# Patient Record
Sex: MALE | Race: WHITE | Employment: OTHER | URBAN - METROPOLITAN AREA
[De-identification: names, ages, dates, MRNs, and addresses within clinical notes are randomized per-mention and may not be internally consistent; named-entity substitution may affect disease eponyms.]

---

## 2017-04-17 ENCOUNTER — HOSPITAL ENCOUNTER (OUTPATIENT)
Dept: OTHER | Age: 67
Discharge: HOME | End: 2017-04-17
Attending: INTERNAL MEDICINE | Admitting: INTERNAL MEDICINE

## 2017-04-17 LAB
A/G RATIO: 1.6 (ref 1.1–2.2)
ALBUMIN SERPL-MCNC: 3.9 G/DL (ref 3.4–5)
ALP BLD-CCNC: 97 U/L (ref 40–129)
ALT SERPL-CCNC: 11 U/L (ref 10–40)
ANION GAP SERPL CALCULATED.3IONS-SCNC: 18 MMOL/L (ref 3–16)
AST SERPL-CCNC: 25 U/L (ref 15–37)
BASOPHILS ABSOLUTE: 0.1 K/UL (ref 0–0.2)
BASOPHILS RELATIVE PERCENT: 1 %
BILIRUB SERPL-MCNC: 0.3 MG/DL (ref 0–1)
BUN BLDV-MCNC: 15 MG/DL (ref 7–20)
C-REACTIVE PROTEIN: 14.1 MG/L (ref 0–5.1)
CALCIUM SERPL-MCNC: 8.8 MG/DL (ref 8.3–10.6)
CHLORIDE BLD-SCNC: 101 MMOL/L (ref 99–110)
CO2: 22 MMOL/L (ref 21–32)
CREAT SERPL-MCNC: 0.7 MG/DL (ref 0.8–1.3)
EOSINOPHILS ABSOLUTE: 0.3 K/UL (ref 0–0.6)
EOSINOPHILS RELATIVE PERCENT: 5.4 %
GFR AFRICAN AMERICAN: >60
GFR NON-AFRICAN AMERICAN: >60
GLOBULIN: 2.5 G/DL
GLUCOSE BLD-MCNC: 90 MG/DL (ref 70–99)
HCT VFR BLD CALC: 29.5 % (ref 40.5–52.5)
HEMOGLOBIN: 9.3 G/DL (ref 13.5–17.5)
LYMPHOCYTES ABSOLUTE: 0.8 K/UL (ref 1–5.1)
LYMPHOCYTES RELATIVE PERCENT: 14.1 %
MCH RBC QN AUTO: 23 PG (ref 26–34)
MCHC RBC AUTO-ENTMCNC: 31.4 G/DL (ref 31–36)
MCV RBC AUTO: 73.3 FL (ref 80–100)
MONOCYTES ABSOLUTE: 0.3 K/UL (ref 0–1.3)
MONOCYTES RELATIVE PERCENT: 6 %
NEUTROPHILS ABSOLUTE: 4 K/UL (ref 1.7–7.7)
NEUTROPHILS RELATIVE PERCENT: 73.5 %
PDW BLD-RTO: 17.8 % (ref 12.4–15.4)
PLATELET # BLD: 260 K/UL (ref 135–450)
PMV BLD AUTO: 8.1 FL (ref 5–10.5)
POTASSIUM SERPL-SCNC: 3.8 MMOL/L (ref 3.5–5.1)
RBC # BLD: 4.02 M/UL (ref 4.2–5.9)
SEDIMENTATION RATE, ERYTHROCYTE: 38 MM/HR (ref 0–20)
SODIUM BLD-SCNC: 141 MMOL/L (ref 136–145)
TOTAL PROTEIN: 6.4 G/DL (ref 6.4–8.2)
WBC # BLD: 5.4 K/UL (ref 4–11)

## 2017-04-25 ENCOUNTER — HOSPITAL ENCOUNTER (OUTPATIENT)
Dept: OTHER | Age: 67
Discharge: HOME | End: 2017-04-25
Attending: INTERNAL MEDICINE | Admitting: INTERNAL MEDICINE

## 2017-04-25 LAB
A/G RATIO: 1.8 (ref 1.1–2.2)
ALBUMIN SERPL-MCNC: 3.9 G/DL (ref 3.4–5)
ALP BLD-CCNC: 108 U/L (ref 40–129)
ALT SERPL-CCNC: <5 U/L (ref 10–40)
ANION GAP SERPL CALCULATED.3IONS-SCNC: 16 MMOL/L (ref 3–16)
AST SERPL-CCNC: 19 U/L (ref 15–37)
BASOPHILS ABSOLUTE: 0.1 K/UL (ref 0–0.2)
BASOPHILS RELATIVE PERCENT: 1 %
BILIRUB SERPL-MCNC: <0.2 MG/DL (ref 0–1)
BUN BLDV-MCNC: 11 MG/DL (ref 7–20)
C-REACTIVE PROTEIN: 11.1 MG/L (ref 0–5.1)
CALCIUM SERPL-MCNC: 8.4 MG/DL (ref 8.3–10.6)
CHLORIDE BLD-SCNC: 104 MMOL/L (ref 99–110)
CO2: 22 MMOL/L (ref 21–32)
CREAT SERPL-MCNC: 0.7 MG/DL (ref 0.8–1.3)
EOSINOPHILS ABSOLUTE: 0.3 K/UL (ref 0–0.6)
EOSINOPHILS RELATIVE PERCENT: 5.4 %
GFR AFRICAN AMERICAN: >60
GFR NON-AFRICAN AMERICAN: >60
GLOBULIN: 2.2 G/DL
GLUCOSE BLD-MCNC: 129 MG/DL (ref 70–99)
HCT VFR BLD CALC: 27.9 % (ref 40.5–52.5)
HEMOGLOBIN: 8.7 G/DL (ref 13.5–17.5)
LYMPHOCYTES ABSOLUTE: 0.5 K/UL (ref 1–5.1)
LYMPHOCYTES RELATIVE PERCENT: 10.7 %
MCH RBC QN AUTO: 22.4 PG (ref 26–34)
MCHC RBC AUTO-ENTMCNC: 31.3 G/DL (ref 31–36)
MCV RBC AUTO: 71.7 FL (ref 80–100)
MONOCYTES ABSOLUTE: 0.4 K/UL (ref 0–1.3)
MONOCYTES RELATIVE PERCENT: 8 %
NEUTROPHILS ABSOLUTE: 3.8 K/UL (ref 1.7–7.7)
NEUTROPHILS RELATIVE PERCENT: 74.9 %
PDW BLD-RTO: 17.3 % (ref 12.4–15.4)
PLATELET # BLD: 266 K/UL (ref 135–450)
PMV BLD AUTO: 7.8 FL (ref 5–10.5)
POTASSIUM SERPL-SCNC: 3.7 MMOL/L (ref 3.5–5.1)
RBC # BLD: 3.89 M/UL (ref 4.2–5.9)
SEDIMENTATION RATE, ERYTHROCYTE: 34 MM/HR (ref 0–20)
SODIUM BLD-SCNC: 142 MMOL/L (ref 136–145)
TOTAL PROTEIN: 6.1 G/DL (ref 6.4–8.2)
WBC # BLD: 5.1 K/UL (ref 4–11)

## 2017-12-22 ENCOUNTER — ORTHOTIC/BRACE ENCOUNTER (OUTPATIENT)
Dept: ORTHOPEDIC SURGERY | Age: 67
End: 2017-12-22

## 2018-02-16 ENCOUNTER — ORTHOTIC/BRACE ENCOUNTER (OUTPATIENT)
Dept: ORTHOPEDIC SURGERY | Age: 68
End: 2018-02-16

## 2019-04-30 ENCOUNTER — OFFICE VISIT (OUTPATIENT)
Dept: SURGERY | Age: 69
End: 2019-04-30
Payer: MEDICARE

## 2019-04-30 VITALS
BODY MASS INDEX: 30.71 KG/M2 | WEIGHT: 260.1 LBS | HEIGHT: 77 IN | SYSTOLIC BLOOD PRESSURE: 136 MMHG | DIASTOLIC BLOOD PRESSURE: 82 MMHG

## 2019-04-30 DIAGNOSIS — Z12.11 ENCOUNTER FOR SCREENING COLONOSCOPY: Primary | ICD-10-CM

## 2019-04-30 DIAGNOSIS — K80.20 ASYMPTOMATIC CHOLELITHIASIS: ICD-10-CM

## 2019-04-30 PROCEDURE — 1123F ACP DISCUSS/DSCN MKR DOCD: CPT | Performed by: SURGERY

## 2019-04-30 PROCEDURE — 99213 OFFICE O/P EST LOW 20 MIN: CPT | Performed by: SURGERY

## 2019-04-30 PROCEDURE — 3017F COLORECTAL CA SCREEN DOC REV: CPT | Performed by: SURGERY

## 2019-04-30 PROCEDURE — 4040F PNEUMOC VAC/ADMIN/RCVD: CPT | Performed by: SURGERY

## 2019-04-30 PROCEDURE — 1036F TOBACCO NON-USER: CPT | Performed by: SURGERY

## 2019-04-30 PROCEDURE — G8427 DOCREV CUR MEDS BY ELIG CLIN: HCPCS | Performed by: SURGERY

## 2019-04-30 PROCEDURE — G8417 CALC BMI ABV UP PARAM F/U: HCPCS | Performed by: SURGERY

## 2019-04-30 RX ORDER — AMOXICILLIN 500 MG/1
500 CAPSULE ORAL 2 TIMES DAILY
Status: ON HOLD | COMMUNITY
End: 2019-07-12 | Stop reason: HOSPADM

## 2019-04-30 RX ORDER — VALSARTAN AND HYDROCHLOROTHIAZIDE 320; 25 MG/1; MG/1
1 TABLET, FILM COATED ORAL
COMMUNITY
Start: 2019-03-23 | End: 2019-07-03 | Stop reason: ALTCHOICE

## 2019-04-30 NOTE — PROGRESS NOTES
[address] [facility] Darrol Every T[address] [facility], [address] [facility], [address] [facility], [address]  Phone: [phone]  Fax: [phone]    Jitendra Bliss   YOB: 1950    Date of Visit:  4/30/2019    Eufemia Almodovar PA-C    HPI:     Gallbladder: Patient well-known to me from prior surgeries - sigmoidectomy/RIH repair, and then [facility] repair, in 2015, has been recently c/o generalized abdominal bloating, nausea and vague discomfort. Denies specific RUQ/epigastric pain. Also has noted some difficulties with having bowel movements - has to strain for a while and then usually does have \"blowout\" movement. Denies N/V, fever, chills, dark urine, jaundice. Has had known gallstones since his prior abdominal surgeries but never w/ any obvious biliary colic. He has never had a colonoscopy even with his diverticular intervention in 2015 (he refused). Denies anorexia, weight loss, increasing fatigue. Allergies   Allergen Reactions    Ace Inhibitors      COUGH    Amlodipine Besylate      Lower extremity edema    Avelox [Moxifloxacin] Other (See Comments)     Hallucination    Clonidine Derivatives      FATIGUE    Flagyl [Metronidazole] Nausea Only     Stomach cramps    Oxycontin [Oxycodone]      Pt has never had reaction but adamantly refuses to take this med     Outpatient Medications Marked as Taking for the 4/30/19 encounter (Office Visit) with Kerwin Winter MD   Medication Sig Dispense Refill    valsartan-hydrochlorothiazide (DIOVAN-HCT) 320-25 MG per tablet Take 1 tablet by mouth      levothyroxine (SYNTHROID) 125 MCG tablet Take 250 mcg by mouth Daily.  Multiple Vitamins-Minerals (THERAPEUTIC MULTIVITAMIN-MINERALS) tablet Take 1 tablet by mouth daily.  Flaxseed, Linseed, (FLAXSEED OIL) 1000 MG CAPS Take 1 tablet by mouth daily.  allopurinol (ZYLOPRIM) 300 MG tablet Take 300 mg by mouth daily.         meloxicam (MOBIC) 15 MG tablet Take 15 mg by mouth daily. Past Medical History:   Diagnosis Date    Arthritis     Cancer ([address]     SKIN    Hypertension     Perforated diverticulitis     Thyroid disease     HYPOTHROID     Past Surgical History:   Procedure Laterality Date    ABDOMINAL ADHESION SURGERY  02/05/15    debridement of colcotaneous fistula    ABDOMINAL EXPLORATION SURGERY  02/05/15    APPENDECTOMY      INGUINAL HERNIA REPAIR Right 02/05/15    INGUINAL HERNIA REPAIR Left 04/22/15    left inguinal hernia repair with mesh    JOINT REPLACEMENT      KNEE SURGERY  11/29/2012    right total knee replacement    SMALL INTESTINE SURGERY  [id number]     Family History   Problem Relation Age of Onset    High Blood Pressure Mother     Other Father         MYASTHENIA GRAVIS     Social History     Socioeconomic History    Marital status: Single     Spouse name: Not on file    Number of children: Not on file    Years of education: Not on file    Highest education level: Not on file   Occupational History    Not on file   Social Needs    Financial resource strain: Not on file    Food insecurity:     Worry: Not on file     Inability: Not on file    Transportation needs:     Medical: Not on file     Non-medical: Not on file   Tobacco Use    Smoking status: Never Smoker    Smokeless tobacco: Never Used   Substance and Sexual Activity    Alcohol use:  Yes     Alcohol/week: 14.4 oz     Types: 24 Cans of beer per week    Drug use: No    Sexual activity: Not on file   Lifestyle    Physical activity:     Days per week: Not on file     Minutes per session: Not on file    Stress: Not on file   Relationships    Social connections:     Talks on phone: Not on file     Gets together: Not on file     Attends Shinto service: Not on file     Active member of club or organization: Not on file     Attends meetings of clubs or organizations: Not on file     Relationship status: Not on file    Intimate partner violence:     Fear of current or ex partner: Not on file     Emotionally abused: Not on file     Physically abused: Not on file     Forced sexual activity: Not on file   Other Topics Concern    Not on file   Social History Narrative    Not on file          Vitals:    04/30/19 1342   BP: 136/82   Site: Left Upper Arm   Position: Sitting   Cuff Size: Large Adult   Weight: 260 lb 1.6 oz (118 kg)   Height: 6' 5.01\" (1.956 m)     Body mass index is 30.84 kg/m². Wt Readings from Last 3 Encounters:   04/30/19 260 lb 1.6 oz (118 kg)   09/03/15 242 lb (109.8 kg)   08/27/15 242 lb 1 oz (109.8 kg)     BP Readings from Last 3 Encounters:   04/30/19 136/82   08/27/15 130/85   08/20/15 134/73          REVIEW OF SYSTEMS:   · All other systems reviewed; please refer to HPI with pertinent positives, all other ROS are negative    PHYSICAL EXAM:    CONSTITUTIONAL:  awake, alert, no apparent distress and normal weight  ENT:  normocepalic, without obvious abnormality  NECK:  supple, symmetrical, trachea midline   LUNGS:  Resp easy and unlabored  CARDIOVASCULAR:     ABDOMEN:  scars noted lower midline scar, soft, non-distended, non-tender, involuntary guarding absent, and possible periumbilical hernia vs rectus diastasis  MUSCULOSKELETAL: No edema  NEUROLOGIC:  Mental Status Exam:  Level of Alertness:   awake  Orientation:   person, place, time        DATA:  IMPRESSION:    1. Cholelithiasis. 2. Stable adenopathy. Signed By: Kandi Rojas DO   Result Narrative   EXAM: CT ABDOMEN AND PELVIS WITH CONTRAST    INDICATION: abd pain and bloating, Pain    COMPARISON: None. TECHNIQUE: Axial CT imaging obtained from lung bases through pelvis. Axial images and multiplanar reformatted images are provided for review.   Up-to-date CT equipment and radiation dose reduction techniques were employed. IV Contrast: 100 cc Omnipaque 350  Oral Contrast: Yes. FINDINGS:    LUNG BASES: Clear.     LIVER: Normal.    GALLBLADDER AND BILIARY TREE: Cholelithiasis.  No

## 2019-07-03 RX ORDER — CLOTRIMAZOLE 1 %
CREAM (GRAM) TOPICAL
COMMUNITY
End: 2019-09-27

## 2019-07-03 RX ORDER — DOXYCYCLINE HYCLATE 100 MG
100 TABLET ORAL DAILY
Status: ON HOLD | COMMUNITY
Start: 2019-05-08 | End: 2019-07-12 | Stop reason: HOSPADM

## 2019-07-03 RX ORDER — VALSARTAN AND HYDROCHLOROTHIAZIDE 320; 25 MG/1; MG/1
1 TABLET, FILM COATED ORAL DAILY
COMMUNITY

## 2019-07-05 ENCOUNTER — HOSPITAL ENCOUNTER (OUTPATIENT)
Dept: PREADMISSION TESTING | Age: 69
Setting detail: SURGERY ADMIT
Discharge: HOME | DRG: 465 | End: 2019-07-09
Payer: MEDICARE

## 2019-07-05 LAB
ABO/RH: NORMAL
ALBUMIN SERPL-MCNC: 4.3 G/DL (ref 3.4–5)
ANION GAP SERPL CALCULATED.3IONS-SCNC: 11 MMOL/L (ref 3–16)
ANTIBODY SCREEN: NORMAL
APTT: 30.4 SEC (ref 26–36)
BASOPHILS ABSOLUTE: 0.1 K/UL (ref 0–0.2)
BASOPHILS RELATIVE PERCENT: 0.7 %
BILIRUBIN URINE: NEGATIVE
BLOOD, URINE: NEGATIVE
BUN BLDV-MCNC: 15 MG/DL (ref 7–20)
CALCIUM SERPL-MCNC: 9.7 MG/DL (ref 8.3–10.6)
CHLORIDE BLD-SCNC: 105 MMOL/L (ref 99–110)
CLARITY: CLEAR
CO2: 25 MMOL/L (ref 21–32)
COLOR: YELLOW
CREAT SERPL-MCNC: 0.8 MG/DL (ref 0.8–1.3)
EKG ATRIAL RATE: 62 BPM
EKG DIAGNOSIS: NORMAL
EKG P AXIS: 72 DEGREES
EKG P-R INTERVAL: 174 MS
EKG Q-T INTERVAL: 430 MS
EKG QRS DURATION: 94 MS
EKG QTC CALCULATION (BAZETT): 436 MS
EKG R AXIS: 20 DEGREES
EKG T AXIS: 1 DEGREES
EKG VENTRICULAR RATE: 62 BPM
EOSINOPHILS ABSOLUTE: 0.1 K/UL (ref 0–0.6)
EOSINOPHILS RELATIVE PERCENT: 1.8 %
ESTIMATED AVERAGE GLUCOSE: 119.8 MG/DL
GFR AFRICAN AMERICAN: >60
GFR NON-AFRICAN AMERICAN: >60
GLUCOSE BLD-MCNC: 107 MG/DL (ref 70–99)
GLUCOSE URINE: NEGATIVE MG/DL
HBA1C MFR BLD: 5.8 %
HCT VFR BLD CALC: 40.4 % (ref 40.5–52.5)
HEMOGLOBIN: 13.4 G/DL (ref 13.5–17.5)
INR BLD: 1 (ref 0.86–1.14)
KETONES, URINE: NEGATIVE MG/DL
LEUKOCYTE ESTERASE, URINE: NEGATIVE
LYMPHOCYTES ABSOLUTE: 0.7 K/UL (ref 1–5.1)
LYMPHOCYTES RELATIVE PERCENT: 8.6 %
MCH RBC QN AUTO: 28.6 PG (ref 26–34)
MCHC RBC AUTO-ENTMCNC: 33.3 G/DL (ref 31–36)
MCV RBC AUTO: 86 FL (ref 80–100)
MICROSCOPIC EXAMINATION: NORMAL
MONOCYTES ABSOLUTE: 0.5 K/UL (ref 0–1.3)
MONOCYTES RELATIVE PERCENT: 6.5 %
NEUTROPHILS ABSOLUTE: 6.3 K/UL (ref 1.7–7.7)
NEUTROPHILS RELATIVE PERCENT: 82.4 %
NITRITE, URINE: NEGATIVE
PDW BLD-RTO: 15.6 % (ref 12.4–15.4)
PH UA: 6 (ref 5–8)
PLATELET # BLD: 278 K/UL (ref 135–450)
PMV BLD AUTO: 7.7 FL (ref 5–10.5)
POTASSIUM SERPL-SCNC: 3.9 MMOL/L (ref 3.5–5.1)
PROTEIN UA: NEGATIVE MG/DL
PROTHROMBIN TIME: 11.4 SEC (ref 9.8–13)
RBC # BLD: 4.7 M/UL (ref 4.2–5.9)
SODIUM BLD-SCNC: 141 MMOL/L (ref 136–145)
SPECIFIC GRAVITY UA: 1.01 (ref 1–1.03)
URINE TYPE: NORMAL
UROBILINOGEN, URINE: 0.2 E.U./DL
WBC # BLD: 7.6 K/UL (ref 4–11)

## 2019-07-05 PROCEDURE — 85025 COMPLETE CBC W/AUTO DIFF WBC: CPT

## 2019-07-05 PROCEDURE — 80048 BASIC METABOLIC PNL TOTAL CA: CPT

## 2019-07-05 PROCEDURE — 85730 THROMBOPLASTIN TIME PARTIAL: CPT

## 2019-07-05 PROCEDURE — 86850 RBC ANTIBODY SCREEN: CPT

## 2019-07-05 PROCEDURE — 36415 COLL VENOUS BLD VENIPUNCTURE: CPT

## 2019-07-05 PROCEDURE — 82040 ASSAY OF SERUM ALBUMIN: CPT

## 2019-07-05 PROCEDURE — 85610 PROTHROMBIN TIME: CPT

## 2019-07-05 PROCEDURE — 81003 URINALYSIS AUTO W/O SCOPE: CPT

## 2019-07-05 PROCEDURE — 86901 BLOOD TYPING SEROLOGIC RH(D): CPT

## 2019-07-05 PROCEDURE — 83036 HEMOGLOBIN GLYCOSYLATED A1C: CPT

## 2019-07-05 PROCEDURE — 87086 URINE CULTURE/COLONY COUNT: CPT

## 2019-07-05 PROCEDURE — 86900 BLOOD TYPING SEROLOGIC ABO: CPT

## 2019-07-06 LAB — URINE CULTURE, ROUTINE: NORMAL

## 2019-07-07 ENCOUNTER — ANESTHESIA EVENT (OUTPATIENT)
Dept: OPERATING ROOM | Age: 69
DRG: 465 | End: 2019-07-07
Payer: MEDICARE

## 2019-07-07 NOTE — ANESTHESIA PRE PROCEDURE
Endo/Other:    (+) hypothyroidism: arthritis: OA., .    (-) diabetes mellitus                ROS comment: S/P Thyroid ablation for hyperthyroidism >20 yrs ago. On Synthroid now Abdominal:           Vascular:     - DVT and PE. Anesthesia Plan      general     ASA 2     (FN Block(s) for post-op pain management per surgeon request.)  Induction: intravenous. MIPS: Prophylactic antiemetics administered. Anesthetic plan and risks discussed with patient. Plan discussed with CRNA.             Han Ortiz MD

## 2019-07-08 ENCOUNTER — HOSPITAL ENCOUNTER (INPATIENT)
Age: 69
LOS: 4 days | Discharge: HOME HEALTH | DRG: 465 | End: 2019-07-12
Attending: ORTHOPAEDIC SURGERY | Admitting: ORTHOPAEDIC SURGERY
Payer: MEDICARE

## 2019-07-08 ENCOUNTER — ANESTHESIA (OUTPATIENT)
Dept: OPERATING ROOM | Age: 69
DRG: 465 | End: 2019-07-08
Payer: MEDICARE

## 2019-07-08 VITALS
SYSTOLIC BLOOD PRESSURE: 101 MMHG | OXYGEN SATURATION: 99 % | RESPIRATION RATE: 14 BRPM | DIASTOLIC BLOOD PRESSURE: 63 MMHG

## 2019-07-08 DIAGNOSIS — T84.7XXS INFECTION/INFLAMMATION DUE TO INTERNAL ORTHOPEDIC DEVICE/IMPLANT/GRAFT, SEQUELA: ICD-10-CM

## 2019-07-08 DIAGNOSIS — T84.53XA INFECTION ASSOCIATED WITH INTERNAL RIGHT KNEE PROSTHESIS, INITIAL ENCOUNTER (HCC): Primary | ICD-10-CM

## 2019-07-08 PROBLEM — Z87.39 HISTORY OF INFECTION OF TOTAL JOINT PROSTHESIS OF KNEE: Status: ACTIVE | Noted: 2019-07-08

## 2019-07-08 PROBLEM — E66.9 OBESITY: Status: ACTIVE | Noted: 2019-07-08

## 2019-07-08 PROBLEM — I10 HYPERTENSION: Status: ACTIVE | Noted: 2019-07-08

## 2019-07-08 LAB
ABO/RH: NORMAL
ANTIBODY SCREEN: NORMAL

## 2019-07-08 PROCEDURE — 3700000000 HC ANESTHESIA ATTENDED CARE: Performed by: ORTHOPAEDIC SURGERY

## 2019-07-08 PROCEDURE — 87075 CULTR BACTERIA EXCEPT BLOOD: CPT

## 2019-07-08 PROCEDURE — 1200000000 HC SEMI PRIVATE

## 2019-07-08 PROCEDURE — 2580000003 HC RX 258: Performed by: ORTHOPAEDIC SURGERY

## 2019-07-08 PROCEDURE — 36415 COLL VENOUS BLD VENIPUNCTURE: CPT

## 2019-07-08 PROCEDURE — 6370000000 HC RX 637 (ALT 250 FOR IP): Performed by: PHYSICIAN ASSISTANT

## 2019-07-08 PROCEDURE — 64447 NJX AA&/STRD FEMORAL NRV IMG: CPT | Performed by: ANESTHESIOLOGY

## 2019-07-08 PROCEDURE — 2700000000 HC OXYGEN THERAPY PER DAY

## 2019-07-08 PROCEDURE — 0SBC0ZZ EXCISION OF RIGHT KNEE JOINT, OPEN APPROACH: ICD-10-PCS | Performed by: ORTHOPAEDIC SURGERY

## 2019-07-08 PROCEDURE — 3600000015 HC SURGERY LEVEL 5 ADDTL 15MIN: Performed by: ORTHOPAEDIC SURGERY

## 2019-07-08 PROCEDURE — C1713 ANCHOR/SCREW BN/BN,TIS/BN: HCPCS | Performed by: ORTHOPAEDIC SURGERY

## 2019-07-08 PROCEDURE — L1830 KO IMMOB CANVAS LONG PRE OTS: HCPCS | Performed by: ORTHOPAEDIC SURGERY

## 2019-07-08 PROCEDURE — 0SPC0JZ REMOVAL OF SYNTHETIC SUBSTITUTE FROM RIGHT KNEE JOINT, OPEN APPROACH: ICD-10-PCS | Performed by: ORTHOPAEDIC SURGERY

## 2019-07-08 PROCEDURE — 86901 BLOOD TYPING SEROLOGIC RH(D): CPT

## 2019-07-08 PROCEDURE — 7100000000 HC PACU RECOVERY - FIRST 15 MIN: Performed by: ORTHOPAEDIC SURGERY

## 2019-07-08 PROCEDURE — 87077 CULTURE AEROBIC IDENTIFY: CPT

## 2019-07-08 PROCEDURE — 2500000003 HC RX 250 WO HCPCS: Performed by: ORTHOPAEDIC SURGERY

## 2019-07-08 PROCEDURE — 94761 N-INVAS EAR/PLS OXIMETRY MLT: CPT

## 2019-07-08 PROCEDURE — 3600000005 HC SURGERY LEVEL 5 BASE: Performed by: ORTHOPAEDIC SURGERY

## 2019-07-08 PROCEDURE — 2500000003 HC RX 250 WO HCPCS: Performed by: NURSE ANESTHETIST, CERTIFIED REGISTERED

## 2019-07-08 PROCEDURE — 87186 SC STD MICRODIL/AGAR DIL: CPT

## 2019-07-08 PROCEDURE — 6360000002 HC RX W HCPCS: Performed by: ORTHOPAEDIC SURGERY

## 2019-07-08 PROCEDURE — 6360000002 HC RX W HCPCS: Performed by: NURSE ANESTHETIST, CERTIFIED REGISTERED

## 2019-07-08 PROCEDURE — 2580000003 HC RX 258: Performed by: ANESTHESIOLOGY

## 2019-07-08 PROCEDURE — 2709999900 HC NON-CHARGEABLE SUPPLY: Performed by: ORTHOPAEDIC SURGERY

## 2019-07-08 PROCEDURE — 6370000000 HC RX 637 (ALT 250 FOR IP): Performed by: ORTHOPAEDIC SURGERY

## 2019-07-08 PROCEDURE — 0QBB0ZZ EXCISION OF RIGHT LOWER FEMUR, OPEN APPROACH: ICD-10-PCS | Performed by: ORTHOPAEDIC SURGERY

## 2019-07-08 PROCEDURE — 3700000001 HC ADD 15 MINUTES (ANESTHESIA): Performed by: ORTHOPAEDIC SURGERY

## 2019-07-08 PROCEDURE — 86900 BLOOD TYPING SEROLOGIC ABO: CPT

## 2019-07-08 PROCEDURE — 86850 RBC ANTIBODY SCREEN: CPT

## 2019-07-08 PROCEDURE — 0SHC08Z INSERTION OF SPACER INTO RIGHT KNEE JOINT, OPEN APPROACH: ICD-10-PCS | Performed by: ORTHOPAEDIC SURGERY

## 2019-07-08 PROCEDURE — 87205 SMEAR GRAM STAIN: CPT

## 2019-07-08 PROCEDURE — 6370000000 HC RX 637 (ALT 250 FOR IP): Performed by: ANESTHESIOLOGY

## 2019-07-08 PROCEDURE — 87070 CULTURE OTHR SPECIMN AEROBIC: CPT

## 2019-07-08 PROCEDURE — 7100000001 HC PACU RECOVERY - ADDTL 15 MIN: Performed by: ORTHOPAEDIC SURGERY

## 2019-07-08 DEVICE — CEMENT BNE 20ML 41GM FULL DOSE PMMA W/ TOBRA M VISC RADPQ: Type: IMPLANTABLE DEVICE | Status: FUNCTIONAL

## 2019-07-08 DEVICE — CEMENT BNE RADIOPAQUE SIMPLEX P SPEEDDET: Type: IMPLANTABLE DEVICE | Status: FUNCTIONAL

## 2019-07-08 RX ORDER — DEXTROSE, SODIUM CHLORIDE, AND POTASSIUM CHLORIDE 5; .45; .15 G/100ML; G/100ML; G/100ML
1000 INJECTION INTRAVENOUS CONTINUOUS
Status: DISPENSED | OUTPATIENT
Start: 2019-07-08 | End: 2019-07-09

## 2019-07-08 RX ORDER — HYDROCHLOROTHIAZIDE 25 MG/1
25 TABLET ORAL DAILY
Status: DISCONTINUED | OUTPATIENT
Start: 2019-07-09 | End: 2019-07-12 | Stop reason: HOSPADM

## 2019-07-08 RX ORDER — GLYCOPYRROLATE 0.2 MG/ML
INJECTION INTRAMUSCULAR; INTRAVENOUS PRN
Status: DISCONTINUED | OUTPATIENT
Start: 2019-07-08 | End: 2019-07-08 | Stop reason: SDUPTHER

## 2019-07-08 RX ORDER — ACETAMINOPHEN 500 MG
1000 TABLET ORAL ONCE
Status: COMPLETED | OUTPATIENT
Start: 2019-07-08 | End: 2019-07-08

## 2019-07-08 RX ORDER — HYDROCODONE BITARTRATE AND ACETAMINOPHEN 5; 325 MG/1; MG/1
1 TABLET ORAL EVERY 4 HOURS PRN
Status: DISCONTINUED | OUTPATIENT
Start: 2019-07-08 | End: 2019-07-12 | Stop reason: HOSPADM

## 2019-07-08 RX ORDER — CELECOXIB 100 MG/1
200 CAPSULE ORAL ONCE
Status: COMPLETED | OUTPATIENT
Start: 2019-07-08 | End: 2019-07-08

## 2019-07-08 RX ORDER — MEPERIDINE HYDROCHLORIDE 50 MG/ML
12.5 INJECTION INTRAMUSCULAR; INTRAVENOUS; SUBCUTANEOUS EVERY 5 MIN PRN
Status: DISCONTINUED | OUTPATIENT
Start: 2019-07-08 | End: 2019-07-08 | Stop reason: HOSPADM

## 2019-07-08 RX ORDER — DOCUSATE SODIUM 100 MG/1
100 CAPSULE, LIQUID FILLED ORAL 2 TIMES DAILY
Status: DISCONTINUED | OUTPATIENT
Start: 2019-07-08 | End: 2019-07-12 | Stop reason: HOSPADM

## 2019-07-08 RX ORDER — ONDANSETRON 2 MG/ML
4 INJECTION INTRAMUSCULAR; INTRAVENOUS
Status: DISCONTINUED | OUTPATIENT
Start: 2019-07-08 | End: 2019-07-08 | Stop reason: HOSPADM

## 2019-07-08 RX ORDER — SODIUM CHLORIDE 0.9 % (FLUSH) 0.9 %
10 SYRINGE (ML) INJECTION PRN
Status: DISCONTINUED | OUTPATIENT
Start: 2019-07-08 | End: 2019-07-08 | Stop reason: HOSPADM

## 2019-07-08 RX ORDER — SODIUM CHLORIDE, SODIUM LACTATE, POTASSIUM CHLORIDE, CALCIUM CHLORIDE 600; 310; 30; 20 MG/100ML; MG/100ML; MG/100ML; MG/100ML
INJECTION, SOLUTION INTRAVENOUS CONTINUOUS
Status: DISCONTINUED | OUTPATIENT
Start: 2019-07-08 | End: 2019-07-08

## 2019-07-08 RX ORDER — HYDROCODONE BITARTRATE AND ACETAMINOPHEN 5; 325 MG/1; MG/1
2 TABLET ORAL EVERY 4 HOURS PRN
Status: DISCONTINUED | OUTPATIENT
Start: 2019-07-08 | End: 2019-07-12 | Stop reason: HOSPADM

## 2019-07-08 RX ORDER — PROMETHAZINE HYDROCHLORIDE 25 MG/ML
6.25 INJECTION, SOLUTION INTRAMUSCULAR; INTRAVENOUS
Status: DISCONTINUED | OUTPATIENT
Start: 2019-07-08 | End: 2019-07-08 | Stop reason: HOSPADM

## 2019-07-08 RX ORDER — LIDOCAINE HYDROCHLORIDE 20 MG/ML
INJECTION, SOLUTION EPIDURAL; INFILTRATION; INTRACAUDAL; PERINEURAL PRN
Status: DISCONTINUED | OUTPATIENT
Start: 2019-07-08 | End: 2019-07-08 | Stop reason: SDUPTHER

## 2019-07-08 RX ORDER — VALSARTAN AND HYDROCHLOROTHIAZIDE 320; 25 MG/1; MG/1
1 TABLET, FILM COATED ORAL DAILY
Status: DISCONTINUED | OUTPATIENT
Start: 2019-07-08 | End: 2019-07-08 | Stop reason: SDUPTHER

## 2019-07-08 RX ORDER — SODIUM CHLORIDE 0.9 % (FLUSH) 0.9 %
10 SYRINGE (ML) INJECTION EVERY 12 HOURS SCHEDULED
Status: DISCONTINUED | OUTPATIENT
Start: 2019-07-08 | End: 2019-07-08 | Stop reason: HOSPADM

## 2019-07-08 RX ORDER — SODIUM CHLORIDE 0.9 % (FLUSH) 0.9 %
10 SYRINGE (ML) INJECTION EVERY 12 HOURS SCHEDULED
Status: DISCONTINUED | OUTPATIENT
Start: 2019-07-08 | End: 2019-07-12 | Stop reason: HOSPADM

## 2019-07-08 RX ORDER — OXYCODONE HYDROCHLORIDE 5 MG/1
5 TABLET ORAL EVERY 4 HOURS PRN
Status: DISCONTINUED | OUTPATIENT
Start: 2019-07-08 | End: 2019-07-08 | Stop reason: ALTCHOICE

## 2019-07-08 RX ORDER — VANCOMYCIN HYDROCHLORIDE 1 G/20ML
INJECTION, POWDER, LYOPHILIZED, FOR SOLUTION INTRAVENOUS PRN
Status: DISCONTINUED | OUTPATIENT
Start: 2019-07-08 | End: 2019-07-08 | Stop reason: HOSPADM

## 2019-07-08 RX ORDER — LEVOTHYROXINE SODIUM 0.1 MG/1
200 TABLET ORAL DAILY
Status: DISCONTINUED | OUTPATIENT
Start: 2019-07-09 | End: 2019-07-12 | Stop reason: HOSPADM

## 2019-07-08 RX ORDER — DEXAMETHASONE SODIUM PHOSPHATE 4 MG/ML
INJECTION, SOLUTION INTRA-ARTICULAR; INTRALESIONAL; INTRAMUSCULAR; INTRAVENOUS; SOFT TISSUE PRN
Status: DISCONTINUED | OUTPATIENT
Start: 2019-07-08 | End: 2019-07-08 | Stop reason: SDUPTHER

## 2019-07-08 RX ORDER — ALLOPURINOL 300 MG/1
300 TABLET ORAL DAILY
Status: DISCONTINUED | OUTPATIENT
Start: 2019-07-09 | End: 2019-07-12 | Stop reason: HOSPADM

## 2019-07-08 RX ORDER — LIDOCAINE HYDROCHLORIDE 10 MG/ML
1 INJECTION, SOLUTION EPIDURAL; INFILTRATION; INTRACAUDAL; PERINEURAL
Status: DISCONTINUED | OUTPATIENT
Start: 2019-07-08 | End: 2019-07-08 | Stop reason: HOSPADM

## 2019-07-08 RX ORDER — CYCLOBENZAPRINE HCL 10 MG
10 TABLET ORAL 3 TIMES DAILY PRN
Status: DISCONTINUED | OUTPATIENT
Start: 2019-07-08 | End: 2019-07-12 | Stop reason: HOSPADM

## 2019-07-08 RX ORDER — ONDANSETRON 2 MG/ML
INJECTION INTRAMUSCULAR; INTRAVENOUS PRN
Status: DISCONTINUED | OUTPATIENT
Start: 2019-07-08 | End: 2019-07-08 | Stop reason: SDUPTHER

## 2019-07-08 RX ORDER — TRANEXAMIC ACID 100 MG/ML
15 INJECTION, SOLUTION INTRAVENOUS ONCE
Status: COMPLETED | OUTPATIENT
Start: 2019-07-08 | End: 2019-07-08

## 2019-07-08 RX ORDER — CELECOXIB 100 MG/1
100 CAPSULE ORAL 2 TIMES DAILY
Status: DISCONTINUED | OUTPATIENT
Start: 2019-07-09 | End: 2019-07-12 | Stop reason: HOSPADM

## 2019-07-08 RX ORDER — SODIUM CHLORIDE 0.9 % (FLUSH) 0.9 %
10 SYRINGE (ML) INJECTION PRN
Status: DISCONTINUED | OUTPATIENT
Start: 2019-07-08 | End: 2019-07-12 | Stop reason: HOSPADM

## 2019-07-08 RX ORDER — HYDROMORPHONE HCL 110MG/55ML
PATIENT CONTROLLED ANALGESIA SYRINGE INTRAVENOUS PRN
Status: DISCONTINUED | OUTPATIENT
Start: 2019-07-08 | End: 2019-07-08 | Stop reason: SDUPTHER

## 2019-07-08 RX ORDER — CYCLOBENZAPRINE HCL 10 MG
10 TABLET ORAL ONCE
Status: COMPLETED | OUTPATIENT
Start: 2019-07-08 | End: 2019-07-08

## 2019-07-08 RX ORDER — HYDRALAZINE HYDROCHLORIDE 20 MG/ML
5 INJECTION INTRAMUSCULAR; INTRAVENOUS EVERY 10 MIN PRN
Status: DISCONTINUED | OUTPATIENT
Start: 2019-07-08 | End: 2019-07-08 | Stop reason: HOSPADM

## 2019-07-08 RX ORDER — VALSARTAN 80 MG/1
320 TABLET ORAL DAILY
Status: DISCONTINUED | OUTPATIENT
Start: 2019-07-09 | End: 2019-07-12 | Stop reason: HOSPADM

## 2019-07-08 RX ORDER — ONDANSETRON 2 MG/ML
4 INJECTION INTRAMUSCULAR; INTRAVENOUS EVERY 6 HOURS PRN
Status: DISCONTINUED | OUTPATIENT
Start: 2019-07-08 | End: 2019-07-12 | Stop reason: HOSPADM

## 2019-07-08 RX ORDER — FENTANYL CITRATE 50 UG/ML
25 INJECTION, SOLUTION INTRAMUSCULAR; INTRAVENOUS EVERY 5 MIN PRN
Status: DISCONTINUED | OUTPATIENT
Start: 2019-07-08 | End: 2019-07-08 | Stop reason: HOSPADM

## 2019-07-08 RX ORDER — OXYCODONE HYDROCHLORIDE 5 MG/1
10 TABLET ORAL EVERY 4 HOURS PRN
Status: DISCONTINUED | OUTPATIENT
Start: 2019-07-08 | End: 2019-07-08 | Stop reason: ALTCHOICE

## 2019-07-08 RX ADMIN — DEXAMETHASONE SODIUM PHOSPHATE 4 MG: 4 INJECTION, SOLUTION INTRAMUSCULAR; INTRAVENOUS at 17:11

## 2019-07-08 RX ADMIN — HYDROMORPHONE HYDROCHLORIDE 0.5 MG: 2 INJECTION INTRAMUSCULAR; INTRAVENOUS; SUBCUTANEOUS at 18:34

## 2019-07-08 RX ADMIN — HYDROCODONE BITARTRATE AND ACETAMINOPHEN 2 TABLET: 5; 325 TABLET ORAL at 21:05

## 2019-07-08 RX ADMIN — Medication 2 G: at 17:05

## 2019-07-08 RX ADMIN — TRANEXAMIC ACID 1500 MG: 100 INJECTION, SOLUTION INTRAVENOUS at 17:16

## 2019-07-08 RX ADMIN — SODIUM CHLORIDE, POTASSIUM CHLORIDE, SODIUM LACTATE AND CALCIUM CHLORIDE: 600; 310; 30; 20 INJECTION, SOLUTION INTRAVENOUS at 16:40

## 2019-07-08 RX ADMIN — CYCLOBENZAPRINE HYDROCHLORIDE 10 MG: 10 TABLET, FILM COATED ORAL at 15:54

## 2019-07-08 RX ADMIN — HYDROMORPHONE HYDROCHLORIDE 0.5 MG: 2 INJECTION INTRAMUSCULAR; INTRAVENOUS; SUBCUTANEOUS at 17:41

## 2019-07-08 RX ADMIN — DOCUSATE SODIUM 100 MG: 100 CAPSULE, LIQUID FILLED ORAL at 21:05

## 2019-07-08 RX ADMIN — CELECOXIB 200 MG: 100 CAPSULE ORAL at 15:54

## 2019-07-08 RX ADMIN — ACETAMINOPHEN 1000 MG: 500 TABLET ORAL at 15:54

## 2019-07-08 RX ADMIN — GLYCOPYRROLATE 0.2 MG: 0.2 INJECTION, SOLUTION INTRAMUSCULAR; INTRAVENOUS at 17:02

## 2019-07-08 RX ADMIN — SODIUM CHLORIDE, POTASSIUM CHLORIDE, SODIUM LACTATE AND CALCIUM CHLORIDE: 600; 310; 30; 20 INJECTION, SOLUTION INTRAVENOUS at 18:27

## 2019-07-08 RX ADMIN — POTASSIUM CHLORIDE, DEXTROSE MONOHYDRATE AND SODIUM CHLORIDE 1000 ML: 150; 5; 450 INJECTION, SOLUTION INTRAVENOUS at 21:05

## 2019-07-08 RX ADMIN — SODIUM CHLORIDE, POTASSIUM CHLORIDE, SODIUM LACTATE AND CALCIUM CHLORIDE: 600; 310; 30; 20 INJECTION, SOLUTION INTRAVENOUS at 15:55

## 2019-07-08 RX ADMIN — LIDOCAINE HYDROCHLORIDE 40 MG: 20 INJECTION, SOLUTION EPIDURAL; INFILTRATION; INTRACAUDAL; PERINEURAL at 17:02

## 2019-07-08 RX ADMIN — ONDANSETRON 4 MG: 2 INJECTION INTRAMUSCULAR; INTRAVENOUS at 17:11

## 2019-07-08 RX ADMIN — HYDROMORPHONE HYDROCHLORIDE 0.5 MG: 2 INJECTION INTRAMUSCULAR; INTRAVENOUS; SUBCUTANEOUS at 17:16

## 2019-07-08 ASSESSMENT — PULMONARY FUNCTION TESTS
PIF_VALUE: 2
PIF_VALUE: 3
PIF_VALUE: 1
PIF_VALUE: 2
PIF_VALUE: 3
PIF_VALUE: 2
PIF_VALUE: 3
PIF_VALUE: 3
PIF_VALUE: 2
PIF_VALUE: 2
PIF_VALUE: 3
PIF_VALUE: 2
PIF_VALUE: 16
PIF_VALUE: 3
PIF_VALUE: 2
PIF_VALUE: 11
PIF_VALUE: 2
PIF_VALUE: 2
PIF_VALUE: 1
PIF_VALUE: 0
PIF_VALUE: 2
PIF_VALUE: 3
PIF_VALUE: 2
PIF_VALUE: 10
PIF_VALUE: 2
PIF_VALUE: 2
PIF_VALUE: 3
PIF_VALUE: 2
PIF_VALUE: 3
PIF_VALUE: 10
PIF_VALUE: 2
PIF_VALUE: 11
PIF_VALUE: 4
PIF_VALUE: 2
PIF_VALUE: 10
PIF_VALUE: 2
PIF_VALUE: 3
PIF_VALUE: 2
PIF_VALUE: 11
PIF_VALUE: 3
PIF_VALUE: 1
PIF_VALUE: 10
PIF_VALUE: 2
PIF_VALUE: 3
PIF_VALUE: 2
PIF_VALUE: 5
PIF_VALUE: 3
PIF_VALUE: 2
PIF_VALUE: 3
PIF_VALUE: 2
PIF_VALUE: 10
PIF_VALUE: 10
PIF_VALUE: 2
PIF_VALUE: 2
PIF_VALUE: 3
PIF_VALUE: 2
PIF_VALUE: 11
PIF_VALUE: 3
PIF_VALUE: 2
PIF_VALUE: 2
PIF_VALUE: 10
PIF_VALUE: 2
PIF_VALUE: 3
PIF_VALUE: 3
PIF_VALUE: 2

## 2019-07-08 ASSESSMENT — PAIN SCALES - GENERAL
PAINLEVEL_OUTOF10: 2
PAINLEVEL_OUTOF10: 0
PAINLEVEL_OUTOF10: 7
PAINLEVEL_OUTOF10: 0
PAINLEVEL_OUTOF10: 0

## 2019-07-08 ASSESSMENT — PAIN DESCRIPTION - PAIN TYPE
TYPE: SURGICAL PAIN
TYPE: SURGICAL PAIN

## 2019-07-08 ASSESSMENT — PAIN DESCRIPTION - ORIENTATION: ORIENTATION: RIGHT

## 2019-07-08 ASSESSMENT — PAIN DESCRIPTION - LOCATION: LOCATION: KNEE

## 2019-07-08 ASSESSMENT — LIFESTYLE VARIABLES: SMOKING_STATUS: 0

## 2019-07-08 ASSESSMENT — PAIN - FUNCTIONAL ASSESSMENT: PAIN_FUNCTIONAL_ASSESSMENT: 0-10

## 2019-07-09 LAB
ANION GAP SERPL CALCULATED.3IONS-SCNC: 11 MMOL/L (ref 3–16)
BUN BLDV-MCNC: 17 MG/DL (ref 7–20)
CALCIUM SERPL-MCNC: 8.8 MG/DL (ref 8.3–10.6)
CHLORIDE BLD-SCNC: 100 MMOL/L (ref 99–110)
CO2: 26 MMOL/L (ref 21–32)
CREAT SERPL-MCNC: 0.8 MG/DL (ref 0.8–1.3)
GFR AFRICAN AMERICAN: >60
GFR NON-AFRICAN AMERICAN: >60
GLUCOSE BLD-MCNC: 135 MG/DL (ref 70–99)
HCT VFR BLD CALC: 39.2 % (ref 40.5–52.5)
HEMOGLOBIN: 13.1 G/DL (ref 13.5–17.5)
MCH RBC QN AUTO: 28.7 PG (ref 26–34)
MCHC RBC AUTO-ENTMCNC: 33.3 G/DL (ref 31–36)
MCV RBC AUTO: 86.2 FL (ref 80–100)
PDW BLD-RTO: 15.6 % (ref 12.4–15.4)
PLATELET # BLD: 267 K/UL (ref 135–450)
PMV BLD AUTO: 7.7 FL (ref 5–10.5)
POTASSIUM REFLEX MAGNESIUM: 3.8 MMOL/L (ref 3.5–5.1)
RBC # BLD: 4.55 M/UL (ref 4.2–5.9)
SODIUM BLD-SCNC: 137 MMOL/L (ref 136–145)
WBC # BLD: 10 K/UL (ref 4–11)

## 2019-07-09 PROCEDURE — 1200000000 HC SEMI PRIVATE

## 2019-07-09 PROCEDURE — 51798 US URINE CAPACITY MEASURE: CPT

## 2019-07-09 PROCEDURE — 97530 THERAPEUTIC ACTIVITIES: CPT

## 2019-07-09 PROCEDURE — APPSS15 APP SPLIT SHARED TIME 0-15 MINUTES: Performed by: PHYSICIAN ASSISTANT

## 2019-07-09 PROCEDURE — 6370000000 HC RX 637 (ALT 250 FOR IP): Performed by: PHYSICIAN ASSISTANT

## 2019-07-09 PROCEDURE — G0009 ADMIN PNEUMOCOCCAL VACCINE: HCPCS | Performed by: ORTHOPAEDIC SURGERY

## 2019-07-09 PROCEDURE — 6370000000 HC RX 637 (ALT 250 FOR IP): Performed by: ANESTHESIOLOGY

## 2019-07-09 PROCEDURE — 90670 PCV13 VACCINE IM: CPT | Performed by: ORTHOPAEDIC SURGERY

## 2019-07-09 PROCEDURE — 85027 COMPLETE CBC AUTOMATED: CPT

## 2019-07-09 PROCEDURE — 36415 COLL VENOUS BLD VENIPUNCTURE: CPT

## 2019-07-09 PROCEDURE — 97535 SELF CARE MNGMENT TRAINING: CPT

## 2019-07-09 PROCEDURE — 6370000000 HC RX 637 (ALT 250 FOR IP): Performed by: ORTHOPAEDIC SURGERY

## 2019-07-09 PROCEDURE — 80048 BASIC METABOLIC PNL TOTAL CA: CPT

## 2019-07-09 PROCEDURE — 97116 GAIT TRAINING THERAPY: CPT

## 2019-07-09 PROCEDURE — 97166 OT EVAL MOD COMPLEX 45 MIN: CPT

## 2019-07-09 PROCEDURE — 2580000003 HC RX 258: Performed by: ORTHOPAEDIC SURGERY

## 2019-07-09 PROCEDURE — 97162 PT EVAL MOD COMPLEX 30 MIN: CPT

## 2019-07-09 PROCEDURE — 6360000002 HC RX W HCPCS: Performed by: ORTHOPAEDIC SURGERY

## 2019-07-09 RX ADMIN — LEVOTHYROXINE SODIUM 200 MCG: 100 TABLET ORAL at 08:53

## 2019-07-09 RX ADMIN — HYDROCODONE BITARTRATE AND ACETAMINOPHEN 1 TABLET: 5; 325 TABLET ORAL at 12:08

## 2019-07-09 RX ADMIN — CELECOXIB 100 MG: 100 CAPSULE ORAL at 08:52

## 2019-07-09 RX ADMIN — ALLOPURINOL 300 MG: 300 TABLET ORAL at 08:53

## 2019-07-09 RX ADMIN — Medication 10 ML: at 20:09

## 2019-07-09 RX ADMIN — CYCLOBENZAPRINE HYDROCHLORIDE 10 MG: 10 TABLET, FILM COATED ORAL at 22:50

## 2019-07-09 RX ADMIN — PNEUMOCOCCAL 13-VALENT CONJUGATE VACCINE 0.5 ML: 2.2; 2.2; 2.2; 2.2; 2.2; 4.4; 2.2; 2.2; 2.2; 2.2; 2.2; 2.2; 2.2 INJECTION, SUSPENSION INTRAMUSCULAR at 18:59

## 2019-07-09 RX ADMIN — Medication 2 G: at 01:20

## 2019-07-09 RX ADMIN — Medication 2 G: at 08:53

## 2019-07-09 RX ADMIN — DOCUSATE SODIUM 100 MG: 100 CAPSULE, LIQUID FILLED ORAL at 20:09

## 2019-07-09 RX ADMIN — HYDROCODONE BITARTRATE AND ACETAMINOPHEN 1 TABLET: 5; 325 TABLET ORAL at 20:30

## 2019-07-09 RX ADMIN — HYDROCODONE BITARTRATE AND ACETAMINOPHEN 1 TABLET: 5; 325 TABLET ORAL at 22:50

## 2019-07-09 RX ADMIN — DOCUSATE SODIUM 100 MG: 100 CAPSULE, LIQUID FILLED ORAL at 08:53

## 2019-07-09 RX ADMIN — CELECOXIB 100 MG: 100 CAPSULE ORAL at 20:09

## 2019-07-09 RX ADMIN — ENOXAPARIN SODIUM 40 MG: 40 INJECTION SUBCUTANEOUS at 08:53

## 2019-07-09 ASSESSMENT — PAIN SCALES - GENERAL
PAINLEVEL_OUTOF10: 6
PAINLEVEL_OUTOF10: 2
PAINLEVEL_OUTOF10: 5
PAINLEVEL_OUTOF10: 1
PAINLEVEL_OUTOF10: 5
PAINLEVEL_OUTOF10: 1

## 2019-07-09 ASSESSMENT — PAIN DESCRIPTION - ORIENTATION
ORIENTATION: RIGHT

## 2019-07-09 ASSESSMENT — PAIN DESCRIPTION - LOCATION
LOCATION: KNEE

## 2019-07-09 ASSESSMENT — PAIN DESCRIPTION - FREQUENCY: FREQUENCY: CONTINUOUS

## 2019-07-09 ASSESSMENT — PAIN DESCRIPTION - PAIN TYPE
TYPE: SURGICAL PAIN

## 2019-07-09 ASSESSMENT — PAIN DESCRIPTION - DESCRIPTORS
DESCRIPTORS: ACHING
DESCRIPTORS: ACHING

## 2019-07-09 NOTE — PLAN OF CARE
Problem: Falls - Risk of:  Goal: Will remain free from falls  Description  Will remain free from falls  Note:   Bed in lowest, locked position, side rails up X2, bed check in place, call light within reach. Instructed pt to call for assistance before getting out of bed, pt verbalized understanding. Will continue to monitor.

## 2019-07-09 NOTE — PROGRESS NOTES
Pt has not voided yet tonight. Bladder scanned pt for 613 ml. Pt refusing straight cath at this time, states he will urinate later. Will continue to monitor.

## 2019-07-09 NOTE — PROGRESS NOTES
Concurrent Group Co-treatment   Time In 0905         Time Out 0940         Minutes 35         Timed Code Treatment Minutes: 22 Minutes       Miley Wilkins

## 2019-07-09 NOTE — PROGRESS NOTES
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[employer])  [address]: Working with his service dogs (takes his 2 labs to schools 2-3 days/week to help children with reading, walking, anger management, etc.)    Objective  Observation/Palpation  Posture: Good    RLE General AROM: WFL hip & ankle, NT in knee due to recent spacer placement  LLE AROM : WFL  Strength RLE: Not formally strength tested due to recent surgery; appears at least 3/5 in hip & ankle through observation  Strength LLE: WFL     Bed mobility  Supine to Sit: Stand by assistance(moving toward L side, HOB elevated ~45 degrees)  Scooting: Stand by assistance(to scoot forward to EOB)     Transfers  Sit to Stand: Contact guard assistance  Stand to sit: Stand by assistance  Bed to Chair: Contact guard assistance(using std. walker)     Ambulation  WB Status: TTWB RLE with KI  Surface: level tile  Device: Standard Walker  Other Apparatus: Knee Immobilizer;Right  Assistance: Contact guard assistance  Quality of Gait: Pt amb with slow jorge with decreased stride length. Min cues needed to initially maintain TTWB RLE. Mildly unsteady when first starting ambulation with overall balance improving with repetition. No LOB. Distance: x 20 feet  Comments: Amb distance limited by c/o fatigue and RLE discomfort.     Stairs/Curb  Stairs?: No(deferred curb step assessment today due to unsteadiness when amb over level

## 2019-07-09 NOTE — CARE COORDINATION
CASE MANAGEMENT INITIAL ASSESSMENT      Reviewed chart and met with patient today, re: 79 yr old male, possible dc needs    Explained Case Management role/services. 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 He is normally independent in ADLs and drives. He has help at home if needed from his friends that live close by. Transportation: [place] [id number] at home: Walker_x_Cane__RTS__ BSC__Shower Chair__  02__ HHN__ CPAP__  BiPap__  Hospital Bed__ W/C___ Other__________    Services in the home and/or outpatient, prior to admission: None, but has been active with [facility] in the past     Dialysis Facility (if applicable)   · Name:  · Address:  · Dialysis Schedule:  · Phone:  · Fax:    PT/OT recs: Ordered and Pending     Hospital Exemption Notification (HEN): NA    Barriers to discharge: None    Plan/comments: Pt states that he plans on returning home at DC. He has friends that can help him as needed with shopping and transportation, ect. He states that he would like to use [facility] for skilled services again. He does not have agency preference for pharmacy. Referral placed to ALMA Duong RN; Request additional referral to [facility]. Pt may DC tomorrow 7/10 pending ID input and culture results. He will likely need PICC placement. Will follow.      ECOC on chart for MD fiona Fisher RN

## 2019-07-09 NOTE — PLAN OF CARE
Nutrition Problem: Inadequate oral intake  Intervention: Food and/or Nutrient Delivery: Continue current diet, Start ONS  Nutritional Goals: Patient will consume 50%< of PO meals and supplements provided

## 2019-07-10 LAB
ANION GAP SERPL CALCULATED.3IONS-SCNC: 10 MMOL/L (ref 3–16)
BUN BLDV-MCNC: 13 MG/DL (ref 7–20)
CALCIUM SERPL-MCNC: 8.8 MG/DL (ref 8.3–10.6)
CHLORIDE BLD-SCNC: 101 MMOL/L (ref 99–110)
CO2: 26 MMOL/L (ref 21–32)
CREAT SERPL-MCNC: 0.7 MG/DL (ref 0.8–1.3)
GFR AFRICAN AMERICAN: >60
GFR NON-AFRICAN AMERICAN: >60
GLUCOSE BLD-MCNC: 99 MG/DL (ref 70–99)
HCT VFR BLD CALC: 37.4 % (ref 40.5–52.5)
HEMOGLOBIN: 12.5 G/DL (ref 13.5–17.5)
MCH RBC QN AUTO: 28.8 PG (ref 26–34)
MCHC RBC AUTO-ENTMCNC: 33.3 G/DL (ref 31–36)
MCV RBC AUTO: 86.5 FL (ref 80–100)
PDW BLD-RTO: 16.4 % (ref 12.4–15.4)
PLATELET # BLD: 242 K/UL (ref 135–450)
PMV BLD AUTO: 7.7 FL (ref 5–10.5)
POTASSIUM SERPL-SCNC: 4.1 MMOL/L (ref 3.5–5.1)
RBC # BLD: 4.33 M/UL (ref 4.2–5.9)
SODIUM BLD-SCNC: 137 MMOL/L (ref 136–145)
WBC # BLD: 8.4 K/UL (ref 4–11)

## 2019-07-10 PROCEDURE — 36415 COLL VENOUS BLD VENIPUNCTURE: CPT

## 2019-07-10 PROCEDURE — 85027 COMPLETE CBC AUTOMATED: CPT

## 2019-07-10 PROCEDURE — 6370000000 HC RX 637 (ALT 250 FOR IP): Performed by: NURSE PRACTITIONER

## 2019-07-10 PROCEDURE — 6360000002 HC RX W HCPCS: Performed by: INTERNAL MEDICINE

## 2019-07-10 PROCEDURE — 99223 1ST HOSP IP/OBS HIGH 75: CPT | Performed by: INTERNAL MEDICINE

## 2019-07-10 PROCEDURE — 97535 SELF CARE MNGMENT TRAINING: CPT

## 2019-07-10 PROCEDURE — 6370000000 HC RX 637 (ALT 250 FOR IP): Performed by: ANESTHESIOLOGY

## 2019-07-10 PROCEDURE — 2580000003 HC RX 258: Performed by: INTERNAL MEDICINE

## 2019-07-10 PROCEDURE — 80048 BASIC METABOLIC PNL TOTAL CA: CPT

## 2019-07-10 PROCEDURE — 97530 THERAPEUTIC ACTIVITIES: CPT

## 2019-07-10 PROCEDURE — 2580000003 HC RX 258: Performed by: ORTHOPAEDIC SURGERY

## 2019-07-10 PROCEDURE — 1200000000 HC SEMI PRIVATE

## 2019-07-10 PROCEDURE — 6370000000 HC RX 637 (ALT 250 FOR IP): Performed by: PHYSICIAN ASSISTANT

## 2019-07-10 PROCEDURE — 6360000002 HC RX W HCPCS: Performed by: ORTHOPAEDIC SURGERY

## 2019-07-10 PROCEDURE — 97116 GAIT TRAINING THERAPY: CPT

## 2019-07-10 PROCEDURE — 6370000000 HC RX 637 (ALT 250 FOR IP): Performed by: ORTHOPAEDIC SURGERY

## 2019-07-10 RX ORDER — SODIUM CHLORIDE 9 MG/ML
INJECTION, SOLUTION INTRAVENOUS
Status: DISPENSED
Start: 2019-07-10 | End: 2019-07-11

## 2019-07-10 RX ORDER — IBUPROFEN 200 MG
CAPSULE ORAL 2 TIMES DAILY
Status: DISCONTINUED | OUTPATIENT
Start: 2019-07-10 | End: 2019-07-12 | Stop reason: HOSPADM

## 2019-07-10 RX ADMIN — ENOXAPARIN SODIUM 40 MG: 40 INJECTION SUBCUTANEOUS at 08:16

## 2019-07-10 RX ADMIN — CELECOXIB 100 MG: 100 CAPSULE ORAL at 21:27

## 2019-07-10 RX ADMIN — Medication 10 ML: at 08:17

## 2019-07-10 RX ADMIN — ALLOPURINOL 300 MG: 300 TABLET ORAL at 08:15

## 2019-07-10 RX ADMIN — CEFEPIME HYDROCHLORIDE 2 G: 2 INJECTION, POWDER, FOR SOLUTION INTRAVENOUS at 16:46

## 2019-07-10 RX ADMIN — CYCLOBENZAPRINE HYDROCHLORIDE 10 MG: 10 TABLET, FILM COATED ORAL at 19:53

## 2019-07-10 RX ADMIN — DOCUSATE SODIUM 100 MG: 100 CAPSULE, LIQUID FILLED ORAL at 08:16

## 2019-07-10 RX ADMIN — LEVOTHYROXINE SODIUM 200 MCG: 100 TABLET ORAL at 08:15

## 2019-07-10 RX ADMIN — Medication 10 ML: at 21:27

## 2019-07-10 RX ADMIN — VANCOMYCIN HYDROCHLORIDE 2000 MG: 1 INJECTION, POWDER, LYOPHILIZED, FOR SOLUTION INTRAVENOUS at 17:30

## 2019-07-10 RX ADMIN — POLYMYXIN B SULFATE, BACITRACIN ZINC, NEOMYCIN SULFATE: 5000; 3.5; 4 OINTMENT TOPICAL at 16:17

## 2019-07-10 RX ADMIN — CELECOXIB 100 MG: 100 CAPSULE ORAL at 08:15

## 2019-07-10 RX ADMIN — HYDROCODONE BITARTRATE AND ACETAMINOPHEN 1 TABLET: 5; 325 TABLET ORAL at 19:53

## 2019-07-10 ASSESSMENT — PAIN SCALES - GENERAL
PAINLEVEL_OUTOF10: 7
PAINLEVEL_OUTOF10: 6
PAINLEVEL_OUTOF10: 2
PAINLEVEL_OUTOF10: 1

## 2019-07-10 ASSESSMENT — ENCOUNTER SYMPTOMS
SHORTNESS OF BREATH: 0
EYE DISCHARGE: 0
EYE REDNESS: 0
WHEEZING: 0
COUGH: 0
RHINORRHEA: 0
BACK PAIN: 0
CONSTIPATION: 0
TROUBLE SWALLOWING: 0
SORE THROAT: 0
ABDOMINAL PAIN: 0
DIARRHEA: 0
NAUSEA: 0

## 2019-07-10 ASSESSMENT — PAIN DESCRIPTION - PAIN TYPE
TYPE: SURGICAL PAIN
TYPE: SURGICAL PAIN

## 2019-07-10 ASSESSMENT — PAIN DESCRIPTION - LOCATION
LOCATION: KNEE
LOCATION: KNEE

## 2019-07-10 ASSESSMENT — PAIN DESCRIPTION - ORIENTATION
ORIENTATION: RIGHT
ORIENTATION: RIGHT

## 2019-07-10 ASSESSMENT — PAIN SCALES - WONG BAKER: WONGBAKER_NUMERICALRESPONSE: 6

## 2019-07-10 NOTE — PLAN OF CARE
Patient educated to not get up without assistance. Patient verbalized understanding. Patient verbalized concerns about how he will get around at home, but will only go home.

## 2019-07-10 NOTE — PROGRESS NOTES
Pt aware and voiced understanding. All questions answered. CM aware and following.     Rodger Belcher, CNP

## 2019-07-10 NOTE — CONSULTS
Infectious Diseases   Consult Note        Admission Date: 7/8/2019  Hospital Day: Hospital Day: 3  Attending: Anna Eckert MD  Date of service: 7/10/19    Reason for admission: History of infection of total joint prosthesis of knee [Z87.39]    Chief complaint/ Reason for consult: Right knee prosthetic joint infection    Microbiology:        I have reviewed allavailable micro lab data and cultures    · Right knee synovial joint fluid culture  - collected on 7/8/2019: *In process      Antibiotics and immunizations:       Current antibiotics: All antibiotics and their doses were reviewed by me    Recent Abx Admin      No antibiotic orders with administrations found. Immunization History: All immunization history was reviewed by me today. Immunization History   Administered Date(s) Administered    Pneumococcal Conjugate 13-valent ([id number]) 07/09/2019       Known drug allergies: All allergies were reviewed and updated    Allergies   Allergen Reactions    Ace Inhibitors      COUGH    Amlodipine Besylate      Lower extremity edema    Avelox [Moxifloxacin] Other (See Comments)     Hallucination    Clonidine Derivatives      FATIGUE    Flagyl [Metronidazole] Nausea Only     Stomach cramps    Oxycontin [Oxycodone]      Pt has never had reaction but adamantly refuses to take this med       Assessment:     The patient is a 79 y.o. old male who  has a past medical history of Arthritis, Cancer ([address] Hypertension, Perforated diverticulitis, and Thyroid disease. with following problems:    · Complicated right knee prosthetic joint infection  · Status post right knee stage I surgery with hardware removal and antibiotic spacer placement on 7/8/2019  · History of MSSA and Enterococcus faecalis infection in the past  · Failure of outpatient treatment  · Essential hypertension  · Thyroid disease  · Overweight due to excess calorie intake : Body mass index is 28.89 kg/m².         Discussion:

## 2019-07-10 NOTE — CONSULTS
Pharmacy to Dose Vancomycin    Dx: Prosthetic Joint Infection   Goal trough =  15-20 mcg/mL  Pt wt = 113.4 kg,  Estimated Creatinine Clearance: 147 mL/min (A) (based on SCr of 0.7 mg/dL (L)). Start Vancomycin 2000 mg IVPB q12h today at 1600.   Vancomycin trough prior to 4th dose (7/12 0300)   Lashell Car PharmD 7/10/2019 3:08 PM

## 2019-07-11 LAB
HCT VFR BLD CALC: 36.2 % (ref 40.5–52.5)
HEMOGLOBIN: 12.1 G/DL (ref 13.5–17.5)
INR BLD: 1.04 (ref 0.86–1.14)
MCH RBC QN AUTO: 28.9 PG (ref 26–34)
MCHC RBC AUTO-ENTMCNC: 33.5 G/DL (ref 31–36)
MCV RBC AUTO: 86.1 FL (ref 80–100)
PDW BLD-RTO: 16.5 % (ref 12.4–15.4)
PLATELET # BLD: 229 K/UL (ref 135–450)
PMV BLD AUTO: 8.1 FL (ref 5–10.5)
PROTHROMBIN TIME: 11.9 SEC (ref 9.8–13)
RBC # BLD: 4.2 M/UL (ref 4.2–5.9)
WBC # BLD: 6.8 K/UL (ref 4–11)

## 2019-07-11 PROCEDURE — 97535 SELF CARE MNGMENT TRAINING: CPT

## 2019-07-11 PROCEDURE — 97530 THERAPEUTIC ACTIVITIES: CPT

## 2019-07-11 PROCEDURE — 2580000003 HC RX 258: Performed by: ORTHOPAEDIC SURGERY

## 2019-07-11 PROCEDURE — 6370000000 HC RX 637 (ALT 250 FOR IP): Performed by: PHYSICIAN ASSISTANT

## 2019-07-11 PROCEDURE — 6360000002 HC RX W HCPCS: Performed by: INTERNAL MEDICINE

## 2019-07-11 PROCEDURE — 85610 PROTHROMBIN TIME: CPT

## 2019-07-11 PROCEDURE — 99233 SBSQ HOSP IP/OBS HIGH 50: CPT | Performed by: INTERNAL MEDICINE

## 2019-07-11 PROCEDURE — 97116 GAIT TRAINING THERAPY: CPT

## 2019-07-11 PROCEDURE — 85027 COMPLETE CBC AUTOMATED: CPT

## 2019-07-11 PROCEDURE — 36415 COLL VENOUS BLD VENIPUNCTURE: CPT

## 2019-07-11 PROCEDURE — 2580000003 HC RX 258: Performed by: INTERNAL MEDICINE

## 2019-07-11 PROCEDURE — 6370000000 HC RX 637 (ALT 250 FOR IP): Performed by: ANESTHESIOLOGY

## 2019-07-11 PROCEDURE — 6370000000 HC RX 637 (ALT 250 FOR IP): Performed by: ORTHOPAEDIC SURGERY

## 2019-07-11 PROCEDURE — 1200000000 HC SEMI PRIVATE

## 2019-07-11 RX ORDER — SODIUM CHLORIDE 9 MG/ML
INJECTION, SOLUTION INTRAVENOUS
Status: DISPENSED
Start: 2019-07-11 | End: 2019-07-12

## 2019-07-11 RX ORDER — SODIUM CHLORIDE 0.9 % (FLUSH) 0.9 %
10 SYRINGE (ML) INJECTION PRN
Status: DISCONTINUED | OUTPATIENT
Start: 2019-07-11 | End: 2019-07-12 | Stop reason: HOSPADM

## 2019-07-11 RX ORDER — LIDOCAINE HYDROCHLORIDE 10 MG/ML
5 INJECTION, SOLUTION INFILTRATION; PERINEURAL ONCE
Status: COMPLETED | OUTPATIENT
Start: 2019-07-11 | End: 2019-07-12

## 2019-07-11 RX ORDER — SODIUM CHLORIDE 0.9 % (FLUSH) 0.9 %
10 SYRINGE (ML) INJECTION EVERY 12 HOURS SCHEDULED
Status: DISCONTINUED | OUTPATIENT
Start: 2019-07-11 | End: 2019-07-12 | Stop reason: HOSPADM

## 2019-07-11 RX ADMIN — DOCUSATE SODIUM 100 MG: 100 CAPSULE, LIQUID FILLED ORAL at 20:17

## 2019-07-11 RX ADMIN — LEVOTHYROXINE SODIUM 200 MCG: 100 TABLET ORAL at 09:10

## 2019-07-11 RX ADMIN — VANCOMYCIN HYDROCHLORIDE 2000 MG: 1 INJECTION, POWDER, LYOPHILIZED, FOR SOLUTION INTRAVENOUS at 03:28

## 2019-07-11 RX ADMIN — HYDROCODONE BITARTRATE AND ACETAMINOPHEN 1 TABLET: 5; 325 TABLET ORAL at 20:17

## 2019-07-11 RX ADMIN — Medication 10 ML: at 09:13

## 2019-07-11 RX ADMIN — CELECOXIB 100 MG: 100 CAPSULE ORAL at 09:10

## 2019-07-11 RX ADMIN — CELECOXIB 100 MG: 100 CAPSULE ORAL at 20:17

## 2019-07-11 RX ADMIN — CYCLOBENZAPRINE HYDROCHLORIDE 10 MG: 10 TABLET, FILM COATED ORAL at 20:17

## 2019-07-11 RX ADMIN — POLYMYXIN B SULFATE, BACITRACIN ZINC, NEOMYCIN SULFATE: 5000; 3.5; 4 OINTMENT TOPICAL at 20:18

## 2019-07-11 RX ADMIN — DOCUSATE SODIUM 100 MG: 100 CAPSULE, LIQUID FILLED ORAL at 09:10

## 2019-07-11 RX ADMIN — CEFEPIME HYDROCHLORIDE 2 G: 2 INJECTION, POWDER, FOR SOLUTION INTRAVENOUS at 16:25

## 2019-07-11 RX ADMIN — VANCOMYCIN HYDROCHLORIDE 2000 MG: 1 INJECTION, POWDER, LYOPHILIZED, FOR SOLUTION INTRAVENOUS at 17:12

## 2019-07-11 RX ADMIN — CEFEPIME HYDROCHLORIDE 2 G: 2 INJECTION, POWDER, FOR SOLUTION INTRAVENOUS at 02:47

## 2019-07-11 RX ADMIN — POLYMYXIN B SULFATE, BACITRACIN ZINC, NEOMYCIN SULFATE: 5000; 3.5; 4 OINTMENT TOPICAL at 09:11

## 2019-07-11 RX ADMIN — ALLOPURINOL 300 MG: 300 TABLET ORAL at 09:10

## 2019-07-11 ASSESSMENT — PAIN SCALES - GENERAL
PAINLEVEL_OUTOF10: 0
PAINLEVEL_OUTOF10: 0
PAINLEVEL_OUTOF10: 6

## 2019-07-11 NOTE — PROGRESS NOTES
color, texture, turgor normal.  OR ace and brace intact. Increased drainage from incision noted. Neurologic:  Neurovascularly intact. Cranial nerves: II-XII intact, grossly non-focal.  Psychiatric: Alert and oriented, thought content appropriate, normal insight  Capillary Refill: Brisk,< 3 seconds   Peripheral Pulses: +2 palpable, equal bilaterally       Assessment/Plan:    Active Hospital Problems    Diagnosis    Infection/inflammation due to internal orthopedic device/implant/graft, sequela [T84. 7XXS]    Failure of outpatient treatment [Z78.9]    Thyroid disease [E07.9]    Overweight [E66.3]    Infection of prosthetic right knee joint ([address] [place]     History of infection of total joint prosthesis of knee [Z87.39]    Hypertension [I10]    Obesity [E66.9]     Infection of right total knee prosthesis  -s/p right knee I&D with removal of total knee and placement of cement spacer  -post op management and pain control per ortho  -ID consult; awaiting final ID plans now that surgical cultures finalized  -Monitor incision for further drainage to ensure no need for repeat I&D per Ortho  -iv and po pain medication    Essential HTN, controlled  -continue diovan    Obesity  With Body mass index is 29.6 kg/m². Complicating assessment and treatment. Placing patient at risk for multiple co-morbidities as well as early death and contributing to the patient's presentation.  Counseled on weight loss    DVT Prophylaxis: Lovenox  Diet: DIET GENERAL;  Dietary Nutrition Supplements: Low Calorie High Protein Supplement  Code Status: Full Code  PT/OT Eval Status: Ongoing    Dispo - No medical barriers to discharge when arranged by Edna Montes MD

## 2019-07-11 NOTE — PROGRESS NOTES
Occupational Therapy  Facility/Department: [place] [id number] - MED SURG/ORTHO  Daily Treatment Note  NAME: Yaneth Whelan  : 1950  MRN: [id number]    Date of Service: 2019    Discharge Recommendations:  Home with assist PRN  OT Equipment Recommendations  Equipment Needed: Yes  Mobility Devices: ADL Assistive Devices  ADL Assistive Devices: Walker Tray;Walker Basket;Grab Bars - toilet(pt reports he will obtain all equipment on his own, friend will install grab bar by toilet)    Assessment   Performance deficits / Impairments: Decreased functional mobility ; Decreased ADL status; Decreased endurance;Decreased balance  Assessment: Continue OT per POC. Prognosis: Good  Patient Education: ADLs, bed mobility, functional transfers and role of OT  REQUIRES OT FOLLOW UP: Yes  Activity Tolerance  Activity Tolerance: Patient Tolerated treatment well  Safety Devices  Safety Devices in place: Yes  Type of devices: Call light within reach;Nurse notified;Gait belt;Left in bed         Patient Diagnosis(es): The encounter diagnosis was Infection/inflammation due to internal orthopedic device/implant/graft, sequela. has a past medical history of Arthritis, Cancer ([facility] [address] Hypertension, Perforated diverticulitis, and Thyroid disease. has a past surgical history that includes Appendectomy; knee surgery (2012); Abdominal exploration surgery (02/05/15); Abdominal adhesion surgery (02/05/15); Small intestine surgery ([id number]); Inguinal hernia repair (Right, 02/05/15); Inguinal hernia repair (Left, 04/22/15); joint replacement; Colonoscopy; skin biopsy; incision and drainage (Right, 2019); and Revision total knee arthroplasty (Right, 2019).     Restrictions  Restrictions/Precautions  Restrictions/Precautions: General Precautions, Weight Bearing, Fall Risk, ROM Restrictions  Required Braces or Orthoses?: Yes  Lower Extremity Weight Bearing Restrictions  Right Lower Extremity Weight Bearing: Toe Touch Weight Bearing  Required Braces or Orthoses  Right Lower Extremity Brace: Knee Immobilizer(at all times)  Position Activity Restriction  Other position/activity restrictions: No ROM to R knee, KI on RLE at all times     Subjective   General  Chart Reviewed: Yes  Patient assessed for rehabilitation services?: Yes  Response to previous treatment: Patient with no complaints from previous session  Family / Caregiver Present: No  Referring Practitioner: Dr. Tiana Stoddard 7/8/19  Diagnosis: R knee infection, s/p RTK explant and placement of cement spacer 7/8/19    Subjective  Subjective: Pt resting in bed, agreeable to OT treatment. Vital Signs  Patient Currently in Pain: Denies     Orientation  Orientation  Overall Orientation Status: Within Functional Limits     Objective    ADL  Additional Comments: Pt declining ADLs until after his lunch. Balance  Sitting Balance: Independent  Standing Balance: Stand by assistance(progressing to supervision)    Functional Mobility  Functional - Mobility Device: Standard Walker  Activity: To/from bathroom; Other  Assist Level: Stand by assistance    Toilet Transfers  Toilet - Technique: Ambulating  Equipment Used: Standard toilet(with use of grab bars)  Toilet Transfer: Supervision  Toilet Transfers Comments: Pt reporting he discussed with friend last evening who will install grab bar prior to pt return home. Bed mobility  Supine to Sit: Modified independent  Sit to Supine: Modified independent     Transfers  Sit to stand: Supervision  Stand to sit: Supervision     Car Transfers  Car Transfers: Pt verbalizes understanding of safe car transfer.      Plan   Plan  Times per week: 4-6x's a week while in acute care    AM-PAC Score  AM-[facility] Inpatient Daily Activity Raw Score: 22 (07/11/19 1336)  AM-PAC Inpatient ADL T-Scale Score : 47.1 (07/11/19 1336)  ADL Inpatient CMS 0-100% Score: 25.8 (07/11/19 1336)  ADL Inpatient CMS G-Code Modifier : CJ (07/11/19 1336)    Goals  Short term goals  Time

## 2019-07-11 NOTE — PROGRESS NOTES
adhesion surgery (02/05/15); Small intestine surgery (350643); Inguinal hernia repair (Right, 02/05/15); Inguinal hernia repair (Left, 04/22/15); joint replacement; Colonoscopy; skin biopsy; incision and drainage (Right, 7/8/2019); and Revision total knee arthroplasty (Right, 7/8/2019). Restrictions  Restrictions/Precautions  Restrictions/Precautions: General Precautions, Weight Bearing, Fall Risk, ROM Restrictions  Required Braces or Orthoses?: Yes  Lower Extremity Weight Bearing Restrictions  Right Lower Extremity Weight Bearing: Toe Touch Weight Bearing  Required Braces or Orthoses  Right Lower Extremity Brace: Knee Immobilizer(at all times)  Position Activity Restriction  Other position/activity restrictions: No ROM to R knee, KI on RLE at all times  Subjective   General  Chart Reviewed: Yes  Response To Previous Treatment: Patient with no complaints from previous session. Family / Caregiver Present: No  Referring Practitioner: Dr. Faby Rangel  Subjective  Subjective: Pt agreeable to work with PT this morning. States he had some increased bleeding last night, although encouraged that he feels better overall today. \"That walking sure felt better today than yesterday. \"  General Comment  Comments: Pt resting in bed upon entry of therapy staff. RN requests no intense therapy and holding off on curb step today due to increased bleeding from incision last night.   Pain Screening  Patient Currently in Pain: Denies       Orientation  Orientation  Overall Orientation Status: Within Normal Limits    Objective   Bed mobility  Supine to Sit: Modified independent(HOB elevated ~45 degrees, pt not using bedrails)  Sit to Supine: Modified independent(HOB elevated ~45 degrees, pt not using bedrails)  Scooting: Independent(to scoot up in bed and forward to EOB)     Transfers  Sit to Stand: Stand by assistance(from EOB (height of bed left low to mimic pt's home setup) to std. walker)  Stand to sit: Supervision(from std. walker to Pt will tolerate 10-15 reps appropriate LE exercise for strengthening and balance - not yet met due to incisional bleeding with therapist holding off on exercise at this time, will continue to progress toward goal as able  Patient Goals   Patient goals : \"To go home and get back to my normal activities. \"    Plan    Times per week: Once daily 7x/week  Times per day: Daily  Specific instructions for Next Treatment: Progress ther ex and mobility as tolerated, gait training while TTWB RLE  Current Treatment Recommendations: Strengthening, Gait Training, Stair training, Balance Training, Functional Mobility Training, Endurance Training, Transfer Training, Safety Education & Training, Equipment Evaluation, Education, & procurement  Safety Devices:  All fall risk precautions in place, Call light within reach, Nurse notified, Gait belt, Patient at risk for falls, Bed alarm in place, Left in bed     Therapy Time   Individual Concurrent Group Co-treatment   Time In 0908         Time Out 0931         Minutes 23         Timed Code Treatment Minutes: Keely [address], [address], [place] #[id number]

## 2019-07-12 ENCOUNTER — APPOINTMENT (OUTPATIENT)
Dept: INTERVENTIONAL RADIOLOGY/VASCULAR | Age: 69
DRG: 465 | End: 2019-07-12
Attending: ORTHOPAEDIC SURGERY
Payer: MEDICARE

## 2019-07-12 VITALS
OXYGEN SATURATION: 96 % | SYSTOLIC BLOOD PRESSURE: 133 MMHG | BODY MASS INDEX: 28.93 KG/M2 | RESPIRATION RATE: 17 BRPM | WEIGHT: 250 LBS | TEMPERATURE: 98.8 F | HEART RATE: 73 BPM | DIASTOLIC BLOOD PRESSURE: 87 MMHG | HEIGHT: 78 IN

## 2019-07-12 LAB — VANCOMYCIN TROUGH: 15.6 UG/ML (ref 10–20)

## 2019-07-12 PROCEDURE — C1751 CATH, INF, PER/CENT/MIDLINE: HCPCS

## 2019-07-12 PROCEDURE — 36415 COLL VENOUS BLD VENIPUNCTURE: CPT

## 2019-07-12 PROCEDURE — 2580000003 HC RX 258: Performed by: INTERNAL MEDICINE

## 2019-07-12 PROCEDURE — 02HV33Z INSERTION OF INFUSION DEVICE INTO SUPERIOR VENA CAVA, PERCUTANEOUS APPROACH: ICD-10-PCS | Performed by: ORTHOPAEDIC SURGERY

## 2019-07-12 PROCEDURE — APPNB30 APP NON BILLABLE TIME 0-30 MINS: Performed by: PHYSICIAN ASSISTANT

## 2019-07-12 PROCEDURE — 97116 GAIT TRAINING THERAPY: CPT

## 2019-07-12 PROCEDURE — 97530 THERAPEUTIC ACTIVITIES: CPT

## 2019-07-12 PROCEDURE — 6360000002 HC RX W HCPCS: Performed by: INTERNAL MEDICINE

## 2019-07-12 PROCEDURE — 6370000000 HC RX 637 (ALT 250 FOR IP): Performed by: ANESTHESIOLOGY

## 2019-07-12 PROCEDURE — 2500000003 HC RX 250 WO HCPCS: Performed by: NURSE PRACTITIONER

## 2019-07-12 PROCEDURE — 80202 ASSAY OF VANCOMYCIN: CPT

## 2019-07-12 PROCEDURE — 36573 INSJ PICC RS&I 5 YR+: CPT

## 2019-07-12 PROCEDURE — 6370000000 HC RX 637 (ALT 250 FOR IP): Performed by: ORTHOPAEDIC SURGERY

## 2019-07-12 RX ORDER — HYDROCODONE BITARTRATE AND ACETAMINOPHEN 5; 325 MG/1; MG/1
1-2 TABLET ORAL EVERY 4 HOURS PRN
Qty: 36 TABLET | Refills: 0 | Status: SHIPPED | OUTPATIENT
Start: 2019-07-12 | End: 2019-07-15

## 2019-07-12 RX ORDER — CYCLOBENZAPRINE HCL 10 MG
10 TABLET ORAL 3 TIMES DAILY PRN
Qty: 30 TABLET | Refills: 0 | Status: SHIPPED | OUTPATIENT
Start: 2019-07-12 | End: 2019-07-22

## 2019-07-12 RX ORDER — PSEUDOEPHEDRINE HCL 30 MG
100 TABLET ORAL 2 TIMES DAILY
Qty: 30 CAPSULE | Refills: 0 | Status: SHIPPED | OUTPATIENT
Start: 2019-07-12 | End: 2019-09-27

## 2019-07-12 RX ADMIN — CEFEPIME HYDROCHLORIDE 2 G: 2 INJECTION, POWDER, FOR SOLUTION INTRAVENOUS at 15:21

## 2019-07-12 RX ADMIN — DOCUSATE SODIUM 100 MG: 100 CAPSULE, LIQUID FILLED ORAL at 08:50

## 2019-07-12 RX ADMIN — ALLOPURINOL 300 MG: 300 TABLET ORAL at 08:50

## 2019-07-12 RX ADMIN — VANCOMYCIN HYDROCHLORIDE 2000 MG: 1 INJECTION, POWDER, LYOPHILIZED, FOR SOLUTION INTRAVENOUS at 05:00

## 2019-07-12 RX ADMIN — CEFEPIME HYDROCHLORIDE 2 G: 2 INJECTION, POWDER, FOR SOLUTION INTRAVENOUS at 04:20

## 2019-07-12 RX ADMIN — LIDOCAINE HYDROCHLORIDE 5 ML: 10 INJECTION, SOLUTION EPIDURAL; INFILTRATION; INTRACAUDAL; PERINEURAL at 11:05

## 2019-07-12 RX ADMIN — LEVOTHYROXINE SODIUM 200 MCG: 100 TABLET ORAL at 08:51

## 2019-07-12 RX ADMIN — POLYMYXIN B SULFATE, BACITRACIN ZINC, NEOMYCIN SULFATE: 5000; 3.5; 4 OINTMENT TOPICAL at 08:52

## 2019-07-12 RX ADMIN — CELECOXIB 100 MG: 100 CAPSULE ORAL at 08:50

## 2019-07-12 RX ADMIN — VANCOMYCIN HYDROCHLORIDE 2000 MG: 1 INJECTION, POWDER, LYOPHILIZED, FOR SOLUTION INTRAVENOUS at 16:54

## 2019-07-12 ASSESSMENT — PAIN SCALES - GENERAL
PAINLEVEL_OUTOF10: 0
PAINLEVEL_OUTOF10: 0

## 2019-07-12 ASSESSMENT — PAIN SCALES - WONG BAKER: WONGBAKER_NUMERICALRESPONSE: 4

## 2019-07-12 NOTE — CARE COORDINATION
[facility]  Writer has reviewed recent progress notes by ID, hospitalist, ortho and CM. Per Epic the pt still needs a PICC line placed. Should pt be agreeable to IV A (reviewed cost is a factor) writer will arrange for Ander Loco with [facility] for home IV A and S3 Program with [facility], have already discussed with pt. [facility] has been updated. Pt to be a SOC the day after DC, to receive IV A doses prior to DC from [facility]. Please update [facility] and [facility] over the wkend. Should pt dc over the weekend please fax facesheet, order, DOMI, and H&P to [facility] and update [facility]. -DOMI has been completed for Ander Loco by Alyx Matute, please modify if the plan of care changes upon DC. Update 10:21- Call received from MSW, see her note. Writer was updated that the pt would like to have [employer] run his benefits to see if the cost of his medication would be cheaper. Writer updated that MSW has consulted [employer]. Will continue to follow as plan of care evolves for the pt and writer will arrange for home care services with [facility] and infusion agency upon DC. Update 11:50- MSW made writer aware the pt will DC home today with home IV A through [employer]. Pt to have a [facility] with [facility] for home IV A 7/13 am. Pt to receive both doses of IV A on 7/12 prior to DC, MSW and pt's bedside RN are aware. Orders faxed to [facility] and [employer].      Mulu Saucedo RN CTN with [facility]  Y:[phone]

## 2019-07-12 NOTE — CARE COORDINATION
Sw reviewed current price for meds with pt. $306.00 per week. Izabel with [employer] will come and fill out financial hardship papers with pt to see if we can get the cost any lower.

## 2019-07-12 NOTE — PROGRESS NOTES
(Right, 7/8/2019); and Revision total knee arthroplasty (Right, 7/8/2019). Restrictions  Restrictions/Precautions  Restrictions/Precautions: General Precautions, Weight Bearing, Fall Risk, ROM Restrictions  Required Braces or Orthoses?: Yes  Lower Extremity Weight Bearing Restrictions  Right Lower Extremity Weight Bearing: Toe Touch Weight Bearing  Required Braces or Orthoses  Right Lower Extremity Brace: Knee Immobilizer(all times)  Position Activity Restriction  Other position/activity restrictions: No ROM to R knee, KI on RLE at all times  Subjective   General  Chart Reviewed: Yes  Response To Previous Treatment: Patient with no complaints from previous session. Family / Caregiver Present: No  Referring Practitioner: Dr. Ny: Pt agreeable to work with PT  General Comment  Comments: Pt resting in bed upon entry of therapy staff. RN cleared fot treatment. Pain Screening  Patient Currently in Pain: Yes  Pain Assessment  Pain Assessment: Faces  [place] Pain Rating: Hurts little more  Non-Pharmaceutical Pain Intervention(s): Ambulation/Increased Activity;Repositioned  Vital Signs  Patient Currently in Pain: Yes       Orientation  Orientation  Overall Orientation Status: Within Normal Limits  Cognition      Objective   Bed mobility  Supine to Sit: Modified independent  Sit to Supine: Modified independent  Transfers  Stand to sit: Supervision  Ambulation  Ambulation?: Yes  WB Status: TTWB RLE with KI  Ambulation 1  Surface: level tile  Device: Standard Walker  Other Apparatus: Knee Immobilizer;Right  Assistance: Supervision  Quality of Gait: Pt amb with slow jorge with decreased stride length. No cues needed to maintain TTWB RLE. Steady.   Gait Deviations: Slow Jorge;Decreased step length;Decreased step height  Distance: x 160 feet  Stairs/Curb  Stairs?: No(Pt declined to practice, stating he is satified with his level of skill.)     Balance  Posture: Good  Sitting - Static:

## 2019-07-14 LAB
CULTURE, JOINT AEROBIC: ABNORMAL
CULTURE, JOINT ANAEROBIC: ABNORMAL
GRAM STAIN RESULT: ABNORMAL
ORGANISM: ABNORMAL

## 2019-07-15 LAB
C-REACTIVE PROTEIN WIDE RANGE: 52.26 MG/L
SEDIMENTATION RATE, ERYTHROCYTE: 31 MM/HR (ref 0–20)

## 2019-07-16 ENCOUNTER — TELEPHONE (OUTPATIENT)
Dept: INFECTIOUS DISEASES | Age: 69
End: 2019-07-16

## 2019-07-16 LAB
BASOPHILS # BLD: 0.8 %
BASOPHILS ABSOLUTE: 0.1 X10(3)/MCL (ref 0–0.1)
EOSINOPHIL # BLD: 3.7 %
EOSINOPHILS ABSOLUTE: 0.3 X10(3)/MCL (ref 0–0.5)
HCT VFR BLD CALC: 34.7 % (ref 40–51)
HEMOGLOBIN: 10.9 G/DL (ref 13.7–17.5)
IMMATURE CELLS ABSOLUTE COUNT: 0 X10(3)/MCL (ref 0–0.1)
IMMATURE GRANULOCYTES: 0.4 %
LYMPHOCYTES # BLD: 8.6 %
LYMPHOCYTES ABSOLUTE: 0.7 X10(3)/MCL (ref 1.2–3.9)
MCH RBC QN AUTO: 28.2 PG (ref 26–34)
MCHC RBC AUTO-ENTMCNC: 31.4 G/DL (ref 30.7–35.5)
MCV RBC AUTO: 89.9 FL (ref 80–100)
MONOCYTES # BLD: 7.8 %
MONOCYTES ABSOLUTE: 0.6 X10(3)/MCL (ref 0.3–0.9)
NEUTROPHILS ABSOLUTE: 6 X10(3)/MCL (ref 1.6–6.1)
NEUTROPHILS: 78.7 %
PDW BLD-RTO: 15.7 %
PLATELET # BLD: 228 X10(3)/MCL (ref 155–369)
PMV BLD AUTO: 10.7 FL (ref 8.8–12.5)
RBC # BLD: 3.86 X10(6)/MCL (ref 4.6–6.1)
WBC # BLD: 7.6 X10(3)/MCL (ref 3.7–10.3)

## 2019-07-16 NOTE — DISCHARGE SUMMARY
Department of Orthopedic Surgery  Nurse Practitioner   Discharge Summary    The Kaila Almanzar is a 79 y.o. male underwent total knee explant with spacer procedure without complication. Kaila Almanzar was admitted to the floor following His recovery in the PACU. Discharge Diagnosis  right Knee Replacement Explant with spacer    Current Inpatient Medications    No current facility-administered medications for this encounter. Post-operatively the patients diet was advanced as tolerated and their incision was checked on POD #1. The incision is dressing in place, clean, dry and intact with no signs of infection. The patient remained neurovascularly intact in the lower extremity and had intact pulses distally. Patients calf remained soft and showed no evidence of DVT. The patient was able to move their leg and ankle/foot without any problems post-operatively. Physical therapy and occupational therapy were consulted and began working with the patient post-operatively. The patient progressed with PT/OT as would be expected and continued to improve through their stay. The patients pain was initially controlled with IV medications but we were able to transition to oral pain medications soon after arrival to the floor and their pain remained under good control through their hospital stay. From a medical standpoint the patient remained stable and continued to have the medicine team follow throughout their stay. The patients dressing was changed/incison was checked on day of d/c. The patient will be discharged at this time to Home  with their current diet restrictions and will continue to follow the total knee precautions outlined to them by us and PT/OT. Condition on Discharge: Stable    Plan  Return visit in 2 weeks. .  Patient was instructed on the use of pain medications, the signs and symptoms of infection, and was given our number to call should they have any questions or concerns following

## 2019-07-16 NOTE — TELEPHONE ENCOUNTER
Spoke with patient, he would like to switch from Dr. Maddi Goldman to Dr. Fartun Iverson. Dr. Domenica Bedoya did speak with the patient and notify him that she would be changing his IV medication due to his culture results.

## 2019-07-16 NOTE — TELEPHONE ENCOUNTER
Plan is to stop IV vanc and cefepime    Please see if he can get first dose of Unasyn 3g x1 dose tomorrow in the infusion center after PICC placement     Thereafter, plan will be Unasyn 12g / 24 hours continuous infusion  Same end date   qMonday CBC diff, CMP, ESR, CRP

## 2019-07-17 ENCOUNTER — HOSPITAL ENCOUNTER (OUTPATIENT)
Dept: INTERVENTIONAL RADIOLOGY/VASCULAR | Age: 69
Discharge: HOME | End: 2019-07-17
Payer: MEDICARE

## 2019-07-17 DIAGNOSIS — Z96.659 INFECTION OF PROSTHETIC KNEE JOINT, SUBSEQUENT ENCOUNTER: ICD-10-CM

## 2019-07-17 DIAGNOSIS — T84.59XD INFECTION OF PROSTHETIC KNEE JOINT, SUBSEQUENT ENCOUNTER: ICD-10-CM

## 2019-07-17 PROCEDURE — 2580000003 HC RX 258: Performed by: INTERNAL MEDICINE

## 2019-07-17 PROCEDURE — 36573 INSJ PICC RS&I 5 YR+: CPT

## 2019-07-17 PROCEDURE — C1751 CATH, INF, PER/CENT/MIDLINE: HCPCS

## 2019-07-17 PROCEDURE — 96365 THER/PROPH/DIAG IV INF INIT: CPT

## 2019-07-17 PROCEDURE — 6360000002 HC RX W HCPCS: Performed by: INTERNAL MEDICINE

## 2019-07-17 PROCEDURE — 99201 HC NEW PT, OUTPT VISIT LEVEL 1: CPT

## 2019-07-17 RX ADMIN — SODIUM CHLORIDE 3 G: 900 INJECTION INTRAVENOUS at 13:55

## 2019-07-22 LAB
ANION GAP SERPL CALCULATED.3IONS-SCNC: 12 MMOL/L (ref 7–16)
BUN BLDV-MCNC: 13 MG/DL (ref 8–23)
C-REACTIVE PROTEIN WIDE RANGE: 10.48 MG/L
CALCIUM SERPL-MCNC: 8.5 MG/DL (ref 8.8–10.4)
CHLORIDE BLD-SCNC: 104 MEQ/L (ref 98–107)
CO2: 27 MMOL/L (ref 22–29)
CREAT SERPL-MCNC: 0.82 MG/DL (ref 0.67–1.3)
GFR AFRICAN AMERICAN: 107 ML/MIN/1.73 M2
GFR NON-AFRICAN AMERICAN: 92 ML/MIN/1.73 M2
GLUCOSE BLD-MCNC: 82 MG/DL (ref 82–100)
POTASSIUM SERPL-SCNC: 4 MEQ/L (ref 3.5–5)
SEDIMENTATION RATE, ERYTHROCYTE: 24 MM/HR (ref 0–20)
SODIUM BLD-SCNC: 143 MEQ/L (ref 136–145)

## 2019-07-23 ENCOUNTER — TELEPHONE (OUTPATIENT)
Dept: INFECTIOUS DISEASES | Age: 69
End: 2019-07-23

## 2019-07-23 LAB
BASOPHILS # BLD: 1 %
BASOPHILS ABSOLUTE: 0.1 X10(3)/MCL (ref 0–0.1)
EOSINOPHIL # BLD: 4.8 %
EOSINOPHILS ABSOLUTE: 0.3 X10(3)/MCL (ref 0–0.5)
HCT VFR BLD CALC: 33.3 % (ref 40–51)
HEMOGLOBIN: 10.8 G/DL (ref 13.7–17.5)
IMMATURE CELLS ABSOLUTE COUNT: 0 X10(3)/MCL (ref 0–0.1)
IMMATURE GRANULOCYTES: 0.5 %
LYMPHOCYTES # BLD: 15.2 %
LYMPHOCYTES ABSOLUTE: 0.9 X10(3)/MCL (ref 1.2–3.9)
MCH RBC QN AUTO: 28.2 PG (ref 26–34)
MCHC RBC AUTO-ENTMCNC: 32.4 G/DL (ref 30.7–35.5)
MCV RBC AUTO: 86.9 FL (ref 80–100)
MONOCYTES # BLD: 6.2 %
MONOCYTES ABSOLUTE: 0.4 X10(3)/MCL (ref 0.3–0.9)
NEUTROPHILS ABSOLUTE: 4.2 X10(3)/MCL (ref 1.6–6.1)
NEUTROPHILS: 72.3 %
PDW BLD-RTO: 15 %
PLATELET # BLD: 214 X10(3)/MCL (ref 155–369)
PMV BLD AUTO: 10.3 FL (ref 8.8–12.5)
RBC # BLD: 3.83 X10(6)/MCL (ref 4.6–6.1)
WBC # BLD: 5.8 X10(3)/MCL (ref 3.7–10.3)

## 2019-07-29 LAB
ALBUMIN SERPL-MCNC: 3.3 G/DL (ref 3.5–5)
ALP BLD-CCNC: 95 IU/L (ref 35–135)
ALT SERPL-CCNC: 17 IU/L (ref 10–60)
ANION GAP SERPL CALCULATED.3IONS-SCNC: 9 MMOL/L (ref 6–18)
AST SERPL-CCNC: 25 IU/L (ref 10–40)
BASOPHILS ABSOLUTE: 0.04 THOU/MCL (ref 0–0.2)
BASOPHILS ABSOLUTE: 1 %
BILIRUB SERPL-MCNC: 0.3 MG/DL (ref 0–1.2)
BUN BLDV-MCNC: 18 MG/DL (ref 8–26)
C-REACTIVE PROTEIN WIDE RANGE: 10.2 MG/L
CALCIUM SERPL-MCNC: 8.4 MG/DL (ref 8.5–10.5)
CHLORIDE BLD-SCNC: 104 MEQ/L (ref 101–111)
CO2: 26 MMOL/L (ref 24–36)
CREAT SERPL-MCNC: 0.76 MG/DL (ref 0.64–1.27)
EOSINOPHILS ABSOLUTE: 0.27 THOU/MCL (ref 0.03–0.45)
EOSINOPHILS RELATIVE PERCENT: 5 %
GFR AFRICAN AMERICAN: 108 ML/MIN/1.73 M2
GFR NON-AFRICAN AMERICAN: 93 ML/MIN/1.73 M2
GLUCOSE BLD-MCNC: 70 MG/DL (ref 70–99)
HCT VFR BLD CALC: 32.5 % (ref 40–50)
HEMOGLOBIN: 11 G/DL (ref 13.5–16.5)
LYMPHOCYTES ABSOLUTE: 0.85 THOU/MCL (ref 1–4)
LYMPHOCYTES RELATIVE PERCENT: 15 %
MCH RBC QN AUTO: 29.2 PG (ref 27–33)
MCHC RBC AUTO-ENTMCNC: 33.8 G/DL (ref 32–36)
MCV RBC AUTO: 86.3 FL (ref 82–97)
MONOCYTES # BLD: 8 %
MONOCYTES ABSOLUTE: 0.43 THOU/MCL (ref 0.2–0.9)
NEUTROPHILS ABSOLUTE: 4.08 THOU/MCL (ref 1.8–7.7)
PDW BLD-RTO: 16.4 %
PLATELET # BLD: 190 THOU/MCL (ref 140–375)
PMV BLD AUTO: 8.5 FL (ref 7.4–11.5)
POTASSIUM SERPL-SCNC: 3.5 MEQ/L (ref 3.6–5.1)
RBC # BLD: 3.77 MIL/MCL (ref 4.4–5.8)
SEDIMENTATION RATE, ERYTHROCYTE: 11 MM/HR (ref 0–20)
SEG NEUTROPHILS: 71 %
SODIUM BLD-SCNC: 139 MEQ/L (ref 135–145)
TOTAL PROTEIN: 5.8 G/DL (ref 6–8)
WBC # BLD: 5.7 THOU/MCL (ref 3.6–10.5)

## 2019-08-05 ENCOUNTER — APPOINTMENT (OUTPATIENT)
Dept: GENERAL RADIOLOGY | Age: 69
DRG: 487 | End: 2019-08-05
Attending: ORTHOPAEDIC SURGERY
Payer: MEDICARE

## 2019-08-05 ENCOUNTER — APPOINTMENT (OUTPATIENT)
Dept: PREADMISSION TESTING | Age: 69
DRG: 487 | End: 2019-08-05
Attending: ORTHOPAEDIC SURGERY
Payer: MEDICARE

## 2019-08-05 ENCOUNTER — HOSPITAL ENCOUNTER (INPATIENT)
Age: 69
LOS: 1 days | Discharge: HOME HEALTH | DRG: 487 | End: 2019-08-06
Attending: ORTHOPAEDIC SURGERY | Admitting: FAMILY MEDICINE
Payer: MEDICARE

## 2019-08-05 ENCOUNTER — ANESTHESIA EVENT (OUTPATIENT)
Dept: OPERATING ROOM | Age: 69
DRG: 487 | End: 2019-08-05
Payer: MEDICARE

## 2019-08-05 ENCOUNTER — ANESTHESIA (OUTPATIENT)
Dept: OPERATING ROOM | Age: 69
DRG: 487 | End: 2019-08-05
Payer: MEDICARE

## 2019-08-05 VITALS
SYSTOLIC BLOOD PRESSURE: 99 MMHG | DIASTOLIC BLOOD PRESSURE: 64 MMHG | OXYGEN SATURATION: 100 % | RESPIRATION RATE: 14 BRPM

## 2019-08-05 DIAGNOSIS — L08.9 INFECTED WOUND: ICD-10-CM

## 2019-08-05 DIAGNOSIS — T14.8XXA INFECTED WOUND: ICD-10-CM

## 2019-08-05 LAB
ABO/RH: NORMAL
ANION GAP SERPL CALCULATED.3IONS-SCNC: 12 MMOL/L (ref 3–16)
ANTIBODY SCREEN: NORMAL
BASOPHILS ABSOLUTE: 0.1 K/UL (ref 0–0.2)
BASOPHILS RELATIVE PERCENT: 1 %
BUN BLDV-MCNC: 12 MG/DL (ref 7–20)
CALCIUM SERPL-MCNC: 9.2 MG/DL (ref 8.3–10.6)
CHLORIDE BLD-SCNC: 104 MMOL/L (ref 99–110)
CO2: 25 MMOL/L (ref 21–32)
CREAT SERPL-MCNC: 0.6 MG/DL (ref 0.8–1.3)
EOSINOPHILS ABSOLUTE: 0.3 K/UL (ref 0–0.6)
EOSINOPHILS RELATIVE PERCENT: 4 %
GFR AFRICAN AMERICAN: >60
GFR NON-AFRICAN AMERICAN: >60
GLUCOSE BLD-MCNC: 105 MG/DL (ref 70–99)
HCT VFR BLD CALC: 39.7 % (ref 40.5–52.5)
HEMOGLOBIN: 13.3 G/DL (ref 13.5–17.5)
LYMPHOCYTES ABSOLUTE: 0.8 K/UL (ref 1–5.1)
LYMPHOCYTES RELATIVE PERCENT: 9.4 %
MCH RBC QN AUTO: 28.1 PG (ref 26–34)
MCHC RBC AUTO-ENTMCNC: 33.4 G/DL (ref 31–36)
MCV RBC AUTO: 84.3 FL (ref 80–100)
MONOCYTES ABSOLUTE: 0.6 K/UL (ref 0–1.3)
MONOCYTES RELATIVE PERCENT: 6.9 %
NEUTROPHILS ABSOLUTE: 6.9 K/UL (ref 1.7–7.7)
NEUTROPHILS RELATIVE PERCENT: 78.7 %
PDW BLD-RTO: 15.5 % (ref 12.4–15.4)
PLATELET # BLD: 200 K/UL (ref 135–450)
PMV BLD AUTO: 8.2 FL (ref 5–10.5)
POTASSIUM SERPL-SCNC: 3.5 MMOL/L (ref 3.5–5.1)
RBC # BLD: 4.71 M/UL (ref 4.2–5.9)
SODIUM BLD-SCNC: 141 MMOL/L (ref 136–145)
WBC # BLD: 8.8 K/UL (ref 4–11)

## 2019-08-05 PROCEDURE — 2700000000 HC OXYGEN THERAPY PER DAY

## 2019-08-05 PROCEDURE — 87205 SMEAR GRAM STAIN: CPT

## 2019-08-05 PROCEDURE — 2580000003 HC RX 258: Performed by: ANESTHESIOLOGY

## 2019-08-05 PROCEDURE — 80048 BASIC METABOLIC PNL TOTAL CA: CPT

## 2019-08-05 PROCEDURE — 6360000002 HC RX W HCPCS: Performed by: ANESTHESIOLOGY

## 2019-08-05 PROCEDURE — 2500000003 HC RX 250 WO HCPCS: Performed by: NURSE ANESTHETIST, CERTIFIED REGISTERED

## 2019-08-05 PROCEDURE — 3700000001 HC ADD 15 MINUTES (ANESTHESIA): Performed by: ORTHOPAEDIC SURGERY

## 2019-08-05 PROCEDURE — 0SRC0EZ REPLACEMENT OF RIGHT KNEE JOINT WITH ARTICULATING SPACER, OPEN APPROACH: ICD-10-PCS | Performed by: ORTHOPAEDIC SURGERY

## 2019-08-05 PROCEDURE — 86901 BLOOD TYPING SEROLOGIC RH(D): CPT

## 2019-08-05 PROCEDURE — 0S9C0ZZ DRAINAGE OF RIGHT KNEE JOINT, OPEN APPROACH: ICD-10-PCS | Performed by: ORTHOPAEDIC SURGERY

## 2019-08-05 PROCEDURE — 2580000003 HC RX 258: Performed by: ORTHOPAEDIC SURGERY

## 2019-08-05 PROCEDURE — 87186 SC STD MICRODIL/AGAR DIL: CPT

## 2019-08-05 PROCEDURE — 36415 COLL VENOUS BLD VENIPUNCTURE: CPT

## 2019-08-05 PROCEDURE — 6360000002 HC RX W HCPCS: Performed by: ORTHOPAEDIC SURGERY

## 2019-08-05 PROCEDURE — 0SPC0EZ REMOVAL OF ARTICULATING SPACER FROM RIGHT KNEE JOINT, OPEN APPROACH: ICD-10-PCS | Performed by: ORTHOPAEDIC SURGERY

## 2019-08-05 PROCEDURE — 2500000003 HC RX 250 WO HCPCS: Performed by: ORTHOPAEDIC SURGERY

## 2019-08-05 PROCEDURE — 3700000000 HC ANESTHESIA ATTENDED CARE: Performed by: ORTHOPAEDIC SURGERY

## 2019-08-05 PROCEDURE — C1713 ANCHOR/SCREW BN/BN,TIS/BN: HCPCS | Performed by: ORTHOPAEDIC SURGERY

## 2019-08-05 PROCEDURE — 3600000014 HC SURGERY LEVEL 4 ADDTL 15MIN: Performed by: ORTHOPAEDIC SURGERY

## 2019-08-05 PROCEDURE — 85025 COMPLETE CBC W/AUTO DIFF WBC: CPT

## 2019-08-05 PROCEDURE — 71045 X-RAY EXAM CHEST 1 VIEW: CPT

## 2019-08-05 PROCEDURE — 87077 CULTURE AEROBIC IDENTIFY: CPT

## 2019-08-05 PROCEDURE — 94761 N-INVAS EAR/PLS OXIMETRY MLT: CPT

## 2019-08-05 PROCEDURE — 2500000003 HC RX 250 WO HCPCS: Performed by: ANESTHESIOLOGY

## 2019-08-05 PROCEDURE — 86850 RBC ANTIBODY SCREEN: CPT

## 2019-08-05 PROCEDURE — 76942 ECHO GUIDE FOR BIOPSY: CPT | Performed by: ANESTHESIOLOGY

## 2019-08-05 PROCEDURE — 1200000000 HC SEMI PRIVATE

## 2019-08-05 PROCEDURE — 64447 NJX AA&/STRD FEMORAL NRV IMG: CPT | Performed by: ANESTHESIOLOGY

## 2019-08-05 PROCEDURE — L1830 KO IMMOB CANVAS LONG PRE OTS: HCPCS | Performed by: ORTHOPAEDIC SURGERY

## 2019-08-05 PROCEDURE — 6370000000 HC RX 637 (ALT 250 FOR IP): Performed by: ORTHOPAEDIC SURGERY

## 2019-08-05 PROCEDURE — 87070 CULTURE OTHR SPECIMN AEROBIC: CPT

## 2019-08-05 PROCEDURE — 7100000000 HC PACU RECOVERY - FIRST 15 MIN: Performed by: ORTHOPAEDIC SURGERY

## 2019-08-05 PROCEDURE — 2709999900 HC NON-CHARGEABLE SUPPLY: Performed by: ORTHOPAEDIC SURGERY

## 2019-08-05 PROCEDURE — 87075 CULTR BACTERIA EXCEPT BLOOD: CPT

## 2019-08-05 PROCEDURE — 7100000001 HC PACU RECOVERY - ADDTL 15 MIN: Performed by: ORTHOPAEDIC SURGERY

## 2019-08-05 PROCEDURE — 86900 BLOOD TYPING SEROLOGIC ABO: CPT

## 2019-08-05 PROCEDURE — 3600000004 HC SURGERY LEVEL 4 BASE: Performed by: ORTHOPAEDIC SURGERY

## 2019-08-05 PROCEDURE — 6360000002 HC RX W HCPCS: Performed by: NURSE ANESTHETIST, CERTIFIED REGISTERED

## 2019-08-05 DEVICE — CEMENT BNE 20ML 41GM FULL DOSE PMMA W/ TOBRA M VISC RADPQ: Type: IMPLANTABLE DEVICE | Site: KNEE | Status: FUNCTIONAL

## 2019-08-05 RX ORDER — MEPERIDINE HYDROCHLORIDE 50 MG/ML
12.5 INJECTION INTRAMUSCULAR; INTRAVENOUS; SUBCUTANEOUS EVERY 5 MIN PRN
Status: DISCONTINUED | OUTPATIENT
Start: 2019-08-05 | End: 2019-08-05 | Stop reason: HOSPADM

## 2019-08-05 RX ORDER — SODIUM CHLORIDE 0.9 % (FLUSH) 0.9 %
10 SYRINGE (ML) INJECTION EVERY 12 HOURS SCHEDULED
Status: DISCONTINUED | OUTPATIENT
Start: 2019-08-05 | End: 2019-08-06 | Stop reason: HOSPADM

## 2019-08-05 RX ORDER — PROPOFOL 10 MG/ML
INJECTION, EMULSION INTRAVENOUS PRN
Status: DISCONTINUED | OUTPATIENT
Start: 2019-08-05 | End: 2019-08-05 | Stop reason: SDUPTHER

## 2019-08-05 RX ORDER — LIDOCAINE HYDROCHLORIDE 20 MG/ML
INJECTION, SOLUTION INFILTRATION; PERINEURAL PRN
Status: DISCONTINUED | OUTPATIENT
Start: 2019-08-05 | End: 2019-08-05 | Stop reason: SDUPTHER

## 2019-08-05 RX ORDER — BUPIVACAINE HYDROCHLORIDE AND EPINEPHRINE 2.5; 5 MG/ML; UG/ML
INJECTION, SOLUTION EPIDURAL; INFILTRATION; INTRACAUDAL; PERINEURAL PRN
Status: DISCONTINUED | OUTPATIENT
Start: 2019-08-05 | End: 2019-08-05 | Stop reason: SDUPTHER

## 2019-08-05 RX ORDER — OXYCODONE HYDROCHLORIDE 5 MG/1
10 TABLET ORAL EVERY 4 HOURS PRN
Status: DISCONTINUED | OUTPATIENT
Start: 2019-08-05 | End: 2019-08-06 | Stop reason: HOSPADM

## 2019-08-05 RX ORDER — MORPHINE SULFATE 2 MG/ML
2 INJECTION, SOLUTION INTRAMUSCULAR; INTRAVENOUS
Status: DISCONTINUED | OUTPATIENT
Start: 2019-08-05 | End: 2019-08-06 | Stop reason: HOSPADM

## 2019-08-05 RX ORDER — VANCOMYCIN HYDROCHLORIDE 1 G/20ML
INJECTION, POWDER, LYOPHILIZED, FOR SOLUTION INTRAVENOUS PRN
Status: DISCONTINUED | OUTPATIENT
Start: 2019-08-05 | End: 2019-08-05 | Stop reason: ALTCHOICE

## 2019-08-05 RX ORDER — DIPHENHYDRAMINE HYDROCHLORIDE 50 MG/ML
12.5 INJECTION INTRAMUSCULAR; INTRAVENOUS
Status: DISCONTINUED | OUTPATIENT
Start: 2019-08-05 | End: 2019-08-05 | Stop reason: HOSPADM

## 2019-08-05 RX ORDER — MORPHINE SULFATE 4 MG/ML
4 INJECTION, SOLUTION INTRAMUSCULAR; INTRAVENOUS
Status: DISCONTINUED | OUTPATIENT
Start: 2019-08-05 | End: 2019-08-06 | Stop reason: HOSPADM

## 2019-08-05 RX ORDER — PROMETHAZINE HYDROCHLORIDE 25 MG/ML
6.25 INJECTION, SOLUTION INTRAMUSCULAR; INTRAVENOUS
Status: DISCONTINUED | OUTPATIENT
Start: 2019-08-05 | End: 2019-08-05 | Stop reason: HOSPADM

## 2019-08-05 RX ORDER — ONDANSETRON 2 MG/ML
4 INJECTION INTRAMUSCULAR; INTRAVENOUS EVERY 6 HOURS PRN
Status: DISCONTINUED | OUTPATIENT
Start: 2019-08-05 | End: 2019-08-06 | Stop reason: HOSPADM

## 2019-08-05 RX ORDER — DEXAMETHASONE SODIUM PHOSPHATE 4 MG/ML
INJECTION, SOLUTION INTRA-ARTICULAR; INTRALESIONAL; INTRAMUSCULAR; INTRAVENOUS; SOFT TISSUE PRN
Status: DISCONTINUED | OUTPATIENT
Start: 2019-08-05 | End: 2019-08-05 | Stop reason: SDUPTHER

## 2019-08-05 RX ORDER — DOCUSATE SODIUM 100 MG/1
100 CAPSULE, LIQUID FILLED ORAL 2 TIMES DAILY
Status: DISCONTINUED | OUTPATIENT
Start: 2019-08-05 | End: 2019-08-05 | Stop reason: SDUPTHER

## 2019-08-05 RX ORDER — FENTANYL CITRATE 50 UG/ML
INJECTION, SOLUTION INTRAMUSCULAR; INTRAVENOUS PRN
Status: DISCONTINUED | OUTPATIENT
Start: 2019-08-05 | End: 2019-08-05 | Stop reason: SDUPTHER

## 2019-08-05 RX ORDER — VALSARTAN AND HYDROCHLOROTHIAZIDE 320; 25 MG/1; MG/1
1 TABLET, FILM COATED ORAL DAILY
Status: DISCONTINUED | OUTPATIENT
Start: 2019-08-05 | End: 2019-08-05 | Stop reason: CLARIF

## 2019-08-05 RX ORDER — LABETALOL HYDROCHLORIDE 5 MG/ML
5 INJECTION, SOLUTION INTRAVENOUS EVERY 10 MIN PRN
Status: DISCONTINUED | OUTPATIENT
Start: 2019-08-05 | End: 2019-08-05 | Stop reason: HOSPADM

## 2019-08-05 RX ORDER — LIDOCAINE HYDROCHLORIDE 10 MG/ML
2 INJECTION, SOLUTION INFILTRATION; PERINEURAL
Status: DISCONTINUED | OUTPATIENT
Start: 2019-08-05 | End: 2019-08-05 | Stop reason: HOSPADM

## 2019-08-05 RX ORDER — ALLOPURINOL 300 MG/1
300 TABLET ORAL DAILY
Status: DISCONTINUED | OUTPATIENT
Start: 2019-08-05 | End: 2019-08-06 | Stop reason: HOSPADM

## 2019-08-05 RX ORDER — VALSARTAN 80 MG/1
320 TABLET ORAL DAILY
Status: DISCONTINUED | OUTPATIENT
Start: 2019-08-05 | End: 2019-08-06 | Stop reason: HOSPADM

## 2019-08-05 RX ORDER — MORPHINE SULFATE 2 MG/ML
1 INJECTION, SOLUTION INTRAMUSCULAR; INTRAVENOUS EVERY 5 MIN PRN
Status: DISCONTINUED | OUTPATIENT
Start: 2019-08-05 | End: 2019-08-05 | Stop reason: HOSPADM

## 2019-08-05 RX ORDER — SODIUM CHLORIDE 0.9 % (FLUSH) 0.9 %
SYRINGE (ML) INJECTION
Status: DISPENSED
Start: 2019-08-05 | End: 2019-08-06

## 2019-08-05 RX ORDER — DEXTROSE, SODIUM CHLORIDE, AND POTASSIUM CHLORIDE 5; .45; .15 G/100ML; G/100ML; G/100ML
1000 INJECTION INTRAVENOUS CONTINUOUS
Status: DISCONTINUED | OUTPATIENT
Start: 2019-08-05 | End: 2019-08-06 | Stop reason: HOSPADM

## 2019-08-05 RX ORDER — HYDROCHLOROTHIAZIDE 25 MG/1
25 TABLET ORAL DAILY
Status: DISCONTINUED | OUTPATIENT
Start: 2019-08-05 | End: 2019-08-06 | Stop reason: HOSPADM

## 2019-08-05 RX ORDER — ONDANSETRON 2 MG/ML
INJECTION INTRAMUSCULAR; INTRAVENOUS PRN
Status: DISCONTINUED | OUTPATIENT
Start: 2019-08-05 | End: 2019-08-05 | Stop reason: SDUPTHER

## 2019-08-05 RX ORDER — ONDANSETRON 2 MG/ML
4 INJECTION INTRAMUSCULAR; INTRAVENOUS PRN
Status: DISCONTINUED | OUTPATIENT
Start: 2019-08-05 | End: 2019-08-05 | Stop reason: HOSPADM

## 2019-08-05 RX ORDER — SODIUM CHLORIDE 0.9 % (FLUSH) 0.9 %
10 SYRINGE (ML) INJECTION PRN
Status: DISCONTINUED | OUTPATIENT
Start: 2019-08-05 | End: 2019-08-06 | Stop reason: HOSPADM

## 2019-08-05 RX ORDER — MIDAZOLAM HYDROCHLORIDE 1 MG/ML
INJECTION INTRAMUSCULAR; INTRAVENOUS PRN
Status: DISCONTINUED | OUTPATIENT
Start: 2019-08-05 | End: 2019-08-05 | Stop reason: SDUPTHER

## 2019-08-05 RX ORDER — TRANEXAMIC ACID 100 MG/ML
15 INJECTION, SOLUTION INTRAVENOUS
Status: COMPLETED | OUTPATIENT
Start: 2019-08-05 | End: 2019-08-05

## 2019-08-05 RX ORDER — HYDRALAZINE HYDROCHLORIDE 20 MG/ML
5 INJECTION INTRAMUSCULAR; INTRAVENOUS EVERY 10 MIN PRN
Status: DISCONTINUED | OUTPATIENT
Start: 2019-08-05 | End: 2019-08-05 | Stop reason: HOSPADM

## 2019-08-05 RX ORDER — LEVOTHYROXINE SODIUM 0.1 MG/1
200 TABLET ORAL DAILY
Status: DISCONTINUED | OUTPATIENT
Start: 2019-08-05 | End: 2019-08-06 | Stop reason: HOSPADM

## 2019-08-05 RX ORDER — DOCUSATE SODIUM 100 MG/1
100 CAPSULE, LIQUID FILLED ORAL 2 TIMES DAILY
Status: DISCONTINUED | OUTPATIENT
Start: 2019-08-05 | End: 2019-08-06 | Stop reason: HOSPADM

## 2019-08-05 RX ORDER — MORPHINE SULFATE 2 MG/ML
2 INJECTION, SOLUTION INTRAMUSCULAR; INTRAVENOUS EVERY 5 MIN PRN
Status: DISCONTINUED | OUTPATIENT
Start: 2019-08-05 | End: 2019-08-05 | Stop reason: HOSPADM

## 2019-08-05 RX ORDER — OXYCODONE HYDROCHLORIDE 5 MG/1
5 TABLET ORAL EVERY 4 HOURS PRN
Status: DISCONTINUED | OUTPATIENT
Start: 2019-08-05 | End: 2019-08-06 | Stop reason: HOSPADM

## 2019-08-05 RX ORDER — SODIUM CHLORIDE, SODIUM LACTATE, POTASSIUM CHLORIDE, CALCIUM CHLORIDE 600; 310; 30; 20 MG/100ML; MG/100ML; MG/100ML; MG/100ML
INJECTION, SOLUTION INTRAVENOUS CONTINUOUS
Status: DISCONTINUED | OUTPATIENT
Start: 2019-08-05 | End: 2019-08-05

## 2019-08-05 RX ADMIN — HYDROMORPHONE HYDROCHLORIDE 0.5 MG: 1 INJECTION, SOLUTION INTRAMUSCULAR; INTRAVENOUS; SUBCUTANEOUS at 18:49

## 2019-08-05 RX ADMIN — POTASSIUM CHLORIDE, DEXTROSE MONOHYDRATE AND SODIUM CHLORIDE 1000 ML: 150; 5; 450 INJECTION, SOLUTION INTRAVENOUS at 20:28

## 2019-08-05 RX ADMIN — Medication 2 G: at 17:16

## 2019-08-05 RX ADMIN — ONDANSETRON 4 MG: 2 INJECTION INTRAMUSCULAR; INTRAVENOUS at 17:16

## 2019-08-05 RX ADMIN — PROPOFOL 300 MG: 10 INJECTION, EMULSION INTRAVENOUS at 17:11

## 2019-08-05 RX ADMIN — DEXAMETHASONE SODIUM PHOSPHATE 4 MG: 4 INJECTION, SOLUTION INTRAMUSCULAR; INTRAVENOUS at 17:16

## 2019-08-05 RX ADMIN — PROPOFOL 50 MG: 10 INJECTION, EMULSION INTRAVENOUS at 17:24

## 2019-08-05 RX ADMIN — FENTANYL CITRATE 50 MCG: 50 INJECTION, SOLUTION INTRAMUSCULAR; INTRAVENOUS at 18:16

## 2019-08-05 RX ADMIN — HYDROMORPHONE HYDROCHLORIDE 0.5 MG: 1 INJECTION, SOLUTION INTRAMUSCULAR; INTRAVENOUS; SUBCUTANEOUS at 18:37

## 2019-08-05 RX ADMIN — LIDOCAINE HYDROCHLORIDE 60 MG: 20 INJECTION, SOLUTION INFILTRATION; PERINEURAL at 17:11

## 2019-08-05 RX ADMIN — MIDAZOLAM HYDROCHLORIDE 4 MG: 2 INJECTION, SOLUTION INTRAMUSCULAR; INTRAVENOUS at 15:55

## 2019-08-05 RX ADMIN — FENTANYL CITRATE 50 MCG: 50 INJECTION, SOLUTION INTRAMUSCULAR; INTRAVENOUS at 17:11

## 2019-08-05 RX ADMIN — FENTANYL CITRATE 50 MCG: 50 INJECTION, SOLUTION INTRAMUSCULAR; INTRAVENOUS at 17:30

## 2019-08-05 RX ADMIN — TRANEXAMIC ACID 1500 MG: 100 INJECTION, SOLUTION INTRAVENOUS at 17:23

## 2019-08-05 RX ADMIN — OXYCODONE HYDROCHLORIDE 5 MG: 5 TABLET ORAL at 23:02

## 2019-08-05 RX ADMIN — FENTANYL CITRATE 50 MCG: 50 INJECTION, SOLUTION INTRAMUSCULAR; INTRAVENOUS at 17:23

## 2019-08-05 RX ADMIN — FENTANYL CITRATE 50 MCG: 50 INJECTION, SOLUTION INTRAMUSCULAR; INTRAVENOUS at 17:52

## 2019-08-05 RX ADMIN — MIDAZOLAM HYDROCHLORIDE 2 MG: 2 INJECTION, SOLUTION INTRAMUSCULAR; INTRAVENOUS at 17:04

## 2019-08-05 RX ADMIN — BUPIVACAINE HYDROCHLORIDE AND EPINEPHRINE BITARTRATE 40 ML: 2.5; .005 INJECTION, SOLUTION EPIDURAL; INFILTRATION; INTRACAUDAL; PERINEURAL at 16:10

## 2019-08-05 RX ADMIN — SODIUM CHLORIDE, POTASSIUM CHLORIDE, SODIUM LACTATE AND CALCIUM CHLORIDE: 600; 310; 30; 20 INJECTION, SOLUTION INTRAVENOUS at 17:05

## 2019-08-05 ASSESSMENT — PULMONARY FUNCTION TESTS
PIF_VALUE: 3
PIF_VALUE: 1
PIF_VALUE: 12
PIF_VALUE: 3
PIF_VALUE: 3
PIF_VALUE: 4
PIF_VALUE: 3
PIF_VALUE: 2
PIF_VALUE: 3
PIF_VALUE: 2
PIF_VALUE: 3
PIF_VALUE: 3
PIF_VALUE: 1
PIF_VALUE: 5
PIF_VALUE: 3
PIF_VALUE: 3
PIF_VALUE: 4
PIF_VALUE: 2
PIF_VALUE: 2
PIF_VALUE: 13
PIF_VALUE: 3
PIF_VALUE: 3
PIF_VALUE: 2
PIF_VALUE: 3
PIF_VALUE: 12
PIF_VALUE: 2
PIF_VALUE: 3
PIF_VALUE: 3
PIF_VALUE: 2
PIF_VALUE: 3
PIF_VALUE: 3
PIF_VALUE: 4
PIF_VALUE: 3
PIF_VALUE: 6
PIF_VALUE: 3
PIF_VALUE: 13
PIF_VALUE: 3
PIF_VALUE: 1
PIF_VALUE: 2
PIF_VALUE: 3
PIF_VALUE: 3
PIF_VALUE: 1
PIF_VALUE: 3
PIF_VALUE: 5
PIF_VALUE: 12
PIF_VALUE: 3
PIF_VALUE: 13
PIF_VALUE: 13
PIF_VALUE: 3
PIF_VALUE: 13
PIF_VALUE: 2
PIF_VALUE: 5
PIF_VALUE: 2
PIF_VALUE: 5
PIF_VALUE: 3
PIF_VALUE: 12
PIF_VALUE: 3
PIF_VALUE: 2
PIF_VALUE: 3

## 2019-08-05 ASSESSMENT — PAIN DESCRIPTION - LOCATION
LOCATION: KNEE

## 2019-08-05 ASSESSMENT — PAIN DESCRIPTION - PAIN TYPE
TYPE: SURGICAL PAIN

## 2019-08-05 ASSESSMENT — PAIN DESCRIPTION - DESCRIPTORS
DESCRIPTORS: BURNING
DESCRIPTORS: BURNING

## 2019-08-05 ASSESSMENT — PAIN SCALES - GENERAL
PAINLEVEL_OUTOF10: 4
PAINLEVEL_OUTOF10: 4
PAINLEVEL_OUTOF10: 7
PAINLEVEL_OUTOF10: 3
PAINLEVEL_OUTOF10: 3
PAINLEVEL_OUTOF10: 7
PAINLEVEL_OUTOF10: 3

## 2019-08-05 ASSESSMENT — PAIN - FUNCTIONAL ASSESSMENT: PAIN_FUNCTIONAL_ASSESSMENT: 0-10

## 2019-08-05 ASSESSMENT — PAIN DESCRIPTION - ORIENTATION
ORIENTATION: RIGHT

## 2019-08-05 NOTE — ANESTHESIA PROCEDURE NOTES
Peripheral Block    Patient location during procedure: PACU  Start time: 8/5/2019 4:01 PM  End time: 8/5/2019 4:10 PM  Staffing  Anesthesiologist: Karishma Bruner MD  Preanesthetic Checklist  Completed: patient identified, site marked, surgical consent, pre-op evaluation, timeout performed, IV checked, risks and benefits discussed, monitors and equipment checked, anesthesia consent given, oxygen available and patient being monitored  Peripheral Block  Patient position: supine  Prep: ChloraPrep  Patient monitoring: cardiac monitor, continuous pulse ox, frequent blood pressure checks and IV access  Block type: Femoral  Laterality: right  Injection technique: single-shot  Procedures: ultrasound guided  Local infiltration: lidocaine  Infiltration strength: 1 %  Adductor canal  Provider prep: mask and sterile gloves  Local infiltration: lidocaine  Needle  Needle type: combined needle/nerve stimulator   Needle gauge: 22 G  Needle length: 5 cm  Needle localization: ultrasound guidance  Assessment  Injection assessment: negative aspiration for heme, no paresthesia on injection and local visualized surrounding nerve on ultrasound  Slow fractionated injection: yes  Hemodynamics: stable  Additional Notes  Timeout with RN, aspiration eery 5mL  Reason for block: post-op pain management

## 2019-08-05 NOTE — BRIEF OP NOTE
Brief Postoperative Note  ______________________________________________________________    Patient: Jacelyn Pallas  YOB: 1950  MRN: [id number]  Date of Procedure: 8/5/2019    Pre-Op Diagnosis: RIGHT KNEE WOUND DRAINAGE    Post-Op Diagnosis: Same       Procedure(s):  INCISION AND DEBRIDEMENT RIGHT KNEE WITH CEMENT SPACER EXCHANGE  CPT CODE - 53868    Anesthesia: General    Surgeon(s):  Guerda Wilder MD    Assistant: Anai Malone    Estimated Blood Loss (mL): 512     Complications: None    Specimens:   ID Type Source Tests Collected by Time Destination   1 : Right knee fluid  Joint/Joint Fluid Joint Fluid JOINT CULTURE Guerda Wilder MD 8/5/2019 1730        Implants:  Implant Name Type Inv.  Item Serial No.  Lot No. LRB No. Used   CEMENT TOBRAMYCIN ANTIBIOTIC FULL Cement CEMENT TOBRAMYCIN ANTIBIOTIC FULL  MALACHI: ORTHOPAEDICS [id number] Right 3         Drains: * No LDAs found *    Findings: none    Guerda Wilder MD  Date: 8/5/2019  Time: 7:03 PM

## 2019-08-05 NOTE — ANESTHESIA PRE PROCEDURE
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 7XXS    Failure of outpatient treatment Z78.9    Thyroid disease E07.9    Overweight E66.3    Infection of prosthetic right knee joint ([facility] [address] T84.53XA       Past Medical History:        Diagnosis Date    Arthritis     Cancer ([facility] [address]     SKIN    Hypertension     Perforated diverticulitis     Thyroid disease     HYPOTHROID       Past Surgical History:        Procedure Laterality Date    ABDOMINAL ADHESION SURGERY  02/05/15    debridement of colcotaneous fistula    ABDOMINAL EXPLORATION SURGERY  02/05/15    APPENDECTOMY      COLONOSCOPY      INCISION AND DRAINAGE Right 7/8/2019    RIGHT KNEE KNEE INCISION AND DRAINAGE WITH performed by Myra Francis MD at [address] Right 02/05/15    INGUINAL HERNIA REPAIR Left 04/22/15    left inguinal hernia repair with mesh    JOINT REPLACEMENT      KNEE SURGERY  11/29/2012    right total knee replacement    REVISION TOTAL KNEE ARTHROPLASTY Right 7/8/2019    REMOVAL OF TOTAL KNEE AND PLACEMENT OF CEMENT SPACER                  BIOMET performed by Myra Francis MD at [address] [place] [address] Road  [id number]       Social History:    Social History     Tobacco Use    Smoking status: Never Smoker    Smokeless tobacco: Never Used   Substance Use Topics    Alcohol use: No results found for: PREGTESTUR, PREGSERUM, HCG, HCGQUANT     ABGs: No results found for: PHART, PO2ART, AQZ0ZFI, RJK0AKH, BEART, R4OTICOF     Type & Screen (If Applicable):  Lab Results   Component Value Date    LABABO O 11/20/2012    [address] Negative 11/20/2012       Anesthesia Evaluation  Patient summary reviewed and Nursing notes reviewed  Airway: Mallampati: I  TM distance: >3 FB   Neck ROM: full  Mouth opening: > = 3 FB Dental: normal exam         Pulmonary:Negative Pulmonary ROS and normal exam  breath sounds clear to auscultation                             Cardiovascular:    (+) hypertension:,         Rhythm: regular  Rate: normal                    Neuro/Psych:   Negative Neuro/Psych ROS              GI/Hepatic/Renal: Neg GI/Hepatic/Renal ROS            Endo/Other: Negative Endo/Other ROS                    Abdominal:           Vascular: negative vascular ROS. Anesthesia Plan      general     ASA 2     (Blocks)  Induction: intravenous. MIPS: Postoperative opioids intended and Prophylactic antiemetics administered. Anesthetic plan and risks discussed with patient. Plan discussed with CRNA.                   Umesh Lang MD   8/5/2019

## 2019-08-05 NOTE — H&P
mouth    Historical Provider, MD   Levothyroxine Sodium (SYNTHROID PO) Take 200 mcg by mouth daily    Historical Provider, MD   clotrimazole (LOTRIMIN) 1 % cream Apply topically    Historical Provider, MD   valsartan-hydrochlorothiazide (DIOVAN-HCT) 320-25 MG per tablet Take 1 tablet by mouth daily    Historical Provider, MD   allopurinol (ZYLOPRIM) 300 MG tablet Take 300 mg by mouth daily. Historical Provider, MD       Allergies:  Ace inhibitors; Amlodipine besylate; Avelox [moxifloxacin]; Clonidine derivatives; Flagyl [metronidazole]; and Oxycontin [oxycodone]    Social History:      The patient currently lives with 3 dogs, independently. TOBACCO:   reports that he has never smoked. He has never used smokeless tobacco.  ETOH:   reports that he drinks about 24.0 standard drinks of alcohol per week. Family History:     Positive as follows:        Problem Relation Age of Onset    High Blood Pressure Mother     Other Father         MYASTHENIA GRAVIS       REVIEW OF SYSTEMS:   Pertinent positives as noted in the HPI. All other systems reviewed and negative. PHYSICAL EXAM:  BP (!) 130/91   Pulse 71   Temp 98.3 °F (36.8 °C) (Oral)   Resp 16   Ht 6' 6\" (1.981 m)   Wt 250 lb (113.4 kg)   SpO2 98%   BMI 28.89 kg/m²   General appearance: No apparent distress, appears stated age and cooperative. HEENT: Normal cephalic, atraumatic without obvious deformity. Pupils equal, round, and reactive to light. Extra ocular muscles intact. Conjunctivae/corneas clear. Neck: Supple, with full range of motion. No jugular venous distention. Trachea midline. Respiratory:  Normal respiratory effort. Clear to auscultation, bilaterally without Rales/Wheezes/Rhonchi. Cardiovascular: Regular rate and rhythm with normal S1/S2 without murmurs, rubs or gallops. Abdomen: Soft, non-tender, non-distended with normal bowel sounds. Musculoskeletal: R in knee immobilizer.   Skin: Skin color, texture, turgor normal.  No rashes

## 2019-08-06 VITALS
DIASTOLIC BLOOD PRESSURE: 77 MMHG | OXYGEN SATURATION: 98 % | TEMPERATURE: 98.3 F | SYSTOLIC BLOOD PRESSURE: 124 MMHG | BODY MASS INDEX: 28.93 KG/M2 | WEIGHT: 250 LBS | RESPIRATION RATE: 16 BRPM | HEIGHT: 78 IN | HEART RATE: 73 BPM

## 2019-08-06 LAB
ANION GAP SERPL CALCULATED.3IONS-SCNC: 10 MMOL/L (ref 3–16)
BUN BLDV-MCNC: 12 MG/DL (ref 7–20)
CALCIUM SERPL-MCNC: 8.7 MG/DL (ref 8.3–10.6)
CHLORIDE BLD-SCNC: 101 MMOL/L (ref 99–110)
CO2: 27 MMOL/L (ref 21–32)
CREAT SERPL-MCNC: 0.7 MG/DL (ref 0.8–1.3)
GFR AFRICAN AMERICAN: >60
GFR NON-AFRICAN AMERICAN: >60
GLUCOSE BLD-MCNC: 132 MG/DL (ref 70–99)
HCT VFR BLD CALC: 34.9 % (ref 40.5–52.5)
HEMOGLOBIN: 11.8 G/DL (ref 13.5–17.5)
MCH RBC QN AUTO: 28.6 PG (ref 26–34)
MCHC RBC AUTO-ENTMCNC: 34 G/DL (ref 31–36)
MCV RBC AUTO: 84.3 FL (ref 80–100)
PDW BLD-RTO: 15.2 % (ref 12.4–15.4)
PLATELET # BLD: 192 K/UL (ref 135–450)
PMV BLD AUTO: 8.1 FL (ref 5–10.5)
POTASSIUM REFLEX MAGNESIUM: 4 MMOL/L (ref 3.5–5.1)
RBC # BLD: 4.13 M/UL (ref 4.2–5.9)
SODIUM BLD-SCNC: 138 MMOL/L (ref 136–145)
WBC # BLD: 7.8 K/UL (ref 4–11)

## 2019-08-06 PROCEDURE — 97161 PT EVAL LOW COMPLEX 20 MIN: CPT

## 2019-08-06 PROCEDURE — 97165 OT EVAL LOW COMPLEX 30 MIN: CPT

## 2019-08-06 PROCEDURE — 36592 COLLECT BLOOD FROM PICC: CPT

## 2019-08-06 PROCEDURE — 97530 THERAPEUTIC ACTIVITIES: CPT

## 2019-08-06 PROCEDURE — 80048 BASIC METABOLIC PNL TOTAL CA: CPT

## 2019-08-06 PROCEDURE — 94761 N-INVAS EAR/PLS OXIMETRY MLT: CPT

## 2019-08-06 PROCEDURE — 6360000002 HC RX W HCPCS: Performed by: ORTHOPAEDIC SURGERY

## 2019-08-06 PROCEDURE — APPSS45 APP SPLIT SHARED TIME 31-45 MINUTES: Performed by: PHYSICIAN ASSISTANT

## 2019-08-06 PROCEDURE — 97535 SELF CARE MNGMENT TRAINING: CPT

## 2019-08-06 PROCEDURE — 2700000000 HC OXYGEN THERAPY PER DAY

## 2019-08-06 PROCEDURE — 85027 COMPLETE CBC AUTOMATED: CPT

## 2019-08-06 PROCEDURE — 97116 GAIT TRAINING THERAPY: CPT

## 2019-08-06 PROCEDURE — 99221 1ST HOSP IP/OBS SF/LOW 40: CPT | Performed by: INTERNAL MEDICINE

## 2019-08-06 PROCEDURE — 6370000000 HC RX 637 (ALT 250 FOR IP): Performed by: ORTHOPAEDIC SURGERY

## 2019-08-06 RX ADMIN — ALLOPURINOL 300 MG: 300 TABLET ORAL at 08:51

## 2019-08-06 RX ADMIN — LEVOTHYROXINE SODIUM 200 MCG: 100 TABLET ORAL at 08:51

## 2019-08-06 RX ADMIN — Medication 1.5 G: at 06:11

## 2019-08-06 RX ADMIN — ENOXAPARIN SODIUM 40 MG: 40 INJECTION SUBCUTANEOUS at 08:51

## 2019-08-06 ASSESSMENT — PAIN SCALES - GENERAL
PAINLEVEL_OUTOF10: 1

## 2019-08-06 ASSESSMENT — PAIN DESCRIPTION - DESCRIPTORS
DESCRIPTORS: BURNING;ACHING
DESCRIPTORS: ACHING
DESCRIPTORS: ACHING

## 2019-08-06 ASSESSMENT — PAIN DESCRIPTION - ORIENTATION
ORIENTATION: RIGHT
ORIENTATION: RIGHT

## 2019-08-06 ASSESSMENT — PAIN DESCRIPTION - LOCATION
LOCATION: KNEE
LOCATION: KNEE

## 2019-08-06 ASSESSMENT — PAIN DESCRIPTION - FREQUENCY: FREQUENCY: CONTINUOUS

## 2019-08-06 ASSESSMENT — PAIN DESCRIPTION - PAIN TYPE
TYPE: SURGICAL PAIN
TYPE: SURGICAL PAIN

## 2019-08-06 NOTE — PROGRESS NOTES
4 Eyes Skin Assessment     The patient is being assess for   Post-Op Surgical    I agree that 2 RN's have performed a thorough Head to Toe Skin Assessment on the patient. ALL assessment sites listed below have been assessed. Areas assessed by both nurses:   [x]   Head, Face, and Ears   [x]   Shoulders, Back, and Chest, Abdomen  [x]   Arms, Elbows, and Hands   [x]   Coccyx, Sacrum, and Ischium  [x]   Legs, Feet, and Heels        ****no new skin issues since preop assessment    **SHARE this note so that the co-signing nurse is able to place an eSignature**    Co-signer eSignature: Electronically signed by Brittni Caceres RN on 8/5/19 at 7:21 PM    Does the Patient have Skin Breakdown?   {Blank single:86882::\"No\",\"Yes LDA WOUND CARE was Initiated documentation include the Jessica-wound, Wound Assessment, Measurements, Dressing Treatment, Drainage, and Color\",\"}          Joe Prevention initiated:  {YES/NO:19732}   Wound Care Orders initiated:  {YES/NO:19732}      [facility] nurse consulted for Pressure Injury (Stage 3,4, Unstageable, DTI, NWPT, Complex wounds)and New or Established Ostomies:  {YES/NO:19732}      Primary Nurse eSignature: {Esignature:[id number]}
Bedside report given to Vivian galvan RN. Skin assessment completed. Chirag Cobb
Department of Orthopedic Surgery  Physician Assistant   Progress Note    Subjective:     Post-Operative Day: 1 Status Post right knee spacer exchange. Systemic or Specific Complaints:No Complaints per patient today. He is ready for d/c home today. He was able to work well with therapy. Objective:     Patient Vitals for the past 24 hrs:   BP Temp Temp src Pulse Resp SpO2 Height Weight   08/06/19 0817 121/74 98.4 °F (36.9 °C) Oral 64 16 97 % -- --   08/06/19 0315 112/64 98.1 °F (36.7 °C) Oral 61 16 97 % -- --   08/05/19 2250 137/84 97.7 °F (36.5 °C) Oral 59 16 97 % -- --   08/05/19 2130 (!) 148/85 97.6 °F (36.4 °C) Oral 57 14 98 % -- --   08/05/19 2030 (!) 153/90 97.6 °F (36.4 °C) Oral 55 16 98 % -- --   08/05/19 1915 (!) 151/86 97.6 °F (36.4 °C) Oral 54 14 99 % -- --   08/05/19 1900 (!) 140/88 97.5 °F (36.4 °C) Temporal 65 -- 100 % -- --   08/05/19 1855 (!) 141/89 -- -- 66 12 100 % -- --   08/05/19 1850 (!) 142/86 97.5 °F (36.4 °C) Temporal 61 11 99 % -- --   08/05/19 1840 (!) 144/85 -- -- 70 13 98 % -- --   08/05/19 1835 (!) 148/91 -- -- 67 19 94 % -- --   08/05/19 1830 (!) 145/85 -- -- 74 14 97 % -- --   08/05/19 1600 137/78 -- -- 73 16 95 % -- --   08/05/19 1545 (!) 171/91 -- -- 65 16 98 % -- --   08/05/19 1442 (!) 149/86 97.4 °F (36.3 °C) Temporal 68 16 98 % 6' 6\" (1.981 m) 250 lb (113.4 kg)   08/05/19 1331 (!) 130/91 -- -- -- -- -- -- --   08/05/19 1238 (!) 142/87 98.3 °F (36.8 °C) Oral 71 16 98 % 6' 6\" (1.981 m) 250 lb (113.4 kg)       General: alert, appears stated age, cooperative and no distress   Wound: Positive for Erythema   Motion: Painful range of Motion   DVT Exam: No evidence of DVT seen on physical exam.       Knee swollen but thigh soft to palpation. Moving foot and ankle. Good distal pulses.       Data Review  CBC:   Lab Results   Component Value Date    WBC 7.8 08/06/2019    RBC 4.13 08/06/2019    HGB 11.8 08/06/2019    HCT 34.9 08/06/2019     08/06/2019     Cultures from 7/2019:
Patient admitted from OR to PACU. Patient immediately hooked up to heart monitor. Bedside report received. Joe Escobar
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 to help children with reading, walking, anger management, etc.)    Objective  Observation/Palpation  Posture: Good    RLE General AROM: WFL hip & ankle, NT in knee due to ROM restrictions  LLE AROM : WFL  Strength RLE: Not formally strength tested due to recent surgery; appears WFL in hip & ankle, NT in knee  Strength LLE: WFL     Bed mobility  Supine to Sit: Modified independent(moving toward L side, HOB slightly elevated)  Scooting: Independent(to scoot forward to EOB)     Transfers  Sit to Stand: Stand by assistance  Stand to sit: Stand by assistance  Bed to Chair: Stand by assistance(using std. walker)     Ambulation  WB Status: TTWB RLE with KI  Surface: level tile  Device: Standard Walker  Other Apparatus: Right;Knee Immobilizer  Assistance: Stand by assistance  Quality of Gait: Pt amb with slow jorge with decreased stride length. Able to maintain TTWB RLE without cueing from therapist.  Gait Deviations: Slow Jorge;Decreased step length;Decreased step height  Distance: x 50 feet    Stairs/Curb  # Steps : 1  Rails: None  Curbs: 6\"  Device: Standard walker  Assistance: Stand by assistance;Contact guard assistance  Comment: Pt amb up/down 6\" curb step safely using backward approach when ascending step in order for pt to better maintain TTWB status.   Able to navigate step safely with

## 2019-08-06 NOTE — DISCHARGE INSTR - COC
Continuity of Care Form    Patient Name: Lalita North   :  1950  MRN:  [id number]    Admit date:  2019  Discharge date:  2019    Code Status Order: Full Code   Advance Directives:   [facility] Documentation     Date/Time Healthcare Directive Type of Healthcare Directive Copy in [address] Agent's Name Healthcare Agent's Phone Number    19 2046  Yes, patient has an advance directive for healthcare treatment  Durable power of  for health care;Living will  --  Healthcare power of   --  --    19 1449  Yes, patient has an advance directive for healthcare treatment  --  Yes, copy in chart  --  --  --    19 1013  Yes, patient has an advance directive for healthcare treatment  Durable power of  for health care;Living will  Yes, copy in chart  Healthcare power of   --  --          Admitting Physician:  Mariangel Ramirez MD  PCP: Lina Grullon    Discharging Nurse: Maricarmen Marc.   [address] Unit/Room#: 9470/3608-42  Discharging Unit Phone Number: [phone]    Emergency Contact:   Extended Emergency Contact Information  Primary Emergency Contact: Preethi Rodriguez [address] Phone: [phone]  Relation: Other  Secondary Emergency Contact: Mio Baht  [place] Phone: [phone]  Relation: Other    Past Surgical History:  Past Surgical History:   Procedure Laterality Date    ABDOMINAL ADHESION SURGERY  02/05/15    debridement of colcotaneous fistula    ABDOMINAL EXPLORATION SURGERY  02/05/15    APPENDECTOMY      COLONOSCOPY      INCISION AND DRAINAGE Right 2019    RIGHT KNEE KNEE INCISION AND DRAINAGE WITH performed by Mariangel Ramirez MD at [address] [place] [address] Right 02/05/15    INGUINAL HERNIA REPAIR Left 04/22/15    left inguinal hernia repair with mesh    JOINT REPLACEMENT      KNEE SURGERY  2012    right total knee replacement    REVISION TOTAL KNEE they will be removed on post-operative day 10-12 and steri-strips applied  o If you have glue, this will be removed at your appointment or gradually wear off as your incision heals  o Showering is permitted if waterproof Mepilex dressing is applied or when staples are removed and all areas of incision are healed.  Physical Therapy:  o Weight Bearing Status:  - Touchdown weight bearing (10-25 lbs)  o 3 times per week via [address] at all times, do not bend your knee  o Precautions  - Per Physical Therapy Handout   Pain Medications  o You were instructed to take your home oxycodone as needed  o Wean off pain medications as you deem appropriate as long as pain is under control  o Be sure to drink plenty of fluids (recommend water) while taking narcotic pain medications to prevent constipation  o  You may take an over the counter laxative or stool softener as needed to prevent/treat constipation as well, we recommend Senokot S OTC. We recommend that you consider taking these medications the entire time you are taking pain medication.  Cold packs/Ice packs/Machine  o May be used as much as necessary to control swelling/inflammation/soreness  o Be sure to have a barrier (cloth, clothing, towel) between the site and the ice pack to prevent frostbite   Contact Casa's office if  o Increased redness, swelling, drainage of any kind, and/or pain to surgery site. As well as new onset fevers and or chills. These could signify an infection. o Calf or thigh tenderness to touch as well as increased swelling or redness. This could signify a clot formation. o Numbness or tingling to an area around the incision site or below the incision site (toes). o Any rash appears, increased  or new onset nausea/vomiting occur. This may indicate a reaction to a medication. [facility] Phone # [phone].  Follow up with Surgeon at scheduled appointment time.     I

## 2019-08-06 NOTE — DISCHARGE SUMMARY
Department of Orthopedic Surgery  Physician Assistant   Discharge Summary    The Pippa Hook is a 79 y.o. male admitted for right knee spacer exchange. Pippa Hook was admitted to the floor following His recovery in the PACU. Discharge Diagnosis  right knee spacer exchange    Current Inpatient Medications    Current Facility-Administered Medications: docusate sodium (COLACE) capsule 100 mg, 100 mg, Oral, BID  allopurinol (ZYLOPRIM) tablet 300 mg, 300 mg, Oral, Daily  levothyroxine (SYNTHROID) tablet 200 mcg, 200 mcg, Oral, Daily  dextrose 5 % and 0.45 % NaCl with KCl 20 mEq infusion, 1,000 mL, Intravenous, Continuous  sodium chloride flush 0.9 % injection 10 mL, 10 mL, Intravenous, 2 times per day  sodium chloride flush 0.9 % injection 10 mL, 10 mL, Intravenous, PRN  morphine (PF) injection 2 mg, 2 mg, Intravenous, Q2H PRN **OR** morphine injection 4 mg, 4 mg, Intravenous, Q2H PRN  ondansetron (ZOFRAN) injection 4 mg, 4 mg, Intravenous, Q6H PRN  enoxaparin (LOVENOX) injection 40 mg, 40 mg, Subcutaneous, Daily  oxyCODONE (ROXICODONE) immediate release tablet 5 mg, 5 mg, Oral, Q4H PRN **OR** oxyCODONE (ROXICODONE) immediate release tablet 10 mg, 10 mg, Oral, Q4H PRN  valsartan (DIOVAN) tablet 320 mg, 320 mg, Oral, Daily **AND** hydrochlorothiazide (HYDRODIURIL) tablet 25 mg, 25 mg, Oral, Daily    Post-operatively the patients diet was advanced as tolerated and their dressing was changed on POD #1. The incision is dressing in place, clean, dry and intact with no signs of infection. The patient remained neurovascularly intact in the right lower and had intact pulses distally. Patients calf remained soft and showed no evidence of DVT. The patient was able to move their right lower extremity without any problems post-operatively. Physical therapy and occupational therapy were consulted and began working with the patient post-operatively.   The patient progressed with PT/OT as would be expected and continued to improve through their stay. The patients pain was initially controlled with IV medications but we were able to transition to oral pain medications soon after arrival to the floor and their pain remained under good control through their hospital stay. From a medical standpoint the patient remained stable and continued to have the medicine team follow throughout their stay. The patient will be discharged at this time to Home  with their current diet restrictions and will continue to follow the precautions outlined to them by us and PT/OT. ID consulted for abx recommendation. PICC line in place. Resumed home care and IV abx. Condition on Discharge: Stable    Plan  Return visit in 2 weeks. .  Patient was instructed on the use of pain medications, the signs and symptoms of infection, and was given our number to call should they have any questions or concerns following discharge. For opioid prescriptions given at discharge the following statement is provided for compliance with [employer] rules. Patient being given increased dosage/quantity of opoid pain medication in excess of [employer] guidelines which noted a 30 MED daily of opioids due to the fact that he/she has undergone major orthopaedic surgery as outlined in rule 4731-11-13. Dosages and further duration of the pain medication will be re-evaluated at her post op visit in 2 weeks. Patient was educated on dosing expectations and limits of prescribing as a result of the new [facility] Board rules enacted August 31, 2017. Please also note that this is not the initial opoid prescription issued to this patient but a continuation of medication utilized during the hospital admission as noted in the medical record. [place]ARRS report has also been utilized to screen for any abuse history or suspicious activity as outlined in [place].   All efforts have been taken to prevent abuse potential and misuse of opioid medications including education, screening, and close clinical follow up.

## 2019-08-06 NOTE — CONSULTS
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 Isolation MSSA in culture. Treated with IV followed by oral suppressive abx. Traumatic wound dehiscence 3/2017 requiring another debridement I&D 5/2017, culture negative   Maintained on po clinda thereafter  Developed draining sinus, E faecalis isolated in wound cultures     S/p R knee explant 7/8/19 - cultures with amp-sensitive E faecalis  He has been getting IV Unasyn as outpt    A portion of the knee failed to heal and continued to drain  He was admitted 8/5 for planned I&D and spacer exchange  Surgical culture is negative so far; Gram's stain no organisms. He is feeling well. Pain controlled.         Current abx:  Unasyn prior to admission        Past Surgical History:       Diagnosis Date    Arthritis     Cancer ([address]     SKIN    Hypertension     Perforated diverticulitis     Thyroid disease     HYPOTHROID         Procedure Laterality Date    ABDOMINAL ADHESION SURGERY  02/05/15    debridement of colcotaneous fistula    ABDOMINAL EXPLORATION SURGERY  02/05/15    APPENDECTOMY      COLONOSCOPY      INCISION AND DRAINAGE Right 7/8/2019    RIGHT KNEE KNEE INCISION AND DRAINAGE WITH performed by Anyi Meraz MD at [facility] Right 02/05/15    INGUINAL HERNIA REPAIR Left 04/22/15    left inguinal hernia repair with mesh    JOINT REPLACEMENT      KNEE SURGERY  11/29/2012    right total knee replacement    REVISION TOTAL KNEE ARTHROPLASTY Right 7/8/2019    REMOVAL OF TOTAL KNEE AND PLACEMENT OF CEMENT SPACER                  BIOMET performed by Anyi Meraz MD at [facility] Right 8/5/2019 to person place and time. No obvious depression or anxiety. MUSCULOSKELETAL:  RLE surgically dressed with immobilizer   SKIN:  normal skin color, texture, turgor and no redness, warmth, or swelling. No palpable nodules or stigmata of embolic phenomenon  NEUROLOGIC: nonfocal exam  ACCESS:  PICC RUE site ok       DATA:    Old records have been reviewed    CBC:  Recent Labs     08/05/19  1330 08/06/19  0610   WBC 8.8 7.8   RBC 4.71 4.13*   HGB 13.3* 11.8*   HCT 39.7* 34.9*    192   MCV 84.3 84.3   MCH 28.1 28.6   MCHC 33.4 34.0   RDW 15.5* 15.2      BMP:  Recent Labs     08/05/19  1330 08/06/19  0610    138   K 3.5 4.0    101   CO2 25 27   BUN 12 12   CREATININE 0.6* 0.7*   CALCIUM 9.2 8.7   GLUCOSE 105* 132*        Cultures:   7/8 Surgical culture R knee with amp-sens E faecalis   Enterococcus faecalis (1)   Antibiotic Interpretation ROSENDO Status    ampicillin Sensitive <=2 mcg/mL     vancomycin Sensitive 2 mcg/mL       8/5 Surgical culture R knee NGTD; GS no organisms      Radiology Review:  All pertinent images / reports were reviewed as a part of this visit.        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    Hypertension    Obesity    Infection/inflammation due to internal orthopedic device/implant/graft, sequela    Failure of outpatient treatment    Thyroid disease    Overweight    Infection of prosthetic right knee joint ([address]       Rosalind Rohith is a 79yoM who is evaluated for the following:    S/p R TKA 2012    Late infection R TKA  Previous isolation MSSA in deep surgical cultures and E faecalis in wound cultures  Failed attempts at debridement with retention and chronic suppressive abx     S/p R knee explant 7/8/19  Isolation of amp-sensitive E faecalis in surgical cultures    Persistent

## 2019-08-08 ENCOUNTER — HOSPITAL ENCOUNTER (OUTPATIENT)
Age: 69
Setting detail: SPECIMEN
Discharge: HOME | End: 2019-08-08
Payer: MEDICARE

## 2019-08-08 LAB
ANION GAP SERPL CALCULATED.3IONS-SCNC: 19 MMOL/L (ref 3–16)
BUN BLDV-MCNC: 16 MG/DL (ref 7–20)
CALCIUM SERPL-MCNC: 9.3 MG/DL (ref 8.3–10.6)
CHLORIDE BLD-SCNC: 104 MMOL/L (ref 99–110)
CO2: 21 MMOL/L (ref 21–32)
CREAT SERPL-MCNC: 0.9 MG/DL (ref 0.8–1.3)
GFR AFRICAN AMERICAN: >60
GFR NON-AFRICAN AMERICAN: >60
GLUCOSE BLD-MCNC: 117 MG/DL (ref 70–99)
POTASSIUM SERPL-SCNC: 3.7 MMOL/L (ref 3.5–5.1)
SODIUM BLD-SCNC: 144 MMOL/L (ref 136–145)
VANCOMYCIN TROUGH: 7.2 UG/ML (ref 10–20)

## 2019-08-08 PROCEDURE — 80048 BASIC METABOLIC PNL TOTAL CA: CPT

## 2019-08-08 PROCEDURE — 36415 COLL VENOUS BLD VENIPUNCTURE: CPT

## 2019-08-08 PROCEDURE — 80202 ASSAY OF VANCOMYCIN: CPT

## 2019-08-09 ENCOUNTER — TELEPHONE (OUTPATIENT)
Dept: INFECTIOUS DISEASES | Age: 69
End: 2019-08-09

## 2019-08-12 LAB
ALBUMIN SERPL-MCNC: 3.6 GM/DL (ref 3.2–4.6)
ALP BLD-CCNC: 124 IU/L (ref 40–129)
ALT SERPL-CCNC: 16 IU/L
ANION GAP SERPL CALCULATED.3IONS-SCNC: 16 MMOL/L (ref 7–16)
AST SERPL-CCNC: 20 IU/L
BASOPHILS # BLD: 0.7 %
BASOPHILS ABSOLUTE: 0 X10(3)/MCL (ref 0–0.1)
BILIRUB SERPL-MCNC: 0.4 MG/DL (ref 0.1–1.4)
BUN BLDV-MCNC: 11 MG/DL (ref 8–23)
C-REACTIVE PROTEIN WIDE RANGE: 15.34 MG/L
CALCIUM SERPL-MCNC: 8.3 MG/DL (ref 8.8–10.4)
CHLORIDE BLD-SCNC: 104 MEQ/L (ref 98–107)
CO2: 25 MMOL/L (ref 22–29)
CREAT SERPL-MCNC: 0.83 MG/DL (ref 0.67–1.3)
EOSINOPHIL # BLD: 4.8 %
EOSINOPHILS ABSOLUTE: 0.3 X10(3)/MCL (ref 0–0.5)
GFR AFRICAN AMERICAN: 106 ML/MIN/1.73 M2
GFR NON-AFRICAN AMERICAN: 92 ML/MIN/1.73 M2
GLUCOSE BLD-MCNC: 166 MG/DL (ref 82–100)
HCT VFR BLD CALC: 34.9 % (ref 40–51)
HEMOGLOBIN: 11.2 G/DL (ref 13.7–17.5)
IMMATURE CELLS ABSOLUTE COUNT: 0.1 X10(3)/MCL (ref 0–0.1)
IMMATURE GRANULOCYTES: 1.2 %
LYMPHOCYTES # BLD: 10.6 %
LYMPHOCYTES ABSOLUTE: 0.6 X10(3)/MCL (ref 1.2–3.9)
MCH RBC QN AUTO: 27.9 PG (ref 26–34)
MCHC RBC AUTO-ENTMCNC: 32.1 G/DL (ref 30.7–35.5)
MCV RBC AUTO: 87 FL (ref 80–100)
MONOCYTES # BLD: 9 %
MONOCYTES ABSOLUTE: 0.5 X10(3)/MCL (ref 0.3–0.9)
NEUTROPHILS ABSOLUTE: 4.4 X10(3)/MCL (ref 1.6–6.1)
NEUTROPHILS: 73.7 %
PDW BLD-RTO: 14.2 %
PLATELET # BLD: 164 X10(3)/MCL (ref 155–369)
PMV BLD AUTO: 10.7 FL (ref 8.8–12.5)
POTASSIUM SERPL-SCNC: 3.4 MEQ/L (ref 3.5–5)
RBC # BLD: 4.01 X10(6)/MCL (ref 4.6–6.1)
SEDIMENTATION RATE, ERYTHROCYTE: 11 MM/HR (ref 0–20)
SODIUM BLD-SCNC: 145 MEQ/L (ref 136–145)
TOTAL PROTEIN: 6.1 GM/DL (ref 6.4–8.3)
VANCOMYCIN TROUGH: 12.9 MCG/ML (ref 10–20)
WBC # BLD: 6 X10(3)/MCL (ref 3.7–10.3)

## 2019-08-13 ENCOUNTER — OFFICE VISIT (OUTPATIENT)
Dept: INFECTIOUS DISEASES | Age: 69
End: 2019-08-13
Payer: MEDICARE

## 2019-08-13 VITALS — HEART RATE: 72 BPM | TEMPERATURE: 98.1 F | SYSTOLIC BLOOD PRESSURE: 118 MMHG | DIASTOLIC BLOOD PRESSURE: 76 MMHG

## 2019-08-13 DIAGNOSIS — Z51.81 THERAPEUTIC DRUG MONITORING: ICD-10-CM

## 2019-08-13 DIAGNOSIS — T84.50XD INFECTION OF PROSTHETIC JOINT, SUBSEQUENT ENCOUNTER: Primary | ICD-10-CM

## 2019-08-13 DIAGNOSIS — Z45.2 PERIPHERALLY INSERTED CENTRAL CATHETER (PICC) IN PLACE: ICD-10-CM

## 2019-08-13 PROCEDURE — 4040F PNEUMOC VAC/ADMIN/RCVD: CPT | Performed by: INTERNAL MEDICINE

## 2019-08-13 PROCEDURE — 1123F ACP DISCUSS/DSCN MKR DOCD: CPT | Performed by: INTERNAL MEDICINE

## 2019-08-13 PROCEDURE — G8417 CALC BMI ABV UP PARAM F/U: HCPCS | Performed by: INTERNAL MEDICINE

## 2019-08-13 PROCEDURE — 3017F COLORECTAL CA SCREEN DOC REV: CPT | Performed by: INTERNAL MEDICINE

## 2019-08-13 PROCEDURE — 1036F TOBACCO NON-USER: CPT | Performed by: INTERNAL MEDICINE

## 2019-08-13 PROCEDURE — G8427 DOCREV CUR MEDS BY ELIG CLIN: HCPCS | Performed by: INTERNAL MEDICINE

## 2019-08-13 PROCEDURE — 99214 OFFICE O/P EST MOD 30 MIN: CPT | Performed by: INTERNAL MEDICINE

## 2019-08-13 PROCEDURE — 1111F DSCHRG MED/CURRENT MED MERGE: CPT | Performed by: INTERNAL MEDICINE

## 2019-08-15 LAB
ANION GAP SERPL CALCULATED.3IONS-SCNC: 14 MMOL/L (ref 7–16)
BUN BLDV-MCNC: 13 MG/DL (ref 8–23)
CALCIUM SERPL-MCNC: 8.3 MG/DL (ref 8.8–10.4)
CHLORIDE BLD-SCNC: 103 MEQ/L (ref 98–107)
CO2: 25 MMOL/L (ref 22–29)
CREAT SERPL-MCNC: 0.88 MG/DL (ref 0.67–1.3)
GFR AFRICAN AMERICAN: 103 ML/MIN/1.73 M2
GFR NON-AFRICAN AMERICAN: 90 ML/MIN/1.73 M2
GLUCOSE BLD-MCNC: 204 MG/DL (ref 82–100)
POTASSIUM SERPL-SCNC: 3 MEQ/L (ref 3.5–5)
SODIUM BLD-SCNC: 142 MEQ/L (ref 136–145)
VANCOMYCIN TROUGH: 13.7 MCG/ML (ref 10–20)

## 2019-08-19 ENCOUNTER — TELEPHONE (OUTPATIENT)
Dept: INFECTIOUS DISEASES | Age: 69
End: 2019-08-19

## 2019-08-19 DIAGNOSIS — T84.59XA INFECTED PROSTHETIC KNEE JOINT, INITIAL ENCOUNTER (HCC): Primary | ICD-10-CM

## 2019-08-19 DIAGNOSIS — Z96.659 INFECTED PROSTHETIC KNEE JOINT, INITIAL ENCOUNTER (HCC): Primary | ICD-10-CM

## 2019-08-19 NOTE — TELEPHONE ENCOUNTER
Unable to get patient scheduled until 8/20/19 at 0800 to replace the PICC line.    This would be 3 days without antibiotics

## 2019-08-20 ENCOUNTER — TELEPHONE (OUTPATIENT)
Dept: INFECTIOUS DISEASES | Age: 69
End: 2019-08-20

## 2019-08-20 ENCOUNTER — HOSPITAL ENCOUNTER (OUTPATIENT)
Dept: INTERVENTIONAL RADIOLOGY/VASCULAR | Age: 69
Discharge: HOME | End: 2019-08-20
Payer: MEDICARE

## 2019-08-20 DIAGNOSIS — T84.59XA INFECTED PROSTHETIC KNEE JOINT, INITIAL ENCOUNTER (HCC): ICD-10-CM

## 2019-08-20 DIAGNOSIS — Z96.659 INFECTED PROSTHETIC KNEE JOINT, INITIAL ENCOUNTER (HCC): ICD-10-CM

## 2019-08-20 PROCEDURE — C1751 CATH, INF, PER/CENT/MIDLINE: HCPCS

## 2019-08-20 PROCEDURE — 36573 INSJ PICC RS&I 5 YR+: CPT

## 2019-08-20 NOTE — TELEPHONE ENCOUNTER
Spoke with Nikolas Kulkarni at [facility] v.o to d/c Unasyn and continue IV Vancomycin 1750 mg q12HRS  Term date: 9/10/19

## 2019-08-26 NOTE — PROGRESS NOTES
<0.6  17N-541Y    <5.1    CRP is used in the detection and evaluation of infection, tissue injury,  inflammatory disorders and associated diseases. Increases in CRP values  are non-specific and should not be interpreted without a complete  clinical evaluation. 04/17/2017 14.1 (H) 0.0 - 5.1 mg/L Final     Comment:     WR-CRP Reference Range:  0D-29D      <0.6  30D-199Y    <5.1    CRP is used in the detection and evaluation of infection, tissue injury,  inflammatory disorders and associated diseases. Increases in CRP values  are non-specific and should not be interpreted without a complete  clinical evaluation.            Cultures:   7/8       Surgical culture R knee with amp-sens E faecalis   Enterococcus faecalis (1)           Antibiotic Interpretation ROSENDO Status     ampicillin Sensitive <=2 mcg/mL       vancomycin Sensitive 2 mcg/mL          8/5       Surgical culture R knee rare growth S epi and E faecalis; GS no organisms       Assessment / Plan:      S/p R TKA 2012     Late infection R TKA  Previous isolation MSSA in deep surgical cultures and E faecalis in wound cultures  Failed attempts at debridement with retention and chronic suppressive abx      S/p R knee explant 7/8/19  Isolation of amp-sensitive E faecalis in surgical cultures     Persistent drainage from knee  Admitted for I&D and spacer exchange 8/5/19 - culture again with E faecalis along with rare growth of S epi of uncertain significance       Allergy flagyl, avelox      Plan      Continued drainage from the knee is concerning  He may need another I&D  For now will continue both vanc and Unasyn  Plan to continue for at least the next 4 weeks   Routine PICC care  Serial labs as ordered  Watch closely for any DEMETRI     All questions answered   RTC 4 weeks         Annie Ordaz MD

## 2019-08-27 ENCOUNTER — TELEPHONE (OUTPATIENT)
Dept: INFECTIOUS DISEASES | Age: 69
End: 2019-08-27

## 2019-09-02 ENCOUNTER — HOSPITAL ENCOUNTER (OUTPATIENT)
Age: 69
Setting detail: SPECIMEN
Discharge: HOME | End: 2019-09-02
Payer: MEDICARE

## 2019-09-02 PROCEDURE — 86140 C-REACTIVE PROTEIN: CPT

## 2019-09-02 PROCEDURE — 80202 ASSAY OF VANCOMYCIN: CPT

## 2019-09-02 PROCEDURE — 85652 RBC SED RATE AUTOMATED: CPT

## 2019-09-02 PROCEDURE — 36415 COLL VENOUS BLD VENIPUNCTURE: CPT

## 2019-09-02 PROCEDURE — 85025 COMPLETE CBC W/AUTO DIFF WBC: CPT

## 2019-09-03 LAB
BASOPHILS ABSOLUTE: 0.1 K/UL (ref 0–0.2)
BASOPHILS RELATIVE PERCENT: 2.2 %
C-REACTIVE PROTEIN: 13.1 MG/L (ref 0–5.1)
EOSINOPHILS ABSOLUTE: 0.3 K/UL (ref 0–0.6)
EOSINOPHILS RELATIVE PERCENT: 4.4 %
HCT VFR BLD CALC: 37.7 % (ref 40.5–52.5)
HEMOGLOBIN: 12.3 G/DL (ref 13.5–17.5)
LYMPHOCYTES ABSOLUTE: 0.7 K/UL (ref 1–5.1)
LYMPHOCYTES RELATIVE PERCENT: 11.7 %
MCH RBC QN AUTO: 27.5 PG (ref 26–34)
MCHC RBC AUTO-ENTMCNC: 32.7 G/DL (ref 31–36)
MCV RBC AUTO: 83.9 FL (ref 80–100)
MONOCYTES ABSOLUTE: 0.4 K/UL (ref 0–1.3)
MONOCYTES RELATIVE PERCENT: 6.5 %
NEUTROPHILS ABSOLUTE: 4.7 K/UL (ref 1.7–7.7)
NEUTROPHILS RELATIVE PERCENT: 75.2 %
PDW BLD-RTO: 14.5 % (ref 12.4–15.4)
PLATELET # BLD: 264 K/UL (ref 135–450)
PMV BLD AUTO: 8.1 FL (ref 5–10.5)
RBC # BLD: 4.49 M/UL (ref 4.2–5.9)
SEDIMENTATION RATE, ERYTHROCYTE: 16 MM/HR (ref 0–20)
VANCOMYCIN TROUGH: 18.4 UG/ML (ref 10–20)
WBC # BLD: 6.2 K/UL (ref 4–11)

## 2019-09-09 LAB
ALBUMIN SERPL-MCNC: 3.9 G/DL
ALP BLD-CCNC: 158 U/L
ALT SERPL-CCNC: 37 U/L
ANION GAP SERPL CALCULATED.3IONS-SCNC: NORMAL MMOL/L
AST SERPL-CCNC: 30 U/L
BASOPHILS ABSOLUTE: 0.1 /ΜL
BASOPHILS RELATIVE PERCENT: 0.9 %
BILIRUB SERPL-MCNC: 0.3 MG/DL (ref 0.1–1.4)
BUN BLDV-MCNC: 9 MG/DL
C-REACTIVE PROTEIN: 9.47
CALCIUM SERPL-MCNC: 9 MG/DL
CHLORIDE BLD-SCNC: 100 MMOL/L
CO2: 25 MMOL/L
CREAT SERPL-MCNC: 0.9 MG/DL
EOSINOPHILS ABSOLUTE: 0.3 /ΜL
EOSINOPHILS RELATIVE PERCENT: 5 %
GFR CALCULATED: NORMAL
GLUCOSE BLD-MCNC: 176 MG/DL
HCT VFR BLD CALC: 37.6 % (ref 41–53)
HEMOGLOBIN: 11.9 G/DL (ref 13.5–17.5)
LYMPHOCYTES ABSOLUTE: 0.8 /ΜL
LYMPHOCYTES RELATIVE PERCENT: 11.8 %
MCH RBC QN AUTO: 26.2 PG
MCHC RBC AUTO-ENTMCNC: 31.6 G/DL
MCV RBC AUTO: 82.6 FL
MONOCYTES ABSOLUTE: 0.5 /ΜL
MONOCYTES RELATIVE PERCENT: 7.4 %
NEUTROPHILS ABSOLUTE: 4.7 /ΜL
NEUTROPHILS RELATIVE PERCENT: 74.3 %
PLATELET # BLD: 202 K/ΜL
PMV BLD AUTO: 10.5 FL
POTASSIUM SERPL-SCNC: 3.4 MMOL/L
RBC # BLD: 4.55 10^6/ΜL
SEDIMENTATION RATE, ERYTHROCYTE: 16
SODIUM BLD-SCNC: 140 MMOL/L
TOTAL PROTEIN: 6.3
VANCOMYCIN TROUGH: 14.1
WBC # BLD: 6.3 10^3/ML

## 2019-09-17 DIAGNOSIS — M25.561 RIGHT KNEE PAIN, UNSPECIFIED CHRONICITY: Primary | ICD-10-CM

## 2019-09-27 ENCOUNTER — HOSPITAL ENCOUNTER (OUTPATIENT)
Dept: PREADMISSION TESTING | Age: 69
Discharge: HOME | End: 2019-10-01
Payer: MEDICARE

## 2019-09-27 VITALS
BODY MASS INDEX: 28.93 KG/M2 | HEIGHT: 78 IN | DIASTOLIC BLOOD PRESSURE: 78 MMHG | HEART RATE: 68 BPM | SYSTOLIC BLOOD PRESSURE: 124 MMHG | RESPIRATION RATE: 16 BRPM | TEMPERATURE: 97.3 F | OXYGEN SATURATION: 97 % | WEIGHT: 250 LBS

## 2019-09-27 LAB
ALBUMIN SERPL-MCNC: 4.4 G/DL (ref 3.4–5)
ANION GAP SERPL CALCULATED.3IONS-SCNC: 12 MMOL/L (ref 3–16)
APTT: 25.7 SEC (ref 26–36)
BASOPHILS ABSOLUTE: 0 K/UL (ref 0–0.2)
BASOPHILS RELATIVE PERCENT: 0.7 %
BILIRUBIN URINE: NEGATIVE
BLOOD, URINE: NEGATIVE
BUN BLDV-MCNC: 9 MG/DL (ref 7–20)
CALCIUM SERPL-MCNC: 9.3 MG/DL (ref 8.3–10.6)
CHLORIDE BLD-SCNC: 101 MMOL/L (ref 99–110)
CLARITY: CLEAR
CO2: 27 MMOL/L (ref 21–32)
COLOR: YELLOW
CREAT SERPL-MCNC: 0.8 MG/DL (ref 0.8–1.3)
EOSINOPHILS ABSOLUTE: 0.2 K/UL (ref 0–0.6)
EOSINOPHILS RELATIVE PERCENT: 3.8 %
ESTIMATED AVERAGE GLUCOSE: 105.4 MG/DL
GFR AFRICAN AMERICAN: >60
GFR NON-AFRICAN AMERICAN: >60
GLUCOSE BLD-MCNC: 150 MG/DL (ref 70–99)
GLUCOSE URINE: NEGATIVE MG/DL
HBA1C MFR BLD: 5.3 %
HCT VFR BLD CALC: 40.1 % (ref 40.5–52.5)
HEMOGLOBIN: 13.1 G/DL (ref 13.5–17.5)
INR BLD: 0.96 (ref 0.86–1.14)
KETONES, URINE: NEGATIVE MG/DL
LEUKOCYTE ESTERASE, URINE: NEGATIVE
LYMPHOCYTES ABSOLUTE: 0.7 K/UL (ref 1–5.1)
LYMPHOCYTES RELATIVE PERCENT: 11.8 %
MCH RBC QN AUTO: 26 PG (ref 26–34)
MCHC RBC AUTO-ENTMCNC: 32.8 G/DL (ref 31–36)
MCV RBC AUTO: 79.4 FL (ref 80–100)
MICROSCOPIC EXAMINATION: NORMAL
MONOCYTES ABSOLUTE: 0.5 K/UL (ref 0–1.3)
MONOCYTES RELATIVE PERCENT: 8.3 %
NEUTROPHILS ABSOLUTE: 4.4 K/UL (ref 1.7–7.7)
NEUTROPHILS RELATIVE PERCENT: 75.4 %
NITRITE, URINE: NEGATIVE
PDW BLD-RTO: 14.6 % (ref 12.4–15.4)
PH UA: 6 (ref 5–8)
PLATELET # BLD: 190 K/UL (ref 135–450)
PMV BLD AUTO: 7.6 FL (ref 5–10.5)
POTASSIUM SERPL-SCNC: 3.8 MMOL/L (ref 3.5–5.1)
PROTEIN UA: NEGATIVE MG/DL
PROTHROMBIN TIME: 10.9 SEC (ref 9.8–13)
RBC # BLD: 5.05 M/UL (ref 4.2–5.9)
SODIUM BLD-SCNC: 140 MMOL/L (ref 136–145)
SPECIFIC GRAVITY UA: 1.01 (ref 1–1.03)
URINE TYPE: NORMAL
UROBILINOGEN, URINE: 0.2 E.U./DL
WBC # BLD: 5.9 K/UL (ref 4–11)

## 2019-09-27 PROCEDURE — 85730 THROMBOPLASTIN TIME PARTIAL: CPT

## 2019-09-27 PROCEDURE — 85025 COMPLETE CBC W/AUTO DIFF WBC: CPT

## 2019-09-27 PROCEDURE — 82040 ASSAY OF SERUM ALBUMIN: CPT

## 2019-09-27 PROCEDURE — 36415 COLL VENOUS BLD VENIPUNCTURE: CPT

## 2019-09-27 PROCEDURE — 83036 HEMOGLOBIN GLYCOSYLATED A1C: CPT

## 2019-09-27 PROCEDURE — 87086 URINE CULTURE/COLONY COUNT: CPT

## 2019-09-27 PROCEDURE — 80048 BASIC METABOLIC PNL TOTAL CA: CPT

## 2019-09-27 PROCEDURE — 85610 PROTHROMBIN TIME: CPT

## 2019-09-27 PROCEDURE — 81003 URINALYSIS AUTO W/O SCOPE: CPT

## 2019-09-27 RX ORDER — TRANEXAMIC ACID 100 MG/ML
15 INJECTION, SOLUTION INTRAVENOUS ONCE
Status: CANCELLED | OUTPATIENT
Start: 2019-10-07 | End: 2019-09-27

## 2019-09-27 RX ORDER — MELOXICAM 15 MG/1
15 TABLET ORAL DAILY
COMMUNITY

## 2019-09-28 LAB — URINE CULTURE, ROUTINE: NORMAL

## 2019-10-06 ENCOUNTER — ANESTHESIA EVENT (OUTPATIENT)
Dept: OPERATING ROOM | Age: 69
DRG: 487 | End: 2019-10-06
Payer: MEDICARE

## 2019-10-07 ENCOUNTER — ANESTHESIA (OUTPATIENT)
Dept: OPERATING ROOM | Age: 69
DRG: 487 | End: 2019-10-07
Payer: MEDICARE

## 2019-10-07 ENCOUNTER — HOSPITAL ENCOUNTER (OUTPATIENT)
Dept: GENERAL RADIOLOGY | Age: 69
Discharge: HOME | DRG: 487 | End: 2019-10-07
Payer: MEDICARE

## 2019-10-07 ENCOUNTER — HOSPITAL ENCOUNTER (INPATIENT)
Age: 69
LOS: 2 days | Discharge: HOME | DRG: 487 | End: 2019-10-09
Attending: ORTHOPAEDIC SURGERY | Admitting: ORTHOPAEDIC SURGERY
Payer: MEDICARE

## 2019-10-07 VITALS — OXYGEN SATURATION: 95 % | TEMPERATURE: 97.3 F | SYSTOLIC BLOOD PRESSURE: 90 MMHG | DIASTOLIC BLOOD PRESSURE: 54 MMHG

## 2019-10-07 DIAGNOSIS — T84.59XA INFECTED PROSTHETIC KNEE JOINT, INITIAL ENCOUNTER (HCC): Primary | ICD-10-CM

## 2019-10-07 DIAGNOSIS — T84.7XXS INFECTION/INFLAMMATION DUE TO INTERNAL ORTHOPEDIC DEVICE/IMPLANT/GRAFT, SEQUELA: ICD-10-CM

## 2019-10-07 DIAGNOSIS — Z96.659 INFECTED PROSTHETIC KNEE JOINT, INITIAL ENCOUNTER (HCC): Primary | ICD-10-CM

## 2019-10-07 DIAGNOSIS — R52 PAIN: ICD-10-CM

## 2019-10-07 LAB
ABO/RH: NORMAL
ANTIBODY SCREEN: NORMAL

## 2019-10-07 PROCEDURE — 3600000014 HC SURGERY LEVEL 4 ADDTL 15MIN: Performed by: ORTHOPAEDIC SURGERY

## 2019-10-07 PROCEDURE — 6360000002 HC RX W HCPCS: Performed by: ORTHOPAEDIC SURGERY

## 2019-10-07 PROCEDURE — 0SRC0EZ REPLACEMENT OF RIGHT KNEE JOINT WITH ARTICULATING SPACER, OPEN APPROACH: ICD-10-PCS | Performed by: ORTHOPAEDIC SURGERY

## 2019-10-07 PROCEDURE — 6360000002 HC RX W HCPCS: Performed by: NURSE ANESTHETIST, CERTIFIED REGISTERED

## 2019-10-07 PROCEDURE — 7100000000 HC PACU RECOVERY - FIRST 15 MIN: Performed by: ORTHOPAEDIC SURGERY

## 2019-10-07 PROCEDURE — 87070 CULTURE OTHR SPECIMN AEROBIC: CPT

## 2019-10-07 PROCEDURE — 6370000000 HC RX 637 (ALT 250 FOR IP): Performed by: ANESTHESIOLOGY

## 2019-10-07 PROCEDURE — 6370000000 HC RX 637 (ALT 250 FOR IP): Performed by: ORTHOPAEDIC SURGERY

## 2019-10-07 PROCEDURE — 2500000003 HC RX 250 WO HCPCS: Performed by: ORTHOPAEDIC SURGERY

## 2019-10-07 PROCEDURE — 88305 TISSUE EXAM BY PATHOLOGIST: CPT

## 2019-10-07 PROCEDURE — L1830 KO IMMOB CANVAS LONG PRE OTS: HCPCS | Performed by: ORTHOPAEDIC SURGERY

## 2019-10-07 PROCEDURE — 3700000000 HC ANESTHESIA ATTENDED CARE: Performed by: ORTHOPAEDIC SURGERY

## 2019-10-07 PROCEDURE — 3600000004 HC SURGERY LEVEL 4 BASE: Performed by: ORTHOPAEDIC SURGERY

## 2019-10-07 PROCEDURE — 87075 CULTR BACTERIA EXCEPT BLOOD: CPT

## 2019-10-07 PROCEDURE — C1713 ANCHOR/SCREW BN/BN,TIS/BN: HCPCS | Performed by: ORTHOPAEDIC SURGERY

## 2019-10-07 PROCEDURE — 86850 RBC ANTIBODY SCREEN: CPT

## 2019-10-07 PROCEDURE — 87205 SMEAR GRAM STAIN: CPT

## 2019-10-07 PROCEDURE — 88331 PATH CONSLTJ SURG 1 BLK 1SPC: CPT

## 2019-10-07 PROCEDURE — 2500000003 HC RX 250 WO HCPCS: Performed by: NURSE ANESTHETIST, CERTIFIED REGISTERED

## 2019-10-07 PROCEDURE — 2580000003 HC RX 258: Performed by: ORTHOPAEDIC SURGERY

## 2019-10-07 PROCEDURE — 1200000000 HC SEMI PRIVATE

## 2019-10-07 PROCEDURE — 2580000003 HC RX 258: Performed by: ANESTHESIOLOGY

## 2019-10-07 PROCEDURE — 3700000001 HC ADD 15 MINUTES (ANESTHESIA): Performed by: ORTHOPAEDIC SURGERY

## 2019-10-07 PROCEDURE — 87106 FUNGI IDENTIFICATION YEAST: CPT

## 2019-10-07 PROCEDURE — 86901 BLOOD TYPING SEROLOGIC RH(D): CPT

## 2019-10-07 PROCEDURE — 86900 BLOOD TYPING SEROLOGIC ABO: CPT

## 2019-10-07 PROCEDURE — 0SPC0EZ REMOVAL OF ARTICULATING SPACER FROM RIGHT KNEE JOINT, OPEN APPROACH: ICD-10-PCS | Performed by: ORTHOPAEDIC SURGERY

## 2019-10-07 PROCEDURE — 6360000002 HC RX W HCPCS: Performed by: ANESTHESIOLOGY

## 2019-10-07 PROCEDURE — 2709999900 HC NON-CHARGEABLE SUPPLY: Performed by: ORTHOPAEDIC SURGERY

## 2019-10-07 PROCEDURE — 7100000001 HC PACU RECOVERY - ADDTL 15 MIN: Performed by: ORTHOPAEDIC SURGERY

## 2019-10-07 DEVICE — CEMENT BNE 20ML 41GM FULL DOSE PMMA W/ TOBRA M VISC RADPQ: Type: IMPLANTABLE DEVICE | Status: FUNCTIONAL

## 2019-10-07 RX ORDER — GABAPENTIN 300 MG/1
300 CAPSULE ORAL ONCE
Status: COMPLETED | OUTPATIENT
Start: 2019-10-07 | End: 2019-10-07

## 2019-10-07 RX ORDER — PROPOFOL 10 MG/ML
INJECTION, EMULSION INTRAVENOUS PRN
Status: DISCONTINUED | OUTPATIENT
Start: 2019-10-07 | End: 2019-10-07 | Stop reason: SDUPTHER

## 2019-10-07 RX ORDER — DEXAMETHASONE SODIUM PHOSPHATE 4 MG/ML
INJECTION, SOLUTION INTRA-ARTICULAR; INTRALESIONAL; INTRAMUSCULAR; INTRAVENOUS; SOFT TISSUE PRN
Status: DISCONTINUED | OUTPATIENT
Start: 2019-10-07 | End: 2019-10-07 | Stop reason: SDUPTHER

## 2019-10-07 RX ORDER — HYDROCHLOROTHIAZIDE 25 MG/1
25 TABLET ORAL DAILY
Status: DISCONTINUED | OUTPATIENT
Start: 2019-10-07 | End: 2019-10-09 | Stop reason: HOSPADM

## 2019-10-07 RX ORDER — OXYCODONE HYDROCHLORIDE 5 MG/1
5 TABLET ORAL EVERY 4 HOURS PRN
Status: DISCONTINUED | OUTPATIENT
Start: 2019-10-07 | End: 2019-10-09 | Stop reason: HOSPADM

## 2019-10-07 RX ORDER — ONDANSETRON 2 MG/ML
4 INJECTION INTRAMUSCULAR; INTRAVENOUS EVERY 6 HOURS PRN
Status: DISCONTINUED | OUTPATIENT
Start: 2019-10-07 | End: 2019-10-09 | Stop reason: HOSPADM

## 2019-10-07 RX ORDER — ALLOPURINOL 300 MG/1
300 TABLET ORAL DAILY
Status: DISCONTINUED | OUTPATIENT
Start: 2019-10-07 | End: 2019-10-09 | Stop reason: HOSPADM

## 2019-10-07 RX ORDER — ONDANSETRON 2 MG/ML
4 INJECTION INTRAMUSCULAR; INTRAVENOUS EVERY 10 MIN PRN
Status: DISCONTINUED | OUTPATIENT
Start: 2019-10-07 | End: 2019-10-07 | Stop reason: HOSPADM

## 2019-10-07 RX ORDER — HYDROMORPHONE HCL 110MG/55ML
PATIENT CONTROLLED ANALGESIA SYRINGE INTRAVENOUS PRN
Status: DISCONTINUED | OUTPATIENT
Start: 2019-10-07 | End: 2019-10-07 | Stop reason: SDUPTHER

## 2019-10-07 RX ORDER — MEPERIDINE HYDROCHLORIDE 50 MG/ML
12.5 INJECTION INTRAMUSCULAR; INTRAVENOUS; SUBCUTANEOUS EVERY 5 MIN PRN
Status: DISCONTINUED | OUTPATIENT
Start: 2019-10-07 | End: 2019-10-07 | Stop reason: HOSPADM

## 2019-10-07 RX ORDER — ONDANSETRON 2 MG/ML
INJECTION INTRAMUSCULAR; INTRAVENOUS PRN
Status: DISCONTINUED | OUTPATIENT
Start: 2019-10-07 | End: 2019-10-07 | Stop reason: SDUPTHER

## 2019-10-07 RX ORDER — SODIUM CHLORIDE 0.9 % (FLUSH) 0.9 %
10 SYRINGE (ML) INJECTION EVERY 12 HOURS SCHEDULED
Status: DISCONTINUED | OUTPATIENT
Start: 2019-10-07 | End: 2019-10-09 | Stop reason: HOSPADM

## 2019-10-07 RX ORDER — OXYCODONE HYDROCHLORIDE 5 MG/1
10 TABLET ORAL EVERY 4 HOURS PRN
Status: DISCONTINUED | OUTPATIENT
Start: 2019-10-07 | End: 2019-10-09 | Stop reason: HOSPADM

## 2019-10-07 RX ORDER — LABETALOL HYDROCHLORIDE 5 MG/ML
5 INJECTION, SOLUTION INTRAVENOUS EVERY 10 MIN PRN
Status: DISCONTINUED | OUTPATIENT
Start: 2019-10-07 | End: 2019-10-07 | Stop reason: HOSPADM

## 2019-10-07 RX ORDER — MORPHINE SULFATE 2 MG/ML
2 INJECTION, SOLUTION INTRAMUSCULAR; INTRAVENOUS
Status: DISCONTINUED | OUTPATIENT
Start: 2019-10-07 | End: 2019-10-09 | Stop reason: HOSPADM

## 2019-10-07 RX ORDER — CELECOXIB 100 MG/1
100 CAPSULE ORAL 2 TIMES DAILY
Status: DISCONTINUED | OUTPATIENT
Start: 2019-10-07 | End: 2019-10-09 | Stop reason: HOSPADM

## 2019-10-07 RX ORDER — GABAPENTIN 300 MG/1
300 CAPSULE ORAL 3 TIMES DAILY
Status: DISCONTINUED | OUTPATIENT
Start: 2019-10-07 | End: 2019-10-09 | Stop reason: HOSPADM

## 2019-10-07 RX ORDER — LEVOTHYROXINE SODIUM 0.1 MG/1
200 TABLET ORAL DAILY
Status: DISCONTINUED | OUTPATIENT
Start: 2019-10-08 | End: 2019-10-09 | Stop reason: HOSPADM

## 2019-10-07 RX ORDER — FENTANYL CITRATE 50 UG/ML
INJECTION, SOLUTION INTRAMUSCULAR; INTRAVENOUS PRN
Status: DISCONTINUED | OUTPATIENT
Start: 2019-10-07 | End: 2019-10-07 | Stop reason: SDUPTHER

## 2019-10-07 RX ORDER — LIDOCAINE HYDROCHLORIDE 10 MG/ML
0.3 INJECTION, SOLUTION EPIDURAL; INFILTRATION; INTRACAUDAL; PERINEURAL
Status: DISCONTINUED | OUTPATIENT
Start: 2019-10-07 | End: 2019-10-07 | Stop reason: HOSPADM

## 2019-10-07 RX ORDER — MORPHINE SULFATE 4 MG/ML
4 INJECTION, SOLUTION INTRAMUSCULAR; INTRAVENOUS
Status: DISCONTINUED | OUTPATIENT
Start: 2019-10-07 | End: 2019-10-09 | Stop reason: HOSPADM

## 2019-10-07 RX ORDER — VALSARTAN AND HYDROCHLOROTHIAZIDE 320; 25 MG/1; MG/1
1 TABLET, FILM COATED ORAL DAILY
Status: DISCONTINUED | OUTPATIENT
Start: 2019-10-07 | End: 2019-10-07 | Stop reason: SDUPTHER

## 2019-10-07 RX ORDER — VANCOMYCIN HYDROCHLORIDE 1 G/20ML
INJECTION, POWDER, LYOPHILIZED, FOR SOLUTION INTRAVENOUS PRN
Status: DISCONTINUED | OUTPATIENT
Start: 2019-10-07 | End: 2019-10-07 | Stop reason: ALTCHOICE

## 2019-10-07 RX ORDER — MIDAZOLAM HYDROCHLORIDE 1 MG/ML
INJECTION INTRAMUSCULAR; INTRAVENOUS PRN
Status: DISCONTINUED | OUTPATIENT
Start: 2019-10-07 | End: 2019-10-07 | Stop reason: SDUPTHER

## 2019-10-07 RX ORDER — SODIUM CHLORIDE 0.9 % (FLUSH) 0.9 %
10 SYRINGE (ML) INJECTION EVERY 12 HOURS SCHEDULED
Status: DISCONTINUED | OUTPATIENT
Start: 2019-10-07 | End: 2019-10-07 | Stop reason: HOSPADM

## 2019-10-07 RX ORDER — SODIUM CHLORIDE, SODIUM LACTATE, POTASSIUM CHLORIDE, CALCIUM CHLORIDE 600; 310; 30; 20 MG/100ML; MG/100ML; MG/100ML; MG/100ML
INJECTION, SOLUTION INTRAVENOUS CONTINUOUS
Status: DISCONTINUED | OUTPATIENT
Start: 2019-10-07 | End: 2019-10-07

## 2019-10-07 RX ORDER — TRANEXAMIC ACID 100 MG/ML
15 INJECTION, SOLUTION INTRAVENOUS ONCE
Status: COMPLETED | OUTPATIENT
Start: 2019-10-07 | End: 2019-10-07

## 2019-10-07 RX ORDER — HYDRALAZINE HYDROCHLORIDE 20 MG/ML
5 INJECTION INTRAMUSCULAR; INTRAVENOUS EVERY 10 MIN PRN
Status: DISCONTINUED | OUTPATIENT
Start: 2019-10-07 | End: 2019-10-07 | Stop reason: HOSPADM

## 2019-10-07 RX ORDER — CHOLECALCIFEROL (VITAMIN D3) 125 MCG
5 CAPSULE ORAL NIGHTLY PRN
Status: DISCONTINUED | OUTPATIENT
Start: 2019-10-07 | End: 2019-10-09 | Stop reason: HOSPADM

## 2019-10-07 RX ORDER — CELECOXIB 100 MG/1
200 CAPSULE ORAL ONCE
Status: COMPLETED | OUTPATIENT
Start: 2019-10-07 | End: 2019-10-07

## 2019-10-07 RX ORDER — DOCUSATE SODIUM 100 MG/1
100 CAPSULE, LIQUID FILLED ORAL 2 TIMES DAILY
Status: DISCONTINUED | OUTPATIENT
Start: 2019-10-07 | End: 2019-10-09 | Stop reason: HOSPADM

## 2019-10-07 RX ORDER — VALSARTAN 80 MG/1
320 TABLET ORAL DAILY
Status: DISCONTINUED | OUTPATIENT
Start: 2019-10-07 | End: 2019-10-09 | Stop reason: HOSPADM

## 2019-10-07 RX ORDER — SODIUM CHLORIDE 0.9 % (FLUSH) 0.9 %
10 SYRINGE (ML) INJECTION PRN
Status: DISCONTINUED | OUTPATIENT
Start: 2019-10-07 | End: 2019-10-07 | Stop reason: HOSPADM

## 2019-10-07 RX ORDER — LIDOCAINE HYDROCHLORIDE 20 MG/ML
INJECTION, SOLUTION INFILTRATION; PERINEURAL PRN
Status: DISCONTINUED | OUTPATIENT
Start: 2019-10-07 | End: 2019-10-07 | Stop reason: SDUPTHER

## 2019-10-07 RX ORDER — DEXTROSE, SODIUM CHLORIDE, AND POTASSIUM CHLORIDE 5; .45; .15 G/100ML; G/100ML; G/100ML
1000 INJECTION INTRAVENOUS CONTINUOUS
Status: ACTIVE | OUTPATIENT
Start: 2019-10-07 | End: 2019-10-08

## 2019-10-07 RX ORDER — SODIUM CHLORIDE 0.9 % (FLUSH) 0.9 %
10 SYRINGE (ML) INJECTION PRN
Status: DISCONTINUED | OUTPATIENT
Start: 2019-10-07 | End: 2019-10-09 | Stop reason: HOSPADM

## 2019-10-07 RX ADMIN — GABAPENTIN 300 MG: 300 CAPSULE ORAL at 11:57

## 2019-10-07 RX ADMIN — SODIUM CHLORIDE, POTASSIUM CHLORIDE, SODIUM LACTATE AND CALCIUM CHLORIDE: 600; 310; 30; 20 INJECTION, SOLUTION INTRAVENOUS at 11:59

## 2019-10-07 RX ADMIN — HYDROMORPHONE HYDROCHLORIDE 2 MG: 2 INJECTION INTRAMUSCULAR; INTRAVENOUS; SUBCUTANEOUS at 12:45

## 2019-10-07 RX ADMIN — SODIUM CHLORIDE, POTASSIUM CHLORIDE, SODIUM LACTATE AND CALCIUM CHLORIDE: 600; 310; 30; 20 INJECTION, SOLUTION INTRAVENOUS at 14:04

## 2019-10-07 RX ADMIN — HYDROMORPHONE HYDROCHLORIDE 0.5 MG: 1 INJECTION, SOLUTION INTRAMUSCULAR; INTRAVENOUS; SUBCUTANEOUS at 14:51

## 2019-10-07 RX ADMIN — LIDOCAINE HYDROCHLORIDE 60 MG: 20 INJECTION, SOLUTION INFILTRATION; PERINEURAL at 12:37

## 2019-10-07 RX ADMIN — FENTANYL CITRATE 100 MCG: 50 INJECTION INTRAMUSCULAR; INTRAVENOUS at 12:35

## 2019-10-07 RX ADMIN — PROPOFOL 300 MG: 10 INJECTION, EMULSION INTRAVENOUS at 12:37

## 2019-10-07 RX ADMIN — DEXAMETHASONE SODIUM PHOSPHATE 8 MG: 4 INJECTION, SOLUTION INTRAMUSCULAR; INTRAVENOUS at 12:41

## 2019-10-07 RX ADMIN — TRANEXAMIC ACID 1700 MG: 100 INJECTION, SOLUTION INTRAVENOUS at 12:40

## 2019-10-07 RX ADMIN — DOCUSATE SODIUM 100 MG: 100 CAPSULE, LIQUID FILLED ORAL at 20:47

## 2019-10-07 RX ADMIN — CELECOXIB 100 MG: 100 CAPSULE ORAL at 20:47

## 2019-10-07 RX ADMIN — Medication 2 G: at 22:19

## 2019-10-07 RX ADMIN — Medication 10 ML: at 20:48

## 2019-10-07 RX ADMIN — OXYCODONE HYDROCHLORIDE 5 MG: 5 TABLET ORAL at 20:47

## 2019-10-07 RX ADMIN — CELECOXIB 200 MG: 100 CAPSULE ORAL at 11:57

## 2019-10-07 RX ADMIN — ONDANSETRON 4 MG: 2 INJECTION INTRAMUSCULAR; INTRAVENOUS at 12:41

## 2019-10-07 RX ADMIN — HYDROMORPHONE HYDROCHLORIDE 0.5 MG: 1 INJECTION, SOLUTION INTRAMUSCULAR; INTRAVENOUS; SUBCUTANEOUS at 15:41

## 2019-10-07 RX ADMIN — GABAPENTIN 300 MG: 300 CAPSULE ORAL at 20:47

## 2019-10-07 RX ADMIN — MIDAZOLAM HYDROCHLORIDE 4 MG: 2 INJECTION, SOLUTION INTRAMUSCULAR; INTRAVENOUS at 12:29

## 2019-10-07 RX ADMIN — HYDROMORPHONE HYDROCHLORIDE 0.5 MG: 1 INJECTION, SOLUTION INTRAMUSCULAR; INTRAVENOUS; SUBCUTANEOUS at 14:29

## 2019-10-07 RX ADMIN — Medication 2 G: at 12:40

## 2019-10-07 ASSESSMENT — PULMONARY FUNCTION TESTS
PIF_VALUE: 3
PIF_VALUE: 1
PIF_VALUE: 3
PIF_VALUE: 0
PIF_VALUE: 3
PIF_VALUE: 3
PIF_VALUE: 10
PIF_VALUE: 3
PIF_VALUE: 0
PIF_VALUE: 3
PIF_VALUE: 0
PIF_VALUE: 3
PIF_VALUE: 2
PIF_VALUE: 3
PIF_VALUE: 0
PIF_VALUE: 3
PIF_VALUE: 0
PIF_VALUE: 3
PIF_VALUE: 1
PIF_VALUE: 4
PIF_VALUE: 13
PIF_VALUE: 3
PIF_VALUE: 4
PIF_VALUE: 3
PIF_VALUE: 3
PIF_VALUE: 0
PIF_VALUE: 3
PIF_VALUE: 12
PIF_VALUE: 3
PIF_VALUE: 3
PIF_VALUE: 0
PIF_VALUE: 3
PIF_VALUE: 16
PIF_VALUE: 3
PIF_VALUE: 8
PIF_VALUE: 17
PIF_VALUE: 19
PIF_VALUE: 12
PIF_VALUE: 0
PIF_VALUE: 3
PIF_VALUE: 0
PIF_VALUE: 3
PIF_VALUE: 12
PIF_VALUE: 3
PIF_VALUE: 2
PIF_VALUE: 3
PIF_VALUE: 0
PIF_VALUE: 3
PIF_VALUE: 0
PIF_VALUE: 3
PIF_VALUE: 0
PIF_VALUE: 4

## 2019-10-07 ASSESSMENT — PAIN SCALES - GENERAL
PAINLEVEL_OUTOF10: 7
PAINLEVEL_OUTOF10: 8
PAINLEVEL_OUTOF10: 5
PAINLEVEL_OUTOF10: 8
PAINLEVEL_OUTOF10: 7

## 2019-10-07 ASSESSMENT — PAIN DESCRIPTION - PAIN TYPE: TYPE: SURGICAL PAIN

## 2019-10-07 ASSESSMENT — PAIN DESCRIPTION - LOCATION: LOCATION: KNEE

## 2019-10-07 ASSESSMENT — PAIN DESCRIPTION - ORIENTATION: ORIENTATION: RIGHT

## 2019-10-07 ASSESSMENT — PAIN - FUNCTIONAL ASSESSMENT: PAIN_FUNCTIONAL_ASSESSMENT: 0-10

## 2019-10-08 LAB
ANION GAP SERPL CALCULATED.3IONS-SCNC: 9 MMOL/L (ref 3–16)
BUN BLDV-MCNC: 14 MG/DL (ref 7–20)
CALCIUM SERPL-MCNC: 9.3 MG/DL (ref 8.3–10.6)
CHLORIDE BLD-SCNC: 101 MMOL/L (ref 99–110)
CO2: 29 MMOL/L (ref 21–32)
CREAT SERPL-MCNC: 0.8 MG/DL (ref 0.8–1.3)
GFR AFRICAN AMERICAN: >60
GFR NON-AFRICAN AMERICAN: >60
GLUCOSE BLD-MCNC: 152 MG/DL (ref 70–99)
HCT VFR BLD CALC: 35.4 % (ref 40.5–52.5)
HEMOGLOBIN: 11.9 G/DL (ref 13.5–17.5)
MCH RBC QN AUTO: 26.2 PG (ref 26–34)
MCHC RBC AUTO-ENTMCNC: 33.6 G/DL (ref 31–36)
MCV RBC AUTO: 77.8 FL (ref 80–100)
PDW BLD-RTO: 14.7 % (ref 12.4–15.4)
PLATELET # BLD: 198 K/UL (ref 135–450)
PMV BLD AUTO: 7.9 FL (ref 5–10.5)
POTASSIUM REFLEX MAGNESIUM: 4.5 MMOL/L (ref 3.5–5.1)
RBC # BLD: 4.55 M/UL (ref 4.2–5.9)
SODIUM BLD-SCNC: 139 MMOL/L (ref 136–145)
WBC # BLD: 10.4 K/UL (ref 4–11)

## 2019-10-08 PROCEDURE — 2580000003 HC RX 258: Performed by: ORTHOPAEDIC SURGERY

## 2019-10-08 PROCEDURE — 97535 SELF CARE MNGMENT TRAINING: CPT

## 2019-10-08 PROCEDURE — APPSS30 APP SPLIT SHARED TIME 16-30 MINUTES: Performed by: PHYSICIAN ASSISTANT

## 2019-10-08 PROCEDURE — 6360000002 HC RX W HCPCS: Performed by: INTERNAL MEDICINE

## 2019-10-08 PROCEDURE — 85027 COMPLETE CBC AUTOMATED: CPT

## 2019-10-08 PROCEDURE — 97116 GAIT TRAINING THERAPY: CPT

## 2019-10-08 PROCEDURE — 80048 BASIC METABOLIC PNL TOTAL CA: CPT

## 2019-10-08 PROCEDURE — 90686 IIV4 VACC NO PRSV 0.5 ML IM: CPT | Performed by: ORTHOPAEDIC SURGERY

## 2019-10-08 PROCEDURE — 2580000003 HC RX 258: Performed by: INTERNAL MEDICINE

## 2019-10-08 PROCEDURE — 6370000000 HC RX 637 (ALT 250 FOR IP): Performed by: ORTHOPAEDIC SURGERY

## 2019-10-08 PROCEDURE — G0008 ADMIN INFLUENZA VIRUS VAC: HCPCS | Performed by: ORTHOPAEDIC SURGERY

## 2019-10-08 PROCEDURE — 99221 1ST HOSP IP/OBS SF/LOW 40: CPT | Performed by: INTERNAL MEDICINE

## 2019-10-08 PROCEDURE — 97166 OT EVAL MOD COMPLEX 45 MIN: CPT

## 2019-10-08 PROCEDURE — 51701 INSERT BLADDER CATHETER: CPT

## 2019-10-08 PROCEDURE — 36415 COLL VENOUS BLD VENIPUNCTURE: CPT

## 2019-10-08 PROCEDURE — 51798 US URINE CAPACITY MEASURE: CPT

## 2019-10-08 PROCEDURE — 97161 PT EVAL LOW COMPLEX 20 MIN: CPT

## 2019-10-08 PROCEDURE — 6370000000 HC RX 637 (ALT 250 FOR IP): Performed by: ANESTHESIOLOGY

## 2019-10-08 PROCEDURE — 1200000000 HC SEMI PRIVATE

## 2019-10-08 PROCEDURE — 6360000002 HC RX W HCPCS: Performed by: ORTHOPAEDIC SURGERY

## 2019-10-08 PROCEDURE — 6370000000 HC RX 637 (ALT 250 FOR IP): Performed by: NURSE PRACTITIONER

## 2019-10-08 RX ADMIN — GABAPENTIN 300 MG: 300 CAPSULE ORAL at 08:31

## 2019-10-08 RX ADMIN — GABAPENTIN 300 MG: 300 CAPSULE ORAL at 15:17

## 2019-10-08 RX ADMIN — DAPTOMYCIN 600 MG: 500 INJECTION, POWDER, LYOPHILIZED, FOR SOLUTION INTRAVENOUS at 19:45

## 2019-10-08 RX ADMIN — CELECOXIB 100 MG: 100 CAPSULE ORAL at 20:26

## 2019-10-08 RX ADMIN — OXYCODONE HYDROCHLORIDE 10 MG: 5 TABLET ORAL at 20:26

## 2019-10-08 RX ADMIN — DOCUSATE SODIUM 100 MG: 100 CAPSULE, LIQUID FILLED ORAL at 08:32

## 2019-10-08 RX ADMIN — MELATONIN TAB 5 MG 5 MG: 5 TAB at 20:26

## 2019-10-08 RX ADMIN — DOCUSATE SODIUM 100 MG: 100 CAPSULE, LIQUID FILLED ORAL at 20:26

## 2019-10-08 RX ADMIN — ALLOPURINOL 300 MG: 300 TABLET ORAL at 08:31

## 2019-10-08 RX ADMIN — ENOXAPARIN SODIUM 40 MG: 40 INJECTION SUBCUTANEOUS at 08:31

## 2019-10-08 RX ADMIN — Medication 10 ML: at 20:27

## 2019-10-08 RX ADMIN — VALSARTAN 320 MG: 80 TABLET, FILM COATED ORAL at 08:32

## 2019-10-08 RX ADMIN — HYDROCHLOROTHIAZIDE 25 MG: 25 TABLET ORAL at 08:32

## 2019-10-08 RX ADMIN — CELECOXIB 100 MG: 100 CAPSULE ORAL at 08:31

## 2019-10-08 RX ADMIN — Medication 2 G: at 06:37

## 2019-10-08 RX ADMIN — LEVOTHYROXINE SODIUM 200 MCG: 100 TABLET ORAL at 08:31

## 2019-10-08 RX ADMIN — GABAPENTIN 300 MG: 300 CAPSULE ORAL at 20:26

## 2019-10-08 RX ADMIN — INFLUENZA A VIRUS A/BRISBANE/02/2018 IVR-190 (H1N1) ANTIGEN (PROPIOLACTONE INACTIVATED), INFLUENZA A VIRUS A/KANSAS/14/2017 X-327 (H3N2) ANTIGEN (PROPIOLACTONE INACTIVATED), INFLUENZA B VIRUS B/MARYLAND/15/2016 ANTIGEN (PROPIOLACTONE INACTIVATED), INFLUENZA B VIRUS B/PHUKET/3073/2013 BVR-1B ANTIGEN (PROPIOLACTONE INACTIVATED) 0.5 ML: 15; 15; 15; 15 INJECTION, SUSPENSION INTRAMUSCULAR at 08:32

## 2019-10-08 ASSESSMENT — PAIN SCALES - GENERAL
PAINLEVEL_OUTOF10: 0
PAINLEVEL_OUTOF10: 7

## 2019-10-09 ENCOUNTER — APPOINTMENT (OUTPATIENT)
Dept: INTERVENTIONAL RADIOLOGY/VASCULAR | Age: 69
DRG: 487 | End: 2019-10-09
Attending: ORTHOPAEDIC SURGERY
Payer: MEDICARE

## 2019-10-09 VITALS
RESPIRATION RATE: 16 BRPM | HEIGHT: 78 IN | OXYGEN SATURATION: 97 % | BODY MASS INDEX: 29.5 KG/M2 | SYSTOLIC BLOOD PRESSURE: 106 MMHG | WEIGHT: 255 LBS | TEMPERATURE: 98 F | HEART RATE: 71 BPM | DIASTOLIC BLOOD PRESSURE: 69 MMHG

## 2019-10-09 LAB
HCT VFR BLD CALC: 31.5 % (ref 40.5–52.5)
HEMOGLOBIN: 10.2 G/DL (ref 13.5–17.5)
MCH RBC QN AUTO: 25.6 PG (ref 26–34)
MCHC RBC AUTO-ENTMCNC: 32.4 G/DL (ref 31–36)
MCV RBC AUTO: 79 FL (ref 80–100)
PDW BLD-RTO: 14.6 % (ref 12.4–15.4)
PLATELET # BLD: 148 K/UL (ref 135–450)
PMV BLD AUTO: 8.5 FL (ref 5–10.5)
RBC # BLD: 3.99 M/UL (ref 4.2–5.9)
TOTAL CK: 34 U/L (ref 39–308)
WBC # BLD: 6 K/UL (ref 4–11)

## 2019-10-09 PROCEDURE — 36415 COLL VENOUS BLD VENIPUNCTURE: CPT

## 2019-10-09 PROCEDURE — C1751 CATH, INF, PER/CENT/MIDLINE: HCPCS

## 2019-10-09 PROCEDURE — 2580000003 HC RX 258: Performed by: INTERNAL MEDICINE

## 2019-10-09 PROCEDURE — 6370000000 HC RX 637 (ALT 250 FOR IP): Performed by: ORTHOPAEDIC SURGERY

## 2019-10-09 PROCEDURE — 82550 ASSAY OF CK (CPK): CPT

## 2019-10-09 PROCEDURE — 6360000002 HC RX W HCPCS: Performed by: INTERNAL MEDICINE

## 2019-10-09 PROCEDURE — 6360000002 HC RX W HCPCS: Performed by: ORTHOPAEDIC SURGERY

## 2019-10-09 PROCEDURE — 36573 INSJ PICC RS&I 5 YR+: CPT

## 2019-10-09 PROCEDURE — 6370000000 HC RX 637 (ALT 250 FOR IP): Performed by: ANESTHESIOLOGY

## 2019-10-09 PROCEDURE — APPSS45 APP SPLIT SHARED TIME 31-45 MINUTES: Performed by: PHYSICIAN ASSISTANT

## 2019-10-09 PROCEDURE — 99232 SBSQ HOSP IP/OBS MODERATE 35: CPT | Performed by: INTERNAL MEDICINE

## 2019-10-09 PROCEDURE — 85027 COMPLETE CBC AUTOMATED: CPT

## 2019-10-09 RX ORDER — OXYCODONE HYDROCHLORIDE 5 MG/1
5 TABLET ORAL EVERY 6 HOURS PRN
Qty: 28 TABLET | Refills: 0 | Status: SHIPPED | OUTPATIENT
Start: 2019-10-09 | End: 2019-10-16

## 2019-10-09 RX ORDER — RIFAMPIN 150 MG/1
300 CAPSULE ORAL 2 TIMES DAILY
Status: DISCONTINUED | OUTPATIENT
Start: 2019-10-09 | End: 2019-10-09 | Stop reason: HOSPADM

## 2019-10-09 RX ADMIN — CELECOXIB 100 MG: 100 CAPSULE ORAL at 09:19

## 2019-10-09 RX ADMIN — ENOXAPARIN SODIUM 40 MG: 40 INJECTION SUBCUTANEOUS at 09:14

## 2019-10-09 RX ADMIN — DOCUSATE SODIUM 100 MG: 100 CAPSULE, LIQUID FILLED ORAL at 09:14

## 2019-10-09 RX ADMIN — VANCOMYCIN HYDROCHLORIDE 1.5 G: 10 INJECTION, POWDER, LYOPHILIZED, FOR SOLUTION INTRAVENOUS at 15:17

## 2019-10-09 RX ADMIN — LEVOTHYROXINE SODIUM 200 MCG: 100 TABLET ORAL at 06:46

## 2019-10-09 RX ADMIN — HYDROCHLOROTHIAZIDE 25 MG: 25 TABLET ORAL at 09:14

## 2019-10-09 RX ADMIN — ALLOPURINOL 300 MG: 300 TABLET ORAL at 09:14

## 2019-10-09 RX ADMIN — GABAPENTIN 300 MG: 300 CAPSULE ORAL at 09:14

## 2019-10-09 RX ADMIN — VALSARTAN 160 MG: 80 TABLET, FILM COATED ORAL at 09:14

## 2019-10-09 RX ADMIN — GABAPENTIN 300 MG: 300 CAPSULE ORAL at 15:16

## 2019-10-09 ASSESSMENT — PAIN SCALES - GENERAL: PAINLEVEL_OUTOF10: 0

## 2019-10-11 ENCOUNTER — TELEPHONE (OUTPATIENT)
Dept: INFECTIOUS DISEASES | Age: 69
End: 2019-10-11

## 2019-10-14 ENCOUNTER — TELEPHONE (OUTPATIENT)
Dept: INFECTIOUS DISEASES | Age: 69
End: 2019-10-14

## 2019-10-14 LAB
ALBUMIN SERPL-MCNC: 3.9 GM/DL (ref 3.2–4.6)
ALP BLD-CCNC: 118 IU/L (ref 40–129)
ALT SERPL-CCNC: 15 IU/L
ANION GAP SERPL CALCULATED.3IONS-SCNC: 15 MMOL/L (ref 7–16)
AST SERPL-CCNC: 20 IU/L
BASOPHILS # BLD: 0.7 %
BASOPHILS ABSOLUTE: 0 X10(3)/MCL (ref 0–0.1)
BILIRUB SERPL-MCNC: 1.1 MG/DL (ref 0.1–1.4)
BUN BLDV-MCNC: 7 MG/DL (ref 8–23)
C-REACTIVE PROTEIN WIDE RANGE: 31.94 MG/L
CALCIUM SERPL-MCNC: 8.9 MG/DL (ref 8.8–10.4)
CHLORIDE BLD-SCNC: 102 MEQ/L (ref 98–107)
CO2: 25 MMOL/L (ref 22–29)
CREAT SERPL-MCNC: 0.85 MG/DL (ref 0.67–1.3)
EOSINOPHIL # BLD: 4.1 %
EOSINOPHILS ABSOLUTE: 0.2 X10(3)/MCL (ref 0–0.5)
GFR AFRICAN AMERICAN: 105 ML/MIN/1.73 M2
GFR NON-AFRICAN AMERICAN: 91 ML/MIN/1.73 M2
GLUCOSE BLD-MCNC: 150 MG/DL (ref 82–100)
HCT VFR BLD CALC: 33 % (ref 40–51)
HEMOGLOBIN: 10.4 G/DL (ref 13.7–17.5)
IMMATURE CELLS ABSOLUTE COUNT: 0 X10(3)/MCL (ref 0–0.1)
IMMATURE GRANULOCYTES: 0.4 %
LYMPHOCYTES # BLD: 11.7 %
LYMPHOCYTES ABSOLUTE: 0.7 X10(3)/MCL (ref 1.2–3.9)
MCH RBC QN AUTO: 25.1 PG (ref 26–34)
MCHC RBC AUTO-ENTMCNC: 31.5 G/DL (ref 30.7–35.5)
MCV RBC AUTO: 79.7 FL (ref 80–100)
MONOCYTES # BLD: 9.5 %
MONOCYTES ABSOLUTE: 0.5 X10(3)/MCL (ref 0.3–0.9)
NEUTROPHILS ABSOLUTE: 4.1 X10(3)/MCL (ref 1.6–6.1)
NEUTROPHILS: 73.6 %
PDW BLD-RTO: 14.4 %
PLATELET # BLD: 229 X10(3)/MCL (ref 155–369)
PMV BLD AUTO: 10.7 FL (ref 8.8–12.5)
POTASSIUM SERPL-SCNC: 3.5 MEQ/L (ref 3.5–5)
RBC # BLD: 4.14 X10(6)/MCL (ref 4.6–6.1)
SEDIMENTATION RATE, ERYTHROCYTE: 42 MM/HR (ref 0–20)
SODIUM BLD-SCNC: 142 MEQ/L (ref 136–145)
TOTAL PROTEIN: 6.6 GM/DL (ref 6.4–8.3)
VANCOMYCIN TROUGH: 15.3 MCG/ML (ref 10–20)
WBC # BLD: 5.6 X10(3)/MCL (ref 3.7–10.3)

## 2019-10-14 RX ORDER — FLUCONAZOLE 100 MG/1
200 TABLET ORAL DAILY
Qty: 60 TABLET | Refills: 0 | Status: SHIPPED | OUTPATIENT
Start: 2019-10-14 | End: 2019-11-06 | Stop reason: SDUPTHER

## 2019-10-14 RX ORDER — METHYLPREDNISOLONE SODIUM SUCCINATE 125 MG/2ML
125 INJECTION, POWDER, LYOPHILIZED, FOR SOLUTION INTRAMUSCULAR; INTRAVENOUS ONCE
Status: CANCELLED | OUTPATIENT
Start: 2019-10-16

## 2019-10-14 RX ORDER — SODIUM CHLORIDE 0.9 % (FLUSH) 0.9 %
10 SYRINGE (ML) INJECTION PRN
Status: CANCELLED | OUTPATIENT
Start: 2019-10-16

## 2019-10-14 RX ORDER — EPINEPHRINE 1 MG/ML
0.3 INJECTION, SOLUTION, CONCENTRATE INTRAVENOUS PRN
Status: CANCELLED | OUTPATIENT
Start: 2019-10-16

## 2019-10-14 RX ORDER — DIPHENHYDRAMINE HYDROCHLORIDE 50 MG/ML
50 INJECTION INTRAMUSCULAR; INTRAVENOUS ONCE
Status: CANCELLED | OUTPATIENT
Start: 2019-10-16

## 2019-10-14 RX ORDER — SODIUM CHLORIDE 9 MG/ML
INJECTION, SOLUTION INTRAVENOUS CONTINUOUS
Status: CANCELLED | OUTPATIENT
Start: 2019-10-16

## 2019-10-14 RX ORDER — HEPARIN SODIUM (PORCINE) LOCK FLUSH IV SOLN 100 UNIT/ML 100 UNIT/ML
500 SOLUTION INTRAVENOUS PRN
Status: CANCELLED | OUTPATIENT
Start: 2019-10-16

## 2019-10-14 RX ORDER — SODIUM CHLORIDE 0.9 % (FLUSH) 0.9 %
5 SYRINGE (ML) INJECTION PRN
Status: CANCELLED | OUTPATIENT
Start: 2019-10-16

## 2019-10-16 ENCOUNTER — HOSPITAL ENCOUNTER (OUTPATIENT)
Dept: NURSING | Age: 69
Setting detail: INFUSION SERIES
Discharge: HOME | End: 2019-10-16
Payer: MEDICARE

## 2019-10-16 VITALS
WEIGHT: 250 LBS | HEIGHT: 78 IN | TEMPERATURE: 97.4 F | BODY MASS INDEX: 28.93 KG/M2 | RESPIRATION RATE: 16 BRPM | DIASTOLIC BLOOD PRESSURE: 75 MMHG | HEART RATE: 69 BPM | SYSTOLIC BLOOD PRESSURE: 129 MMHG

## 2019-10-16 PROCEDURE — 6360000002 HC RX W HCPCS: Performed by: INTERNAL MEDICINE

## 2019-10-16 PROCEDURE — 99201 HC NEW PT, OUTPT VISIT LEVEL 1: CPT

## 2019-10-16 PROCEDURE — 96365 THER/PROPH/DIAG IV INF INIT: CPT

## 2019-10-16 PROCEDURE — 99211 OFF/OP EST MAY X REQ PHY/QHP: CPT

## 2019-10-16 PROCEDURE — 2580000003 HC RX 258: Performed by: INTERNAL MEDICINE

## 2019-10-16 RX ADMIN — DAPTOMYCIN 600 MG: 500 INJECTION, POWDER, LYOPHILIZED, FOR SOLUTION INTRAVENOUS at 13:25

## 2019-10-16 ASSESSMENT — PAIN - FUNCTIONAL ASSESSMENT: PAIN_FUNCTIONAL_ASSESSMENT: 0-10

## 2019-10-16 NOTE — PROGRESS NOTES
Antibiotic infused. Tolerated without immediate complications. Info given on antibiotic. Discharged per wheelchair.

## 2019-10-17 ENCOUNTER — HOSPITAL ENCOUNTER (OUTPATIENT)
Dept: NURSING | Age: 69
Setting detail: INFUSION SERIES
Discharge: HOME | End: 2019-10-17
Payer: MEDICARE

## 2019-10-17 VITALS
SYSTOLIC BLOOD PRESSURE: 124 MMHG | DIASTOLIC BLOOD PRESSURE: 78 MMHG | BODY MASS INDEX: 28.93 KG/M2 | TEMPERATURE: 97.4 F | HEIGHT: 78 IN | WEIGHT: 250 LBS | HEART RATE: 77 BPM | RESPIRATION RATE: 16 BRPM

## 2019-10-17 DIAGNOSIS — T84.7XXS INFECTION/INFLAMMATION DUE TO INTERNAL ORTHOPEDIC DEVICE/IMPLANT/GRAFT, SEQUELA: Primary | ICD-10-CM

## 2019-10-17 LAB
ANION GAP SERPL CALCULATED.3IONS-SCNC: 14 MMOL/L (ref 7–16)
BUN BLDV-MCNC: 9 MG/DL (ref 8–23)
CALCIUM SERPL-MCNC: 8.6 MG/DL (ref 8.8–10.4)
CHLORIDE BLD-SCNC: 104 MEQ/L (ref 98–107)
CO2: 25 MMOL/L (ref 22–29)
CREAT SERPL-MCNC: 0.83 MG/DL (ref 0.67–1.3)
GFR AFRICAN AMERICAN: 106 ML/MIN/1.73 M2
GFR NON-AFRICAN AMERICAN: 92 ML/MIN/1.73 M2
GLUCOSE BLD-MCNC: 145 MG/DL (ref 82–100)
POTASSIUM SERPL-SCNC: 3.5 MEQ/L (ref 3.5–5)
SODIUM BLD-SCNC: 143 MEQ/L (ref 136–145)
VANCOMYCIN TROUGH: 10.7 MCG/ML (ref 10–20)

## 2019-10-17 PROCEDURE — 6360000002 HC RX W HCPCS: Performed by: INTERNAL MEDICINE

## 2019-10-17 PROCEDURE — 96367 TX/PROPH/DG ADDL SEQ IV INF: CPT

## 2019-10-17 PROCEDURE — 99211 OFF/OP EST MAY X REQ PHY/QHP: CPT

## 2019-10-17 PROCEDURE — 2580000003 HC RX 258: Performed by: INTERNAL MEDICINE

## 2019-10-17 PROCEDURE — 96365 THER/PROPH/DIAG IV INF INIT: CPT

## 2019-10-17 RX ORDER — SODIUM CHLORIDE 0.9 % (FLUSH) 0.9 %
5 SYRINGE (ML) INJECTION PRN
Status: CANCELLED | OUTPATIENT
Start: 2019-10-18

## 2019-10-17 RX ORDER — SODIUM CHLORIDE 0.9 % (FLUSH) 0.9 %
10 SYRINGE (ML) INJECTION PRN
Status: CANCELLED | OUTPATIENT
Start: 2019-10-18

## 2019-10-17 RX ORDER — EPINEPHRINE 1 MG/ML
0.3 INJECTION, SOLUTION, CONCENTRATE INTRAVENOUS PRN
Status: CANCELLED | OUTPATIENT
Start: 2019-10-18

## 2019-10-17 RX ORDER — HEPARIN SODIUM (PORCINE) LOCK FLUSH IV SOLN 100 UNIT/ML 100 UNIT/ML
500 SOLUTION INTRAVENOUS PRN
Status: CANCELLED | OUTPATIENT
Start: 2019-10-18

## 2019-10-17 RX ORDER — DIPHENHYDRAMINE HYDROCHLORIDE 50 MG/ML
50 INJECTION INTRAMUSCULAR; INTRAVENOUS ONCE
Status: CANCELLED | OUTPATIENT
Start: 2019-10-18

## 2019-10-17 RX ORDER — SODIUM CHLORIDE 9 MG/ML
INJECTION, SOLUTION INTRAVENOUS CONTINUOUS
Status: CANCELLED | OUTPATIENT
Start: 2019-10-18

## 2019-10-17 RX ORDER — METHYLPREDNISOLONE SODIUM SUCCINATE 125 MG/2ML
125 INJECTION, POWDER, LYOPHILIZED, FOR SOLUTION INTRAMUSCULAR; INTRAVENOUS ONCE
Status: CANCELLED | OUTPATIENT
Start: 2019-10-18

## 2019-10-17 RX ADMIN — DAPTOMYCIN 600 MG: 500 INJECTION, POWDER, LYOPHILIZED, FOR SOLUTION INTRAVENOUS at 13:03

## 2019-10-17 ASSESSMENT — PAIN SCALES - GENERAL: PAINLEVEL_OUTOF10: 0

## 2019-10-18 ENCOUNTER — HOSPITAL ENCOUNTER (OUTPATIENT)
Dept: NURSING | Age: 69
Setting detail: INFUSION SERIES
Discharge: HOME | End: 2019-10-18
Payer: MEDICARE

## 2019-10-18 VITALS
HEART RATE: 68 BPM | HEIGHT: 78 IN | BODY MASS INDEX: 28.93 KG/M2 | SYSTOLIC BLOOD PRESSURE: 134 MMHG | RESPIRATION RATE: 18 BRPM | DIASTOLIC BLOOD PRESSURE: 82 MMHG | WEIGHT: 250 LBS | TEMPERATURE: 97.8 F

## 2019-10-18 DIAGNOSIS — T84.7XXS INFECTION/INFLAMMATION DUE TO INTERNAL ORTHOPEDIC DEVICE/IMPLANT/GRAFT, SEQUELA: Primary | ICD-10-CM

## 2019-10-18 PROCEDURE — 6360000002 HC RX W HCPCS: Performed by: INTERNAL MEDICINE

## 2019-10-18 PROCEDURE — 99211 OFF/OP EST MAY X REQ PHY/QHP: CPT

## 2019-10-18 PROCEDURE — 96365 THER/PROPH/DIAG IV INF INIT: CPT

## 2019-10-18 PROCEDURE — 2580000003 HC RX 258: Performed by: INTERNAL MEDICINE

## 2019-10-18 RX ORDER — SODIUM CHLORIDE 0.9 % (FLUSH) 0.9 %
5 SYRINGE (ML) INJECTION PRN
Status: CANCELLED | OUTPATIENT
Start: 2019-10-19

## 2019-10-18 RX ORDER — DIPHENHYDRAMINE HYDROCHLORIDE 50 MG/ML
50 INJECTION INTRAMUSCULAR; INTRAVENOUS ONCE
Status: CANCELLED | OUTPATIENT
Start: 2019-10-19

## 2019-10-18 RX ORDER — METHYLPREDNISOLONE SODIUM SUCCINATE 125 MG/2ML
125 INJECTION, POWDER, LYOPHILIZED, FOR SOLUTION INTRAMUSCULAR; INTRAVENOUS ONCE
Status: CANCELLED | OUTPATIENT
Start: 2019-10-19

## 2019-10-18 RX ORDER — HEPARIN SODIUM (PORCINE) LOCK FLUSH IV SOLN 100 UNIT/ML 100 UNIT/ML
500 SOLUTION INTRAVENOUS PRN
Status: CANCELLED | OUTPATIENT
Start: 2019-10-19

## 2019-10-18 RX ORDER — SODIUM CHLORIDE 0.9 % (FLUSH) 0.9 %
10 SYRINGE (ML) INJECTION PRN
Status: CANCELLED | OUTPATIENT
Start: 2019-10-19

## 2019-10-18 RX ORDER — SODIUM CHLORIDE 9 MG/ML
INJECTION, SOLUTION INTRAVENOUS CONTINUOUS
Status: CANCELLED | OUTPATIENT
Start: 2019-10-19

## 2019-10-18 RX ORDER — EPINEPHRINE 1 MG/ML
0.3 INJECTION, SOLUTION, CONCENTRATE INTRAVENOUS PRN
Status: CANCELLED | OUTPATIENT
Start: 2019-10-19

## 2019-10-18 RX ADMIN — DAPTOMYCIN 600 MG: 500 INJECTION, POWDER, LYOPHILIZED, FOR SOLUTION INTRAVENOUS at 13:15

## 2019-10-18 ASSESSMENT — PAIN - FUNCTIONAL ASSESSMENT: PAIN_FUNCTIONAL_ASSESSMENT: 0-10

## 2019-10-19 ENCOUNTER — HOSPITAL ENCOUNTER (OUTPATIENT)
Age: 69
Discharge: HOME | End: 2019-10-19
Attending: INTERNAL MEDICINE | Admitting: INTERNAL MEDICINE
Payer: MEDICARE

## 2019-10-19 PROCEDURE — 2580000003 HC RX 258: Performed by: INTERNAL MEDICINE

## 2019-10-19 PROCEDURE — 6360000002 HC RX W HCPCS: Performed by: INTERNAL MEDICINE

## 2019-10-19 PROCEDURE — 96368 THER/DIAG CONCURRENT INF: CPT

## 2019-10-19 PROCEDURE — 96365 THER/PROPH/DIAG IV INF INIT: CPT

## 2019-10-19 RX ADMIN — DAPTOMYCIN 600 MG: 500 INJECTION, POWDER, LYOPHILIZED, FOR SOLUTION INTRAVENOUS at 12:31

## 2019-10-20 ENCOUNTER — HOSPITAL ENCOUNTER (OUTPATIENT)
Age: 69
Discharge: HOME | End: 2019-10-20
Attending: INTERNAL MEDICINE | Admitting: INTERNAL MEDICINE
Payer: MEDICARE

## 2019-10-20 VITALS
OXYGEN SATURATION: 95 % | DIASTOLIC BLOOD PRESSURE: 81 MMHG | RESPIRATION RATE: 17 BRPM | TEMPERATURE: 98.2 F | HEART RATE: 68 BPM | SYSTOLIC BLOOD PRESSURE: 129 MMHG

## 2019-10-20 PROCEDURE — 96365 THER/PROPH/DIAG IV INF INIT: CPT

## 2019-10-20 PROCEDURE — 2580000003 HC RX 258: Performed by: INTERNAL MEDICINE

## 2019-10-20 PROCEDURE — 96368 THER/DIAG CONCURRENT INF: CPT | Performed by: NURSE ANESTHETIST, CERTIFIED REGISTERED

## 2019-10-20 PROCEDURE — 6360000002 HC RX W HCPCS: Performed by: INTERNAL MEDICINE

## 2019-10-20 RX ADMIN — DAPTOMYCIN 600 MG: 500 INJECTION, POWDER, LYOPHILIZED, FOR SOLUTION INTRAVENOUS at 11:32

## 2019-10-21 ENCOUNTER — HOSPITAL ENCOUNTER (OUTPATIENT)
Dept: NURSING | Age: 69
Setting detail: INFUSION SERIES
Discharge: HOME | End: 2019-10-21
Payer: MEDICARE

## 2019-10-21 VITALS
BODY MASS INDEX: 28.93 KG/M2 | SYSTOLIC BLOOD PRESSURE: 133 MMHG | HEIGHT: 78 IN | WEIGHT: 250 LBS | RESPIRATION RATE: 16 BRPM | DIASTOLIC BLOOD PRESSURE: 84 MMHG | TEMPERATURE: 97.9 F | OXYGEN SATURATION: 96 % | HEART RATE: 66 BPM

## 2019-10-21 DIAGNOSIS — T84.7XXS INFECTION/INFLAMMATION DUE TO INTERNAL ORTHOPEDIC DEVICE/IMPLANT/GRAFT, SEQUELA: Primary | ICD-10-CM

## 2019-10-21 LAB
ALBUMIN SERPL-MCNC: 4 GM/DL (ref 3.2–4.6)
ALP BLD-CCNC: 128 IU/L (ref 40–129)
ALT SERPL-CCNC: 9 IU/L
ANION GAP SERPL CALCULATED.3IONS-SCNC: 14 MMOL/L (ref 7–16)
AST SERPL-CCNC: 22 IU/L
BASOPHILS # BLD: 0.5 %
BASOPHILS ABSOLUTE: 0 X10(3)/MCL (ref 0–0.1)
BILIRUB SERPL-MCNC: 0.5 MG/DL (ref 0.1–1.4)
BUN BLDV-MCNC: 9 MG/DL (ref 8–23)
C-REACTIVE PROTEIN WIDE RANGE: 25.5 MG/L
CALCIUM SERPL-MCNC: 9 MG/DL (ref 8.8–10.4)
CHLORIDE BLD-SCNC: 103 MEQ/L (ref 98–107)
CO2: 25 MMOL/L (ref 22–29)
CREAT SERPL-MCNC: 0.83 MG/DL (ref 0.67–1.3)
EOSINOPHIL # BLD: 3.4 %
EOSINOPHILS ABSOLUTE: 0.2 X10(3)/MCL (ref 0–0.5)
GFR AFRICAN AMERICAN: 106 ML/MIN/1.73 M2
GFR NON-AFRICAN AMERICAN: 92 ML/MIN/1.73 M2
GLUCOSE BLD-MCNC: 125 MG/DL (ref 82–100)
HCT VFR BLD CALC: 32.8 % (ref 40–51)
HEMOGLOBIN: 10.2 G/DL (ref 13.7–17.5)
IMMATURE CELLS ABSOLUTE COUNT: 0 X10(3)/MCL (ref 0–0.1)
IMMATURE GRANULOCYTES: 0.3 %
LYMPHOCYTES # BLD: 10 %
LYMPHOCYTES ABSOLUTE: 0.6 X10(3)/MCL (ref 1.2–3.9)
MCH RBC QN AUTO: 25.6 PG (ref 26–34)
MCHC RBC AUTO-ENTMCNC: 31.1 G/DL (ref 30.7–35.5)
MCV RBC AUTO: 82.2 FL (ref 80–100)
MONOCYTES # BLD: 6.9 %
MONOCYTES ABSOLUTE: 0.4 X10(3)/MCL (ref 0.3–0.9)
NEUTROPHILS ABSOLUTE: 4.7 X10(3)/MCL (ref 1.6–6.1)
NEUTROPHILS: 78.9 %
PDW BLD-RTO: 15.6 %
PLATELET # BLD: 242 X10(3)/MCL (ref 155–369)
PMV BLD AUTO: 10.1 FL (ref 8.8–12.5)
POTASSIUM SERPL-SCNC: 3.6 MEQ/L (ref 3.5–5)
RBC # BLD: 3.99 X10(6)/MCL (ref 4.6–6.1)
SEDIMENTATION RATE, ERYTHROCYTE: 29 MM/HR (ref 0–20)
SODIUM BLD-SCNC: 142 MEQ/L (ref 136–145)
TOTAL PROTEIN: 6.6 GM/DL (ref 6.4–8.3)
VANCOMYCIN TROUGH: 5.1 MCG/ML (ref 10–20)
WBC # BLD: 5.9 X10(3)/MCL (ref 3.7–10.3)

## 2019-10-21 PROCEDURE — 6360000002 HC RX W HCPCS: Performed by: INTERNAL MEDICINE

## 2019-10-21 PROCEDURE — 96365 THER/PROPH/DIAG IV INF INIT: CPT

## 2019-10-21 PROCEDURE — 2580000003 HC RX 258: Performed by: INTERNAL MEDICINE

## 2019-10-21 PROCEDURE — 99211 OFF/OP EST MAY X REQ PHY/QHP: CPT

## 2019-10-21 PROCEDURE — 36592 COLLECT BLOOD FROM PICC: CPT

## 2019-10-21 RX ORDER — SODIUM CHLORIDE 0.9 % (FLUSH) 0.9 %
5 SYRINGE (ML) INJECTION PRN
Status: CANCELLED | OUTPATIENT
Start: 2019-10-22

## 2019-10-21 RX ORDER — SODIUM CHLORIDE 9 MG/ML
INJECTION, SOLUTION INTRAVENOUS CONTINUOUS
Status: CANCELLED | OUTPATIENT
Start: 2019-10-22

## 2019-10-21 RX ORDER — METHYLPREDNISOLONE SODIUM SUCCINATE 125 MG/2ML
125 INJECTION, POWDER, LYOPHILIZED, FOR SOLUTION INTRAMUSCULAR; INTRAVENOUS ONCE
Status: CANCELLED | OUTPATIENT
Start: 2019-10-22

## 2019-10-21 RX ORDER — DIPHENHYDRAMINE HYDROCHLORIDE 50 MG/ML
50 INJECTION INTRAMUSCULAR; INTRAVENOUS ONCE
Status: CANCELLED | OUTPATIENT
Start: 2019-10-22

## 2019-10-21 RX ORDER — EPINEPHRINE 1 MG/ML
0.3 INJECTION, SOLUTION, CONCENTRATE INTRAVENOUS PRN
Status: CANCELLED | OUTPATIENT
Start: 2019-10-22

## 2019-10-21 RX ORDER — SODIUM CHLORIDE 0.9 % (FLUSH) 0.9 %
10 SYRINGE (ML) INJECTION PRN
Status: CANCELLED | OUTPATIENT
Start: 2019-10-22

## 2019-10-21 RX ORDER — HEPARIN SODIUM (PORCINE) LOCK FLUSH IV SOLN 100 UNIT/ML 100 UNIT/ML
500 SOLUTION INTRAVENOUS PRN
Status: CANCELLED | OUTPATIENT
Start: 2019-10-22

## 2019-10-21 RX ADMIN — DAPTOMYCIN 600 MG: 500 INJECTION, POWDER, LYOPHILIZED, FOR SOLUTION INTRAVENOUS at 13:02

## 2019-10-21 ASSESSMENT — PAIN SCALES - GENERAL: PAINLEVEL_OUTOF10: 0

## 2019-10-21 NOTE — PROGRESS NOTES
Pt tolerates infusion well refuses lab draw today states home health nurse was at his house this am and chuck all labs discharged to home via wheelchair in stable condition

## 2019-10-22 ENCOUNTER — HOSPITAL ENCOUNTER (OUTPATIENT)
Dept: NURSING | Age: 69
Setting detail: INFUSION SERIES
Discharge: HOME | End: 2019-10-22
Payer: MEDICARE

## 2019-10-22 VITALS
RESPIRATION RATE: 16 BRPM | DIASTOLIC BLOOD PRESSURE: 88 MMHG | BODY MASS INDEX: 28.93 KG/M2 | HEART RATE: 62 BPM | WEIGHT: 250 LBS | SYSTOLIC BLOOD PRESSURE: 132 MMHG | TEMPERATURE: 97.8 F | HEIGHT: 78 IN

## 2019-10-22 DIAGNOSIS — T84.7XXS INFECTION/INFLAMMATION DUE TO INTERNAL ORTHOPEDIC DEVICE/IMPLANT/GRAFT, SEQUELA: Primary | ICD-10-CM

## 2019-10-22 PROCEDURE — 96365 THER/PROPH/DIAG IV INF INIT: CPT

## 2019-10-22 PROCEDURE — 99211 OFF/OP EST MAY X REQ PHY/QHP: CPT

## 2019-10-22 PROCEDURE — 2580000003 HC RX 258: Performed by: INTERNAL MEDICINE

## 2019-10-22 PROCEDURE — 6360000002 HC RX W HCPCS: Performed by: INTERNAL MEDICINE

## 2019-10-22 RX ORDER — HEPARIN SODIUM (PORCINE) LOCK FLUSH IV SOLN 100 UNIT/ML 100 UNIT/ML
500 SOLUTION INTRAVENOUS PRN
Status: CANCELLED | OUTPATIENT
Start: 2019-10-23

## 2019-10-22 RX ORDER — EPINEPHRINE 1 MG/ML
0.3 INJECTION, SOLUTION, CONCENTRATE INTRAVENOUS PRN
Status: CANCELLED | OUTPATIENT
Start: 2019-10-23

## 2019-10-22 RX ORDER — DIPHENHYDRAMINE HYDROCHLORIDE 50 MG/ML
50 INJECTION INTRAMUSCULAR; INTRAVENOUS ONCE
Status: CANCELLED | OUTPATIENT
Start: 2019-10-23

## 2019-10-22 RX ORDER — METHYLPREDNISOLONE SODIUM SUCCINATE 125 MG/2ML
125 INJECTION, POWDER, LYOPHILIZED, FOR SOLUTION INTRAMUSCULAR; INTRAVENOUS ONCE
Status: CANCELLED | OUTPATIENT
Start: 2019-10-23

## 2019-10-22 RX ORDER — SODIUM CHLORIDE 0.9 % (FLUSH) 0.9 %
10 SYRINGE (ML) INJECTION PRN
Status: CANCELLED | OUTPATIENT
Start: 2019-10-23

## 2019-10-22 RX ORDER — SODIUM CHLORIDE 9 MG/ML
INJECTION, SOLUTION INTRAVENOUS CONTINUOUS
Status: CANCELLED | OUTPATIENT
Start: 2019-10-23

## 2019-10-22 RX ORDER — SODIUM CHLORIDE 0.9 % (FLUSH) 0.9 %
5 SYRINGE (ML) INJECTION PRN
Status: CANCELLED | OUTPATIENT
Start: 2019-10-23

## 2019-10-22 RX ADMIN — DAPTOMYCIN 600 MG: 500 INJECTION, POWDER, LYOPHILIZED, FOR SOLUTION INTRAVENOUS at 12:57

## 2019-10-22 ASSESSMENT — PAIN - FUNCTIONAL ASSESSMENT: PAIN_FUNCTIONAL_ASSESSMENT: 0-10

## 2019-10-23 ENCOUNTER — HOSPITAL ENCOUNTER (OUTPATIENT)
Dept: NURSING | Age: 69
Setting detail: INFUSION SERIES
Discharge: HOME | End: 2019-10-23
Payer: MEDICARE

## 2019-10-23 ENCOUNTER — TELEPHONE (OUTPATIENT)
Dept: INFECTIOUS DISEASES | Age: 69
End: 2019-10-23

## 2019-10-23 VITALS
HEIGHT: 78 IN | RESPIRATION RATE: 16 BRPM | SYSTOLIC BLOOD PRESSURE: 124 MMHG | TEMPERATURE: 98.1 F | BODY MASS INDEX: 28.93 KG/M2 | DIASTOLIC BLOOD PRESSURE: 75 MMHG | WEIGHT: 250 LBS | HEART RATE: 66 BPM

## 2019-10-23 DIAGNOSIS — T84.7XXS INFECTION/INFLAMMATION DUE TO INTERNAL ORTHOPEDIC DEVICE/IMPLANT/GRAFT, SEQUELA: Primary | ICD-10-CM

## 2019-10-23 PROCEDURE — 2580000003 HC RX 258: Performed by: INTERNAL MEDICINE

## 2019-10-23 PROCEDURE — 99211 OFF/OP EST MAY X REQ PHY/QHP: CPT

## 2019-10-23 PROCEDURE — 6360000002 HC RX W HCPCS: Performed by: INTERNAL MEDICINE

## 2019-10-23 PROCEDURE — 96365 THER/PROPH/DIAG IV INF INIT: CPT

## 2019-10-23 RX ORDER — SODIUM CHLORIDE 0.9 % (FLUSH) 0.9 %
5 SYRINGE (ML) INJECTION PRN
Status: CANCELLED | OUTPATIENT
Start: 2019-10-24

## 2019-10-23 RX ORDER — METHYLPREDNISOLONE SODIUM SUCCINATE 125 MG/2ML
125 INJECTION, POWDER, LYOPHILIZED, FOR SOLUTION INTRAMUSCULAR; INTRAVENOUS ONCE
Status: CANCELLED | OUTPATIENT
Start: 2019-10-24

## 2019-10-23 RX ORDER — SODIUM CHLORIDE 9 MG/ML
INJECTION, SOLUTION INTRAVENOUS CONTINUOUS
Status: CANCELLED | OUTPATIENT
Start: 2019-10-24

## 2019-10-23 RX ORDER — SODIUM CHLORIDE 0.9 % (FLUSH) 0.9 %
10 SYRINGE (ML) INJECTION PRN
Status: CANCELLED | OUTPATIENT
Start: 2019-10-24

## 2019-10-23 RX ORDER — HEPARIN SODIUM (PORCINE) LOCK FLUSH IV SOLN 100 UNIT/ML 100 UNIT/ML
500 SOLUTION INTRAVENOUS PRN
Status: CANCELLED | OUTPATIENT
Start: 2019-10-24

## 2019-10-23 RX ORDER — EPINEPHRINE 1 MG/ML
0.3 INJECTION, SOLUTION, CONCENTRATE INTRAVENOUS PRN
Status: CANCELLED | OUTPATIENT
Start: 2019-10-24

## 2019-10-23 RX ORDER — DIPHENHYDRAMINE HYDROCHLORIDE 50 MG/ML
50 INJECTION INTRAMUSCULAR; INTRAVENOUS ONCE
Status: CANCELLED | OUTPATIENT
Start: 2019-10-24

## 2019-10-23 RX ADMIN — DAPTOMYCIN 600 MG: 500 INJECTION, POWDER, LYOPHILIZED, FOR SOLUTION INTRAVENOUS at 12:44

## 2019-10-23 ASSESSMENT — PAIN - FUNCTIONAL ASSESSMENT: PAIN_FUNCTIONAL_ASSESSMENT: 0-10

## 2019-10-24 ENCOUNTER — HOSPITAL ENCOUNTER (OUTPATIENT)
Dept: NURSING | Age: 69
Setting detail: INFUSION SERIES
Discharge: HOME | End: 2019-10-24
Payer: MEDICARE

## 2019-10-24 VITALS
HEIGHT: 78 IN | DIASTOLIC BLOOD PRESSURE: 87 MMHG | HEART RATE: 64 BPM | TEMPERATURE: 97.8 F | BODY MASS INDEX: 28.93 KG/M2 | RESPIRATION RATE: 16 BRPM | SYSTOLIC BLOOD PRESSURE: 141 MMHG | WEIGHT: 250 LBS

## 2019-10-24 DIAGNOSIS — T84.7XXS INFECTION/INFLAMMATION DUE TO INTERNAL ORTHOPEDIC DEVICE/IMPLANT/GRAFT, SEQUELA: ICD-10-CM

## 2019-10-24 PROCEDURE — 96365 THER/PROPH/DIAG IV INF INIT: CPT

## 2019-10-24 PROCEDURE — 2580000003 HC RX 258: Performed by: INTERNAL MEDICINE

## 2019-10-24 PROCEDURE — 6360000002 HC RX W HCPCS: Performed by: INTERNAL MEDICINE

## 2019-10-24 PROCEDURE — 99211 OFF/OP EST MAY X REQ PHY/QHP: CPT

## 2019-10-24 RX ORDER — METHYLPREDNISOLONE SODIUM SUCCINATE 125 MG/2ML
125 INJECTION, POWDER, LYOPHILIZED, FOR SOLUTION INTRAMUSCULAR; INTRAVENOUS ONCE
Status: CANCELLED | OUTPATIENT
Start: 2019-10-25

## 2019-10-24 RX ORDER — EPINEPHRINE 1 MG/ML
0.3 INJECTION, SOLUTION, CONCENTRATE INTRAVENOUS PRN
Status: CANCELLED | OUTPATIENT
Start: 2019-10-25

## 2019-10-24 RX ORDER — SODIUM CHLORIDE 0.9 % (FLUSH) 0.9 %
10 SYRINGE (ML) INJECTION PRN
Status: CANCELLED | OUTPATIENT
Start: 2019-10-25

## 2019-10-24 RX ORDER — DIPHENHYDRAMINE HYDROCHLORIDE 50 MG/ML
50 INJECTION INTRAMUSCULAR; INTRAVENOUS ONCE
Status: CANCELLED | OUTPATIENT
Start: 2019-10-25

## 2019-10-24 RX ORDER — HEPARIN SODIUM (PORCINE) LOCK FLUSH IV SOLN 100 UNIT/ML 100 UNIT/ML
500 SOLUTION INTRAVENOUS PRN
Status: CANCELLED | OUTPATIENT
Start: 2019-10-25

## 2019-10-24 RX ORDER — SODIUM CHLORIDE 0.9 % (FLUSH) 0.9 %
5 SYRINGE (ML) INJECTION PRN
Status: CANCELLED | OUTPATIENT
Start: 2019-10-25

## 2019-10-24 RX ORDER — SODIUM CHLORIDE 9 MG/ML
INJECTION, SOLUTION INTRAVENOUS CONTINUOUS
Status: CANCELLED | OUTPATIENT
Start: 2019-10-25

## 2019-10-24 RX ADMIN — DAPTOMYCIN 600 MG: 500 INJECTION, POWDER, LYOPHILIZED, FOR SOLUTION INTRAVENOUS at 13:25

## 2019-10-24 ASSESSMENT — PAIN SCALES - GENERAL: PAINLEVEL_OUTOF10: 0

## 2019-10-25 ENCOUNTER — HOSPITAL ENCOUNTER (OUTPATIENT)
Dept: NURSING | Age: 69
Setting detail: INFUSION SERIES
Discharge: HOME | End: 2019-10-25
Payer: MEDICARE

## 2019-10-25 VITALS
HEIGHT: 78 IN | RESPIRATION RATE: 16 BRPM | SYSTOLIC BLOOD PRESSURE: 164 MMHG | DIASTOLIC BLOOD PRESSURE: 94 MMHG | WEIGHT: 250 LBS | HEART RATE: 62 BPM | TEMPERATURE: 97.4 F | OXYGEN SATURATION: 99 % | BODY MASS INDEX: 28.93 KG/M2

## 2019-10-25 DIAGNOSIS — T84.7XXS INFECTION/INFLAMMATION DUE TO INTERNAL ORTHOPEDIC DEVICE/IMPLANT/GRAFT, SEQUELA: Primary | ICD-10-CM

## 2019-10-25 LAB
ANION GAP SERPL CALCULATED.3IONS-SCNC: 12 MMOL/L (ref 7–16)
BUN BLDV-MCNC: 7 MG/DL (ref 8–23)
CALCIUM SERPL-MCNC: 8.7 MG/DL (ref 8.8–10.4)
CHLORIDE BLD-SCNC: 100 MEQ/L (ref 98–107)
CO2: 27 MMOL/L (ref 22–29)
CREAT SERPL-MCNC: 0.9 MG/DL (ref 0.67–1.3)
GFR AFRICAN AMERICAN: 103 ML/MIN/1.73 M2
GFR NON-AFRICAN AMERICAN: 89 ML/MIN/1.73 M2
GLUCOSE BLD-MCNC: 139 MG/DL (ref 82–100)
POTASSIUM SERPL-SCNC: 3.5 MEQ/L (ref 3.5–5)
SODIUM BLD-SCNC: 139 MEQ/L (ref 136–145)

## 2019-10-25 PROCEDURE — 6360000002 HC RX W HCPCS: Performed by: INTERNAL MEDICINE

## 2019-10-25 PROCEDURE — 99211 OFF/OP EST MAY X REQ PHY/QHP: CPT

## 2019-10-25 PROCEDURE — 2580000003 HC RX 258: Performed by: INTERNAL MEDICINE

## 2019-10-25 PROCEDURE — 96365 THER/PROPH/DIAG IV INF INIT: CPT

## 2019-10-25 RX ORDER — EPINEPHRINE 1 MG/ML
0.3 INJECTION, SOLUTION, CONCENTRATE INTRAVENOUS PRN
Status: CANCELLED | OUTPATIENT
Start: 2019-10-26

## 2019-10-25 RX ORDER — SODIUM CHLORIDE 9 MG/ML
INJECTION, SOLUTION INTRAVENOUS CONTINUOUS
Status: CANCELLED | OUTPATIENT
Start: 2019-10-26

## 2019-10-25 RX ORDER — DIPHENHYDRAMINE HYDROCHLORIDE 50 MG/ML
50 INJECTION INTRAMUSCULAR; INTRAVENOUS ONCE
Status: CANCELLED | OUTPATIENT
Start: 2019-10-26

## 2019-10-25 RX ORDER — HEPARIN SODIUM (PORCINE) LOCK FLUSH IV SOLN 100 UNIT/ML 100 UNIT/ML
500 SOLUTION INTRAVENOUS PRN
Status: CANCELLED | OUTPATIENT
Start: 2019-10-26

## 2019-10-25 RX ORDER — SODIUM CHLORIDE 0.9 % (FLUSH) 0.9 %
10 SYRINGE (ML) INJECTION PRN
Status: CANCELLED | OUTPATIENT
Start: 2019-10-26

## 2019-10-25 RX ORDER — METHYLPREDNISOLONE SODIUM SUCCINATE 125 MG/2ML
125 INJECTION, POWDER, LYOPHILIZED, FOR SOLUTION INTRAMUSCULAR; INTRAVENOUS ONCE
Status: CANCELLED | OUTPATIENT
Start: 2019-10-26

## 2019-10-25 RX ORDER — SODIUM CHLORIDE 0.9 % (FLUSH) 0.9 %
5 SYRINGE (ML) INJECTION PRN
Status: CANCELLED | OUTPATIENT
Start: 2019-10-26

## 2019-10-25 RX ADMIN — DAPTOMYCIN 600 MG: 500 INJECTION, POWDER, LYOPHILIZED, FOR SOLUTION INTRAVENOUS at 13:23

## 2019-10-25 ASSESSMENT — PAIN SCALES - GENERAL: PAINLEVEL_OUTOF10: 0

## 2019-10-26 ENCOUNTER — HOSPITAL ENCOUNTER (OUTPATIENT)
Age: 69
Discharge: HOME | End: 2019-10-26
Attending: INTERNAL MEDICINE | Admitting: INTERNAL MEDICINE
Payer: MEDICARE

## 2019-10-26 VITALS
OXYGEN SATURATION: 98 % | TEMPERATURE: 98.2 F | RESPIRATION RATE: 18 BRPM | DIASTOLIC BLOOD PRESSURE: 88 MMHG | SYSTOLIC BLOOD PRESSURE: 121 MMHG | HEART RATE: 68 BPM

## 2019-10-26 PROCEDURE — 6360000002 HC RX W HCPCS: Performed by: INTERNAL MEDICINE

## 2019-10-26 PROCEDURE — 2580000003 HC RX 258: Performed by: INTERNAL MEDICINE

## 2019-10-26 PROCEDURE — 96368 THER/DIAG CONCURRENT INF: CPT

## 2019-10-26 PROCEDURE — 96365 THER/PROPH/DIAG IV INF INIT: CPT

## 2019-10-26 RX ORDER — SODIUM CHLORIDE 9 MG/ML
INJECTION, SOLUTION INTRAVENOUS
Status: DISCONTINUED
Start: 2019-10-26 | End: 2019-10-26 | Stop reason: HOSPADM

## 2019-10-26 RX ADMIN — DAPTOMYCIN 600 MG: 500 INJECTION, POWDER, LYOPHILIZED, FOR SOLUTION INTRAVENOUS at 12:16

## 2019-10-27 ENCOUNTER — HOSPITAL ENCOUNTER (OUTPATIENT)
Age: 69
Discharge: HOME | End: 2019-10-27
Attending: INTERNAL MEDICINE | Admitting: INTERNAL MEDICINE
Payer: MEDICARE

## 2019-10-27 PROCEDURE — 2580000003 HC RX 258: Performed by: INTERNAL MEDICINE

## 2019-10-27 PROCEDURE — 96368 THER/DIAG CONCURRENT INF: CPT

## 2019-10-27 PROCEDURE — 2580000003 HC RX 258

## 2019-10-27 PROCEDURE — 96374 THER/PROPH/DIAG INJ IV PUSH: CPT

## 2019-10-27 PROCEDURE — 6360000002 HC RX W HCPCS: Performed by: INTERNAL MEDICINE

## 2019-10-27 PROCEDURE — 96365 THER/PROPH/DIAG IV INF INIT: CPT

## 2019-10-27 RX ORDER — SODIUM CHLORIDE 9 MG/ML
INJECTION, SOLUTION INTRAVENOUS
Status: COMPLETED
Start: 2019-10-27 | End: 2019-10-27

## 2019-10-27 RX ADMIN — SODIUM CHLORIDE: 900 INJECTION, SOLUTION INTRAVENOUS at 11:50

## 2019-10-27 RX ADMIN — DAPTOMYCIN 600 MG: 500 INJECTION, POWDER, LYOPHILIZED, FOR SOLUTION INTRAVENOUS at 11:50

## 2019-10-28 ENCOUNTER — HOSPITAL ENCOUNTER (OUTPATIENT)
Dept: NURSING | Age: 69
Setting detail: INFUSION SERIES
Discharge: HOME | End: 2019-10-28
Payer: MEDICARE

## 2019-10-28 VITALS
HEIGHT: 78 IN | DIASTOLIC BLOOD PRESSURE: 63 MMHG | RESPIRATION RATE: 18 BRPM | SYSTOLIC BLOOD PRESSURE: 134 MMHG | TEMPERATURE: 97.8 F | BODY MASS INDEX: 28.93 KG/M2 | HEART RATE: 63 BPM | WEIGHT: 250 LBS

## 2019-10-28 DIAGNOSIS — T84.7XXS INFECTION/INFLAMMATION DUE TO INTERNAL ORTHOPEDIC DEVICE/IMPLANT/GRAFT, SEQUELA: Primary | ICD-10-CM

## 2019-10-28 LAB
ALBUMIN SERPL-MCNC: 4.1 GM/DL (ref 3.2–4.6)
ALBUMIN SERPL-MCNC: 4.1 GM/DL (ref 3.2–4.6)
ALP BLD-CCNC: 143 IU/L (ref 40–129)
ALP BLD-CCNC: 143 IU/L (ref 40–129)
ALT SERPL-CCNC: 12 IU/L
ALT SERPL-CCNC: 12 IU/L
ANION GAP SERPL CALCULATED.3IONS-SCNC: 13 MMOL/L (ref 7–16)
AST SERPL-CCNC: 24 IU/L
AST SERPL-CCNC: 24 IU/L
BASOPHILS # BLD: 1.1 %
BASOPHILS ABSOLUTE: 0.1 X10(3)/MCL (ref 0–0.1)
BILIRUB SERPL-MCNC: 0.3 MG/DL (ref 0.1–1.4)
BILIRUB SERPL-MCNC: 0.3 MG/DL (ref 0.1–1.4)
BILIRUBIN DIRECT: <0.2 MG/DL (ref 0–0.3)
BUN BLDV-MCNC: 7 MG/DL (ref 8–23)
C-REACTIVE PROTEIN WIDE RANGE: 26.5 MG/L
CALCIUM SERPL-MCNC: 8.9 MG/DL (ref 8.8–10.4)
CHLORIDE BLD-SCNC: 101 MEQ/L (ref 98–107)
CO2: 26 MMOL/L (ref 22–29)
CREAT SERPL-MCNC: 0.89 MG/DL (ref 0.67–1.3)
EOSINOPHIL # BLD: 4.1 %
EOSINOPHILS ABSOLUTE: 0.2 X10(3)/MCL (ref 0–0.5)
GFR AFRICAN AMERICAN: 103 ML/MIN/1.73 M2
GFR NON-AFRICAN AMERICAN: 89 ML/MIN/1.73 M2
GLUCOSE BLD-MCNC: 163 MG/DL (ref 82–100)
HCT VFR BLD CALC: 35.3 % (ref 40–51)
HEMOGLOBIN: 10.6 G/DL (ref 13.7–17.5)
IMMATURE CELLS ABSOLUTE COUNT: 0 X10(3)/MCL (ref 0–0.1)
IMMATURE GRANULOCYTES: 0.6 %
LYMPHOCYTES # BLD: 14.1 %
LYMPHOCYTES ABSOLUTE: 0.7 X10(3)/MCL (ref 1.2–3.9)
MCH RBC QN AUTO: 24.7 PG (ref 26–34)
MCHC RBC AUTO-ENTMCNC: 30 G/DL (ref 30.7–35.5)
MCV RBC AUTO: 82.1 FL (ref 80–100)
MONOCYTES # BLD: 7.9 %
MONOCYTES ABSOLUTE: 0.4 X10(3)/MCL (ref 0.3–0.9)
NEUTROPHILS ABSOLUTE: 3.4 X10(3)/MCL (ref 1.6–6.1)
NEUTROPHILS: 72.2 %
PDW BLD-RTO: 15.4 %
PLATELET # BLD: 263 X10(3)/MCL (ref 155–369)
PMV BLD AUTO: 10 FL (ref 8.8–12.5)
POTASSIUM SERPL-SCNC: 3.7 MEQ/L (ref 3.5–5)
RBC # BLD: 4.3 X10(6)/MCL (ref 4.6–6.1)
SEDIMENTATION RATE, ERYTHROCYTE: 26 MM/HR (ref 0–20)
SODIUM BLD-SCNC: 140 MEQ/L (ref 136–145)
TOTAL PROTEIN: 6.9 GM/DL (ref 6.4–8.3)
TOTAL PROTEIN: 6.9 GM/DL (ref 6.4–8.3)
WBC # BLD: 4.7 X10(3)/MCL (ref 3.7–10.3)

## 2019-10-28 PROCEDURE — 6360000002 HC RX W HCPCS: Performed by: INTERNAL MEDICINE

## 2019-10-28 PROCEDURE — 2580000003 HC RX 258: Performed by: INTERNAL MEDICINE

## 2019-10-28 PROCEDURE — 99211 OFF/OP EST MAY X REQ PHY/QHP: CPT

## 2019-10-28 PROCEDURE — 96365 THER/PROPH/DIAG IV INF INIT: CPT

## 2019-10-28 RX ORDER — DIPHENHYDRAMINE HYDROCHLORIDE 50 MG/ML
50 INJECTION INTRAMUSCULAR; INTRAVENOUS ONCE
Status: CANCELLED | OUTPATIENT
Start: 2019-10-29

## 2019-10-28 RX ORDER — METHYLPREDNISOLONE SODIUM SUCCINATE 125 MG/2ML
125 INJECTION, POWDER, LYOPHILIZED, FOR SOLUTION INTRAMUSCULAR; INTRAVENOUS ONCE
Status: CANCELLED | OUTPATIENT
Start: 2019-10-29

## 2019-10-28 RX ORDER — SODIUM CHLORIDE 0.9 % (FLUSH) 0.9 %
10 SYRINGE (ML) INJECTION PRN
Status: CANCELLED | OUTPATIENT
Start: 2019-10-29

## 2019-10-28 RX ORDER — HEPARIN SODIUM (PORCINE) LOCK FLUSH IV SOLN 100 UNIT/ML 100 UNIT/ML
500 SOLUTION INTRAVENOUS PRN
Status: CANCELLED | OUTPATIENT
Start: 2019-10-29

## 2019-10-28 RX ORDER — SODIUM CHLORIDE 0.9 % (FLUSH) 0.9 %
5 SYRINGE (ML) INJECTION PRN
Status: CANCELLED | OUTPATIENT
Start: 2019-10-29

## 2019-10-28 RX ORDER — EPINEPHRINE 1 MG/ML
0.3 INJECTION, SOLUTION, CONCENTRATE INTRAVENOUS PRN
Status: CANCELLED | OUTPATIENT
Start: 2019-10-29

## 2019-10-28 RX ORDER — SODIUM CHLORIDE 9 MG/ML
INJECTION, SOLUTION INTRAVENOUS CONTINUOUS
Status: CANCELLED | OUTPATIENT
Start: 2019-10-29

## 2019-10-28 RX ADMIN — DAPTOMYCIN 600 MG: 500 INJECTION, POWDER, LYOPHILIZED, FOR SOLUTION INTRAVENOUS at 13:01

## 2019-10-28 ASSESSMENT — PAIN - FUNCTIONAL ASSESSMENT: PAIN_FUNCTIONAL_ASSESSMENT: 0-10

## 2019-10-28 NOTE — PROGRESS NOTES
Pt tolerates daptomycin well.  Dressing to PICC line changed per procedure protocol Discharged via wheelchair in stable condition

## 2019-10-29 ENCOUNTER — HOSPITAL ENCOUNTER (OUTPATIENT)
Dept: NURSING | Age: 69
Setting detail: INFUSION SERIES
Discharge: HOME | End: 2019-10-29
Payer: MEDICARE

## 2019-10-29 VITALS
RESPIRATION RATE: 18 BRPM | SYSTOLIC BLOOD PRESSURE: 141 MMHG | TEMPERATURE: 98.1 F | DIASTOLIC BLOOD PRESSURE: 88 MMHG | HEART RATE: 73 BPM

## 2019-10-29 DIAGNOSIS — T84.7XXS INFECTION/INFLAMMATION DUE TO INTERNAL ORTHOPEDIC DEVICE/IMPLANT/GRAFT, SEQUELA: Primary | ICD-10-CM

## 2019-10-29 PROCEDURE — 6360000002 HC RX W HCPCS: Performed by: INTERNAL MEDICINE

## 2019-10-29 PROCEDURE — 96365 THER/PROPH/DIAG IV INF INIT: CPT

## 2019-10-29 PROCEDURE — 99211 OFF/OP EST MAY X REQ PHY/QHP: CPT

## 2019-10-29 PROCEDURE — 2580000003 HC RX 258: Performed by: INTERNAL MEDICINE

## 2019-10-29 RX ORDER — HEPARIN SODIUM (PORCINE) LOCK FLUSH IV SOLN 100 UNIT/ML 100 UNIT/ML
500 SOLUTION INTRAVENOUS PRN
Status: CANCELLED | OUTPATIENT
Start: 2019-10-30

## 2019-10-29 RX ORDER — SODIUM CHLORIDE 0.9 % (FLUSH) 0.9 %
5 SYRINGE (ML) INJECTION PRN
Status: CANCELLED | OUTPATIENT
Start: 2019-10-30

## 2019-10-29 RX ORDER — SODIUM CHLORIDE 0.9 % (FLUSH) 0.9 %
10 SYRINGE (ML) INJECTION PRN
Status: CANCELLED | OUTPATIENT
Start: 2019-10-30

## 2019-10-29 RX ORDER — SODIUM CHLORIDE 9 MG/ML
INJECTION, SOLUTION INTRAVENOUS CONTINUOUS
Status: CANCELLED | OUTPATIENT
Start: 2019-10-30

## 2019-10-29 RX ORDER — EPINEPHRINE 1 MG/ML
0.3 INJECTION, SOLUTION, CONCENTRATE INTRAVENOUS PRN
Status: CANCELLED | OUTPATIENT
Start: 2019-10-30

## 2019-10-29 RX ORDER — DIPHENHYDRAMINE HYDROCHLORIDE 50 MG/ML
50 INJECTION INTRAMUSCULAR; INTRAVENOUS ONCE
Status: CANCELLED | OUTPATIENT
Start: 2019-10-30

## 2019-10-29 RX ORDER — METHYLPREDNISOLONE SODIUM SUCCINATE 125 MG/2ML
125 INJECTION, POWDER, LYOPHILIZED, FOR SOLUTION INTRAMUSCULAR; INTRAVENOUS ONCE
Status: CANCELLED | OUTPATIENT
Start: 2019-10-30

## 2019-10-29 RX ADMIN — DAPTOMYCIN 600 MG: 500 INJECTION, POWDER, LYOPHILIZED, FOR SOLUTION INTRAVENOUS at 13:14

## 2019-10-29 ASSESSMENT — PAIN SCALES - GENERAL: PAINLEVEL_OUTOF10: 0

## 2019-10-30 ENCOUNTER — HOSPITAL ENCOUNTER (OUTPATIENT)
Dept: NURSING | Age: 69
Setting detail: INFUSION SERIES
Discharge: HOME | End: 2019-10-30
Payer: MEDICARE

## 2019-10-30 VITALS
HEIGHT: 78 IN | WEIGHT: 250 LBS | BODY MASS INDEX: 28.93 KG/M2 | RESPIRATION RATE: 18 BRPM | SYSTOLIC BLOOD PRESSURE: 152 MMHG | HEART RATE: 67 BPM | TEMPERATURE: 98.3 F | DIASTOLIC BLOOD PRESSURE: 90 MMHG

## 2019-10-30 DIAGNOSIS — T84.7XXS INFECTION/INFLAMMATION DUE TO INTERNAL ORTHOPEDIC DEVICE/IMPLANT/GRAFT, SEQUELA: Primary | ICD-10-CM

## 2019-10-30 PROCEDURE — 2580000003 HC RX 258: Performed by: INTERNAL MEDICINE

## 2019-10-30 PROCEDURE — 99211 OFF/OP EST MAY X REQ PHY/QHP: CPT

## 2019-10-30 PROCEDURE — 6360000002 HC RX W HCPCS: Performed by: INTERNAL MEDICINE

## 2019-10-30 PROCEDURE — 96365 THER/PROPH/DIAG IV INF INIT: CPT

## 2019-10-30 RX ORDER — EPINEPHRINE 1 MG/ML
0.3 INJECTION, SOLUTION, CONCENTRATE INTRAVENOUS PRN
Status: CANCELLED | OUTPATIENT
Start: 2019-10-31

## 2019-10-30 RX ORDER — METHYLPREDNISOLONE SODIUM SUCCINATE 125 MG/2ML
125 INJECTION, POWDER, LYOPHILIZED, FOR SOLUTION INTRAMUSCULAR; INTRAVENOUS ONCE
Status: CANCELLED | OUTPATIENT
Start: 2019-10-31

## 2019-10-30 RX ORDER — SODIUM CHLORIDE 0.9 % (FLUSH) 0.9 %
10 SYRINGE (ML) INJECTION PRN
Status: CANCELLED | OUTPATIENT
Start: 2019-10-31

## 2019-10-30 RX ORDER — SODIUM CHLORIDE 9 MG/ML
INJECTION, SOLUTION INTRAVENOUS CONTINUOUS
Status: CANCELLED | OUTPATIENT
Start: 2019-10-31

## 2019-10-30 RX ORDER — HEPARIN SODIUM (PORCINE) LOCK FLUSH IV SOLN 100 UNIT/ML 100 UNIT/ML
500 SOLUTION INTRAVENOUS PRN
Status: CANCELLED | OUTPATIENT
Start: 2019-10-31

## 2019-10-30 RX ORDER — DIPHENHYDRAMINE HYDROCHLORIDE 50 MG/ML
50 INJECTION INTRAMUSCULAR; INTRAVENOUS ONCE
Status: CANCELLED | OUTPATIENT
Start: 2019-10-31

## 2019-10-30 RX ORDER — SODIUM CHLORIDE 0.9 % (FLUSH) 0.9 %
5 SYRINGE (ML) INJECTION PRN
Status: CANCELLED | OUTPATIENT
Start: 2019-10-31

## 2019-10-30 RX ADMIN — DAPTOMYCIN 600 MG: 500 INJECTION, POWDER, LYOPHILIZED, FOR SOLUTION INTRAVENOUS at 13:02

## 2019-10-30 ASSESSMENT — PAIN - FUNCTIONAL ASSESSMENT: PAIN_FUNCTIONAL_ASSESSMENT: 0-10

## 2019-10-30 NOTE — PROGRESS NOTES
Pt tolerates infusion well.  PICC line flushed with 10cc normal saline discharged to home in stable condition

## 2019-10-31 ENCOUNTER — HOSPITAL ENCOUNTER (OUTPATIENT)
Dept: NURSING | Age: 69
Setting detail: INFUSION SERIES
Discharge: HOME | End: 2019-10-31
Payer: MEDICARE

## 2019-10-31 ENCOUNTER — TELEPHONE (OUTPATIENT)
Dept: INFECTIOUS DISEASES | Age: 69
End: 2019-10-31

## 2019-10-31 VITALS
HEIGHT: 78 IN | SYSTOLIC BLOOD PRESSURE: 135 MMHG | RESPIRATION RATE: 18 BRPM | DIASTOLIC BLOOD PRESSURE: 85 MMHG | TEMPERATURE: 97.4 F | HEART RATE: 66 BPM | BODY MASS INDEX: 28.93 KG/M2 | WEIGHT: 250 LBS

## 2019-10-31 DIAGNOSIS — T84.7XXS INFECTION/INFLAMMATION DUE TO INTERNAL ORTHOPEDIC DEVICE/IMPLANT/GRAFT, SEQUELA: Primary | ICD-10-CM

## 2019-10-31 LAB
ANION GAP SERPL CALCULATED.3IONS-SCNC: 13 MMOL/L (ref 7–16)
BUN BLDV-MCNC: 8 MG/DL (ref 8–23)
CALCIUM SERPL-MCNC: 8.8 MG/DL (ref 8.8–10.4)
CHLORIDE BLD-SCNC: 102 MEQ/L (ref 98–107)
CO2: 27 MMOL/L (ref 22–29)
CREAT SERPL-MCNC: 0.83 MG/DL (ref 0.67–1.3)
CULTURE, JOINT AEROBIC: ABNORMAL
CULTURE, JOINT ANAEROBIC: ABNORMAL
GFR AFRICAN AMERICAN: 106 ML/MIN/1.73 M2
GFR NON-AFRICAN AMERICAN: 92 ML/MIN/1.73 M2
GLUCOSE BLD-MCNC: 97 MG/DL (ref 82–100)
GRAM STAIN RESULT: ABNORMAL
ORGANISM: ABNORMAL
POTASSIUM SERPL-SCNC: 3.9 MEQ/L (ref 3.5–5)
SODIUM BLD-SCNC: 142 MEQ/L (ref 136–145)
TOTAL CK: 57 U/L (ref 39–308)

## 2019-10-31 PROCEDURE — 99211 OFF/OP EST MAY X REQ PHY/QHP: CPT

## 2019-10-31 PROCEDURE — 6360000002 HC RX W HCPCS: Performed by: INTERNAL MEDICINE

## 2019-10-31 PROCEDURE — 2580000003 HC RX 258: Performed by: INTERNAL MEDICINE

## 2019-10-31 PROCEDURE — 96365 THER/PROPH/DIAG IV INF INIT: CPT

## 2019-10-31 PROCEDURE — 82550 ASSAY OF CK (CPK): CPT

## 2019-10-31 RX ORDER — METHYLPREDNISOLONE SODIUM SUCCINATE 125 MG/2ML
125 INJECTION, POWDER, LYOPHILIZED, FOR SOLUTION INTRAMUSCULAR; INTRAVENOUS ONCE
Status: CANCELLED | OUTPATIENT
Start: 2019-11-01

## 2019-10-31 RX ORDER — EPINEPHRINE 1 MG/ML
0.3 INJECTION, SOLUTION, CONCENTRATE INTRAVENOUS PRN
Status: CANCELLED | OUTPATIENT
Start: 2019-11-01

## 2019-10-31 RX ORDER — DIPHENHYDRAMINE HYDROCHLORIDE 50 MG/ML
50 INJECTION INTRAMUSCULAR; INTRAVENOUS ONCE
Status: CANCELLED | OUTPATIENT
Start: 2019-11-01

## 2019-10-31 RX ORDER — SODIUM CHLORIDE 0.9 % (FLUSH) 0.9 %
10 SYRINGE (ML) INJECTION PRN
Status: CANCELLED | OUTPATIENT
Start: 2019-11-01

## 2019-10-31 RX ORDER — SODIUM CHLORIDE 0.9 % (FLUSH) 0.9 %
5 SYRINGE (ML) INJECTION PRN
Status: CANCELLED | OUTPATIENT
Start: 2019-11-01

## 2019-10-31 RX ORDER — SODIUM CHLORIDE 9 MG/ML
INJECTION, SOLUTION INTRAVENOUS CONTINUOUS
Status: CANCELLED | OUTPATIENT
Start: 2019-11-01

## 2019-10-31 RX ORDER — HEPARIN SODIUM (PORCINE) LOCK FLUSH IV SOLN 100 UNIT/ML 100 UNIT/ML
500 SOLUTION INTRAVENOUS PRN
Status: CANCELLED | OUTPATIENT
Start: 2019-11-01

## 2019-10-31 RX ADMIN — DAPTOMYCIN 600 MG: 500 INJECTION, POWDER, LYOPHILIZED, FOR SOLUTION INTRAVENOUS at 12:50

## 2019-10-31 ASSESSMENT — PAIN - FUNCTIONAL ASSESSMENT: PAIN_FUNCTIONAL_ASSESSMENT: 0-10

## 2019-10-31 NOTE — PROGRESS NOTES
Pt tolerates infusion well picc line flushed with 10 cc normal saline after infusion discharged to home in stable condition

## 2019-11-01 ENCOUNTER — HOSPITAL ENCOUNTER (OUTPATIENT)
Dept: NURSING | Age: 69
Setting detail: INFUSION SERIES
Discharge: HOME | End: 2019-11-01
Payer: MEDICARE

## 2019-11-01 VITALS
WEIGHT: 250 LBS | DIASTOLIC BLOOD PRESSURE: 92 MMHG | RESPIRATION RATE: 18 BRPM | HEIGHT: 78 IN | TEMPERATURE: 98.3 F | SYSTOLIC BLOOD PRESSURE: 150 MMHG | BODY MASS INDEX: 28.93 KG/M2

## 2019-11-01 DIAGNOSIS — T84.7XXS INFECTION/INFLAMMATION DUE TO INTERNAL ORTHOPEDIC DEVICE/IMPLANT/GRAFT, SEQUELA: Primary | ICD-10-CM

## 2019-11-01 PROCEDURE — 6360000002 HC RX W HCPCS: Performed by: INTERNAL MEDICINE

## 2019-11-01 PROCEDURE — 2580000003 HC RX 258: Performed by: INTERNAL MEDICINE

## 2019-11-01 PROCEDURE — 99211 OFF/OP EST MAY X REQ PHY/QHP: CPT

## 2019-11-01 PROCEDURE — 96365 THER/PROPH/DIAG IV INF INIT: CPT

## 2019-11-01 RX ORDER — DIPHENHYDRAMINE HYDROCHLORIDE 50 MG/ML
50 INJECTION INTRAMUSCULAR; INTRAVENOUS ONCE
Status: CANCELLED | OUTPATIENT
Start: 2019-11-02

## 2019-11-01 RX ORDER — SODIUM CHLORIDE 0.9 % (FLUSH) 0.9 %
10 SYRINGE (ML) INJECTION PRN
Status: CANCELLED | OUTPATIENT
Start: 2019-11-02

## 2019-11-01 RX ORDER — SODIUM CHLORIDE 9 MG/ML
INJECTION, SOLUTION INTRAVENOUS CONTINUOUS
Status: CANCELLED | OUTPATIENT
Start: 2019-11-02

## 2019-11-01 RX ORDER — METHYLPREDNISOLONE SODIUM SUCCINATE 125 MG/2ML
125 INJECTION, POWDER, LYOPHILIZED, FOR SOLUTION INTRAMUSCULAR; INTRAVENOUS ONCE
Status: CANCELLED | OUTPATIENT
Start: 2019-11-02

## 2019-11-01 RX ORDER — HEPARIN SODIUM (PORCINE) LOCK FLUSH IV SOLN 100 UNIT/ML 100 UNIT/ML
500 SOLUTION INTRAVENOUS PRN
Status: CANCELLED | OUTPATIENT
Start: 2019-11-02

## 2019-11-01 RX ORDER — EPINEPHRINE 1 MG/ML
0.3 INJECTION, SOLUTION, CONCENTRATE INTRAVENOUS PRN
Status: CANCELLED | OUTPATIENT
Start: 2019-11-02

## 2019-11-01 RX ORDER — SODIUM CHLORIDE 0.9 % (FLUSH) 0.9 %
5 SYRINGE (ML) INJECTION PRN
Status: CANCELLED | OUTPATIENT
Start: 2019-11-02

## 2019-11-01 RX ADMIN — DAPTOMYCIN 600 MG: 500 INJECTION, POWDER, LYOPHILIZED, FOR SOLUTION INTRAVENOUS at 11:30

## 2019-11-01 ASSESSMENT — PAIN - FUNCTIONAL ASSESSMENT: PAIN_FUNCTIONAL_ASSESSMENT: 0-10

## 2019-11-02 ENCOUNTER — HOSPITAL ENCOUNTER (OUTPATIENT)
Age: 69
Discharge: HOME | End: 2019-11-02
Attending: INTERNAL MEDICINE | Admitting: INTERNAL MEDICINE
Payer: MEDICARE

## 2019-11-02 VITALS
RESPIRATION RATE: 18 BRPM | SYSTOLIC BLOOD PRESSURE: 157 MMHG | TEMPERATURE: 98.9 F | DIASTOLIC BLOOD PRESSURE: 93 MMHG | HEART RATE: 84 BPM

## 2019-11-02 DIAGNOSIS — T84.7XXS INFECTION/INFLAMMATION DUE TO INTERNAL ORTHOPEDIC DEVICE/IMPLANT/GRAFT, SEQUELA: ICD-10-CM

## 2019-11-02 PROCEDURE — 96365 THER/PROPH/DIAG IV INF INIT: CPT

## 2019-11-02 PROCEDURE — 96374 THER/PROPH/DIAG INJ IV PUSH: CPT

## 2019-11-02 PROCEDURE — 6360000002 HC RX W HCPCS: Performed by: INTERNAL MEDICINE

## 2019-11-02 PROCEDURE — 2580000003 HC RX 258

## 2019-11-02 PROCEDURE — 2580000003 HC RX 258: Performed by: INTERNAL MEDICINE

## 2019-11-02 PROCEDURE — 96368 THER/DIAG CONCURRENT INF: CPT

## 2019-11-02 RX ORDER — SODIUM CHLORIDE 9 MG/ML
INJECTION, SOLUTION INTRAVENOUS
Status: COMPLETED
Start: 2019-11-02 | End: 2019-11-02

## 2019-11-02 RX ADMIN — SODIUM CHLORIDE: 900 INJECTION, SOLUTION INTRAVENOUS at 11:40

## 2019-11-02 RX ADMIN — DAPTOMYCIN 600 MG: 500 INJECTION, POWDER, LYOPHILIZED, FOR SOLUTION INTRAVENOUS at 11:40

## 2019-11-03 ENCOUNTER — HOSPITAL ENCOUNTER (OUTPATIENT)
Age: 69
Discharge: HOME | End: 2019-11-03
Attending: INTERNAL MEDICINE | Admitting: INTERNAL MEDICINE
Payer: MEDICARE

## 2019-11-03 VITALS
TEMPERATURE: 98.4 F | SYSTOLIC BLOOD PRESSURE: 143 MMHG | RESPIRATION RATE: 18 BRPM | DIASTOLIC BLOOD PRESSURE: 89 MMHG | HEART RATE: 63 BPM

## 2019-11-03 DIAGNOSIS — T84.7XXS INFECTION/INFLAMMATION DUE TO INTERNAL ORTHOPEDIC DEVICE/IMPLANT/GRAFT, SEQUELA: ICD-10-CM

## 2019-11-03 PROCEDURE — 96368 THER/DIAG CONCURRENT INF: CPT

## 2019-11-03 PROCEDURE — 6360000002 HC RX W HCPCS: Performed by: INTERNAL MEDICINE

## 2019-11-03 PROCEDURE — 2580000003 HC RX 258: Performed by: INTERNAL MEDICINE

## 2019-11-03 PROCEDURE — 96365 THER/PROPH/DIAG IV INF INIT: CPT

## 2019-11-03 RX ADMIN — DAPTOMYCIN 600 MG: 500 INJECTION, POWDER, LYOPHILIZED, FOR SOLUTION INTRAVENOUS at 11:21

## 2019-11-04 ENCOUNTER — HOSPITAL ENCOUNTER (OUTPATIENT)
Dept: NURSING | Age: 69
Setting detail: INFUSION SERIES
Discharge: HOME | End: 2019-11-04
Payer: MEDICARE

## 2019-11-04 VITALS
TEMPERATURE: 97.2 F | HEART RATE: 64 BPM | BODY MASS INDEX: 28.93 KG/M2 | WEIGHT: 250 LBS | DIASTOLIC BLOOD PRESSURE: 92 MMHG | HEIGHT: 78 IN | SYSTOLIC BLOOD PRESSURE: 141 MMHG | RESPIRATION RATE: 18 BRPM

## 2019-11-04 DIAGNOSIS — T84.7XXS INFECTION/INFLAMMATION DUE TO INTERNAL ORTHOPEDIC DEVICE/IMPLANT/GRAFT, SEQUELA: Primary | ICD-10-CM

## 2019-11-04 LAB
ALBUMIN SERPL-MCNC: 4.1 GM/DL (ref 3.2–4.6)
ALBUMIN SERPL-MCNC: 4.1 GM/DL (ref 3.2–4.6)
ALP BLD-CCNC: 124 IU/L (ref 40–129)
ALP BLD-CCNC: 124 IU/L (ref 40–129)
ALT SERPL-CCNC: 11 IU/L
ALT SERPL-CCNC: 11 IU/L
ANION GAP SERPL CALCULATED.3IONS-SCNC: 13 MMOL/L (ref 7–16)
AST SERPL-CCNC: 22 IU/L
AST SERPL-CCNC: 22 IU/L
BILIRUB SERPL-MCNC: 0.3 MG/DL (ref 0.1–1.4)
BILIRUB SERPL-MCNC: 0.3 MG/DL (ref 0.1–1.4)
BILIRUBIN DIRECT: <0.2 MG/DL (ref 0–0.3)
BUN BLDV-MCNC: 9 MG/DL (ref 8–23)
C-REACTIVE PROTEIN WIDE RANGE: 23.26 MG/L
CALCIUM SERPL-MCNC: 8.8 MG/DL (ref 8.8–10.4)
CHLORIDE BLD-SCNC: 100 MEQ/L (ref 98–107)
CO2: 27 MMOL/L (ref 22–29)
CREAT SERPL-MCNC: 0.8 MG/DL (ref 0.67–1.3)
CREATINE KINASE ISOENZYMES, SER/PLAS: 58 IU/L (ref 39–308)
GFR AFRICAN AMERICAN: 107 ML/MIN/1.73 M2
GFR NON-AFRICAN AMERICAN: 92 ML/MIN/1.73 M2
GLUCOSE BLD-MCNC: 103 MG/DL (ref 82–100)
POTASSIUM SERPL-SCNC: 3.5 MEQ/L (ref 3.5–5)
SEDIMENTATION RATE, ERYTHROCYTE: 27 MM/HR (ref 0–20)
SODIUM BLD-SCNC: 140 MEQ/L (ref 136–145)
TOTAL PROTEIN: 6.7 GM/DL (ref 6.4–8.3)
TOTAL PROTEIN: 6.7 GM/DL (ref 6.4–8.3)

## 2019-11-04 PROCEDURE — 96365 THER/PROPH/DIAG IV INF INIT: CPT

## 2019-11-04 PROCEDURE — 99211 OFF/OP EST MAY X REQ PHY/QHP: CPT

## 2019-11-04 PROCEDURE — 6360000002 HC RX W HCPCS: Performed by: INTERNAL MEDICINE

## 2019-11-04 PROCEDURE — 2580000003 HC RX 258: Performed by: INTERNAL MEDICINE

## 2019-11-04 RX ORDER — DIPHENHYDRAMINE HYDROCHLORIDE 50 MG/ML
50 INJECTION INTRAMUSCULAR; INTRAVENOUS ONCE
Status: CANCELLED | OUTPATIENT
Start: 2019-11-05

## 2019-11-04 RX ORDER — SODIUM CHLORIDE 0.9 % (FLUSH) 0.9 %
10 SYRINGE (ML) INJECTION PRN
Status: CANCELLED | OUTPATIENT
Start: 2019-11-05

## 2019-11-04 RX ORDER — EPINEPHRINE 1 MG/ML
0.3 INJECTION, SOLUTION, CONCENTRATE INTRAVENOUS PRN
Status: CANCELLED | OUTPATIENT
Start: 2019-11-05

## 2019-11-04 RX ORDER — METHYLPREDNISOLONE SODIUM SUCCINATE 125 MG/2ML
125 INJECTION, POWDER, LYOPHILIZED, FOR SOLUTION INTRAMUSCULAR; INTRAVENOUS ONCE
Status: CANCELLED | OUTPATIENT
Start: 2019-11-05

## 2019-11-04 RX ORDER — SODIUM CHLORIDE 9 MG/ML
INJECTION, SOLUTION INTRAVENOUS CONTINUOUS
Status: CANCELLED | OUTPATIENT
Start: 2019-11-05

## 2019-11-04 RX ORDER — HEPARIN SODIUM (PORCINE) LOCK FLUSH IV SOLN 100 UNIT/ML 100 UNIT/ML
500 SOLUTION INTRAVENOUS PRN
Status: CANCELLED | OUTPATIENT
Start: 2019-11-05

## 2019-11-04 RX ORDER — SODIUM CHLORIDE 0.9 % (FLUSH) 0.9 %
5 SYRINGE (ML) INJECTION PRN
Status: CANCELLED | OUTPATIENT
Start: 2019-11-05

## 2019-11-04 RX ADMIN — DAPTOMYCIN 600 MG: 500 INJECTION, POWDER, LYOPHILIZED, FOR SOLUTION INTRAVENOUS at 13:31

## 2019-11-04 ASSESSMENT — PAIN - FUNCTIONAL ASSESSMENT: PAIN_FUNCTIONAL_ASSESSMENT: 0-10

## 2019-11-05 ENCOUNTER — TELEPHONE (OUTPATIENT)
Dept: INFECTIOUS DISEASES | Age: 69
End: 2019-11-05

## 2019-11-05 ENCOUNTER — HOSPITAL ENCOUNTER (OUTPATIENT)
Dept: NURSING | Age: 69
Setting detail: INFUSION SERIES
Discharge: HOME | End: 2019-11-05
Payer: MEDICARE

## 2019-11-05 VITALS
HEART RATE: 62 BPM | WEIGHT: 250 LBS | BODY MASS INDEX: 28.93 KG/M2 | RESPIRATION RATE: 18 BRPM | DIASTOLIC BLOOD PRESSURE: 100 MMHG | HEIGHT: 78 IN | TEMPERATURE: 98.2 F | SYSTOLIC BLOOD PRESSURE: 157 MMHG

## 2019-11-05 DIAGNOSIS — M25.561 RIGHT KNEE PAIN, UNSPECIFIED CHRONICITY: Primary | ICD-10-CM

## 2019-11-05 DIAGNOSIS — T84.7XXS INFECTION/INFLAMMATION DUE TO INTERNAL ORTHOPEDIC DEVICE/IMPLANT/GRAFT, SEQUELA: Primary | ICD-10-CM

## 2019-11-05 LAB
BASOPHILS # BLD: 0.6 %
BASOPHILS ABSOLUTE: 0 X10(3)/MCL (ref 0–0.1)
EOSINOPHIL # BLD: 4.4 %
EOSINOPHILS ABSOLUTE: 0.2 X10(3)/MCL (ref 0–0.5)
HCT VFR BLD CALC: 33.6 % (ref 40–51)
HEMOGLOBIN: 10.2 G/DL (ref 13.7–17.5)
IMMATURE CELLS ABSOLUTE COUNT: 0 X10(3)/MCL (ref 0–0.1)
IMMATURE GRANULOCYTES: 0.4 %
LYMPHOCYTES # BLD: 14.4 %
LYMPHOCYTES ABSOLUTE: 0.7 X10(3)/MCL (ref 1.2–3.9)
MCH RBC QN AUTO: 24.6 PG (ref 26–34)
MCHC RBC AUTO-ENTMCNC: 30.4 G/DL (ref 30.7–35.5)
MCV RBC AUTO: 81 FL (ref 80–100)
MONOCYTES # BLD: 9 %
MONOCYTES ABSOLUTE: 0.4 X10(3)/MCL (ref 0.3–0.9)
NEUTROPHILS ABSOLUTE: 3.4 X10(3)/MCL (ref 1.6–6.1)
NEUTROPHILS: 71.2 %
PDW BLD-RTO: 15.5 %
PLATELET # BLD: 192 X10(3)/MCL (ref 155–369)
PMV BLD AUTO: 10.2 FL (ref 8.8–12.5)
RBC # BLD: 4.15 X10(6)/MCL (ref 4.6–6.1)
WBC # BLD: 4.8 X10(3)/MCL (ref 3.7–10.3)

## 2019-11-05 PROCEDURE — 2580000003 HC RX 258: Performed by: INTERNAL MEDICINE

## 2019-11-05 PROCEDURE — 99211 OFF/OP EST MAY X REQ PHY/QHP: CPT

## 2019-11-05 PROCEDURE — 6360000002 HC RX W HCPCS: Performed by: INTERNAL MEDICINE

## 2019-11-05 PROCEDURE — 96365 THER/PROPH/DIAG IV INF INIT: CPT

## 2019-11-05 RX ORDER — EPINEPHRINE 1 MG/ML
0.3 INJECTION, SOLUTION, CONCENTRATE INTRAVENOUS PRN
Status: CANCELLED | OUTPATIENT
Start: 2019-11-06

## 2019-11-05 RX ORDER — METHYLPREDNISOLONE SODIUM SUCCINATE 125 MG/2ML
125 INJECTION, POWDER, LYOPHILIZED, FOR SOLUTION INTRAMUSCULAR; INTRAVENOUS ONCE
Status: CANCELLED | OUTPATIENT
Start: 2019-11-06

## 2019-11-05 RX ORDER — HEPARIN SODIUM (PORCINE) LOCK FLUSH IV SOLN 100 UNIT/ML 100 UNIT/ML
500 SOLUTION INTRAVENOUS PRN
Status: CANCELLED | OUTPATIENT
Start: 2019-11-06

## 2019-11-05 RX ORDER — SODIUM CHLORIDE 0.9 % (FLUSH) 0.9 %
5 SYRINGE (ML) INJECTION PRN
Status: CANCELLED | OUTPATIENT
Start: 2019-11-06

## 2019-11-05 RX ORDER — SODIUM CHLORIDE 9 MG/ML
INJECTION, SOLUTION INTRAVENOUS CONTINUOUS
Status: CANCELLED | OUTPATIENT
Start: 2019-11-06

## 2019-11-05 RX ORDER — DIPHENHYDRAMINE HYDROCHLORIDE 50 MG/ML
50 INJECTION INTRAMUSCULAR; INTRAVENOUS ONCE
Status: CANCELLED | OUTPATIENT
Start: 2019-11-06

## 2019-11-05 RX ORDER — SODIUM CHLORIDE 0.9 % (FLUSH) 0.9 %
10 SYRINGE (ML) INJECTION PRN
Status: CANCELLED | OUTPATIENT
Start: 2019-11-06

## 2019-11-05 RX ADMIN — DAPTOMYCIN 600 MG: 500 INJECTION, POWDER, LYOPHILIZED, FOR SOLUTION INTRAVENOUS at 13:00

## 2019-11-05 ASSESSMENT — PAIN - FUNCTIONAL ASSESSMENT: PAIN_FUNCTIONAL_ASSESSMENT: 0-10

## 2019-11-05 NOTE — PROGRESS NOTES
Dr Betts's office returns call states sending order to discontinue PICC line.  Attempt to call patient on cell phone no answer message left

## 2019-11-05 NOTE — PROGRESS NOTES
Pt tolerates infusion well . Attempt x 2 today to reach Dr Judi Esposito office for order to discontinue PICC line or continue treatment orders. Office states in clinic will text message no new orders received at this time. Patient states has appointment with Dr Duke Mari needs to leave.  Discharged to home via wheelchair in stable condition

## 2019-11-06 ENCOUNTER — TELEPHONE (OUTPATIENT)
Dept: INFECTIOUS DISEASES | Age: 69
End: 2019-11-06

## 2019-11-06 RX ORDER — FLUCONAZOLE 200 MG/1
400 TABLET ORAL DAILY
Qty: 60 TABLET | Refills: 0 | Status: SHIPPED | OUTPATIENT
Start: 2019-11-06 | End: 2019-12-05 | Stop reason: ALTCHOICE

## 2019-11-20 ENCOUNTER — HOSPITAL ENCOUNTER (OUTPATIENT)
Age: 69
Setting detail: SPECIMEN
Discharge: HOME | End: 2019-11-20
Payer: MEDICARE

## 2019-11-20 LAB
A/G RATIO: 1.6 (ref 1.1–2.2)
ALBUMIN SERPL-MCNC: 4.3 G/DL (ref 3.4–5)
ALP BLD-CCNC: 126 U/L (ref 40–129)
ALT SERPL-CCNC: 16 U/L (ref 10–40)
ANION GAP SERPL CALCULATED.3IONS-SCNC: 14 MMOL/L (ref 3–16)
ANISOCYTOSIS: ABNORMAL
AST SERPL-CCNC: 24 U/L (ref 15–37)
BASOPHILS ABSOLUTE: 0 K/UL (ref 0–0.2)
BASOPHILS RELATIVE PERCENT: 0.6 %
BILIRUB SERPL-MCNC: 0.3 MG/DL (ref 0–1)
BUN BLDV-MCNC: 16 MG/DL (ref 7–20)
C-REACTIVE PROTEIN: 7.7 MG/L (ref 0–5.1)
CALCIUM SERPL-MCNC: 9.2 MG/DL (ref 8.3–10.6)
CHLORIDE BLD-SCNC: 102 MMOL/L (ref 99–110)
CO2: 25 MMOL/L (ref 21–32)
CREAT SERPL-MCNC: 0.7 MG/DL (ref 0.8–1.3)
EOSINOPHILS ABSOLUTE: 0.2 K/UL (ref 0–0.6)
EOSINOPHILS RELATIVE PERCENT: 8.9 %
GFR AFRICAN AMERICAN: >60
GFR NON-AFRICAN AMERICAN: >60
GLOBULIN: 2.7 G/DL
GLUCOSE BLD-MCNC: 80 MG/DL (ref 70–99)
HCT VFR BLD CALC: 33.8 % (ref 40.5–52.5)
HEMATOLOGY PATH CONSULT: YES
HEMOGLOBIN: 10.9 G/DL (ref 13.5–17.5)
HYPOCHROMIA: ABNORMAL
LYMPHOCYTES ABSOLUTE: 0.6 K/UL (ref 1–5.1)
LYMPHOCYTES RELATIVE PERCENT: 28.3 %
MACROCYTES: ABNORMAL
MCH RBC QN AUTO: 24.6 PG (ref 26–34)
MCHC RBC AUTO-ENTMCNC: 32.3 G/DL (ref 31–36)
MCV RBC AUTO: 76.2 FL (ref 80–100)
MICROCYTES: ABNORMAL
MONOCYTES ABSOLUTE: 0.5 K/UL (ref 0–1.3)
MONOCYTES RELATIVE PERCENT: 22.3 %
NEUTROPHILS ABSOLUTE: 0.9 K/UL (ref 1.7–7.7)
NEUTROPHILS RELATIVE PERCENT: 39.9 %
OVALOCYTES: ABNORMAL
PDW BLD-RTO: 17.1 % (ref 12.4–15.4)
PLATELET # BLD: 180 K/UL (ref 135–450)
PMV BLD AUTO: 8.3 FL (ref 5–10.5)
POLYCHROMASIA: ABNORMAL
POTASSIUM SERPL-SCNC: 4.1 MMOL/L (ref 3.5–5.1)
RBC # BLD: 4.44 M/UL (ref 4.2–5.9)
SEDIMENTATION RATE, ERYTHROCYTE: 15 MM/HR (ref 0–20)
SLIDE REVIEW: ABNORMAL
SODIUM BLD-SCNC: 141 MMOL/L (ref 136–145)
TOTAL PROTEIN: 7 G/DL (ref 6.4–8.2)
WBC # BLD: 2.1 K/UL (ref 4–11)

## 2019-11-20 PROCEDURE — 80053 COMPREHEN METABOLIC PANEL: CPT

## 2019-11-20 PROCEDURE — 85652 RBC SED RATE AUTOMATED: CPT

## 2019-11-20 PROCEDURE — 86140 C-REACTIVE PROTEIN: CPT

## 2019-11-20 PROCEDURE — 36415 COLL VENOUS BLD VENIPUNCTURE: CPT

## 2019-11-20 PROCEDURE — 85025 COMPLETE CBC W/AUTO DIFF WBC: CPT

## 2019-11-21 ENCOUNTER — TELEPHONE (OUTPATIENT)
Dept: INFECTIOUS DISEASES | Age: 69
End: 2019-11-21

## 2019-11-21 LAB — HEMATOLOGY PATH CONSULT: NORMAL

## 2019-11-25 ENCOUNTER — TELEPHONE (OUTPATIENT)
Dept: INFECTIOUS DISEASES | Age: 69
End: 2019-11-25

## 2019-11-25 ENCOUNTER — HOSPITAL ENCOUNTER (OUTPATIENT)
Age: 69
Discharge: HOME | End: 2019-11-25
Payer: MEDICARE

## 2019-11-25 LAB
BASOPHILS ABSOLUTE: 0 K/UL (ref 0–0.2)
BASOPHILS RELATIVE PERCENT: 0.7 %
EOSINOPHILS ABSOLUTE: 0.3 K/UL (ref 0–0.6)
EOSINOPHILS RELATIVE PERCENT: 5.7 %
HCT VFR BLD CALC: 35.3 % (ref 40.5–52.5)
HEMOGLOBIN: 11.6 G/DL (ref 13.5–17.5)
LYMPHOCYTES ABSOLUTE: 0.8 K/UL (ref 1–5.1)
LYMPHOCYTES RELATIVE PERCENT: 18.1 %
MCH RBC QN AUTO: 24.4 PG (ref 26–34)
MCHC RBC AUTO-ENTMCNC: 32.8 G/DL (ref 31–36)
MCV RBC AUTO: 74.5 FL (ref 80–100)
MONOCYTES ABSOLUTE: 0.5 K/UL (ref 0–1.3)
MONOCYTES RELATIVE PERCENT: 11.8 %
NEUTROPHILS ABSOLUTE: 3 K/UL (ref 1.7–7.7)
NEUTROPHILS RELATIVE PERCENT: 63.7 %
PDW BLD-RTO: 16.8 % (ref 12.4–15.4)
PLATELET # BLD: 219 K/UL (ref 135–450)
PMV BLD AUTO: 8.4 FL (ref 5–10.5)
RBC # BLD: 4.74 M/UL (ref 4.2–5.9)
WBC # BLD: 4.7 K/UL (ref 4–11)

## 2019-11-25 PROCEDURE — 36415 COLL VENOUS BLD VENIPUNCTURE: CPT

## 2019-11-25 PROCEDURE — 85025 COMPLETE CBC W/AUTO DIFF WBC: CPT

## 2019-11-27 ENCOUNTER — TELEPHONE (OUTPATIENT)
Dept: INFECTIOUS DISEASES | Age: 69
End: 2019-11-27

## 2019-12-05 ENCOUNTER — TELEPHONE (OUTPATIENT)
Dept: INFECTIOUS DISEASES | Age: 69
End: 2019-12-05

## 2019-12-05 DIAGNOSIS — D70.8 OTHER NEUTROPENIA (HCC): Primary | ICD-10-CM

## 2019-12-05 LAB
ALBUMIN SERPL-MCNC: 4.6 GM/DL (ref 3.2–4.6)
ALP BLD-CCNC: 137 IU/L (ref 40–129)
ALT SERPL-CCNC: 21 IU/L
ANION GAP SERPL CALCULATED.3IONS-SCNC: 13 MMOL/L (ref 7–16)
AST SERPL-CCNC: 29 IU/L
BILIRUB SERPL-MCNC: 0.6 MG/DL (ref 0.1–1.4)
BUN BLDV-MCNC: 13 MG/DL (ref 8–23)
C-REACTIVE PROTEIN WIDE RANGE: 18.5 MG/L
CALCIUM SERPL-MCNC: 9.3 MG/DL (ref 8.8–10.4)
CHLORIDE BLD-SCNC: 100 MEQ/L (ref 98–107)
CO2: 26 MMOL/L (ref 22–29)
CREAT SERPL-MCNC: 0.87 MG/DL (ref 0.67–1.3)
GFR AFRICAN AMERICAN: 103 ML/MIN/1.73 M2
GFR NON-AFRICAN AMERICAN: 89 ML/MIN/1.73 M2
GLUCOSE BLD-MCNC: 113 MG/DL (ref 82–100)
POTASSIUM SERPL-SCNC: 3.7 MEQ/L (ref 3.5–5)
SEDIMENTATION RATE, ERYTHROCYTE: 19 MM/HR (ref 0–20)
SODIUM BLD-SCNC: 139 MEQ/L (ref 136–145)
TOTAL PROTEIN: 7.5 GM/DL (ref 6.4–8.3)

## 2019-12-06 ENCOUNTER — TELEPHONE (OUTPATIENT)
Dept: INFECTIOUS DISEASES | Age: 69
End: 2019-12-06

## 2019-12-17 ENCOUNTER — HOSPITAL ENCOUNTER (OUTPATIENT)
Dept: PREADMISSION TESTING | Age: 69
Discharge: HOME | End: 2019-12-21
Payer: MEDICARE

## 2019-12-17 VITALS
BODY MASS INDEX: 29.16 KG/M2 | OXYGEN SATURATION: 97 % | TEMPERATURE: 98 F | HEART RATE: 68 BPM | WEIGHT: 252 LBS | HEIGHT: 78 IN | RESPIRATION RATE: 16 BRPM | SYSTOLIC BLOOD PRESSURE: 145 MMHG | DIASTOLIC BLOOD PRESSURE: 74 MMHG

## 2019-12-17 LAB
ALBUMIN SERPL-MCNC: 4 G/DL (ref 3.4–5)
ANION GAP SERPL CALCULATED.3IONS-SCNC: 12 MMOL/L (ref 3–16)
APTT: 30.3 SEC (ref 24.2–36.2)
BASOPHILS ABSOLUTE: 0.1 K/UL (ref 0–0.2)
BASOPHILS RELATIVE PERCENT: 1.3 %
BILIRUBIN URINE: NEGATIVE
BLOOD, URINE: NEGATIVE
BUN BLDV-MCNC: 11 MG/DL (ref 7–20)
CALCIUM SERPL-MCNC: 9.1 MG/DL (ref 8.3–10.6)
CHLORIDE BLD-SCNC: 102 MMOL/L (ref 99–110)
CLARITY: CLEAR
CO2: 25 MMOL/L (ref 21–32)
COLOR: YELLOW
CREAT SERPL-MCNC: 0.8 MG/DL (ref 0.8–1.3)
EOSINOPHILS ABSOLUTE: 0.4 K/UL (ref 0–0.6)
EOSINOPHILS RELATIVE PERCENT: 6 %
ESTIMATED AVERAGE GLUCOSE: 99.7 MG/DL
GFR AFRICAN AMERICAN: >60
GFR NON-AFRICAN AMERICAN: >60
GLUCOSE BLD-MCNC: 106 MG/DL (ref 70–99)
GLUCOSE URINE: NEGATIVE MG/DL
HBA1C MFR BLD: 5.1 %
HCT VFR BLD CALC: 35.4 % (ref 40.5–52.5)
HEMOGLOBIN: 11.6 G/DL (ref 13.5–17.5)
INR BLD: 0.9 (ref 0.86–1.14)
KETONES, URINE: NEGATIVE MG/DL
LEUKOCYTE ESTERASE, URINE: NEGATIVE
LYMPHOCYTES ABSOLUTE: 0.8 K/UL (ref 1–5.1)
LYMPHOCYTES RELATIVE PERCENT: 12.6 %
MCH RBC QN AUTO: 24.3 PG (ref 26–34)
MCHC RBC AUTO-ENTMCNC: 32.9 G/DL (ref 31–36)
MCV RBC AUTO: 73.8 FL (ref 80–100)
MICROSCOPIC EXAMINATION: NORMAL
MONOCYTES ABSOLUTE: 0.6 K/UL (ref 0–1.3)
MONOCYTES RELATIVE PERCENT: 8.6 %
NEUTROPHILS ABSOLUTE: 4.7 K/UL (ref 1.7–7.7)
NEUTROPHILS RELATIVE PERCENT: 71.5 %
NITRITE, URINE: NEGATIVE
PDW BLD-RTO: 17.8 % (ref 12.4–15.4)
PH UA: 7.5 (ref 5–8)
PLATELET # BLD: 209 K/UL (ref 135–450)
PMV BLD AUTO: 7.7 FL (ref 5–10.5)
POTASSIUM SERPL-SCNC: 4 MMOL/L (ref 3.5–5.1)
PROTEIN UA: NEGATIVE MG/DL
PROTHROMBIN TIME: 10.4 SEC (ref 10–13.2)
RBC # BLD: 4.79 M/UL (ref 4.2–5.9)
SODIUM BLD-SCNC: 139 MMOL/L (ref 136–145)
SPECIFIC GRAVITY UA: 1.01 (ref 1–1.03)
URINE TYPE: NORMAL
UROBILINOGEN, URINE: 0.2 E.U./DL
WBC # BLD: 6.6 K/UL (ref 4–11)

## 2019-12-17 PROCEDURE — 81003 URINALYSIS AUTO W/O SCOPE: CPT

## 2019-12-17 PROCEDURE — 85610 PROTHROMBIN TIME: CPT

## 2019-12-17 PROCEDURE — 93005 ELECTROCARDIOGRAM TRACING: CPT | Performed by: ORTHOPAEDIC SURGERY

## 2019-12-17 PROCEDURE — 80048 BASIC METABOLIC PNL TOTAL CA: CPT

## 2019-12-17 PROCEDURE — 36415 COLL VENOUS BLD VENIPUNCTURE: CPT

## 2019-12-17 PROCEDURE — 83036 HEMOGLOBIN GLYCOSYLATED A1C: CPT

## 2019-12-17 PROCEDURE — 85025 COMPLETE CBC W/AUTO DIFF WBC: CPT

## 2019-12-17 PROCEDURE — 82040 ASSAY OF SERUM ALBUMIN: CPT

## 2019-12-17 PROCEDURE — 85730 THROMBOPLASTIN TIME PARTIAL: CPT

## 2019-12-17 PROCEDURE — 87086 URINE CULTURE/COLONY COUNT: CPT

## 2019-12-17 RX ORDER — HYDRALAZINE HYDROCHLORIDE 50 MG/1
50 TABLET, FILM COATED ORAL 3 TIMES DAILY
COMMUNITY

## 2019-12-17 ASSESSMENT — PAIN SCALES - GENERAL: PAINLEVEL_OUTOF10: 0

## 2019-12-18 LAB
EKG ATRIAL RATE: 66 BPM
EKG DIAGNOSIS: NORMAL
EKG P AXIS: 68 DEGREES
EKG P-R INTERVAL: 172 MS
EKG Q-T INTERVAL: 424 MS
EKG QRS DURATION: 98 MS
EKG QTC CALCULATION (BAZETT): 444 MS
EKG R AXIS: 6 DEGREES
EKG T AXIS: -9 DEGREES
EKG VENTRICULAR RATE: 66 BPM
URINE CULTURE, ROUTINE: NORMAL

## 2019-12-26 ENCOUNTER — TELEPHONE (OUTPATIENT)
Dept: INFECTIOUS DISEASES | Age: 69
End: 2019-12-26

## 2019-12-30 ENCOUNTER — ANESTHESIA (OUTPATIENT)
Dept: OPERATING ROOM | Age: 69
DRG: 487 | End: 2019-12-30
Payer: MEDICARE

## 2019-12-30 ENCOUNTER — ANESTHESIA EVENT (OUTPATIENT)
Dept: OPERATING ROOM | Age: 69
DRG: 487 | End: 2019-12-30
Payer: MEDICARE

## 2019-12-30 ENCOUNTER — HOSPITAL ENCOUNTER (INPATIENT)
Age: 69
LOS: 1 days | Discharge: HOME | DRG: 487 | End: 2019-12-31
Attending: ORTHOPAEDIC SURGERY | Admitting: ORTHOPAEDIC SURGERY
Payer: MEDICARE

## 2019-12-30 VITALS
SYSTOLIC BLOOD PRESSURE: 94 MMHG | OXYGEN SATURATION: 100 % | DIASTOLIC BLOOD PRESSURE: 52 MMHG | RESPIRATION RATE: 8 BRPM

## 2019-12-30 PROCEDURE — 0SPC08Z REMOVAL OF SPACER FROM RIGHT KNEE JOINT, OPEN APPROACH: ICD-10-PCS | Performed by: ORTHOPAEDIC SURGERY

## 2019-12-30 PROCEDURE — 6360000002 HC RX W HCPCS: Performed by: NURSE ANESTHETIST, CERTIFIED REGISTERED

## 2019-12-30 PROCEDURE — 7100000001 HC PACU RECOVERY - ADDTL 15 MIN: Performed by: ORTHOPAEDIC SURGERY

## 2019-12-30 PROCEDURE — 0SBC0ZZ EXCISION OF RIGHT KNEE JOINT, OPEN APPROACH: ICD-10-PCS | Performed by: ORTHOPAEDIC SURGERY

## 2019-12-30 PROCEDURE — 87106 FUNGI IDENTIFICATION YEAST: CPT

## 2019-12-30 PROCEDURE — 6370000000 HC RX 637 (ALT 250 FOR IP): Performed by: ORTHOPAEDIC SURGERY

## 2019-12-30 PROCEDURE — 87070 CULTURE OTHR SPECIMN AEROBIC: CPT

## 2019-12-30 PROCEDURE — 87206 SMEAR FLUORESCENT/ACID STAI: CPT

## 2019-12-30 PROCEDURE — 2709999900 HC NON-CHARGEABLE SUPPLY: Performed by: ORTHOPAEDIC SURGERY

## 2019-12-30 PROCEDURE — 2580000003 HC RX 258: Performed by: ANESTHESIOLOGY

## 2019-12-30 PROCEDURE — L1830 KO IMMOB CANVAS LONG PRE OTS: HCPCS | Performed by: ORTHOPAEDIC SURGERY

## 2019-12-30 PROCEDURE — 2580000003 HC RX 258: Performed by: ORTHOPAEDIC SURGERY

## 2019-12-30 PROCEDURE — 1200000000 HC SEMI PRIVATE

## 2019-12-30 PROCEDURE — 3600000014 HC SURGERY LEVEL 4 ADDTL 15MIN: Performed by: ORTHOPAEDIC SURGERY

## 2019-12-30 PROCEDURE — 3600000015 HC SURGERY LEVEL 5 ADDTL 15MIN: Performed by: ORTHOPAEDIC SURGERY

## 2019-12-30 PROCEDURE — 7100000000 HC PACU RECOVERY - FIRST 15 MIN: Performed by: ORTHOPAEDIC SURGERY

## 2019-12-30 PROCEDURE — 87205 SMEAR GRAM STAIN: CPT

## 2019-12-30 PROCEDURE — 3600000004 HC SURGERY LEVEL 4 BASE: Performed by: ORTHOPAEDIC SURGERY

## 2019-12-30 PROCEDURE — C1713 ANCHOR/SCREW BN/BN,TIS/BN: HCPCS | Performed by: ORTHOPAEDIC SURGERY

## 2019-12-30 PROCEDURE — 2500000003 HC RX 250 WO HCPCS: Performed by: ORTHOPAEDIC SURGERY

## 2019-12-30 PROCEDURE — 0SHC08Z INSERTION OF SPACER INTO RIGHT KNEE JOINT, OPEN APPROACH: ICD-10-PCS | Performed by: ORTHOPAEDIC SURGERY

## 2019-12-30 PROCEDURE — 87102 FUNGUS ISOLATION CULTURE: CPT

## 2019-12-30 PROCEDURE — 87077 CULTURE AEROBIC IDENTIFY: CPT

## 2019-12-30 PROCEDURE — 6360000002 HC RX W HCPCS: Performed by: ORTHOPAEDIC SURGERY

## 2019-12-30 PROCEDURE — 6360000002 HC RX W HCPCS: Performed by: ANESTHESIOLOGY

## 2019-12-30 PROCEDURE — 87015 SPECIMEN INFECT AGNT CONCNTJ: CPT

## 2019-12-30 PROCEDURE — 3700000000 HC ANESTHESIA ATTENDED CARE: Performed by: ORTHOPAEDIC SURGERY

## 2019-12-30 PROCEDURE — 3600000005 HC SURGERY LEVEL 5 BASE: Performed by: ORTHOPAEDIC SURGERY

## 2019-12-30 PROCEDURE — 3700000001 HC ADD 15 MINUTES (ANESTHESIA): Performed by: ORTHOPAEDIC SURGERY

## 2019-12-30 PROCEDURE — 2500000003 HC RX 250 WO HCPCS: Performed by: NURSE ANESTHETIST, CERTIFIED REGISTERED

## 2019-12-30 PROCEDURE — 87075 CULTR BACTERIA EXCEPT BLOOD: CPT

## 2019-12-30 PROCEDURE — 87116 MYCOBACTERIA CULTURE: CPT

## 2019-12-30 DEVICE — CEMENT BNE 40 GM RADIOPAQUE BA SIMPLEX P: Type: IMPLANTABLE DEVICE | Site: KNEE | Status: FUNCTIONAL

## 2019-12-30 RX ORDER — MIDAZOLAM HYDROCHLORIDE 1 MG/ML
INJECTION INTRAMUSCULAR; INTRAVENOUS PRN
Status: DISCONTINUED | OUTPATIENT
Start: 2019-12-30 | End: 2019-12-30 | Stop reason: SDUPTHER

## 2019-12-30 RX ORDER — OXYCODONE HYDROCHLORIDE AND ACETAMINOPHEN 5; 325 MG/1; MG/1
2 TABLET ORAL PRN
Status: DISCONTINUED | OUTPATIENT
Start: 2019-12-30 | End: 2019-12-30 | Stop reason: HOSPADM

## 2019-12-30 RX ORDER — MORPHINE SULFATE 2 MG/ML
2 INJECTION, SOLUTION INTRAMUSCULAR; INTRAVENOUS
Status: DISCONTINUED | OUTPATIENT
Start: 2019-12-30 | End: 2019-12-31 | Stop reason: HOSPADM

## 2019-12-30 RX ORDER — HYDROCHLOROTHIAZIDE 25 MG/1
25 TABLET ORAL DAILY
Status: DISCONTINUED | OUTPATIENT
Start: 2019-12-31 | End: 2019-12-31 | Stop reason: HOSPADM

## 2019-12-30 RX ORDER — OXYCODONE HYDROCHLORIDE AND ACETAMINOPHEN 5; 325 MG/1; MG/1
1 TABLET ORAL PRN
Status: DISCONTINUED | OUTPATIENT
Start: 2019-12-30 | End: 2019-12-30 | Stop reason: HOSPADM

## 2019-12-30 RX ORDER — PROPOFOL 10 MG/ML
INJECTION, EMULSION INTRAVENOUS PRN
Status: DISCONTINUED | OUTPATIENT
Start: 2019-12-30 | End: 2019-12-30 | Stop reason: SDUPTHER

## 2019-12-30 RX ORDER — LIDOCAINE HYDROCHLORIDE 10 MG/ML
0.3 INJECTION, SOLUTION EPIDURAL; INFILTRATION; INTRACAUDAL; PERINEURAL
Status: DISCONTINUED | OUTPATIENT
Start: 2019-12-30 | End: 2019-12-30 | Stop reason: HOSPADM

## 2019-12-30 RX ORDER — MORPHINE SULFATE 4 MG/ML
4 INJECTION, SOLUTION INTRAMUSCULAR; INTRAVENOUS
Status: DISCONTINUED | OUTPATIENT
Start: 2019-12-30 | End: 2019-12-31 | Stop reason: HOSPADM

## 2019-12-30 RX ORDER — VANCOMYCIN HYDROCHLORIDE 1 G/20ML
INJECTION, POWDER, LYOPHILIZED, FOR SOLUTION INTRAVENOUS PRN
Status: DISCONTINUED | OUTPATIENT
Start: 2019-12-30 | End: 2019-12-30 | Stop reason: HOSPADM

## 2019-12-30 RX ORDER — DEXAMETHASONE SODIUM PHOSPHATE 4 MG/ML
INJECTION, SOLUTION INTRA-ARTICULAR; INTRALESIONAL; INTRAMUSCULAR; INTRAVENOUS; SOFT TISSUE PRN
Status: DISCONTINUED | OUTPATIENT
Start: 2019-12-30 | End: 2019-12-30 | Stop reason: SDUPTHER

## 2019-12-30 RX ORDER — SODIUM CHLORIDE 0.9 % (FLUSH) 0.9 %
10 SYRINGE (ML) INJECTION PRN
Status: DISCONTINUED | OUTPATIENT
Start: 2019-12-30 | End: 2019-12-31 | Stop reason: SDUPTHER

## 2019-12-30 RX ORDER — DOCUSATE SODIUM 100 MG/1
100 CAPSULE, LIQUID FILLED ORAL 2 TIMES DAILY
Status: DISCONTINUED | OUTPATIENT
Start: 2019-12-30 | End: 2019-12-31 | Stop reason: HOSPADM

## 2019-12-30 RX ORDER — SODIUM CHLORIDE 0.9 % (FLUSH) 0.9 %
10 SYRINGE (ML) INJECTION EVERY 12 HOURS SCHEDULED
Status: DISCONTINUED | OUTPATIENT
Start: 2019-12-30 | End: 2019-12-31 | Stop reason: SDUPTHER

## 2019-12-30 RX ORDER — FENTANYL CITRATE 50 UG/ML
INJECTION, SOLUTION INTRAMUSCULAR; INTRAVENOUS PRN
Status: DISCONTINUED | OUTPATIENT
Start: 2019-12-30 | End: 2019-12-30 | Stop reason: SDUPTHER

## 2019-12-30 RX ORDER — GLYCOPYRROLATE 0.2 MG/ML
INJECTION INTRAMUSCULAR; INTRAVENOUS PRN
Status: DISCONTINUED | OUTPATIENT
Start: 2019-12-30 | End: 2019-12-30 | Stop reason: SDUPTHER

## 2019-12-30 RX ORDER — HYDROMORPHONE HCL 110MG/55ML
PATIENT CONTROLLED ANALGESIA SYRINGE INTRAVENOUS PRN
Status: DISCONTINUED | OUTPATIENT
Start: 2019-12-30 | End: 2019-12-30 | Stop reason: SDUPTHER

## 2019-12-30 RX ORDER — ONDANSETRON 2 MG/ML
INJECTION INTRAMUSCULAR; INTRAVENOUS PRN
Status: DISCONTINUED | OUTPATIENT
Start: 2019-12-30 | End: 2019-12-30 | Stop reason: SDUPTHER

## 2019-12-30 RX ORDER — PROMETHAZINE HYDROCHLORIDE 25 MG/ML
6.25 INJECTION, SOLUTION INTRAMUSCULAR; INTRAVENOUS
Status: DISCONTINUED | OUTPATIENT
Start: 2019-12-30 | End: 2019-12-30 | Stop reason: HOSPADM

## 2019-12-30 RX ORDER — ONDANSETRON 2 MG/ML
4 INJECTION INTRAMUSCULAR; INTRAVENOUS PRN
Status: DISCONTINUED | OUTPATIENT
Start: 2019-12-30 | End: 2019-12-30 | Stop reason: HOSPADM

## 2019-12-30 RX ORDER — KETAMINE HYDROCHLORIDE 100 MG/ML
INJECTION, SOLUTION INTRAMUSCULAR; INTRAVENOUS PRN
Status: DISCONTINUED | OUTPATIENT
Start: 2019-12-30 | End: 2019-12-30 | Stop reason: SDUPTHER

## 2019-12-30 RX ORDER — OXYCODONE HYDROCHLORIDE 5 MG/1
5 TABLET ORAL EVERY 4 HOURS PRN
Status: DISCONTINUED | OUTPATIENT
Start: 2019-12-30 | End: 2019-12-31 | Stop reason: HOSPADM

## 2019-12-30 RX ORDER — LABETALOL HYDROCHLORIDE 5 MG/ML
5 INJECTION, SOLUTION INTRAVENOUS EVERY 10 MIN PRN
Status: DISCONTINUED | OUTPATIENT
Start: 2019-12-30 | End: 2019-12-30 | Stop reason: HOSPADM

## 2019-12-30 RX ORDER — MORPHINE SULFATE 2 MG/ML
1 INJECTION, SOLUTION INTRAMUSCULAR; INTRAVENOUS EVERY 5 MIN PRN
Status: DISCONTINUED | OUTPATIENT
Start: 2019-12-30 | End: 2019-12-30 | Stop reason: HOSPADM

## 2019-12-30 RX ORDER — TRANEXAMIC ACID 100 MG/ML
INJECTION, SOLUTION INTRAVENOUS PRN
Status: DISCONTINUED | OUTPATIENT
Start: 2019-12-30 | End: 2019-12-30 | Stop reason: SDUPTHER

## 2019-12-30 RX ORDER — MIDAZOLAM HYDROCHLORIDE 1 MG/ML
INJECTION INTRAMUSCULAR; INTRAVENOUS
Status: COMPLETED
Start: 2019-12-30 | End: 2019-12-30

## 2019-12-30 RX ORDER — VALSARTAN AND HYDROCHLOROTHIAZIDE 320; 25 MG/1; MG/1
1 TABLET, FILM COATED ORAL DAILY
Status: DISCONTINUED | OUTPATIENT
Start: 2019-12-30 | End: 2019-12-30 | Stop reason: CLARIF

## 2019-12-30 RX ORDER — HYDRALAZINE HYDROCHLORIDE 20 MG/ML
5 INJECTION INTRAMUSCULAR; INTRAVENOUS EVERY 10 MIN PRN
Status: DISCONTINUED | OUTPATIENT
Start: 2019-12-30 | End: 2019-12-30 | Stop reason: HOSPADM

## 2019-12-30 RX ORDER — LEVOTHYROXINE SODIUM 0.1 MG/1
200 TABLET ORAL DAILY
Status: DISCONTINUED | OUTPATIENT
Start: 2019-12-31 | End: 2019-12-31 | Stop reason: HOSPADM

## 2019-12-30 RX ORDER — LIDOCAINE HYDROCHLORIDE 20 MG/ML
INJECTION, SOLUTION INFILTRATION; PERINEURAL PRN
Status: DISCONTINUED | OUTPATIENT
Start: 2019-12-30 | End: 2019-12-30 | Stop reason: SDUPTHER

## 2019-12-30 RX ORDER — HYDRALAZINE HYDROCHLORIDE 50 MG/1
50 TABLET, FILM COATED ORAL 3 TIMES DAILY
Status: DISCONTINUED | OUTPATIENT
Start: 2019-12-30 | End: 2019-12-31 | Stop reason: HOSPADM

## 2019-12-30 RX ORDER — ONDANSETRON 2 MG/ML
4 INJECTION INTRAMUSCULAR; INTRAVENOUS EVERY 6 HOURS PRN
Status: DISCONTINUED | OUTPATIENT
Start: 2019-12-30 | End: 2019-12-31 | Stop reason: HOSPADM

## 2019-12-30 RX ORDER — SODIUM CHLORIDE 0.9 % (FLUSH) 0.9 %
10 SYRINGE (ML) INJECTION EVERY 12 HOURS SCHEDULED
Status: DISCONTINUED | OUTPATIENT
Start: 2019-12-30 | End: 2019-12-30 | Stop reason: HOSPADM

## 2019-12-30 RX ORDER — MIDAZOLAM HYDROCHLORIDE 1 MG/ML
2 INJECTION INTRAMUSCULAR; INTRAVENOUS ONCE
Status: COMPLETED | OUTPATIENT
Start: 2019-12-30 | End: 2019-12-30

## 2019-12-30 RX ORDER — CEFAZOLIN SODIUM 1 G/3ML
INJECTION, POWDER, FOR SOLUTION INTRAMUSCULAR; INTRAVENOUS PRN
Status: DISCONTINUED | OUTPATIENT
Start: 2019-12-30 | End: 2019-12-30 | Stop reason: SDUPTHER

## 2019-12-30 RX ORDER — VALSARTAN 80 MG/1
320 TABLET ORAL DAILY
Status: DISCONTINUED | OUTPATIENT
Start: 2019-12-31 | End: 2019-12-31 | Stop reason: HOSPADM

## 2019-12-30 RX ORDER — ALLOPURINOL 300 MG/1
300 TABLET ORAL DAILY
Status: DISCONTINUED | OUTPATIENT
Start: 2019-12-31 | End: 2019-12-31 | Stop reason: HOSPADM

## 2019-12-30 RX ORDER — DEXTROSE, SODIUM CHLORIDE, AND POTASSIUM CHLORIDE 5; .45; .15 G/100ML; G/100ML; G/100ML
1000 INJECTION INTRAVENOUS CONTINUOUS
Status: DISCONTINUED | OUTPATIENT
Start: 2019-12-30 | End: 2019-12-31 | Stop reason: HOSPADM

## 2019-12-30 RX ORDER — OXYCODONE HYDROCHLORIDE 5 MG/1
10 TABLET ORAL EVERY 4 HOURS PRN
Status: DISCONTINUED | OUTPATIENT
Start: 2019-12-30 | End: 2019-12-31 | Stop reason: HOSPADM

## 2019-12-30 RX ORDER — DIPHENHYDRAMINE HYDROCHLORIDE 50 MG/ML
12.5 INJECTION INTRAMUSCULAR; INTRAVENOUS
Status: DISCONTINUED | OUTPATIENT
Start: 2019-12-30 | End: 2019-12-30 | Stop reason: HOSPADM

## 2019-12-30 RX ORDER — SODIUM CHLORIDE, SODIUM LACTATE, POTASSIUM CHLORIDE, CALCIUM CHLORIDE 600; 310; 30; 20 MG/100ML; MG/100ML; MG/100ML; MG/100ML
INJECTION, SOLUTION INTRAVENOUS CONTINUOUS
Status: DISCONTINUED | OUTPATIENT
Start: 2019-12-30 | End: 2019-12-30

## 2019-12-30 RX ORDER — MORPHINE SULFATE 2 MG/ML
2 INJECTION, SOLUTION INTRAMUSCULAR; INTRAVENOUS EVERY 5 MIN PRN
Status: DISCONTINUED | OUTPATIENT
Start: 2019-12-30 | End: 2019-12-30 | Stop reason: HOSPADM

## 2019-12-30 RX ORDER — SODIUM CHLORIDE 0.9 % (FLUSH) 0.9 %
10 SYRINGE (ML) INJECTION PRN
Status: DISCONTINUED | OUTPATIENT
Start: 2019-12-30 | End: 2019-12-30 | Stop reason: HOSPADM

## 2019-12-30 RX ADMIN — HYDROMORPHONE HYDROCHLORIDE 0.5 MG: 1 INJECTION, SOLUTION INTRAMUSCULAR; INTRAVENOUS; SUBCUTANEOUS at 19:14

## 2019-12-30 RX ADMIN — HYDROMORPHONE HYDROCHLORIDE 0.4 MG: 2 INJECTION INTRAMUSCULAR; INTRAVENOUS; SUBCUTANEOUS at 18:11

## 2019-12-30 RX ADMIN — TRANEXAMIC ACID 1500 MG: 100 INJECTION, SOLUTION INTRAVENOUS at 17:53

## 2019-12-30 RX ADMIN — HYDROMORPHONE HYDROCHLORIDE 0.2 MG: 2 INJECTION INTRAMUSCULAR; INTRAVENOUS; SUBCUTANEOUS at 18:31

## 2019-12-30 RX ADMIN — OXYCODONE 5 MG: 5 TABLET ORAL at 20:45

## 2019-12-30 RX ADMIN — GLYCOPYRROLATE 0.2 MG: 0.2 INJECTION, SOLUTION INTRAMUSCULAR; INTRAVENOUS at 17:59

## 2019-12-30 RX ADMIN — CEFAZOLIN 2000 MG: 1 INJECTION, POWDER, FOR SOLUTION INTRAMUSCULAR; INTRAVENOUS at 17:52

## 2019-12-30 RX ADMIN — MIDAZOLAM HYDROCHLORIDE 2 MG: 2 INJECTION, SOLUTION INTRAMUSCULAR; INTRAVENOUS at 17:37

## 2019-12-30 RX ADMIN — FENTANYL CITRATE 50 MCG: 50 INJECTION INTRAMUSCULAR; INTRAVENOUS at 18:00

## 2019-12-30 RX ADMIN — DEXAMETHASONE SODIUM PHOSPHATE 4 MG: 4 INJECTION, SOLUTION INTRAMUSCULAR; INTRAVENOUS at 17:52

## 2019-12-30 RX ADMIN — FENTANYL CITRATE 50 MCG: 50 INJECTION INTRAMUSCULAR; INTRAVENOUS at 17:37

## 2019-12-30 RX ADMIN — MIDAZOLAM 2 MG: 1 INJECTION INTRAMUSCULAR; INTRAVENOUS at 16:19

## 2019-12-30 RX ADMIN — ONDANSETRON 4 MG: 2 INJECTION INTRAMUSCULAR; INTRAVENOUS at 17:37

## 2019-12-30 RX ADMIN — SODIUM CHLORIDE, POTASSIUM CHLORIDE, SODIUM LACTATE AND CALCIUM CHLORIDE: 600; 310; 30; 20 INJECTION, SOLUTION INTRAVENOUS at 17:37

## 2019-12-30 RX ADMIN — PROPOFOL 100 MG: 10 INJECTION, EMULSION INTRAVENOUS at 17:49

## 2019-12-30 RX ADMIN — HYDROMORPHONE HYDROCHLORIDE 0.2 MG: 2 INJECTION INTRAMUSCULAR; INTRAVENOUS; SUBCUTANEOUS at 18:37

## 2019-12-30 RX ADMIN — KETAMINE HYDROCHLORIDE 50 MG: 100 INJECTION, SOLUTION INTRAMUSCULAR; INTRAVENOUS at 18:01

## 2019-12-30 RX ADMIN — PROPOFOL 300 MG: 10 INJECTION, EMULSION INTRAVENOUS at 17:47

## 2019-12-30 RX ADMIN — POTASSIUM CHLORIDE, DEXTROSE MONOHYDRATE AND SODIUM CHLORIDE 1000 ML: 150; 5; 450 INJECTION, SOLUTION INTRAVENOUS at 20:42

## 2019-12-30 RX ADMIN — LIDOCAINE HYDROCHLORIDE 100 MG: 20 INJECTION, SOLUTION INFILTRATION; PERINEURAL at 17:47

## 2019-12-30 RX ADMIN — Medication 10 ML: at 20:42

## 2019-12-30 ASSESSMENT — PULMONARY FUNCTION TESTS
PIF_VALUE: 6
PIF_VALUE: 10
PIF_VALUE: 54
PIF_VALUE: 2
PIF_VALUE: 3
PIF_VALUE: 19
PIF_VALUE: 6
PIF_VALUE: 2
PIF_VALUE: 6
PIF_VALUE: 8
PIF_VALUE: 7
PIF_VALUE: 6
PIF_VALUE: 2
PIF_VALUE: 3
PIF_VALUE: 8
PIF_VALUE: 2
PIF_VALUE: 8
PIF_VALUE: 8
PIF_VALUE: 3
PIF_VALUE: 0
PIF_VALUE: 3
PIF_VALUE: 6
PIF_VALUE: 2
PIF_VALUE: 10
PIF_VALUE: 10
PIF_VALUE: 2
PIF_VALUE: 6
PIF_VALUE: 8
PIF_VALUE: 2
PIF_VALUE: 3
PIF_VALUE: 0
PIF_VALUE: 0
PIF_VALUE: 10
PIF_VALUE: 3
PIF_VALUE: 9
PIF_VALUE: 6
PIF_VALUE: 14
PIF_VALUE: 10
PIF_VALUE: 1
PIF_VALUE: 10
PIF_VALUE: 42
PIF_VALUE: 10
PIF_VALUE: 6
PIF_VALUE: 6
PIF_VALUE: 1
PIF_VALUE: 3
PIF_VALUE: 6
PIF_VALUE: 3
PIF_VALUE: 10
PIF_VALUE: 1
PIF_VALUE: 5
PIF_VALUE: 10
PIF_VALUE: 2
PIF_VALUE: 3
PIF_VALUE: 6
PIF_VALUE: 8
PIF_VALUE: 8
PIF_VALUE: 0
PIF_VALUE: 2
PIF_VALUE: 15
PIF_VALUE: 1
PIF_VALUE: 3
PIF_VALUE: 6
PIF_VALUE: 6
PIF_VALUE: 21
PIF_VALUE: 0
PIF_VALUE: 3
PIF_VALUE: 1
PIF_VALUE: 3
PIF_VALUE: 3
PIF_VALUE: 11
PIF_VALUE: 2
PIF_VALUE: 3
PIF_VALUE: 1
PIF_VALUE: 4
PIF_VALUE: 9

## 2019-12-30 ASSESSMENT — PAIN SCALES - GENERAL
PAINLEVEL_OUTOF10: 5
PAINLEVEL_OUTOF10: 8
PAINLEVEL_OUTOF10: 0

## 2019-12-30 NOTE — ANESTHESIA PRE PROCEDURE
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 7XXS    Failure of outpatient treatment Z78.9    Thyroid disease E07.9    Overweight E66.3    Infected prosthetic knee joint, initial encounter ([facility] T84.59XA, Z96.659       Past Medical History:        Diagnosis Date    Arthritis     Cancer ([facility]     SKIN    Chronic infection of left knee ([facility]     LTKR    Hypertension     Perforated diverticulitis     Retention of urine     Thyroid disease     HYPOTHROID    Vitamin B12 deficiency     hx of       Past Surgical History:        Procedure Laterality Date    ABDOMINAL ADHESION SURGERY  02/05/15    debridement of colcotaneous fistula    ABDOMINAL EXPLORATION SURGERY  02/05/15    APPENDECTOMY      COLONOSCOPY      INCISION AND DRAINAGE Right 7/8/2019    RIGHT KNEE KNEE INCISION AND DRAINAGE WITH performed by Mounika Morales MD at [address]  Right 02/05/15    INGUINAL HERNIA REPAIR Left 04/22/15    left inguinal hernia repair with mesh    JOINT REPLACEMENT      KNEE SURGERY  11/29/2012    right total knee replacement    REVISION TOTAL KNEE ARTHROPLASTY Right 7/8/2019    REMOVAL OF TOTAL KNEE AND PLACEMENT OF CEMENT SPACER                  BIOMET performed by Mounika Morales MD at [facility] Right 8/5/2019    INCISION AND DEBRIDEMENT RIGHT KNEE WITH CEMENT SPACER EXCHANGE  CPT CODE - 45515 performed by Mounika Morales MD at [facility] Alcohol/week: 4.0 standard drinks     Types: 4 Cans of beer per week     Comment: 4 beers/day       LABS:    CBC  Lab Results   Component Value Date/Time    WBC 6.6 12/17/2019 09:19 AM    HGB 11.6 (L) 12/17/2019 09:19 AM    HCT 35.4 (L) 12/17/2019 09:19 AM     12/17/2019 09:19 AM     RENAL  Lab Results   Component Value Date/Time     12/17/2019 09:19 AM    K 4.0 12/17/2019 09:19 AM    K 4.5 10/08/2019 06:26 AM     12/17/2019 09:19 AM    CO2 25 12/17/2019 09:19 AM    BUN 11 12/17/2019 09:19 AM    CREATININE 0.8 12/17/2019 09:19 AM    GLUCOSE 106 (H) 12/17/2019 09:19 AM     COAGS  Lab Results   Component Value Date/Time    PROTIME 10.4 12/17/2019 09:19 AM    INR 0.90 12/17/2019 09:19 AM    APTT 30.3 12/17/2019 09:19 AM         Rosemarie Desai MD   12/30/2019

## 2019-12-31 ENCOUNTER — APPOINTMENT (OUTPATIENT)
Dept: INTERVENTIONAL RADIOLOGY/VASCULAR | Age: 69
DRG: 487 | End: 2019-12-31
Attending: ORTHOPAEDIC SURGERY
Payer: MEDICARE

## 2019-12-31 VITALS
OXYGEN SATURATION: 97 % | HEIGHT: 78 IN | TEMPERATURE: 98.5 F | WEIGHT: 250 LBS | SYSTOLIC BLOOD PRESSURE: 133 MMHG | HEART RATE: 70 BPM | RESPIRATION RATE: 16 BRPM | DIASTOLIC BLOOD PRESSURE: 62 MMHG | BODY MASS INDEX: 28.93 KG/M2

## 2019-12-31 LAB
ANION GAP SERPL CALCULATED.3IONS-SCNC: 10 MMOL/L (ref 3–16)
BUN BLDV-MCNC: 12 MG/DL (ref 7–20)
CALCIUM SERPL-MCNC: 8.6 MG/DL (ref 8.3–10.6)
CHLORIDE BLD-SCNC: 102 MMOL/L (ref 99–110)
CO2: 26 MMOL/L (ref 21–32)
CREAT SERPL-MCNC: 0.8 MG/DL (ref 0.8–1.3)
GFR AFRICAN AMERICAN: >60
GFR NON-AFRICAN AMERICAN: >60
GLUCOSE BLD-MCNC: 142 MG/DL (ref 70–99)
HCT VFR BLD CALC: 36.5 % (ref 40.5–52.5)
HEMOGLOBIN: 12 G/DL (ref 13.5–17.5)
MCH RBC QN AUTO: 25.4 PG (ref 26–34)
MCHC RBC AUTO-ENTMCNC: 32.9 G/DL (ref 31–36)
MCV RBC AUTO: 77.3 FL (ref 80–100)
PDW BLD-RTO: 20.3 % (ref 12.4–15.4)
PLATELET # BLD: 208 K/UL (ref 135–450)
PMV BLD AUTO: 8.2 FL (ref 5–10.5)
POTASSIUM REFLEX MAGNESIUM: 4.4 MMOL/L (ref 3.5–5.1)
RBC # BLD: 4.72 M/UL (ref 4.2–5.9)
SODIUM BLD-SCNC: 138 MMOL/L (ref 136–145)
WBC # BLD: 7.7 K/UL (ref 4–11)

## 2019-12-31 PROCEDURE — 2500000003 HC RX 250 WO HCPCS: Performed by: PHYSICIAN ASSISTANT

## 2019-12-31 PROCEDURE — 85027 COMPLETE CBC AUTOMATED: CPT

## 2019-12-31 PROCEDURE — 02HV33Z INSERTION OF INFUSION DEVICE INTO SUPERIOR VENA CAVA, PERCUTANEOUS APPROACH: ICD-10-PCS | Performed by: ORTHOPAEDIC SURGERY

## 2019-12-31 PROCEDURE — 36573 INSJ PICC RS&I 5 YR+: CPT

## 2019-12-31 PROCEDURE — 80048 BASIC METABOLIC PNL TOTAL CA: CPT

## 2019-12-31 PROCEDURE — 36415 COLL VENOUS BLD VENIPUNCTURE: CPT

## 2019-12-31 PROCEDURE — 6370000000 HC RX 637 (ALT 250 FOR IP): Performed by: INTERNAL MEDICINE

## 2019-12-31 PROCEDURE — 2580000003 HC RX 258: Performed by: INTERNAL MEDICINE

## 2019-12-31 PROCEDURE — 6370000000 HC RX 637 (ALT 250 FOR IP): Performed by: ORTHOPAEDIC SURGERY

## 2019-12-31 PROCEDURE — 97535 SELF CARE MNGMENT TRAINING: CPT

## 2019-12-31 PROCEDURE — 6360000002 HC RX W HCPCS: Performed by: INTERNAL MEDICINE

## 2019-12-31 PROCEDURE — C1751 CATH, INF, PER/CENT/MIDLINE: HCPCS

## 2019-12-31 PROCEDURE — 97162 PT EVAL MOD COMPLEX 30 MIN: CPT

## 2019-12-31 PROCEDURE — 97116 GAIT TRAINING THERAPY: CPT

## 2019-12-31 PROCEDURE — 97165 OT EVAL LOW COMPLEX 30 MIN: CPT

## 2019-12-31 PROCEDURE — 6360000002 HC RX W HCPCS: Performed by: ORTHOPAEDIC SURGERY

## 2019-12-31 PROCEDURE — 2580000003 HC RX 258: Performed by: PHYSICIAN ASSISTANT

## 2019-12-31 RX ORDER — OXYCODONE HYDROCHLORIDE AND ACETAMINOPHEN 5; 325 MG/1; MG/1
1-2 TABLET ORAL EVERY 4 HOURS PRN
Qty: 40 TABLET | Refills: 0 | Status: SHIPPED | OUTPATIENT
Start: 2019-12-31 | End: 2020-01-03

## 2019-12-31 RX ORDER — LIDOCAINE HYDROCHLORIDE 10 MG/ML
5 INJECTION, SOLUTION INFILTRATION; PERINEURAL ONCE
Status: COMPLETED | OUTPATIENT
Start: 2019-12-31 | End: 2019-12-31

## 2019-12-31 RX ORDER — FLUCONAZOLE 100 MG/1
400 TABLET ORAL DAILY
Status: DISCONTINUED | OUTPATIENT
Start: 2019-12-31 | End: 2019-12-31 | Stop reason: HOSPADM

## 2019-12-31 RX ORDER — SODIUM CHLORIDE 0.9 % (FLUSH) 0.9 %
10 SYRINGE (ML) INJECTION EVERY 12 HOURS SCHEDULED
Status: DISCONTINUED | OUTPATIENT
Start: 2019-12-31 | End: 2019-12-31 | Stop reason: HOSPADM

## 2019-12-31 RX ORDER — FLUCONAZOLE 100 MG/1
400 TABLET ORAL DAILY
Qty: 56 TABLET | Refills: 0 | Status: SHIPPED | OUTPATIENT
Start: 2019-12-31 | End: 2020-01-09

## 2019-12-31 RX ORDER — SODIUM CHLORIDE 0.9 % (FLUSH) 0.9 %
10 SYRINGE (ML) INJECTION PRN
Status: DISCONTINUED | OUTPATIENT
Start: 2019-12-31 | End: 2019-12-31 | Stop reason: HOSPADM

## 2019-12-31 RX ADMIN — Medication 2 G: at 10:03

## 2019-12-31 RX ADMIN — ENOXAPARIN SODIUM 40 MG: 40 INJECTION SUBCUTANEOUS at 08:14

## 2019-12-31 RX ADMIN — DOCUSATE SODIUM 100 MG: 100 CAPSULE, LIQUID FILLED ORAL at 08:09

## 2019-12-31 RX ADMIN — HYDRALAZINE HYDROCHLORIDE 50 MG: 50 TABLET, FILM COATED ORAL at 08:09

## 2019-12-31 RX ADMIN — ALLOPURINOL 300 MG: 300 TABLET ORAL at 08:09

## 2019-12-31 RX ADMIN — Medication 2 G: at 02:11

## 2019-12-31 RX ADMIN — LIDOCAINE HYDROCHLORIDE 2 ML: 10 INJECTION, SOLUTION EPIDURAL; INFILTRATION; INTRACAUDAL; PERINEURAL at 17:26

## 2019-12-31 RX ADMIN — DAPTOMYCIN 600 MG: 500 INJECTION, POWDER, LYOPHILIZED, FOR SOLUTION INTRAVENOUS at 15:11

## 2019-12-31 RX ADMIN — Medication 10 ML: at 15:19

## 2019-12-31 RX ADMIN — FLUCONAZOLE 400 MG: 100 TABLET ORAL at 14:29

## 2019-12-31 RX ADMIN — LEVOTHYROXINE SODIUM 200 MCG: 100 TABLET ORAL at 06:42

## 2019-12-31 ASSESSMENT — PAIN SCALES - GENERAL: PAINLEVEL_OUTOF10: 0

## 2019-12-31 NOTE — PROGRESS NOTES
arthroplasty (Right, 10/7/2019); Inguinal hernia repair (Right, 02/05/15); Inguinal hernia repair (Left, 04/22/15); and Revision total knee arthroplasty (Right, 12/30/2019). Restrictions  Restrictions/Precautions  Restrictions/Precautions: Weight Bearing, General Precautions, Fall Risk  Required Braces or Orthoses?: Yes  Lower Extremity Weight Bearing Restrictions  Right Lower Extremity Weight Bearing: Non Weight Bearing  Required Braces or Orthoses  Right Lower Extremity Brace: Knee Immobilizer  Position Activity Restriction  Other position/activity restrictions: IV, progressive ambulation    Subjective   General  Chart Reviewed: Yes  Patient assessed for rehabilitation services?: Yes  Family / Caregiver Present: No  Referring Practitioner: Andrea Sweeney MD  Diagnosis: RIGHT KNEE INCISION AND DRAINAGE WITH SPACER EXCHANGE    Subjective  Subjective: Patient pleasant and agreeable to OT. Patient verbalizing the desire to be discharged today.    Patient Currently in Pain: Denies  Vital Signs  Patient Currently in Pain: Denies  Social/Functional History  Social/Functional History  Lives With: Alone  Type of Home: House  Home Layout: Two level, 1/2 bath on main level(Pt stays on first level)  Home Access: Ramped entrance  Bathroom Shower/Tub: (Pt has been taking sponge baths for past 6 months)  Bathroom Toilet: Handicap height  Bathroom Equipment: Grab bars around toilet  Home Equipment: [employer] M[employer], Standard walker, Grab bars  ADL Assistance: Independent  Ambulation Assistance: Independent(MI short distances within home with SW)  Transfer Assistance: Independent(SPT with SW)  Active : No  Additional Comments: Cleaning lady 2x/wk       Objective           Observation/Palpation  Posture: Fair  Edema: R foot edema  Balance  Sitting Balance: Independent  Standing Balance: Modified independent   Standing Balance  Activity: functional mobility from bed to bathroom with SW to complete oral care standing at sink  Functional Mobility  Functional - Mobility Device: Standard Walker  Activity: To/from bathroom  Assist Level: Modified independent   Functional Mobility Comments: Patient primarily uses his WC throughout the house; but SW inside the bathroom   ADL  Grooming: Modified independent (oral care standing at sink)  LE Dressing: Modified independent (supine initially in bed; standing at EOB to manage over hips)  Tone RUE  RUE Tone: Normotonic  Tone LUE  LUE Tone: Normotonic  Coordination  Movements Are Fluid And Coordinated: Yes     Bed mobility  Supine to Sit: Modified independent  Sit to Supine: Modified independent  Transfers  Sit to stand: Independent  Stand to sit:  Independent     Cognition  Overall Cognitive Status: WFL        Sensation  Overall Sensation Status: WFL        LUE AROM (degrees)  LUE AROM : WFL  RUE AROM (degrees)  RUE AROM : WFL  LUE Strength  Gross LUE Strength: WFL  RUE Strength  Gross RUE Strength: WFL                   Plan   Plan  Times per week: eval and DC     AM-PAC Score        AM-PAC Inpatient Daily Activity Raw Score: 24 (12/31/19 1042)  AM-PAC Inpatient ADL T-Scale Score : 57.54 (12/31/19 1042)  ADL Inpatient CMS 0-100% Score: 0 (12/31/19 1042)  ADL Inpatient CMS G-Code Modifier : [facility] (12/31/19 1042)    Goals  Short term goals  Time Frame for Short term goals: by 12/31/19  Short term goal 1: Complete LB dressing with modified independence- GOAL MET        Therapy Time   Individual Concurrent Group Co-treatment   Time In 0827         Time Out 0854         Minutes 27         Timed Code Treatment Minutes: 17 Minutes(10 minute evaluation)       CEZAR Ba/CORRINA

## 2019-12-31 NOTE — CONSULTS
Pt with complicated R knee infection, POD#1 space exchange  Received information from Dr Yusra Bliss    Prior cultures   10/7 C parapsilosis from broth  8/5 E faecalis, S epidermidis  7/8 E faecalis    Pt has discharge order. I am unable to see him prior to discharge.      Recommend --  Empiric daptomycin iv + fluconazole po  Will f/u on cultures   See DOMI    INFUSION ORDERS:  Daptomycin 600 mg iv daily through 2/12/20 (in addition to po fluconazole 400 mg po daily for same duration)  - First dose given in hospital  - PICC   - Disposition / date discharge - home / 12/31/19  - Check CBC w diff, CMP, ESR, CRP, CK every Mon or Tue - FAX result to [phone]  - Call with antibiotic / infusion issues, [phone]  - No f/u in outpatient ID office necessary

## 2019-12-31 NOTE — DISCHARGE INSTR - COC
Continuity of Care Form    Patient Name: Yobani Brasher   :  1950  MRN:  [id number]    Admit date:  2019  Discharge date:  ***    Code Status Order: Full Code   Advance Directives:   Advance Care Flowsheet Documentation     Date/Time Healthcare Directive Type of Healthcare Directive Copy in [address] Agent's Name Healthcare Agent's Phone Number    19  Yes, patient has an advance directive for healthcare treatment  Living will;Durable power of  for health care  No, copy requested from family  Healthcare power of   --  --    19 1510  Yes, patient has an advance directive for healthcare treatment  --  No, copy requested from family  --  --  --          Admitting Physician:  Andrea Sweeney MD  PCP: Vinh Soler    Discharging Nurse: [facility] Unit/Room#: 0522/0522-01  Discharging Unit Phone Number: ***    Emergency Contact:   Extended Emergency Contact Information  Primary Emergency Contact: Bib Winn [address] Phone: [phone]  Relation: Other  Secondary Emergency Contact: Mio Bhat  [place] Phone: [phone]  Relation: Other    Past Surgical History:  Past Surgical History:   Procedure Laterality Date    ABDOMINAL ADHESION SURGERY  02/05/15    debridement of colcotaneous fistula    ABDOMINAL EXPLORATION SURGERY  02/05/15    APPENDECTOMY      COLONOSCOPY      INCISION AND DRAINAGE Right 2019    RIGHT KNEE KNEE INCISION AND DRAINAGE WITH performed by Andrea Sweeney MD at [address] Right 02/05/15    INGUINAL HERNIA REPAIR Left 04/22/15    left inguinal hernia repair with mesh    JOINT REPLACEMENT      KNEE SURGERY  2012    right total knee replacement    REVISION TOTAL KNEE ARTHROPLASTY Right 2019    REMOVAL OF TOTAL KNEE AND PLACEMENT OF CEMENT SPACER                  BIOMET performed by Andrea Sweeney MD at [address] [place] [address] ARTHROPLASTY Right 8/5/2019    INCISION AND DEBRIDEMENT RIGHT KNEE WITH CEMENT SPACER EXCHANGE  CPT CODE - 72579 performed by Jacinto Juarez MD at [address] [facility] Right 10/7/2019    RIGHT KNEE INCISION AND DEBRIDEMENT WITH  PLACEMENT OF CEMENT AND SPACER FROZEN SECTION       MALACHI performed by Jacinto Juarez MD at [address] [facility] Right 12/30/2019    RIGHT KNEE INCISION AND DRAINAGE WITH SPACER EXCHANGE      MALACHI performed by Jacinto Juarez MD at [address]  [id number]       Immunization History:   Immunization History   Administered Date(s) Administered    Influenza, Quadv, IM, PF (6 mo and older Fluzone, Flulaval, Fluarix, and 3 yrs and older Afluria) 10/08/2019    Pneumococcal Conjugate 13-valent (Denise Parish) 07/09/2019       Active Problems:  Patient Active Problem List   Diagnosis Code    Right TKR Z96.659    Sepsis ([facility] A41.9    Asymptomatic cholelithiasis K80.20    Inguinal hernia unilateral, non-recurrent K40.90    Dyspepsia R10.13    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 7XXS    Failure of outpatient treatment Z78.9    Thyroid disease E07.9    Overweight E66.3    Infected prosthetic knee joint, initial encounter ([facility] T84.59XA, Z96.659       Isolation/Infection:   Isolation          No Isolation        Patient Infection Status     None to display          Nurse Assessment:  Last Vital Signs: /76   Pulse 61   Temp 97.5 °F (36.4 °C)   Resp 16   Ht 6' 6\" (1.981 m)   Wt 250 lb (113.4 kg)   SpO2 95%   BMI 28.89 kg/m²     Last documented pain score (0-10 scale): Pain Level: 0  Last Weight:   Wt Readings from Last 1 Encounters:   12/30/19 250 lb (113.4 kg)     Mental Status:  oriented and alert    IV Access:  - PICC - site  R Upper Arm, insertion date: 12/31/19    Nursing Mobility/ADLs:  Walking   Assisted  Transfer  Assisted  Bathing  Assisted  Dressing  Assisted  Toileting  Assisted  Feeding  Independent  Med Admin  Independent  Med Delivery   whole    Wound Care Documentation and Therapy:        Elimination:  Continence:   · Bowel: yes  · Bladder: Yes  Urinary Catheter: None   Colostomy/Ileostomy/Ileal Conduit: No       Date of Last BM: ***    Intake/Output Summary (Last 24 hours) at 12/31/2019 1128  Last data filed at 12/31/2019 0837  Gross per 24 hour   Intake 1360 ml   Output 900 ml   Net 460 ml     I/O last 3 completed shifts: In: 1000 [I.V.:1000]  Out: 250 [Urine:200; Blood:50]    Safety Concerns: At Risk for Falls    Impairments/Disabilities:      None    Nutrition Therapy:  Current Nutrition Therapy:   - Oral Diet:  General    Routes of Feeding: Oral  Liquids: No Restrictions  Daily Fluid Restriction: no  Last Modified Barium Swallow with Video (Video Swallowing Test): not done    Treatments at the Time of Hospital Discharge:   Respiratory Treatments: ***  Oxygen Therapy:  is not on home oxygen therapy.   Ventilator:    - No ventilator support    Rehab Therapies: NURSE, PT, OT  Weight Bearing Status/Restrictions: Non-weight bearing on right leg  Other Medical Equipment (for information only, NOT a DME order):  wheelchair  Other Treatments: ***    Patient's personal belongings (please select all that are sent with patient):  None    RN SIGNATURE:  Electronically signed by Annabel Schuster RN on 12/31/19 at 5:50 PM    CASE MANAGEMENT/SOCIAL WORK SECTION    Inpatient Status Date: ***    Readmission Risk Assessment Score:  Readmission Risk              Risk of Unplanned Readmission:        8           Discharging to Facility/ Agency   Name:  [address] [place] [address]    Address: [address] [place] [address], [address] [place] [address]  Phone: [phone]  Fax: [phone]    ·     [address]:  Name:  [facility]  Phone: [phone]  Fax:        [phone]  ·     / signature: Electronically signed by Tyra Ordaz RN on 12/31/19 at 2:42 PM    PHYSICIAN SECTION    Prognosis: Good    Condition at Discharge: Stable    Rehab Potential (if transferring to Rehab): Good    Recommended Labs or Other Treatments After Discharge:   INFUSION ORDERS:  Daptomycin 600 mg iv daily through 2/12/20 (in addition to po fluconazole 400 mg po daily for same duration)  - First dose given in hospital  - PICC   - Disposition / date discharge - home / 12/31/19  - Check CBC w diff, CMP, ESR, CRP, CK every Mon or Tue - FAX result to [phone]  - Call with antibiotic / infusion issues, [phone]  - No f/u in outpatient ID office necessary    Physician Certification: I certify the above information and transfer of Daniella Munoz  is necessary for the continuing treatment of the diagnosis listed and that he requires [address] for less 30 days.      Update Admission H&P: No change in H&P    PHYSICIAN SIGNATURE:  Electronically signed by RASHI Webber on 12/31/19 at 11:29 AM/ Dr. Trupti Najera

## 2019-12-31 NOTE — CONSULTS
Hospital Medicine  Consult History & Physical        Chief Complaint:  Right knee infection    Date of Service: Pt seen/examined in consultation on 12/31/19    History Of Present Illness:      81 y.o. male who we are asked to see/evaluate by Andrea Sweeney MD for medical management of right knee infection. 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 Patient is seen following a planned spacer exchange. Patient was previously on daptomycin but infection has recurred. Patient has well controlled HTN, hypothyroidism and gout.      Past Medical History:        Diagnosis Date    Arthritis     Cancer ([address]     SKIN    Chronic infection of left knee ([address]     LTKR    Hypertension     Perforated diverticulitis     Retention of urine     Thyroid disease     HYPOTHROID    Vitamin B12 deficiency     hx of       Past Surgical History:        Procedure Laterality Date    ABDOMINAL ADHESION SURGERY  02/05/15    debridement of colcotaneous fistula    ABDOMINAL EXPLORATION SURGERY  02/05/15    APPENDECTOMY      COLONOSCOPY      INCISION AND DRAINAGE Right 7/8/2019    RIGHT KNEE KNEE INCISION AND DRAINAGE WITH performed by Andrea Sweeney MD at [address] Right 02/05/15    INGUINAL HERNIA REPAIR Left 04/22/15    left inguinal hernia repair with mesh    JOINT REPLACEMENT      KNEE SURGERY  11/29/2012    right total knee replacement    REVISION TOTAL KNEE ARTHROPLASTY Right 7/8/2019    REMOVAL OF TOTAL KNEE AND PLACEMENT OF CEMENT SPACER                  BIOMET performed by Andrea Sweeney MD at [address] [facility] Right 8/5/2019    INCISION AND DEBRIDEMENT RIGHT KNEE WITH CEMENT SPACER EXCHANGE  CPT CODE - 62666 performed by Andrea Sweeney MD at [address] [facility] Right 10/7/2019    RIGHT KNEE INCISION AND DEBRIDEMENT WITH  PLACEMENT OF CEMENT AND SPACER FROZEN SECTION       MALACHI performed by Tish Hughes LENNY Carias MD at [address] Right 12/30/2019    RIGHT KNEE INCISION AND DRAINAGE WITH SPACER EXCHANGE      MALACHI performed by Andrea Sweeney MD at [address] [facility] [address]  [id number]       Medications Prior to Admission:    Prior to Admission medications    Medication Sig Start Date End Date Taking? Authorizing Provider   enoxaparin (LOVENOX) 40 MG/0.4ML injection Inject 0.4 mLs into the skin daily for 10 days 1/1/20 1/11/20 Yes RASHI Jones   oxyCODONE-acetaminophen (PERCOCET) 5-325 MG per tablet Take 1-2 tablets by mouth every 4 hours as needed for Pain (every 4-6 hours as needed) for up to 3 days. 12/31/19 1/3/20 Yes RASHI Jones   fluconazole (DIFLUCAN) 100 MG tablet Take 4 tablets by mouth daily for 14 days 12/31/19 1/14/20 Yes RASHI Jones   hydrALAZINE (APRESOLINE) 50 MG tablet Take 50 mg by mouth 3 times daily 12/17/19 - pt states only taking 1x daily   Yes Historical Provider, MD   meloxicam (MOBIC) 15 MG tablet Take 15 mg by mouth daily   Yes Historical Provider, MD   Multiple Vitamins-Minerals (MULTIVITAMIN PO) Take 1 tablet by mouth   Yes Historical Provider, MD   Levothyroxine Sodium (SYNTHROID PO) Take 200 mcg by mouth daily   Yes Historical Provider, MD   valsartan-hydrochlorothiazide (DIOVAN-HCT) 320-25 MG per tablet Take 1 tablet by mouth daily   Yes Historical Provider, MD   allopurinol (ZYLOPRIM) 300 MG tablet Take 300 mg by mouth daily. Yes Historical Provider, MD       Allergies:  Ace inhibitors; Amlodipine besylate; Avelox [moxifloxacin]; Clonidine derivatives; and Flagyl [metronidazole]    Social History:      The patient currently lives at home    TOBACCO:   reports that he has never smoked. He has never used smokeless tobacco.  ETOH:   reports current alcohol use of about 4.0 standard drinks of alcohol per week. Family History:     Reviewed in detail and negative for DM, CAD, Cancer, CVA.  Positive as follows:        Problem Relation Age of Onset    High Blood Pressure Mother     Other Father         MYASTHENIA GRAVIS       REVIEW OF SYSTEMS:   Pertinent positives as noted in the HPI. All other systems reviewed and negative. PHYSICAL EXAM PERFORMED:  /65   Pulse 69   Temp 97.5 °F (36.4 °C)   Resp 16   Ht 6' 6\" (1.981 m)   Wt 250 lb (113.4 kg)   SpO2 95%   BMI 28.89 kg/m²   General appearance: No apparent distress, appears stated age and cooperative. HEENT: Normal cephalic, atraumatic without obvious deformity. Pupils equal, round, and reactive to light. Extra ocular muscles intact. Conjunctivae/corneas clear. Neck: Supple, with full range of motion. No jugular venous distention. Trachea midline. Respiratory:  Normal respiratory effort. Clear to auscultation, bilaterally without Rales/Wheezes/Rhonchi. Cardiovascular: Regular rate and rhythm with normal S1/S2 without murmurs, rubs or gallops. Abdomen: Soft, non-tender, non-distended with normal bowel sounds. Musculoskeletal: No clubbing, cyanosis or edema bilaterally. Right knee bandaged  Skin: Skin color, texture, turgor normal.  No rashes or lesions. Neurologic:  Neurovascularly intact without any focal sensory/motor deficits. Cranial nerves: II-XII intact, grossly non-focal.  Psychiatric: Alert and oriented, thought content appropriate, normal insight  Capillary Refill: Brisk,< 3 seconds   Peripheral Pulses: +2 palpable, equal bilaterally     Labs:     Recent Labs     12/31/19  0623   WBC 7.7   HGB 12.0*   HCT 36.5*        Recent Labs     12/31/19  0623      K 4.4      CO2 26   BUN 12   CREATININE 0.8   CALCIUM 8.6     No results for input(s): AST, ALT, BILIDIR, BILITOT, ALKPHOS in the last 72 hours. No results for input(s): INR in the last 72 hours. No results for input(s): Erven Sell in the last 72 hours.     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 Post-op management per Ortho  - pain control ordered  - ID consulted  - PICC in place  - surgical cultures pending  - To continue dapto, fluconazole on discharge    HTN  - well controlled  - continue home regimen    Hypothyroidism  - continue synthroid    Gout  - no active disease  - continue allopurinol    DVT Prophylaxis: lovenox  Diet: DIET GENERAL;  Code Status: Full Code    PT/OT Eval Status: Active and ongoing    Dispo - per primary.  No medical barriers to DC    Thank you for the consultation, will follow up as needed    Link Theodore PALAFOX

## 2019-12-31 NOTE — PLAN OF CARE
Bed in lowest position, wheels locked, 2/4 side rails up, nonskid footwear on. Bed alarm in place, call light within reach. Pt instructed to call out when needing assistance. Pt stated understanding. VSS. Will continue to monitor.      Problem: Falls - Risk of:  Goal: Will remain free from falls  Description  Will remain free from falls  Outcome: Ongoing     Problem: Cardiac:  Goal: Ability to maintain vital signs within normal range will improve  Description  Ability to maintain vital signs within normal range will improve  Outcome: Ongoing

## 2019-12-31 NOTE — PROGRESS NOTES
Brought patient up to room on floor and gave bedside report to [employer]. Performed 4 eye assessment. 4 Eyes Skin Assessment     The patient is being assess for   Post-Op Surgical    I agree that 2 RN's have performed a thorough Head to Toe Skin Assessment on the patient. ALL assessment sites listed below have been assessed. Areas assessed by both nurses:   [x]   Head, Face, and Ears   [x]   Shoulders, Back, and Chest, Abdomen  [x]   Arms, Elbows, and Hands   [x]   Coccyx, Sacrum, and Ischium  [x]   Legs, Feet, and Heels        Reddened bottom but blanchable, R knee incision. Co-signer eSignature: Electronically signed by Elsie Felder RN on 12/30/19 at 8:40 PM    Does the Patient have Skin Breakdown?   Yes L[facility] WOUND CARE was Initiated documentation include the Jessica-wound, Wound Assessment, Measurements, Dressing Treatment, Drainage, and Color\",          Joe Prevention initiated:  Yes   Wound Care Orders initiated:  Yes      [address] nurse consulted for Pressure Injury (Stage 3,4, Unstageable, DTI, NWPT, Complex wounds)and New or Established Ostomies:  NA      Primary Nurse eSignature: Electronically signed by Marco Antonio Hinson RN on 12/30/19 at 8:17 PM

## 2019-12-31 NOTE — PLAN OF CARE
Problem: Falls - Risk of:  Goal: Will remain free from falls  Description  Will remain free from falls  12/31/2019 1012 by Gavin Armas RN  Note:   Bed in lowest position, wheels locked, 2/4 side rails up, nonskid footwear on. Bed/ chair check alarm in place, call light within reach. Pt instructed to call out when needing assistance. Pt stated understanding. Nurse will continue to monitor.      12/30/2019 2212 by Altagracia Husain RN  Outcome: Ongoing     Problem: Cardiac:  Goal: Ability to maintain vital signs within normal range will improve  Description  Ability to maintain vital signs within normal range will improve  12/30/2019 2212 by Altagracia Husain RN  Outcome: Ongoing

## 2019-12-31 NOTE — PROGRESS NOTES
Physical Therapy    Facility/Department: [place] [address] - MED SURG/ORTHO  Initial Assessment/Treatment    NAME: Mary Hardy  : 1950  MRN: [id number]    Date of Service: 2019    Discharge Recommendations:  Home with assist PRN   PT Equipment Recommendations  Equipment Needed: No  Other: Pt has needed equipment    Assessment   Body structures, Functions, Activity limitations: Decreased functional mobility ; Decreased balance;Decreased endurance  Assessment: Pt is a 81 y.o. male admitted to [facility] secondary to R knee infection, pt is s/p R knee I&D with spacer exchange 19. Pt lives at home alone and has been MI at a wheel chair level for over 6 months secondary to R knee infection and spacer placement and has been NWB x 6 months. Pt does reports ambulating short distances within the home with use of SW to get in/out of bathroom and reports no falls at home. Pt is currently functionin slightly below his baseline, completes bed mobility with MI, t/f and gait household distances with SW and SBA without LOB. Pt has RLE KI in place throughout session and maintains RLE NWB without cues througout session. Pt will benefit from continued skilled PT in acute care setting to address above deficits. Recommend pt d/c home with assist PRN. Treatment Diagnosis: Impaired balance and functional mobility  Specific instructions for Next Treatment: Progress mobility as tolerated  Prognosis: Excellent  Decision Making: Medium Complexity  PT Education: Goals;Weight-bearing Education;PT Role;General Safety;Plan of Care;Gait Training;Precautions; Functional Mobility Training;Transfer Training  Patient Education: Pt verbalized understanding  Barriers to Learning: None  REQUIRES PT FOLLOW UP: Yes  Activity Tolerance  Activity Tolerance: Patient Tolerated treatment well       Patient Diagnosis(es): The encounter diagnosis was Internal orthopedic device with infection or inflammatory reaction, sequela.      has a past medical history of Arthritis, Cancer ([facility] [address] Chronic infection of left knee ([facility] [address] Hypertension, Perforated diverticulitis, Retention of urine, Thyroid disease, and Vitamin B12 deficiency. has a past surgical history that includes Appendectomy; knee surgery (11/29/2012); Abdominal exploration surgery (02/05/15); Abdominal adhesion surgery (02/05/15); Small intestine surgery ([id number]); joint replacement; Colonoscopy; skin biopsy; incision and drainage (Right, 7/8/2019); Revision total knee arthroplasty (Right, 7/8/2019); Revision total knee arthroplasty (Right, 8/5/2019); Revision total knee arthroplasty (Right, 10/7/2019); Inguinal hernia repair (Right, 02/05/15); and Inguinal hernia repair (Left, 04/22/15).     Restrictions  Restrictions/Precautions  Restrictions/Precautions: Weight Bearing, General Precautions, Fall Risk  Required Braces or Orthoses?: Yes  Lower Extremity Weight Bearing Restrictions  Right Lower Extremity Weight Bearing: Non Weight Bearing  Required Braces or Orthoses  Right Lower Extremity Brace: Knee Immobilizer  Position Activity Restriction  Other position/activity restrictions: IV, progressive ambulation  Vision/Hearing  Vision: Impaired  Vision Exceptions: Wears glasses for reading  Hearing: Exceptions to [facility]  Hearing Exceptions: Hard of hearing/hearing concerns     Subjective  General  Chart Reviewed: Yes  Patient assessed for rehabilitation services?: Yes  Additional Pertinent Hx: R TKR infection with multiple I&D and spacer exchange  Response To Previous Treatment: Not applicable  Family / Caregiver Present: No  Referring Practitioner: Oracio Menchaca MD  Referral Date : 12/31/19  Diagnosis: R Knee I&D with spacer exchange 12/30/19  Follows Commands: Within Functional Limits  General Comment  Comments: RN cleared pt for session  Subjective  Subjective: Pt resting in bed on approach, agreeable to PT evaluation and tx  Pain Screening  Patient Currently in Pain: Denies  Vital Signs  Patient Currently in Pain: Denies       Orientation  Orientation  Overall Orientation Status: Within Normal Limits  Social/Functional History  Social/Functional History  Lives With: Alone  Type of Home: House  Home Layout: Two level, 1/2 bath on main level(Pt stays on first level)  Home Access: Ramped entrance  Bathroom Shower/Tub: (Pt has been taking sponge baths for past 6 months)  Bathroom Toilet: Handicap height  Bathroom Equipment: Grab bars around toilet  Home Equipment: [employer], Standard walker, Grab bars  ADL Assistance: Independent  Ambulation Assistance: Independent(MI short distances within home with SW)  Transfer Assistance: Independent(SPT with SW)  Active : No  Additional Comments: Cleaning lady 2x/wk      Objective     Observation/Palpation  Posture: Fair  Edema: R foot edema    AROM RLE (degrees)  RLE General AROM: Hip and ankle WFL, DNT knee secondary to spacer exchange, precautions and KI  AROM LLE (degrees)  LLE AROM : WFL     Strength RLE  Comment: Hip and ankle grossly > 3/5 as demonstrated with functional mobility, DNT knee seconary to sx/precautions  Strength LLE  Strength LLE: WFL     Tone RLE  RLE Tone: Normotonic  Tone LLE  LLE Tone: Normotonic  Motor Control  Gross Motor?: WFL     Sensation  Overall Sensation Status: WFL     Bed mobility  Supine to Sit: Modified independent(HOB elevated)  Sit to Supine: Modified independent(HOB elevated)  Transfers  Sit to Stand: Stand by assistance  Stand to sit: Stand by assistance  Comment: EOB to SW SBA, completed x 2 trials, maintains RLE NWB without cues     Ambulation  Ambulation?: Yes  Ambulation 1  Surface: level tile  Device: Standard Walker  Assistance: Stand by assistance  Quality of Gait: Pt ambulates with hop to pattern, decreased step length/height, decreased toe clearance, decreased toe off LLE. Pt midly unsteady, no overt LOB.   Distance: 36' (with standing rest break prison through)  Stairs/Curb  Stairs?: No(N/A)     Balance  Posture: Good  Sitting - Static: Good  Sitting - Dynamic: Good  Standing - Static: Fair;+(SBA with SW)  Standing - Dynamic: Fair;+(SBA with SW)  Comments: Pt standing with UUE to no UE support (at SW) CGA to SBA for balance while performing functional tasks, pt completes with approriate righting reactions to maintain balance and maintains RLE NWB. Pt maintains x 2 minutes. Plan   Plan  Times per week: 7x/wk  Times per day: Daily  Specific instructions for Next Treatment: Progress mobility as tolerated  Current Treatment Recommendations: Strengthening, Gait Training, Patient/Caregiver Education & Training, Balance Training, Neuromuscular Re-education, Functional Mobility Training, Endurance Training, Home Exercise Program, Transfer Training, Wheelchair Mobility Training  Safety Devices  Type of devices: All fall risk precautions in place, Bed alarm in place, Call light within reach, Nurse notified, Gait belt, Patient at risk for falls, Left in bed  Restraints  Initially in place: No    AM-PAC Score     AM-PAC Inpatient Mobility without Stair Climbing Raw Score : 19 (12/31/19 0916)  AM-PAC Inpatient without Stair Climbing T-Scale Score : 56.04 (12/31/19 0916)  Mobility Inpatient CMS 0-100% Score: 12.25 (12/31/19 0916)  Mobility Inpatient without Stair CMS G-Code Modifier : CI (12/31/19 0916)       Goals  Short term goals  Time Frame for Short term goals: 5 days 1/05/20 (unless otherwise specified)  Short term goal 1: Pt will complete bed to chair t/f with MI with SW, maintaining RLE NWB  Short term goal 2: Pt will ambulate 48' with SW with MI without LOB, maintainin RLE NWB  Short term goal 3: 1/03/20: Pt will demonstrate MI with w/c mobility 150' over level surfaces to increase I with functional mobility within the home  Patient Goals   Patient goals :  \"Go home\"       Therapy Time   Individual Concurrent Group Co-treatment   Time In 0828         Time Out 0853         Minutes 25         Timed Code Treatment Minutes: 15 Minutes(10

## 2020-01-01 ENCOUNTER — HOSPITAL ENCOUNTER (OUTPATIENT)
Age: 70
Discharge: HOME | End: 2020-01-01
Attending: INTERNAL MEDICINE | Admitting: INTERNAL MEDICINE
Payer: MEDICARE

## 2020-01-01 PROCEDURE — 6360000002 HC RX W HCPCS: Performed by: INTERNAL MEDICINE

## 2020-01-01 PROCEDURE — 2580000003 HC RX 258: Performed by: INTERNAL MEDICINE

## 2020-01-01 PROCEDURE — 96365 THER/PROPH/DIAG IV INF INIT: CPT

## 2020-01-01 RX ORDER — SODIUM CHLORIDE 9 MG/ML
INJECTION, SOLUTION INTRAVENOUS
Status: DISCONTINUED
Start: 2020-01-01 | End: 2020-01-01 | Stop reason: HOSPADM

## 2020-01-01 RX ADMIN — DAPTOMYCIN 600 MG: 500 INJECTION, POWDER, LYOPHILIZED, FOR SOLUTION INTRAVENOUS at 13:44

## 2020-01-02 ENCOUNTER — HOSPITAL ENCOUNTER (OUTPATIENT)
Dept: NURSING | Age: 70
Setting detail: INFUSION SERIES
Discharge: HOME | End: 2020-01-02
Payer: MEDICARE

## 2020-01-02 VITALS
HEIGHT: 78 IN | DIASTOLIC BLOOD PRESSURE: 72 MMHG | SYSTOLIC BLOOD PRESSURE: 136 MMHG | WEIGHT: 250 LBS | RESPIRATION RATE: 16 BRPM | TEMPERATURE: 97.6 F | BODY MASS INDEX: 28.93 KG/M2 | HEART RATE: 70 BPM

## 2020-01-02 DIAGNOSIS — T84.7XXS INFECTION/INFLAMMATION DUE TO INTERNAL ORTHOPEDIC DEVICE/IMPLANT/GRAFT, SEQUELA: Primary | ICD-10-CM

## 2020-01-02 PROCEDURE — 99211 OFF/OP EST MAY X REQ PHY/QHP: CPT

## 2020-01-02 PROCEDURE — 6360000002 HC RX W HCPCS: Performed by: INTERNAL MEDICINE

## 2020-01-02 PROCEDURE — 96365 THER/PROPH/DIAG IV INF INIT: CPT

## 2020-01-02 PROCEDURE — 2580000003 HC RX 258: Performed by: INTERNAL MEDICINE

## 2020-01-02 RX ADMIN — DAPTOMYCIN 600 MG: 500 INJECTION, POWDER, LYOPHILIZED, FOR SOLUTION INTRAVENOUS at 13:20

## 2020-01-02 ASSESSMENT — PAIN - FUNCTIONAL ASSESSMENT: PAIN_FUNCTIONAL_ASSESSMENT: 0-10

## 2020-01-02 NOTE — ANESTHESIA POSTPROCEDURE EVALUATION
Department of Anesthesiology  Postprocedure Note    Patient: Bindu Wheat  MRN: [id number]  YOB: 1950  Date of evaluation: 1/2/2020  Time:  9:02 AM     Procedure Summary     Date:  12/30/19 Room / Location:  [facility]    Anesthesia Start:  1737 Anesthesia Stop:  1901    Procedure:  RIGHT KNEE INCISION AND DRAINAGE WITH SPACER EXCHANGE      MALACHI (Right Knee) Diagnosis:       Internal orthopedic device with infection or inflammatory reaction, sequela      (RIGHT KNEE INFECTION)    Surgeon:  Jaimee Meyer MD Responsible Provider:  Yomaira Vega MD    Anesthesia Type:  general ASA Status:  2          Anesthesia Type: general    Pratibha Phase I: Pratibha Score: 10    Pratibha Phase II:      Last vitals: Reviewed and per EMR flowsheets. Anesthesia Post Evaluation    Comments: Postoperative Anesthesia Note    Name:    Bindu Wheat  MRN:      [id number]    Empty flowsheet group.        LABS:    CBC  Lab Results       Component                Value               Date/Time                  WBC                      7.7                 12/31/2019 06:23 AM        HGB                      12.0 (L)            12/31/2019 06:23 AM        HCT                      36.5 (L)            12/31/2019 06:23 AM        PLT                      208                 12/31/2019 06:23 AM   RENAL  Lab Results       Component                Value               Date/Time                  NA                       138                 12/31/2019 06:23 AM        K                        4.4                 12/31/2019 06:23 AM        CL                       102                 12/31/2019 06:23 AM        CO2                      26                  12/31/2019 06:23 AM        BUN                      12                  12/31/2019 06:23 AM        CREATININE               0.8                 12/31/2019 06:23 AM        GLUCOSE                  142 (H)             12/31/2019 06:23 AM   COAGS  Lab Results Component                Value               Date/Time                  PROTIME                  10.4                12/17/2019 09:19 AM        INR                      0.90                12/17/2019 09:19 AM        APTT                     30.3                12/17/2019 09:19 AM     Intake & Output:  @65NYLI@    Nausea & Vomiting:  No    Level of Consciousness:  Awake    Pain Assessment:  Adequate analgesia    Anesthesia Complications:  No apparent anesthetic complications    SUMMARY      Vital signs stable  OK to discharge from Stage I post anesthesia care.   Care transferred from Anesthesiology department on discharge from perioperative area

## 2020-01-03 ENCOUNTER — HOSPITAL ENCOUNTER (OUTPATIENT)
Dept: NURSING | Age: 70
Setting detail: INFUSION SERIES
Discharge: HOME | End: 2020-01-03
Payer: MEDICARE

## 2020-01-03 VITALS
RESPIRATION RATE: 16 BRPM | DIASTOLIC BLOOD PRESSURE: 87 MMHG | TEMPERATURE: 97.6 F | WEIGHT: 250 LBS | HEART RATE: 63 BPM | BODY MASS INDEX: 28.93 KG/M2 | SYSTOLIC BLOOD PRESSURE: 137 MMHG | HEIGHT: 78 IN

## 2020-01-03 DIAGNOSIS — T84.7XXS INFECTION/INFLAMMATION DUE TO INTERNAL ORTHOPEDIC DEVICE/IMPLANT/GRAFT, SEQUELA: Primary | ICD-10-CM

## 2020-01-03 PROCEDURE — 96365 THER/PROPH/DIAG IV INF INIT: CPT

## 2020-01-03 PROCEDURE — 6360000002 HC RX W HCPCS: Performed by: INTERNAL MEDICINE

## 2020-01-03 PROCEDURE — 96367 TX/PROPH/DG ADDL SEQ IV INF: CPT

## 2020-01-03 PROCEDURE — 2580000003 HC RX 258: Performed by: INTERNAL MEDICINE

## 2020-01-03 PROCEDURE — 99211 OFF/OP EST MAY X REQ PHY/QHP: CPT

## 2020-01-03 RX ADMIN — DAPTOMYCIN 600 MG: 500 INJECTION, POWDER, LYOPHILIZED, FOR SOLUTION INTRAVENOUS at 11:54

## 2020-01-03 ASSESSMENT — PAIN SCALES - GENERAL: PAINLEVEL_OUTOF10: 0

## 2020-01-03 ASSESSMENT — PAIN DESCRIPTION - PAIN TYPE: TYPE: ACUTE PAIN

## 2020-01-03 ASSESSMENT — PAIN DESCRIPTION - LOCATION: LOCATION: KNEE

## 2020-01-03 ASSESSMENT — PAIN DESCRIPTION - ORIENTATION: ORIENTATION: RIGHT;LEFT

## 2020-01-04 ENCOUNTER — HOSPITAL ENCOUNTER (OUTPATIENT)
Dept: NURSING | Age: 70
Setting detail: INFUSION SERIES
Discharge: HOME | End: 2020-01-04
Payer: MEDICARE

## 2020-01-04 VITALS
TEMPERATURE: 97.3 F | DIASTOLIC BLOOD PRESSURE: 78 MMHG | RESPIRATION RATE: 16 BRPM | HEART RATE: 73 BPM | SYSTOLIC BLOOD PRESSURE: 125 MMHG

## 2020-01-04 DIAGNOSIS — T84.7XXS INFECTION/INFLAMMATION DUE TO INTERNAL ORTHOPEDIC DEVICE/IMPLANT/GRAFT, SEQUELA: Primary | ICD-10-CM

## 2020-01-04 PROCEDURE — 96365 THER/PROPH/DIAG IV INF INIT: CPT

## 2020-01-04 PROCEDURE — 6360000002 HC RX W HCPCS: Performed by: INTERNAL MEDICINE

## 2020-01-04 PROCEDURE — 2580000003 HC RX 258: Performed by: INTERNAL MEDICINE

## 2020-01-04 PROCEDURE — 99211 OFF/OP EST MAY X REQ PHY/QHP: CPT

## 2020-01-04 RX ADMIN — DAPTOMYCIN 600 MG: 500 INJECTION, POWDER, LYOPHILIZED, FOR SOLUTION INTRAVENOUS at 09:45

## 2020-01-05 ENCOUNTER — HOSPITAL ENCOUNTER (OUTPATIENT)
Dept: NURSING | Age: 70
Setting detail: INFUSION SERIES
Discharge: HOME | End: 2020-01-05
Payer: MEDICARE

## 2020-01-05 VITALS
DIASTOLIC BLOOD PRESSURE: 82 MMHG | SYSTOLIC BLOOD PRESSURE: 129 MMHG | HEART RATE: 68 BPM | TEMPERATURE: 97.8 F | RESPIRATION RATE: 18 BRPM

## 2020-01-05 DIAGNOSIS — T84.7XXS INFECTION/INFLAMMATION DUE TO INTERNAL ORTHOPEDIC DEVICE/IMPLANT/GRAFT, SEQUELA: Primary | ICD-10-CM

## 2020-01-05 PROCEDURE — 6360000002 HC RX W HCPCS: Performed by: INTERNAL MEDICINE

## 2020-01-05 PROCEDURE — 2580000003 HC RX 258: Performed by: INTERNAL MEDICINE

## 2020-01-05 PROCEDURE — 99211 OFF/OP EST MAY X REQ PHY/QHP: CPT

## 2020-01-05 PROCEDURE — 96365 THER/PROPH/DIAG IV INF INIT: CPT

## 2020-01-05 RX ADMIN — DAPTOMYCIN 600 MG: 500 INJECTION, POWDER, LYOPHILIZED, FOR SOLUTION INTRAVENOUS at 09:48

## 2020-01-06 ENCOUNTER — HOSPITAL ENCOUNTER (OUTPATIENT)
Dept: NURSING | Age: 70
Setting detail: INFUSION SERIES
Discharge: HOME | End: 2020-01-06
Payer: MEDICARE

## 2020-01-06 VITALS
HEART RATE: 66 BPM | SYSTOLIC BLOOD PRESSURE: 133 MMHG | BODY MASS INDEX: 28.93 KG/M2 | WEIGHT: 250 LBS | HEIGHT: 78 IN | DIASTOLIC BLOOD PRESSURE: 83 MMHG | RESPIRATION RATE: 18 BRPM | TEMPERATURE: 97.5 F

## 2020-01-06 DIAGNOSIS — T84.7XXS INFECTION/INFLAMMATION DUE TO INTERNAL ORTHOPEDIC DEVICE/IMPLANT/GRAFT, SEQUELA: Primary | ICD-10-CM

## 2020-01-06 LAB
A/G RATIO: 1.2 (ref 1.1–2.2)
ALBUMIN SERPL-MCNC: 3.9 G/DL (ref 3.4–5)
ALP BLD-CCNC: 108 U/L (ref 40–129)
ALT SERPL-CCNC: 14 U/L (ref 10–40)
ANION GAP SERPL CALCULATED.3IONS-SCNC: 13 MMOL/L (ref 3–16)
AST SERPL-CCNC: 21 U/L (ref 15–37)
BASOPHILS ABSOLUTE: 0.1 K/UL (ref 0–0.2)
BASOPHILS RELATIVE PERCENT: 0.9 %
BILIRUB SERPL-MCNC: 0.3 MG/DL (ref 0–1)
BUN BLDV-MCNC: 13 MG/DL (ref 7–20)
C-REACTIVE PROTEIN: 36.7 MG/L (ref 0–5.1)
CALCIUM SERPL-MCNC: 9 MG/DL (ref 8.3–10.6)
CHLORIDE BLD-SCNC: 102 MMOL/L (ref 99–110)
CO2: 25 MMOL/L (ref 21–32)
CREAT SERPL-MCNC: 0.6 MG/DL (ref 0.8–1.3)
EOSINOPHILS ABSOLUTE: 0.3 K/UL (ref 0–0.6)
EOSINOPHILS RELATIVE PERCENT: 4.3 %
GFR AFRICAN AMERICAN: >60
GFR NON-AFRICAN AMERICAN: >60
GLOBULIN: 3.2 G/DL
GLUCOSE BLD-MCNC: 105 MG/DL (ref 70–99)
HCT VFR BLD CALC: 36.8 % (ref 40.5–52.5)
HEMOGLOBIN: 12.1 G/DL (ref 13.5–17.5)
LYMPHOCYTES ABSOLUTE: 1 K/UL (ref 1–5.1)
LYMPHOCYTES RELATIVE PERCENT: 15 %
MCH RBC QN AUTO: 25.5 PG (ref 26–34)
MCHC RBC AUTO-ENTMCNC: 32.8 G/DL (ref 31–36)
MCV RBC AUTO: 77.7 FL (ref 80–100)
MONOCYTES ABSOLUTE: 0.5 K/UL (ref 0–1.3)
MONOCYTES RELATIVE PERCENT: 7.8 %
NEUTROPHILS ABSOLUTE: 4.7 K/UL (ref 1.7–7.7)
NEUTROPHILS RELATIVE PERCENT: 72 %
PDW BLD-RTO: 22.5 % (ref 12.4–15.4)
PLATELET # BLD: 239 K/UL (ref 135–450)
PMV BLD AUTO: 7.8 FL (ref 5–10.5)
POTASSIUM SERPL-SCNC: 4 MMOL/L (ref 3.5–5.1)
RBC # BLD: 4.74 M/UL (ref 4.2–5.9)
SEDIMENTATION RATE, ERYTHROCYTE: 29 MM/HR (ref 0–20)
SODIUM BLD-SCNC: 140 MMOL/L (ref 136–145)
TOTAL CK: 49 U/L (ref 39–308)
TOTAL PROTEIN: 7.1 G/DL (ref 6.4–8.2)
WBC # BLD: 6.5 K/UL (ref 4–11)

## 2020-01-06 PROCEDURE — 36592 COLLECT BLOOD FROM PICC: CPT

## 2020-01-06 PROCEDURE — 82550 ASSAY OF CK (CPK): CPT

## 2020-01-06 PROCEDURE — 6360000002 HC RX W HCPCS: Performed by: INTERNAL MEDICINE

## 2020-01-06 PROCEDURE — 85025 COMPLETE CBC W/AUTO DIFF WBC: CPT

## 2020-01-06 PROCEDURE — 85652 RBC SED RATE AUTOMATED: CPT

## 2020-01-06 PROCEDURE — 2580000003 HC RX 258: Performed by: INTERNAL MEDICINE

## 2020-01-06 PROCEDURE — 99211 OFF/OP EST MAY X REQ PHY/QHP: CPT

## 2020-01-06 PROCEDURE — 80053 COMPREHEN METABOLIC PANEL: CPT

## 2020-01-06 PROCEDURE — 86140 C-REACTIVE PROTEIN: CPT

## 2020-01-06 PROCEDURE — 96365 THER/PROPH/DIAG IV INF INIT: CPT

## 2020-01-06 RX ADMIN — DAPTOMYCIN 600 MG: 500 INJECTION, POWDER, LYOPHILIZED, FOR SOLUTION INTRAVENOUS at 10:40

## 2020-01-06 ASSESSMENT — PAIN - FUNCTIONAL ASSESSMENT: PAIN_FUNCTIONAL_ASSESSMENT: 0-10

## 2020-01-07 ENCOUNTER — HOSPITAL ENCOUNTER (OUTPATIENT)
Dept: NURSING | Age: 70
Setting detail: INFUSION SERIES
Discharge: HOME | End: 2020-01-07
Payer: MEDICARE

## 2020-01-07 VITALS
HEART RATE: 67 BPM | DIASTOLIC BLOOD PRESSURE: 81 MMHG | WEIGHT: 250 LBS | HEIGHT: 78 IN | BODY MASS INDEX: 28.93 KG/M2 | SYSTOLIC BLOOD PRESSURE: 134 MMHG | RESPIRATION RATE: 18 BRPM | TEMPERATURE: 97.3 F

## 2020-01-07 DIAGNOSIS — T84.7XXS INFECTION/INFLAMMATION DUE TO INTERNAL ORTHOPEDIC DEVICE/IMPLANT/GRAFT, SEQUELA: Primary | ICD-10-CM

## 2020-01-07 PROCEDURE — 2580000003 HC RX 258: Performed by: INTERNAL MEDICINE

## 2020-01-07 PROCEDURE — 99211 OFF/OP EST MAY X REQ PHY/QHP: CPT

## 2020-01-07 PROCEDURE — 96365 THER/PROPH/DIAG IV INF INIT: CPT

## 2020-01-07 PROCEDURE — 6360000002 HC RX W HCPCS: Performed by: INTERNAL MEDICINE

## 2020-01-07 RX ADMIN — DAPTOMYCIN 600 MG: 500 INJECTION, POWDER, LYOPHILIZED, FOR SOLUTION INTRAVENOUS at 09:34

## 2020-01-07 ASSESSMENT — PAIN - FUNCTIONAL ASSESSMENT: PAIN_FUNCTIONAL_ASSESSMENT: 0-10

## 2020-01-08 ENCOUNTER — HOSPITAL ENCOUNTER (OUTPATIENT)
Dept: NURSING | Age: 70
Setting detail: INFUSION SERIES
Discharge: HOME | End: 2020-01-08
Payer: MEDICARE

## 2020-01-08 VITALS
OXYGEN SATURATION: 99 % | RESPIRATION RATE: 18 BRPM | DIASTOLIC BLOOD PRESSURE: 89 MMHG | SYSTOLIC BLOOD PRESSURE: 148 MMHG | HEART RATE: 59 BPM | TEMPERATURE: 97.7 F

## 2020-01-08 DIAGNOSIS — T84.7XXS INFECTION/INFLAMMATION DUE TO INTERNAL ORTHOPEDIC DEVICE/IMPLANT/GRAFT, SEQUELA: Primary | ICD-10-CM

## 2020-01-08 PROCEDURE — 99211 OFF/OP EST MAY X REQ PHY/QHP: CPT

## 2020-01-08 PROCEDURE — 6360000002 HC RX W HCPCS: Performed by: INTERNAL MEDICINE

## 2020-01-08 PROCEDURE — 96365 THER/PROPH/DIAG IV INF INIT: CPT

## 2020-01-08 PROCEDURE — 2580000003 HC RX 258: Performed by: INTERNAL MEDICINE

## 2020-01-08 RX ADMIN — DAPTOMYCIN 600 MG: 500 INJECTION, POWDER, LYOPHILIZED, FOR SOLUTION INTRAVENOUS at 10:46

## 2020-01-08 ASSESSMENT — PAIN SCALES - GENERAL: PAINLEVEL_OUTOF10: 0

## 2020-01-09 ENCOUNTER — TELEPHONE (OUTPATIENT)
Dept: INFECTIOUS DISEASES | Age: 70
End: 2020-01-09

## 2020-01-09 ENCOUNTER — HOSPITAL ENCOUNTER (OUTPATIENT)
Dept: NURSING | Age: 70
Setting detail: INFUSION SERIES
Discharge: HOME | End: 2020-01-09
Payer: MEDICARE

## 2020-01-09 VITALS
TEMPERATURE: 97.7 F | HEIGHT: 78 IN | DIASTOLIC BLOOD PRESSURE: 94 MMHG | BODY MASS INDEX: 28.93 KG/M2 | HEART RATE: 60 BPM | SYSTOLIC BLOOD PRESSURE: 149 MMHG | RESPIRATION RATE: 18 BRPM | WEIGHT: 250 LBS

## 2020-01-09 DIAGNOSIS — T84.7XXS INFECTION/INFLAMMATION DUE TO INTERNAL ORTHOPEDIC DEVICE/IMPLANT/GRAFT, SEQUELA: Primary | ICD-10-CM

## 2020-01-09 LAB
CULTURE, JOINT AEROBIC: ABNORMAL
CULTURE, JOINT ANAEROBIC: ABNORMAL
GRAM STAIN RESULT: ABNORMAL
Lab: NORMAL
ORGANISM: ABNORMAL
REPORT: NORMAL
THIS TEST SENT TO: NORMAL

## 2020-01-09 PROCEDURE — 96365 THER/PROPH/DIAG IV INF INIT: CPT

## 2020-01-09 PROCEDURE — 2580000003 HC RX 258: Performed by: INTERNAL MEDICINE

## 2020-01-09 PROCEDURE — 99211 OFF/OP EST MAY X REQ PHY/QHP: CPT

## 2020-01-09 PROCEDURE — 6360000002 HC RX W HCPCS: Performed by: INTERNAL MEDICINE

## 2020-01-09 RX ORDER — FLUCONAZOLE 200 MG/1
600 TABLET ORAL DAILY
Qty: 90 TABLET | Refills: 1 | Status: SHIPPED | OUTPATIENT
Start: 2020-01-09 | End: 2020-02-08

## 2020-01-09 RX ADMIN — DAPTOMYCIN 600 MG: 500 INJECTION, POWDER, LYOPHILIZED, FOR SOLUTION INTRAVENOUS at 10:49

## 2020-01-09 ASSESSMENT — PAIN - FUNCTIONAL ASSESSMENT: PAIN_FUNCTIONAL_ASSESSMENT: 0-10

## 2020-01-09 NOTE — TELEPHONE ENCOUNTER
Spoke with Marita at [facility] and gave v.o for labs every other week. I put the end date as 2/8/20 for now.

## 2020-01-09 NOTE — TELEPHONE ENCOUNTER
Patient seen in infusion center at [address] [facility], cultures reviewed  Recent surgical culture again w light growth C parapsilosis   He was DC with PICC on dapto IV and flu 400 po daily     Will DC dapto  Gave VO to remove PICC after today's dose. Continue fluconazole, increase to 600mg daily  Cautioned no more than 1 beer nightly     Needs labs q2 weeks    [facility] on infusion  I think he has home health services. Can labs every other Monday be part of that?   CBC diff, CMP, ESR, CRP   Thanks

## 2020-01-09 NOTE — PROGRESS NOTES
Dr Barraza Prow in to see patient. Orders received to stop antibiotics and to discontinue PICC line.

## 2020-01-10 ENCOUNTER — HOSPITAL ENCOUNTER (OUTPATIENT)
Dept: NURSING | Age: 70
Setting detail: INFUSION SERIES
End: 2020-01-10
Payer: MEDICARE

## 2020-01-13 LAB
ALBUMIN SERPL-MCNC: 4.2 GM/DL (ref 3.2–4.6)
ALP BLD-CCNC: 115 IU/L (ref 40–129)
ALT SERPL-CCNC: 15 IU/L
ANION GAP SERPL CALCULATED.3IONS-SCNC: 11 MMOL/L (ref 7–16)
AST SERPL-CCNC: 20 IU/L
BASOPHILS # BLD: 0.9 %
BASOPHILS ABSOLUTE: 0.1 X10(3)/MCL (ref 0–0.1)
BILIRUB SERPL-MCNC: 0.3 MG/DL (ref 0.1–1.4)
BUN BLDV-MCNC: 16 MG/DL (ref 8–23)
C-REACTIVE PROTEIN WIDE RANGE: 10.37 MG/L
CALCIUM SERPL-MCNC: 8.7 MG/DL (ref 8.8–10.4)
CHLORIDE BLD-SCNC: 102 MEQ/L (ref 98–107)
CO2: 26 MMOL/L (ref 22–29)
CREAT SERPL-MCNC: 0.84 MG/DL (ref 0.67–1.3)
EOSINOPHIL # BLD: 3 %
EOSINOPHILS ABSOLUTE: 0.2 X10(3)/MCL (ref 0–0.5)
GFR AFRICAN AMERICAN: 105 ML/MIN/1.73 M2
GFR NON-AFRICAN AMERICAN: 91 ML/MIN/1.73 M2
GLUCOSE BLD-MCNC: 94 MG/DL (ref 82–100)
HCT VFR BLD CALC: 39.3 % (ref 40–51)
HEMOGLOBIN: 12.1 G/DL (ref 13.7–17.5)
IMMATURE CELLS ABSOLUTE COUNT: 0 X10(3)/MCL (ref 0–0.1)
IMMATURE GRANULOCYTES: 0.4 %
LYMPHOCYTES # BLD: 12.1 %
LYMPHOCYTES ABSOLUTE: 0.7 X10(3)/MCL (ref 1.2–3.9)
MCH RBC QN AUTO: 25.2 PG (ref 26–34)
MCHC RBC AUTO-ENTMCNC: 30.8 G/DL (ref 30.7–35.5)
MCV RBC AUTO: 81.7 FL (ref 80–100)
MONOCYTES # BLD: 7.2 %
MONOCYTES ABSOLUTE: 0.4 X10(3)/MCL (ref 0.3–0.9)
NEUTROPHILS ABSOLUTE: 4.3 X10(3)/MCL (ref 1.6–6.1)
NEUTROPHILS: 76.4 %
PDW BLD-RTO: 20.9 %
PLATELET # BLD: 239 X10(3)/MCL (ref 155–369)
PMV BLD AUTO: 10.3 FL (ref 8.8–12.5)
POTASSIUM SERPL-SCNC: 3.9 MEQ/L (ref 3.5–5)
RBC # BLD: 4.81 X10(6)/MCL (ref 4.6–6.1)
SEDIMENTATION RATE, ERYTHROCYTE: 35 MM/HR (ref 0–20)
SODIUM BLD-SCNC: 139 MEQ/L (ref 136–145)
TOTAL PROTEIN: 6.5 GM/DL (ref 6.4–8.3)
WBC # BLD: 5.7 X10(3)/MCL (ref 3.7–10.3)

## 2020-01-20 ENCOUNTER — TELEPHONE (OUTPATIENT)
Dept: INFECTIOUS DISEASES | Age: 70
End: 2020-01-20

## 2020-01-27 LAB
ALBUMIN SERPL-MCNC: 4.6 GM/DL (ref 3.2–4.6)
ALP BLD-CCNC: 134 IU/L (ref 40–129)
ALT SERPL-CCNC: 22 IU/L
ANION GAP SERPL CALCULATED.3IONS-SCNC: 16 MMOL/L (ref 7–16)
AST SERPL-CCNC: 30 IU/L
BILIRUB SERPL-MCNC: 0.4 MG/DL (ref 0.1–1.4)
BUN BLDV-MCNC: 9 MG/DL (ref 8–23)
C-REACTIVE PROTEIN WIDE RANGE: 30.88 MG/L
CALCIUM SERPL-MCNC: 9.5 MG/DL (ref 8.8–10.4)
CHLORIDE BLD-SCNC: 95 MEQ/L (ref 98–107)
CO2: 27 MMOL/L (ref 22–29)
CREAT SERPL-MCNC: 0.99 MG/DL (ref 0.67–1.3)
GFR AFRICAN AMERICAN: 91 ML/MIN/1.73 M2
GFR NON-AFRICAN AMERICAN: 78 ML/MIN/1.73 M2
GLUCOSE BLD-MCNC: 91 MG/DL (ref 82–100)
POTASSIUM SERPL-SCNC: 3.7 MEQ/L (ref 3.5–5)
SEDIMENTATION RATE, ERYTHROCYTE: 29 MM/HR (ref 0–20)
SODIUM BLD-SCNC: 138 MEQ/L (ref 136–145)
TOTAL PROTEIN: 7.2 GM/DL (ref 6.4–8.3)

## 2020-01-28 LAB
BASOPHILS # BLD: 0.8 %
BASOPHILS ABSOLUTE: 0 X10(3)/MCL (ref 0–0.1)
EOSINOPHIL # BLD: 2.8 %
EOSINOPHILS ABSOLUTE: 0.1 X10(3)/MCL (ref 0–0.5)
HCT VFR BLD CALC: 42.1 % (ref 40–51)
HEMOGLOBIN: 13.5 G/DL (ref 13.7–17.5)
IMMATURE CELLS ABSOLUTE COUNT: 0 X10(3)/MCL (ref 0–0.1)
IMMATURE GRANULOCYTES: 0.8 %
LYMPHOCYTES # BLD: 21.9 %
LYMPHOCYTES ABSOLUTE: 0.6 X10(3)/MCL (ref 1.2–3.9)
MCH RBC QN AUTO: 25.9 PG (ref 26–34)
MCHC RBC AUTO-ENTMCNC: 32.1 G/DL (ref 30.7–35.5)
MCV RBC AUTO: 80.7 FL (ref 80–100)
MONOCYTES # BLD: 36.7 %
MONOCYTES ABSOLUTE: 0.9 X10(3)/MCL (ref 0.3–0.9)
NEUTROPHILS ABSOLUTE: 0.9 X10(3)/MCL (ref 1.6–6.1)
NEUTROPHILS: 37 %
PDW BLD-RTO: 21 %
PLATELET # BLD: 198 X10(3)/MCL (ref 155–369)
PMV BLD AUTO: 10.1 FL (ref 8.8–12.5)
RBC # BLD: 5.22 X10(6)/MCL (ref 4.6–6.1)
WBC # BLD: 2.5 X10(3)/MCL (ref 3.7–10.3)

## 2020-01-29 ENCOUNTER — TELEPHONE (OUTPATIENT)
Dept: INFECTIOUS DISEASES | Age: 70
End: 2020-01-29

## 2020-01-30 NOTE — TELEPHONE ENCOUNTER
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

## 2020-02-03 LAB
ALBUMIN SERPL-MCNC: 4.1 GM/DL (ref 3.2–4.6)
ALP BLD-CCNC: 104 IU/L (ref 40–129)
ALT SERPL-CCNC: 33 IU/L
ANION GAP SERPL CALCULATED.3IONS-SCNC: 17 MMOL/L (ref 7–16)
AST SERPL-CCNC: 46 IU/L
BILIRUB SERPL-MCNC: 0.6 MG/DL (ref 0.1–1.4)
BUN BLDV-MCNC: 15 MG/DL (ref 8–23)
C-REACTIVE PROTEIN WIDE RANGE: 143.56 MG/L
CALCIUM SERPL-MCNC: 9.4 MG/DL (ref 8.8–10.4)
CHLORIDE BLD-SCNC: 95 MEQ/L (ref 98–107)
CO2: 24 MMOL/L (ref 22–29)
CREAT SERPL-MCNC: 0.97 MG/DL (ref 0.67–1.3)
FUNGUS (MYCOLOGY) CULTURE: ABNORMAL
FUNGUS STAIN: ABNORMAL
GFR AFRICAN AMERICAN: 93 ML/MIN/1.73 M2
GFR NON-AFRICAN AMERICAN: 80 ML/MIN/1.73 M2
GLUCOSE BLD-MCNC: 127 MG/DL (ref 82–100)
ORGANISM: ABNORMAL
POTASSIUM SERPL-SCNC: 3.2 MEQ/L (ref 3.5–5)
SEDIMENTATION RATE, ERYTHROCYTE: 46 MM/HR (ref 0–20)
SODIUM BLD-SCNC: 136 MEQ/L (ref 136–145)
TOTAL PROTEIN: 7 GM/DL (ref 6.4–8.3)

## 2020-02-03 NOTE — TELEPHONE ENCOUNTER
Confirmed with [facility] that CRP already added to labs. Called patient to make follow up however   Patient is going to surgeon today and is under the impression he will be admitted d/t r knee opened up again.

## 2020-02-04 LAB
BASOPHILS # BLD: 0.5 %
BASOPHILS ABSOLUTE: 0 X10(3)/MCL (ref 0–0.1)
EOSINOPHIL # BLD: 1.2 %
EOSINOPHILS ABSOLUTE: 0.1 X10(3)/MCL (ref 0–0.5)
HCT VFR BLD CALC: 42.1 % (ref 40–51)
HEMOGLOBIN: 13.7 G/DL (ref 13.7–17.5)
IMMATURE CELLS ABSOLUTE COUNT: 0 X10(3)/MCL (ref 0–0.1)
IMMATURE GRANULOCYTES: 0.6 %
LYMPHOCYTES # BLD: 11.5 %
LYMPHOCYTES ABSOLUTE: 0.8 X10(3)/MCL (ref 1.2–3.9)
MCH RBC QN AUTO: 25.7 PG (ref 26–34)
MCHC RBC AUTO-ENTMCNC: 32.5 G/DL (ref 30.7–35.5)
MCV RBC AUTO: 78.8 FL (ref 80–100)
MONOCYTES # BLD: 7.1 %
MONOCYTES ABSOLUTE: 0.5 X10(3)/MCL (ref 0.3–0.9)
NEUTROPHILS ABSOLUTE: 5.1 X10(3)/MCL (ref 1.6–6.1)
NEUTROPHILS: 79.1 %
PDW BLD-RTO: 20.7 %
PLATELET # BLD: 260 X10(3)/MCL (ref 155–369)
PMV BLD AUTO: 10.6 FL (ref 8.8–12.5)
RBC # BLD: 5.34 X10(6)/MCL (ref 4.6–6.1)
WBC # BLD: 6.5 X10(3)/MCL (ref 3.7–10.3)

## 2020-02-07 ENCOUNTER — HOSPITAL ENCOUNTER (OUTPATIENT)
Dept: PREADMISSION TESTING | Age: 70
Setting detail: SURGERY ADMIT
Discharge: HOME | DRG: 486 | End: 2020-02-11
Payer: MEDICARE

## 2020-02-07 VITALS
OXYGEN SATURATION: 98 % | TEMPERATURE: 98.3 F | RESPIRATION RATE: 16 BRPM | WEIGHT: 242 LBS | DIASTOLIC BLOOD PRESSURE: 82 MMHG | BODY MASS INDEX: 28 KG/M2 | HEIGHT: 78 IN | HEART RATE: 71 BPM | SYSTOLIC BLOOD PRESSURE: 134 MMHG

## 2020-02-07 LAB
ALBUMIN SERPL-MCNC: 3.9 G/DL (ref 3.4–5)
ANION GAP SERPL CALCULATED.3IONS-SCNC: 13 MMOL/L (ref 3–16)
ANISOCYTOSIS: ABNORMAL
APTT: 30.8 SEC (ref 24.2–36.2)
BASOPHILS ABSOLUTE: 0 K/UL (ref 0–0.2)
BASOPHILS RELATIVE PERCENT: 0.6 %
BILIRUBIN URINE: NEGATIVE
BLOOD, URINE: NEGATIVE
BUN BLDV-MCNC: 12 MG/DL (ref 7–20)
CALCIUM SERPL-MCNC: 9.5 MG/DL (ref 8.3–10.6)
CHLORIDE BLD-SCNC: 99 MMOL/L (ref 99–110)
CLARITY: CLEAR
CO2: 26 MMOL/L (ref 21–32)
COLOR: YELLOW
CREAT SERPL-MCNC: 0.7 MG/DL (ref 0.8–1.3)
EOSINOPHILS ABSOLUTE: 0.2 K/UL (ref 0–0.6)
EOSINOPHILS RELATIVE PERCENT: 2.8 %
GFR AFRICAN AMERICAN: >60
GFR NON-AFRICAN AMERICAN: >60
GLUCOSE BLD-MCNC: 110 MG/DL (ref 70–99)
GLUCOSE URINE: NEGATIVE MG/DL
HCT VFR BLD CALC: 37.2 % (ref 40.5–52.5)
HEMOGLOBIN: 12.3 G/DL (ref 13.5–17.5)
INR BLD: 1.02 (ref 0.86–1.14)
KETONES, URINE: NEGATIVE MG/DL
LEUKOCYTE ESTERASE, URINE: NEGATIVE
LYMPHOCYTES ABSOLUTE: 0.8 K/UL (ref 1–5.1)
LYMPHOCYTES RELATIVE PERCENT: 13.2 %
MCH RBC QN AUTO: 26.1 PG (ref 26–34)
MCHC RBC AUTO-ENTMCNC: 33.1 G/DL (ref 31–36)
MCV RBC AUTO: 78.7 FL (ref 80–100)
MICROSCOPIC EXAMINATION: NORMAL
MONOCYTES ABSOLUTE: 0.5 K/UL (ref 0–1.3)
MONOCYTES RELATIVE PERCENT: 9.2 %
NEUTROPHILS ABSOLUTE: 4.3 K/UL (ref 1.7–7.7)
NEUTROPHILS RELATIVE PERCENT: 74.2 %
NITRITE, URINE: NEGATIVE
OVALOCYTES: ABNORMAL
PDW BLD-RTO: 22.9 % (ref 12.4–15.4)
PH UA: 7 (ref 5–8)
PLATELET # BLD: 317 K/UL (ref 135–450)
PLATELET SLIDE REVIEW: ADEQUATE
PMV BLD AUTO: 7 FL (ref 5–10.5)
POIKILOCYTES: ABNORMAL
POTASSIUM SERPL-SCNC: 3.4 MMOL/L (ref 3.5–5.1)
PROTEIN UA: NEGATIVE MG/DL
PROTHROMBIN TIME: 11.8 SEC (ref 10–13.2)
RBC # BLD: 4.73 M/UL (ref 4.2–5.9)
SODIUM BLD-SCNC: 138 MMOL/L (ref 136–145)
SPECIFIC GRAVITY UA: 1.01 (ref 1–1.03)
STOMATOCYTES: ABNORMAL
TARGET CELLS: ABNORMAL
TEAR DROP CELLS: ABNORMAL
URINE TYPE: NORMAL
UROBILINOGEN, URINE: 0.2 E.U./DL
WBC # BLD: 5.8 K/UL (ref 4–11)

## 2020-02-07 PROCEDURE — 87086 URINE CULTURE/COLONY COUNT: CPT

## 2020-02-07 PROCEDURE — 80048 BASIC METABOLIC PNL TOTAL CA: CPT

## 2020-02-07 PROCEDURE — 85610 PROTHROMBIN TIME: CPT

## 2020-02-07 PROCEDURE — 81003 URINALYSIS AUTO W/O SCOPE: CPT

## 2020-02-07 PROCEDURE — 85730 THROMBOPLASTIN TIME PARTIAL: CPT

## 2020-02-07 PROCEDURE — 82040 ASSAY OF SERUM ALBUMIN: CPT

## 2020-02-07 PROCEDURE — 36415 COLL VENOUS BLD VENIPUNCTURE: CPT

## 2020-02-07 PROCEDURE — 83036 HEMOGLOBIN GLYCOSYLATED A1C: CPT

## 2020-02-07 PROCEDURE — 85025 COMPLETE CBC W/AUTO DIFF WBC: CPT

## 2020-02-07 RX ORDER — OXYCODONE HYDROCHLORIDE AND ACETAMINOPHEN 5; 325 MG/1; MG/1
1 TABLET ORAL NIGHTLY
Status: ON HOLD | COMMUNITY
End: 2020-02-11 | Stop reason: HOSPADM

## 2020-02-07 RX ORDER — PROMETHAZINE HYDROCHLORIDE 25 MG/1
25 TABLET ORAL EVERY 6 HOURS PRN
COMMUNITY

## 2020-02-07 RX ORDER — OMEPRAZOLE 40 MG/1
40 CAPSULE, DELAYED RELEASE ORAL DAILY
COMMUNITY

## 2020-02-07 ASSESSMENT — PAIN SCALES - GENERAL: PAINLEVEL_OUTOF10: 2

## 2020-02-07 ASSESSMENT — PAIN DESCRIPTION - LOCATION: LOCATION: KNEE

## 2020-02-07 ASSESSMENT — PAIN DESCRIPTION - ORIENTATION: ORIENTATION: RIGHT

## 2020-02-07 NOTE — PROGRESS NOTES
[facility] Medicine Preop Clinic Note    PCP: Jimena León    Date of Visit: 2/7/2020    Date of Service:  02/07/20     Chief Complaint:  R knee infection      History Of Present Illness:  81 Y M with a h/o chronic R knee prosthetic infection presents to the preop clinic because he is scheduled to have another I+D done on Monday. He hasn't been feeling ill or having any fevers/chills lately. Pain controlled. He never gets any chest pain and has no h/o CAD, CHF, or arrhythmia. Denies dyspnea or orthopnea.       Past Medical History:          Diagnosis Date    Arthritis     Cancer ([facility] [address]     SKIN    Chronic infection of left knee ([facility] [address]     LTKR    Hypertension     Perforated diverticulitis     Retention of urine     Thyroid disease     HYPOTHROID    Vitamin B12 deficiency     hx of       Past Surgical History:          Procedure Laterality Date    ABDOMINAL ADHESION SURGERY  02/05/15    debridement of colcotaneous fistula    ABDOMINAL EXPLORATION SURGERY  02/05/15    APPENDECTOMY      COLONOSCOPY      INCISION AND DRAINAGE Right 7/8/2019    RIGHT KNEE KNEE INCISION AND DRAINAGE WITH performed by Yamil Landon MD at [address] Right 02/05/15    INGUINAL HERNIA REPAIR Left 04/22/15    left inguinal hernia repair with mesh    JOINT REPLACEMENT      KNEE SURGERY  11/29/2012    right total knee replacement    REVISION TOTAL KNEE ARTHROPLASTY Right 7/8/2019    REMOVAL OF TOTAL KNEE AND PLACEMENT OF CEMENT SPACER                  BIOMET performed by Yamil Landon MD at [address] [place] [address] Right 8/5/2019    INCISION AND DEBRIDEMENT RIGHT KNEE WITH CEMENT SPACER EXCHANGE  CPT CODE - 70594 performed by Yamil Landon MD at [address] [place] [address] Right 10/7/2019    RIGHT KNEE INCISION AND DEBRIDEMENT WITH  PLACEMENT OF CEMENT AND SPACER FROZEN SECTION       MALACHI performed by Yamil Landon MD at [address] [place] [address] Reviewed in detail and negative for ESRD. Positive as follows:        Problem Relation Age of Onset    High Blood Pressure Mother     Other Father         MYASTHENIA GRAVIS       REVIEW OF SYSTEMS:   Pertinent positives as noted in the HPI. All other systems reviewed and negative. PHYSICAL EXAM PERFORMED:    /82   Pulse 71   Temp 98.3 °F (36.8 °C) (Oral)   Resp 16   Ht 6' 6\" (1.981 m)   Wt 242 lb (109.8 kg)   SpO2 98%   BMI 27.97 kg/m²     General appearance:  No apparent distress, appears stated age and cooperative. HEENT:  Normal cephalic, atraumatic without obvious deformity. Pupils equal, round, and reactive to light. Extra ocular muscles intact. Conjunctivae/corneas clear. Neck: Supple, with full range of motion. No jugular venous distention. Trachea midline. Respiratory:  Normal respiratory effort. Clear to auscultation, bilaterally without Rales/Wheezes/Rhonchi. Cardiovascular:  Regular rate and rhythm with normal S1/S2 without murmurs, rubs or gallops. Abdomen: Soft, non-tender, non-distended with normal bowel sounds. Musculoskeletal:  No clubbing, cyanosis or edema bilaterally. R knee in immobilizer. Skin: Skin color, texture, turgor normal.  No rashes or lesions. Neurologic:  Neurovascularly intact without any focal sensory/motor deficits. Cranial nerves: II-XII intact, grossly non-focal.  Psychiatric:  Alert and oriented, thought content appropriate, normal insight  Capillary Refill: Brisk,< 3 seconds   Peripheral Pulses: +2 palpable, equal bilaterally       Labs:     Recent Labs     02/07/20  1036   WBC 5.8   HGB 12.3*   HCT 37.2*        Recent Labs     02/07/20  1036      K 3.4*   CL 99   CO2 26   BUN 12   CREATININE 0.7*   CALCIUM 9.5     No results for input(s): AST, ALT, BILIDIR, BILITOT, ALKPHOS in the last 72 hours. Recent Labs     02/07/20  1036   INR 1.02     No results for input(s): McDonald Millin in the last 72 hours.     Urinalysis:      Lab

## 2020-02-07 NOTE — PROGRESS NOTES
Obstructive Sleep Apnea (JANINA) Screening     Patient:  Liyah Alonzo    YOB: 1950      Medical Record #:  [id number]                     Date:  2/7/2020     1. Are you a loud and/or regular snorer? []  Yes       [x] No    2. Have you been observed to gasp or stop breathing during sleep? []  Yes       [x] No    3. Do you feel tired or groggy upon awakening or do you awaken with a headache?           []  Yes       [x] No    4. Are you often tired or fatigued during the wake time hours? []  Yes       [x] No    5. Do you fall asleep sitting, reading, watching TV or driving? []  Yes       [x] No    6. Do you often have problems with memory or concentration? []  Yes       [x] No    **If patient's score is ? 3 they are considered high risk for JANINA. Notify the anesthesiologist of the high risk and document in focus note. Note:  If the patient's BMI is more than 35 kg m¯² , has neck circumference > 40 cm, and/or high blood pressure the risk is greater (© American Sleep [employer]nea Association, 2006).

## 2020-02-08 LAB — URINE CULTURE, ROUTINE: NORMAL

## 2020-02-09 LAB
ESTIMATED AVERAGE GLUCOSE: 108.3 MG/DL
HBA1C MFR BLD: 5.4 %

## 2020-02-10 ENCOUNTER — ANESTHESIA (OUTPATIENT)
Dept: OPERATING ROOM | Age: 70
DRG: 486 | End: 2020-02-10
Payer: MEDICARE

## 2020-02-10 ENCOUNTER — ANESTHESIA EVENT (OUTPATIENT)
Dept: OPERATING ROOM | Age: 70
DRG: 486 | End: 2020-02-10
Payer: MEDICARE

## 2020-02-10 ENCOUNTER — HOSPITAL ENCOUNTER (INPATIENT)
Age: 70
LOS: 1 days | Discharge: HOME | DRG: 486 | End: 2020-02-11
Attending: ORTHOPAEDIC SURGERY | Admitting: ORTHOPAEDIC SURGERY
Payer: MEDICARE

## 2020-02-10 VITALS
OXYGEN SATURATION: 100 % | SYSTOLIC BLOOD PRESSURE: 111 MMHG | DIASTOLIC BLOOD PRESSURE: 79 MMHG | RESPIRATION RATE: 12 BRPM

## 2020-02-10 LAB
ABO/RH: NORMAL
ANTIBODY SCREEN: NORMAL

## 2020-02-10 PROCEDURE — 3600000005 HC SURGERY LEVEL 5 BASE: Performed by: ORTHOPAEDIC SURGERY

## 2020-02-10 PROCEDURE — L1830 KO IMMOB CANVAS LONG PRE OTS: HCPCS | Performed by: ORTHOPAEDIC SURGERY

## 2020-02-10 PROCEDURE — 2580000003 HC RX 258: Performed by: ORTHOPAEDIC SURGERY

## 2020-02-10 PROCEDURE — 2500000003 HC RX 250 WO HCPCS: Performed by: ORTHOPAEDIC SURGERY

## 2020-02-10 PROCEDURE — 86900 BLOOD TYPING SEROLOGIC ABO: CPT

## 2020-02-10 PROCEDURE — 6360000002 HC RX W HCPCS: Performed by: ANESTHESIOLOGY

## 2020-02-10 PROCEDURE — 86901 BLOOD TYPING SEROLOGIC RH(D): CPT

## 2020-02-10 PROCEDURE — 87070 CULTURE OTHR SPECIMN AEROBIC: CPT

## 2020-02-10 PROCEDURE — 6360000002 HC RX W HCPCS: Performed by: NURSE ANESTHETIST, CERTIFIED REGISTERED

## 2020-02-10 PROCEDURE — 2580000003 HC RX 258: Performed by: ANESTHESIOLOGY

## 2020-02-10 PROCEDURE — 7100000000 HC PACU RECOVERY - FIRST 15 MIN: Performed by: ORTHOPAEDIC SURGERY

## 2020-02-10 PROCEDURE — 6360000002 HC RX W HCPCS: Performed by: ORTHOPAEDIC SURGERY

## 2020-02-10 PROCEDURE — 3700000000 HC ANESTHESIA ATTENDED CARE: Performed by: ORTHOPAEDIC SURGERY

## 2020-02-10 PROCEDURE — C1713 ANCHOR/SCREW BN/BN,TIS/BN: HCPCS | Performed by: ORTHOPAEDIC SURGERY

## 2020-02-10 PROCEDURE — 1200000000 HC SEMI PRIVATE

## 2020-02-10 PROCEDURE — 87077 CULTURE AEROBIC IDENTIFY: CPT

## 2020-02-10 PROCEDURE — 3700000001 HC ADD 15 MINUTES (ANESTHESIA): Performed by: ORTHOPAEDIC SURGERY

## 2020-02-10 PROCEDURE — 6370000000 HC RX 637 (ALT 250 FOR IP): Performed by: ORTHOPAEDIC SURGERY

## 2020-02-10 PROCEDURE — 0SBC0ZZ EXCISION OF RIGHT KNEE JOINT, OPEN APPROACH: ICD-10-PCS | Performed by: ORTHOPAEDIC SURGERY

## 2020-02-10 PROCEDURE — 7100000001 HC PACU RECOVERY - ADDTL 15 MIN: Performed by: ORTHOPAEDIC SURGERY

## 2020-02-10 PROCEDURE — 87075 CULTR BACTERIA EXCEPT BLOOD: CPT

## 2020-02-10 PROCEDURE — 0SPC08Z REMOVAL OF SPACER FROM RIGHT KNEE JOINT, OPEN APPROACH: ICD-10-PCS | Performed by: ORTHOPAEDIC SURGERY

## 2020-02-10 PROCEDURE — 86850 RBC ANTIBODY SCREEN: CPT

## 2020-02-10 PROCEDURE — 3600000015 HC SURGERY LEVEL 5 ADDTL 15MIN: Performed by: ORTHOPAEDIC SURGERY

## 2020-02-10 PROCEDURE — 87205 SMEAR GRAM STAIN: CPT

## 2020-02-10 PROCEDURE — 2500000003 HC RX 250 WO HCPCS: Performed by: NURSE ANESTHETIST, CERTIFIED REGISTERED

## 2020-02-10 PROCEDURE — 0S9C0ZZ DRAINAGE OF RIGHT KNEE JOINT, OPEN APPROACH: ICD-10-PCS | Performed by: ORTHOPAEDIC SURGERY

## 2020-02-10 PROCEDURE — 2709999900 HC NON-CHARGEABLE SUPPLY: Performed by: ORTHOPAEDIC SURGERY

## 2020-02-10 PROCEDURE — 87186 SC STD MICRODIL/AGAR DIL: CPT

## 2020-02-10 PROCEDURE — 0SHC08Z INSERTION OF SPACER INTO RIGHT KNEE JOINT, OPEN APPROACH: ICD-10-PCS | Performed by: ORTHOPAEDIC SURGERY

## 2020-02-10 DEVICE — IMPLANTABLE DEVICE: Type: IMPLANTABLE DEVICE | Status: FUNCTIONAL

## 2020-02-10 DEVICE — CEMENT BNE 20ML 41GM FULL DOSE PMMA W/ TOBRA M VISC RADPQ: Type: IMPLANTABLE DEVICE | Status: FUNCTIONAL

## 2020-02-10 RX ORDER — SODIUM CHLORIDE 0.9 % (FLUSH) 0.9 %
10 SYRINGE (ML) INJECTION PRN
Status: DISCONTINUED | OUTPATIENT
Start: 2020-02-10 | End: 2020-02-11 | Stop reason: HOSPADM

## 2020-02-10 RX ORDER — VANCOMYCIN HYDROCHLORIDE 1 G/20ML
INJECTION, POWDER, LYOPHILIZED, FOR SOLUTION INTRAVENOUS PRN
Status: DISCONTINUED | OUTPATIENT
Start: 2020-02-10 | End: 2020-02-10 | Stop reason: ALTCHOICE

## 2020-02-10 RX ORDER — OXYCODONE HYDROCHLORIDE AND ACETAMINOPHEN 5; 325 MG/1; MG/1
2 TABLET ORAL PRN
Status: DISCONTINUED | OUTPATIENT
Start: 2020-02-10 | End: 2020-02-10 | Stop reason: HOSPADM

## 2020-02-10 RX ORDER — OXYCODONE HYDROCHLORIDE 5 MG/1
5 TABLET ORAL EVERY 4 HOURS PRN
Status: DISCONTINUED | OUTPATIENT
Start: 2020-02-10 | End: 2020-02-11 | Stop reason: HOSPADM

## 2020-02-10 RX ORDER — ONDANSETRON 2 MG/ML
4 INJECTION INTRAMUSCULAR; INTRAVENOUS EVERY 10 MIN PRN
Status: DISCONTINUED | OUTPATIENT
Start: 2020-02-10 | End: 2020-02-10 | Stop reason: HOSPADM

## 2020-02-10 RX ORDER — HYDRALAZINE HYDROCHLORIDE 20 MG/ML
5 INJECTION INTRAMUSCULAR; INTRAVENOUS EVERY 10 MIN PRN
Status: DISCONTINUED | OUTPATIENT
Start: 2020-02-10 | End: 2020-02-10 | Stop reason: HOSPADM

## 2020-02-10 RX ORDER — FENTANYL CITRATE 50 UG/ML
INJECTION, SOLUTION INTRAMUSCULAR; INTRAVENOUS PRN
Status: DISCONTINUED | OUTPATIENT
Start: 2020-02-10 | End: 2020-02-10 | Stop reason: SDUPTHER

## 2020-02-10 RX ORDER — DEXAMETHASONE SODIUM PHOSPHATE 4 MG/ML
INJECTION, SOLUTION INTRA-ARTICULAR; INTRALESIONAL; INTRAMUSCULAR; INTRAVENOUS; SOFT TISSUE PRN
Status: DISCONTINUED | OUTPATIENT
Start: 2020-02-10 | End: 2020-02-10 | Stop reason: SDUPTHER

## 2020-02-10 RX ORDER — VALSARTAN AND HYDROCHLOROTHIAZIDE 320; 25 MG/1; MG/1
1 TABLET, FILM COATED ORAL DAILY
Status: DISCONTINUED | OUTPATIENT
Start: 2020-02-10 | End: 2020-02-10 | Stop reason: CLARIF

## 2020-02-10 RX ORDER — LABETALOL HYDROCHLORIDE 5 MG/ML
5 INJECTION, SOLUTION INTRAVENOUS EVERY 10 MIN PRN
Status: DISCONTINUED | OUTPATIENT
Start: 2020-02-10 | End: 2020-02-10 | Stop reason: HOSPADM

## 2020-02-10 RX ORDER — DEXTROSE, SODIUM CHLORIDE, AND POTASSIUM CHLORIDE 5; .45; .15 G/100ML; G/100ML; G/100ML
INJECTION INTRAVENOUS
Status: DISPENSED
Start: 2020-02-10 | End: 2020-02-11

## 2020-02-10 RX ORDER — HYDROCHLOROTHIAZIDE 25 MG/1
25 TABLET ORAL DAILY
Status: DISCONTINUED | OUTPATIENT
Start: 2020-02-11 | End: 2020-02-11 | Stop reason: HOSPADM

## 2020-02-10 RX ORDER — ONDANSETRON 2 MG/ML
4 INJECTION INTRAMUSCULAR; INTRAVENOUS EVERY 6 HOURS PRN
Status: DISCONTINUED | OUTPATIENT
Start: 2020-02-10 | End: 2020-02-11 | Stop reason: HOSPADM

## 2020-02-10 RX ORDER — LEVOTHYROXINE SODIUM 0.1 MG/1
200 TABLET ORAL DAILY
Status: DISCONTINUED | OUTPATIENT
Start: 2020-02-11 | End: 2020-02-11 | Stop reason: HOSPADM

## 2020-02-10 RX ORDER — OXYCODONE HYDROCHLORIDE AND ACETAMINOPHEN 5; 325 MG/1; MG/1
1 TABLET ORAL PRN
Status: DISCONTINUED | OUTPATIENT
Start: 2020-02-10 | End: 2020-02-10 | Stop reason: HOSPADM

## 2020-02-10 RX ORDER — SODIUM CHLORIDE 0.9 % (FLUSH) 0.9 %
10 SYRINGE (ML) INJECTION EVERY 12 HOURS SCHEDULED
Status: DISCONTINUED | OUTPATIENT
Start: 2020-02-10 | End: 2020-02-11 | Stop reason: HOSPADM

## 2020-02-10 RX ORDER — VALSARTAN 80 MG/1
320 TABLET ORAL DAILY
Status: DISCONTINUED | OUTPATIENT
Start: 2020-02-11 | End: 2020-02-11 | Stop reason: HOSPADM

## 2020-02-10 RX ORDER — LIDOCAINE HYDROCHLORIDE 10 MG/ML
2 INJECTION, SOLUTION INFILTRATION; PERINEURAL
Status: DISCONTINUED | OUTPATIENT
Start: 2020-02-10 | End: 2020-02-10 | Stop reason: HOSPADM

## 2020-02-10 RX ORDER — HYDROMORPHONE HCL 110MG/55ML
PATIENT CONTROLLED ANALGESIA SYRINGE INTRAVENOUS PRN
Status: DISCONTINUED | OUTPATIENT
Start: 2020-02-10 | End: 2020-02-10 | Stop reason: SDUPTHER

## 2020-02-10 RX ORDER — SODIUM CHLORIDE, SODIUM LACTATE, POTASSIUM CHLORIDE, CALCIUM CHLORIDE 600; 310; 30; 20 MG/100ML; MG/100ML; MG/100ML; MG/100ML
INJECTION, SOLUTION INTRAVENOUS CONTINUOUS
Status: DISCONTINUED | OUTPATIENT
Start: 2020-02-10 | End: 2020-02-10

## 2020-02-10 RX ORDER — LIDOCAINE HYDROCHLORIDE 20 MG/ML
INJECTION, SOLUTION INFILTRATION; PERINEURAL PRN
Status: DISCONTINUED | OUTPATIENT
Start: 2020-02-10 | End: 2020-02-10 | Stop reason: SDUPTHER

## 2020-02-10 RX ORDER — OXYCODONE HYDROCHLORIDE 5 MG/1
10 TABLET ORAL EVERY 4 HOURS PRN
Status: DISCONTINUED | OUTPATIENT
Start: 2020-02-10 | End: 2020-02-11 | Stop reason: HOSPADM

## 2020-02-10 RX ORDER — DOCUSATE SODIUM 100 MG/1
100 CAPSULE, LIQUID FILLED ORAL 2 TIMES DAILY
Status: DISCONTINUED | OUTPATIENT
Start: 2020-02-10 | End: 2020-02-11 | Stop reason: HOSPADM

## 2020-02-10 RX ORDER — DEXTROSE, SODIUM CHLORIDE, AND POTASSIUM CHLORIDE 5; .45; .15 G/100ML; G/100ML; G/100ML
1000 INJECTION INTRAVENOUS CONTINUOUS
Status: ACTIVE | OUTPATIENT
Start: 2020-02-10 | End: 2020-02-11

## 2020-02-10 RX ORDER — HYDRALAZINE HYDROCHLORIDE 25 MG/1
50 TABLET, FILM COATED ORAL 3 TIMES DAILY
Status: DISCONTINUED | OUTPATIENT
Start: 2020-02-10 | End: 2020-02-11 | Stop reason: HOSPADM

## 2020-02-10 RX ORDER — ONDANSETRON 2 MG/ML
INJECTION INTRAMUSCULAR; INTRAVENOUS PRN
Status: DISCONTINUED | OUTPATIENT
Start: 2020-02-10 | End: 2020-02-10 | Stop reason: SDUPTHER

## 2020-02-10 RX ORDER — PROMETHAZINE HYDROCHLORIDE 25 MG/1
25 TABLET ORAL EVERY 6 HOURS PRN
Status: DISCONTINUED | OUTPATIENT
Start: 2020-02-10 | End: 2020-02-11 | Stop reason: HOSPADM

## 2020-02-10 RX ORDER — TRANEXAMIC ACID 100 MG/ML
15 INJECTION, SOLUTION INTRAVENOUS ONCE
Status: DISCONTINUED | OUTPATIENT
Start: 2020-02-10 | End: 2020-02-10 | Stop reason: ALTCHOICE

## 2020-02-10 RX ORDER — ROCURONIUM BROMIDE 10 MG/ML
INJECTION, SOLUTION INTRAVENOUS PRN
Status: DISCONTINUED | OUTPATIENT
Start: 2020-02-10 | End: 2020-02-10 | Stop reason: SDUPTHER

## 2020-02-10 RX ORDER — PROPOFOL 10 MG/ML
INJECTION, EMULSION INTRAVENOUS PRN
Status: DISCONTINUED | OUTPATIENT
Start: 2020-02-10 | End: 2020-02-10 | Stop reason: SDUPTHER

## 2020-02-10 RX ORDER — ALLOPURINOL 300 MG/1
300 TABLET ORAL DAILY
Status: DISCONTINUED | OUTPATIENT
Start: 2020-02-11 | End: 2020-02-11 | Stop reason: HOSPADM

## 2020-02-10 RX ORDER — PANTOPRAZOLE SODIUM 40 MG/1
40 TABLET, DELAYED RELEASE ORAL
Status: DISCONTINUED | OUTPATIENT
Start: 2020-02-11 | End: 2020-02-11 | Stop reason: HOSPADM

## 2020-02-10 RX ADMIN — SUGAMMADEX 200 MG: 100 INJECTION, SOLUTION INTRAVENOUS at 16:34

## 2020-02-10 RX ADMIN — HYDROMORPHONE HYDROCHLORIDE 0.5 MG: 1 INJECTION, SOLUTION INTRAMUSCULAR; INTRAVENOUS; SUBCUTANEOUS at 17:58

## 2020-02-10 RX ADMIN — LIDOCAINE HYDROCHLORIDE 60 MG: 20 INJECTION, SOLUTION INFILTRATION; PERINEURAL at 15:09

## 2020-02-10 RX ADMIN — HYDRALAZINE HYDROCHLORIDE 50 MG: 25 TABLET, FILM COATED ORAL at 19:52

## 2020-02-10 RX ADMIN — PROPOFOL 200 MG: 10 INJECTION, EMULSION INTRAVENOUS at 15:09

## 2020-02-10 RX ADMIN — FENTANYL CITRATE 100 MCG: 50 INJECTION INTRAMUSCULAR; INTRAVENOUS at 15:05

## 2020-02-10 RX ADMIN — HYDROMORPHONE HYDROCHLORIDE 0.5 MG: 2 INJECTION INTRAMUSCULAR; INTRAVENOUS; SUBCUTANEOUS at 15:35

## 2020-02-10 RX ADMIN — SODIUM CHLORIDE, POTASSIUM CHLORIDE, SODIUM LACTATE AND CALCIUM CHLORIDE: 600; 310; 30; 20 INJECTION, SOLUTION INTRAVENOUS at 16:17

## 2020-02-10 RX ADMIN — SODIUM CHLORIDE, POTASSIUM CHLORIDE, SODIUM LACTATE AND CALCIUM CHLORIDE: 600; 310; 30; 20 INJECTION, SOLUTION INTRAVENOUS at 14:46

## 2020-02-10 RX ADMIN — HYDROMORPHONE HYDROCHLORIDE 0.5 MG: 2 INJECTION INTRAMUSCULAR; INTRAVENOUS; SUBCUTANEOUS at 16:17

## 2020-02-10 RX ADMIN — HYDROMORPHONE HYDROCHLORIDE 0.5 MG: 1 INJECTION, SOLUTION INTRAMUSCULAR; INTRAVENOUS; SUBCUTANEOUS at 16:59

## 2020-02-10 RX ADMIN — HYDROMORPHONE HYDROCHLORIDE 0.5 MG: 1 INJECTION, SOLUTION INTRAMUSCULAR; INTRAVENOUS; SUBCUTANEOUS at 16:45

## 2020-02-10 RX ADMIN — CEFAZOLIN SODIUM 2 G: 10 INJECTION, POWDER, FOR SOLUTION INTRAVENOUS at 23:44

## 2020-02-10 RX ADMIN — CEFAZOLIN SODIUM 2 G: 10 INJECTION, POWDER, FOR SOLUTION INTRAVENOUS at 15:01

## 2020-02-10 RX ADMIN — DEXAMETHASONE SODIUM PHOSPHATE 8 MG: 4 INJECTION, SOLUTION INTRAMUSCULAR; INTRAVENOUS at 15:16

## 2020-02-10 RX ADMIN — ROCURONIUM BROMIDE 50 MG: 10 SOLUTION INTRAVENOUS at 15:09

## 2020-02-10 RX ADMIN — TRANEXAMIC ACID 1000 MG: 100 INJECTION, SOLUTION INTRAVENOUS at 15:14

## 2020-02-10 RX ADMIN — ONDANSETRON 4 MG: 2 INJECTION INTRAMUSCULAR; INTRAVENOUS at 15:16

## 2020-02-10 RX ADMIN — POTASSIUM CHLORIDE, DEXTROSE MONOHYDRATE AND SODIUM CHLORIDE 1000 ML: 150; 5; 450 INJECTION, SOLUTION INTRAVENOUS at 19:11

## 2020-02-10 RX ADMIN — DOCUSATE SODIUM 100 MG: 100 CAPSULE, LIQUID FILLED ORAL at 19:52

## 2020-02-10 ASSESSMENT — PULMONARY FUNCTION TESTS
PIF_VALUE: 14
PIF_VALUE: 14
PIF_VALUE: 17
PIF_VALUE: 14
PIF_VALUE: 17
PIF_VALUE: 16
PIF_VALUE: 13
PIF_VALUE: 14
PIF_VALUE: 16
PIF_VALUE: 13
PIF_VALUE: 13
PIF_VALUE: 18
PIF_VALUE: 14
PIF_VALUE: 13
PIF_VALUE: 14
PIF_VALUE: 0
PIF_VALUE: 11
PIF_VALUE: 14
PIF_VALUE: 17
PIF_VALUE: 2
PIF_VALUE: 13
PIF_VALUE: 13
PIF_VALUE: 14
PIF_VALUE: 16
PIF_VALUE: 1
PIF_VALUE: 16
PIF_VALUE: 16
PIF_VALUE: 13
PIF_VALUE: 16
PIF_VALUE: 12
PIF_VALUE: 14
PIF_VALUE: 14
PIF_VALUE: 16
PIF_VALUE: 13
PIF_VALUE: 14
PIF_VALUE: 16
PIF_VALUE: 13
PIF_VALUE: 14
PIF_VALUE: 1
PIF_VALUE: 13
PIF_VALUE: 14
PIF_VALUE: 14
PIF_VALUE: 13
PIF_VALUE: 17
PIF_VALUE: 16
PIF_VALUE: 16
PIF_VALUE: 13
PIF_VALUE: 16
PIF_VALUE: 17
PIF_VALUE: 1
PIF_VALUE: 17
PIF_VALUE: 25
PIF_VALUE: 0
PIF_VALUE: 16
PIF_VALUE: 13
PIF_VALUE: 12
PIF_VALUE: 14
PIF_VALUE: 0
PIF_VALUE: 17
PIF_VALUE: 14
PIF_VALUE: 14
PIF_VALUE: 13
PIF_VALUE: 14
PIF_VALUE: 16
PIF_VALUE: 14
PIF_VALUE: 16
PIF_VALUE: 16
PIF_VALUE: 14
PIF_VALUE: 13
PIF_VALUE: 17
PIF_VALUE: 13
PIF_VALUE: 13
PIF_VALUE: 2
PIF_VALUE: 16
PIF_VALUE: 14
PIF_VALUE: 23
PIF_VALUE: 0
PIF_VALUE: 14
PIF_VALUE: 14
PIF_VALUE: 17
PIF_VALUE: 0
PIF_VALUE: 16
PIF_VALUE: 14
PIF_VALUE: 13
PIF_VALUE: 17
PIF_VALUE: 17

## 2020-02-10 ASSESSMENT — PAIN DESCRIPTION - PAIN TYPE
TYPE: SURGICAL PAIN
TYPE: SURGICAL PAIN

## 2020-02-10 ASSESSMENT — PAIN SCALES - GENERAL
PAINLEVEL_OUTOF10: 0
PAINLEVEL_OUTOF10: 3
PAINLEVEL_OUTOF10: 7
PAINLEVEL_OUTOF10: 6
PAINLEVEL_OUTOF10: 6

## 2020-02-10 ASSESSMENT — PAIN DESCRIPTION - LOCATION
LOCATION: KNEE
LOCATION: KNEE

## 2020-02-10 ASSESSMENT — PAIN DESCRIPTION - DESCRIPTORS
DESCRIPTORS: BURNING;ACHING
DESCRIPTORS: ACHING;BURNING

## 2020-02-10 NOTE — BRIEF OP NOTE
Brief Postoperative Note  ______________________________________________________________    Patient: Benjamin Das  YOB: 1950  MRN: [id number]  Date of Procedure: 2/10/2020    Pre-Op Diagnosis: RIGHT KNEE INFECTION    Post-Op Diagnosis: Same       Procedure(s):  RIGHT KNEE INCISION AND DRAINAGE WITH CEMENT SPACER EXCHANGE    Anesthesia: General    Surgeon(s):  Toney Rincon MD    Assistant: Mignon Villasenor    Estimated Blood Loss (mL): less than 50     Complications: None    Specimens:   ID Type Source Tests Collected by Time Destination   1 : Right knee fluid for culture Joint/Joint Fluid Joint Fluid JOINT CULTURE Toney Rincon MD 2/10/2020 1526        Implants:  Implant Name Type Inv.  Item Serial No.  Lot No. LRB No. Used   CEMENT TOBRAMYCIN ANTIBIOTIC FULL Cement CEMENT TOBRAMYCIN ANTIBIOTIC FULL  MALACHI: ORTHOPAEDICS [id number]         Drains: * No LDAs found *    Findings: none    Toney Rincon MD  Date: 2/10/2020  Time: 3:44 PM

## 2020-02-10 NOTE — ANESTHESIA PRE PROCEDURE
Department of Anesthesiology  Preprocedure Note       Name:  Yobani Brasher   Age:  81 y.o.  :  1950                                          MRN:  [id number]         Date:  2/10/2020      Surgeon: Jl Nesbitt):  Andrea Sweeney MD    Procedure: RIGHT KNEE INCISION AND DRAINAGE WITH (Right )  CEMENT SPACER EXCHANGE (Right Knee)    Medications prior to admission:   Prior to Admission medications    Medication Sig Start Date End Date Taking? Authorizing Provider   promethazine (PHENERGAN) 25 MG tablet Take 25 mg by mouth every 6 hours as needed for Nausea   Yes Historical Provider, MD   omeprazole (PRILOSEC) 40 MG delayed release capsule Take 40 mg by mouth daily   Yes Historical Provider, MD   oxyCODONE-acetaminophen (PERCOCET) 5-325 MG per tablet Take 1 tablet by mouth nightly. Historical Provider, MD   hydrALAZINE (APRESOLINE) 50 MG tablet Take 50 mg by mouth 3 times daily     Historical Provider, MD   meloxicam (MOBIC) 15 MG tablet Take 15 mg by mouth daily    Historical Provider, MD   Multiple Vitamins-Minerals (MULTIVITAMIN PO) Take 1 tablet by mouth    Historical Provider, MD   Levothyroxine Sodium (SYNTHROID PO) Take 200 mcg by mouth daily    Historical Provider, MD   valsartan-hydrochlorothiazide (DIOVAN-HCT) 320-25 MG per tablet Take 1 tablet by mouth daily    Historical Provider, MD   allopurinol (ZYLOPRIM) 300 MG tablet Take 300 mg by mouth daily.       Historical Provider, MD       Current medications:    Current Facility-Administered Medications   Medication Dose Route Frequency Provider Last Rate Last Dose    ceFAZolin (ANCEF) 2 g in dextrose 5 % 100 mL IVPB  2 g Intravenous On Call to [address] [place] [address], MD        tranexamic acid (CYKLOKAPRON) injection 15 mg/kg  15 mg/kg Intravenous Once Andrea Sweeney MD        lidocaine 1 % injection 2 mL  2 mL Intradermal Once PRN Marshal Vera MD        lactated ringers infusion   Intravenous Continuous Marshal Vera MD Allergies: Allergies   Allergen Reactions    Ace Inhibitors      COUGH    Amlodipine Besylate      Lower extremity edema    Avelox [Moxifloxacin] Other (See Comments)     Hallucination    Clonidine Derivatives      FATIGUE    Flagyl [Metronidazole] Nausea Only     Stomach cramps       Problem List:    Patient Active Problem List   Diagnosis Code    Right TKR Z96.659    Sepsis ([facility] A41.9    Asymptomatic cholelithiasis K80.20    Inguinal hernia unilateral, non-recurrent K40.90    Dyspepsia R10.13    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 7XXS    Failure of outpatient treatment Z78.9    Thyroid disease E07.9    Overweight E66.3    Infected prosthetic knee joint, initial encounter ([facility] T84.59XA, Z96.659       Past Medical History:        Diagnosis Date    Arthritis     Cancer ([facility]     SKIN    Chronic infection of left knee ([facility]     LTKR    Hypertension     Perforated diverticulitis     Retention of urine     Thyroid disease     HYPOTHROID    Vitamin B12 deficiency     hx of       Past Surgical History:        Procedure Laterality Date    ABDOMINAL ADHESION SURGERY  02/05/15    debridement of colcotaneous fistula    ABDOMINAL EXPLORATION SURGERY  02/05/15    APPENDECTOMY      COLONOSCOPY      INCISION AND DRAINAGE Right 7/8/2019    RIGHT KNEE KNEE INCISION AND DRAINAGE WITH performed by Andrea Sweeney MD at [address] Right 02/05/15    INGUINAL HERNIA REPAIR Left 04/22/15    left inguinal hernia repair with mesh    JOINT REPLACEMENT      KNEE SURGERY  11/29/2012    right total knee replacement    REVISION TOTAL KNEE ARTHROPLASTY Right 7/8/2019    REMOVAL OF TOTAL KNEE AND PLACEMENT OF CEMENT SPACER                  BIOMET performed by Andrea Sweeney MD at [facility] 8/5/2019    INCISION AND DEBRIDEMENT RIGHT KNEE WITH CEMENT SPACER EXCHANGE  CPT CODE - 00751 performed by Nataliia aBrrios MD at [facility] Right 10/7/2019    RIGHT KNEE INCISION AND DEBRIDEMENT WITH  PLACEMENT OF CEMENT AND SPACER FROZEN SECTION       MALACHI performed by Nataliia Barrios MD at [address] Right 12/30/2019    RIGHT KNEE INCISION AND DRAINAGE WITH SPACER EXCHANGE      MALACHI performed by Nataliia Barrios MD at [address] [id number]       Social History:    Social History     Tobacco Use    Smoking status: Never Smoker    Smokeless tobacco: Never Used   Substance Use Topics    Alcohol use: Yes     Alcohol/week: 4.0 standard drinks     Types: 4 Cans of beer per week     Comment: 4 beers/day                                Counseling given: Not Answered      Vital Signs (Current): There were no vitals filed for this visit.                                            BP Readings from Last 3 Encounters:   02/07/20 134/82   01/09/20 (!) 149/94   01/08/20 (!) 148/89       NPO Status:                                                                                 BMI:   Wt Readings from Last 3 Encounters:   02/07/20 242 lb (109.8 kg)   01/09/20 250 lb (113.4 kg)   01/07/20 250 lb (113.4 kg)     There is no height or weight on file to calculate BMI.    CBC:   Lab Results   Component Value Date    WBC 5.8 02/07/2020    RBC 4.73 02/07/2020    HGB 12.3 02/07/2020    HCT 37.2 02/07/2020    MCV 78.7 02/07/2020    RDW 22.9 02/07/2020     02/07/2020       CMP:   Lab Results   Component Value Date     02/07/2020    K 3.4 02/07/2020    K 4.4 12/31/2019    CL 99 02/07/2020    CO2 26 02/07/2020    BUN 12 02/07/2020    CREATININE 0.7 02/07/2020    GFRAA >60 02/07/2020    GFRAA >60 11/30/2012    AGRATIO 1.2 01/06/2020    LABGLOM >60 02/07/2020    GLUCOSE 110 02/07/2020    PROT 7.0 02/03/2020    CALCIUM 9.5 02/07/2020    BILITOT 0.6 02/03/2020    ALKPHOS 104 02/03/2020    AST 46 02/03/2020    ALT 33 02/03/2020       POC Tests: No results for input(s): POCGLU, POCNA, POCK, POCCL, POCBUN, POCHEMO, POCHCT in the last 72 hours. Coags:   Lab Results   Component Value Date    PROTIME 11.8 02/07/2020    INR 1.02 02/07/2020    APTT 30.8 02/07/2020       HCG (If Applicable): No results found for: PREGTESTUR, PREGSERUM, HCG, HCGQUANT     ABGs: No results found for: PHART, PO2ART, YRC4KDZ, OGE1HJM, BEART, H4YHVGAM     Type & Screen (If Applicable):  Lab Results   Component Value Date    LABABO O 11/20/2012    [address] Negative 11/20/2012       Anesthesia Evaluation  Patient summary reviewed and Nursing notes reviewed no history of anesthetic complications:   Airway: Mallampati: II  TM distance: >3 FB   Neck ROM: full  Mouth opening: > = 3 FB Dental: normal exam         Pulmonary:Negative Pulmonary ROS                              Cardiovascular:    (+) hypertension:,                   Neuro/Psych:   Negative Neuro/Psych ROS              GI/Hepatic/Renal: Neg GI/Hepatic/Renal ROS       (-) GERD, liver disease and no renal disease       Endo/Other:    (+) hypothyroidism::., .    (-) diabetes mellitus               Abdominal:           Vascular: negative vascular ROS. Anesthesia Plan      general     ASA 3     (I discussed with the patient the risks and benefits of PIV, general anesthesia, IV Narcotics, PACU. All questions were answered the patient agrees with the plan)  Induction: intravenous. MIPS: Prophylactic antiemetics administered. Anesthetic plan and risks discussed with patient. Plan discussed with CRNA.                   Tank Montoya MD   2/10/2020

## 2020-02-10 NOTE — PROGRESS NOTES
Consult has been PerfectServed to [facility] on 2/10/2020 at 18:54.    Radames Briones  2/10/2020

## 2020-02-10 NOTE — H&P
I have reviewed the patients history and physical and have found no changes. I have reviewed the procedure with the patient and answered all questions and explained the risks and benefits. The operative site was marked. Risks benefits of nartotic use and abuse were discussed as were alternatives to their use. They have consented for the use of narcotics. I have reviewed the patients history and physical and have found no changes. I have reviewed the procedure with the patient and answered all questions and explained the risks and benefits. The operative site was marked. Risks benefits of nartotic use and abuse were discussed as were alternatives to their use. They have consented for the use of narcotics.

## 2020-02-11 ENCOUNTER — APPOINTMENT (OUTPATIENT)
Dept: GENERAL RADIOLOGY | Age: 70
DRG: 486 | End: 2020-02-11
Attending: ORTHOPAEDIC SURGERY
Payer: MEDICARE

## 2020-02-11 ENCOUNTER — APPOINTMENT (OUTPATIENT)
Dept: INTERVENTIONAL RADIOLOGY/VASCULAR | Age: 70
DRG: 486 | End: 2020-02-11
Attending: ORTHOPAEDIC SURGERY
Payer: MEDICARE

## 2020-02-11 VITALS
OXYGEN SATURATION: 98 % | BODY MASS INDEX: 28.23 KG/M2 | WEIGHT: 244 LBS | RESPIRATION RATE: 16 BRPM | DIASTOLIC BLOOD PRESSURE: 91 MMHG | TEMPERATURE: 98.1 F | HEIGHT: 78 IN | SYSTOLIC BLOOD PRESSURE: 147 MMHG | HEART RATE: 64 BPM

## 2020-02-11 LAB
ANION GAP SERPL CALCULATED.3IONS-SCNC: 12 MMOL/L (ref 3–16)
BUN BLDV-MCNC: 15 MG/DL (ref 7–20)
CALCIUM SERPL-MCNC: 8.8 MG/DL (ref 8.3–10.6)
CHLORIDE BLD-SCNC: 98 MMOL/L (ref 99–110)
CHOLESTEROL, TOTAL: 122 MG/DL (ref 0–199)
CO2: 26 MMOL/L (ref 21–32)
CREAT SERPL-MCNC: 0.8 MG/DL (ref 0.8–1.3)
GFR AFRICAN AMERICAN: >60
GFR NON-AFRICAN AMERICAN: >60
GLUCOSE BLD-MCNC: 178 MG/DL (ref 70–99)
HCT VFR BLD CALC: 34.2 % (ref 40.5–52.5)
HDLC SERPL-MCNC: 33 MG/DL (ref 40–60)
HEMOGLOBIN: 11.6 G/DL (ref 13.5–17.5)
HIV AG/AB: NORMAL
HIV ANTIGEN: NORMAL
HIV-1 ANTIBODY: NORMAL
HIV-2 AB: NORMAL
LDL CHOLESTEROL CALCULATED: 75 MG/DL
MCH RBC QN AUTO: 26.6 PG (ref 26–34)
MCHC RBC AUTO-ENTMCNC: 33.8 G/DL (ref 31–36)
MCV RBC AUTO: 78.7 FL (ref 80–100)
PDW BLD-RTO: 22.1 % (ref 12.4–15.4)
PLATELET # BLD: 337 K/UL (ref 135–450)
PMV BLD AUTO: 7.2 FL (ref 5–10.5)
POTASSIUM REFLEX MAGNESIUM: 4.1 MMOL/L (ref 3.5–5.1)
RBC # BLD: 4.34 M/UL (ref 4.2–5.9)
SODIUM BLD-SCNC: 136 MMOL/L (ref 136–145)
T4 FREE: 1 NG/DL (ref 0.9–1.8)
TRIGL SERPL-MCNC: 68 MG/DL (ref 0–150)
TSH SERPL DL<=0.05 MIU/L-ACNC: 2.53 UIU/ML (ref 0.27–4.2)
VLDLC SERPL CALC-MCNC: 14 MG/DL
WBC # BLD: 3.9 K/UL (ref 4–11)

## 2020-02-11 PROCEDURE — 97530 THERAPEUTIC ACTIVITIES: CPT

## 2020-02-11 PROCEDURE — 80048 BASIC METABOLIC PNL TOTAL CA: CPT

## 2020-02-11 PROCEDURE — 97162 PT EVAL MOD COMPLEX 30 MIN: CPT

## 2020-02-11 PROCEDURE — 99221 1ST HOSP IP/OBS SF/LOW 40: CPT | Performed by: INTERNAL MEDICINE

## 2020-02-11 PROCEDURE — 97165 OT EVAL LOW COMPLEX 30 MIN: CPT

## 2020-02-11 PROCEDURE — APPNB30 APP NON BILLABLE TIME 0-30 MINS: Performed by: PHYSICIAN ASSISTANT

## 2020-02-11 PROCEDURE — 6360000002 HC RX W HCPCS: Performed by: ORTHOPAEDIC SURGERY

## 2020-02-11 PROCEDURE — 86702 HIV-2 ANTIBODY: CPT

## 2020-02-11 PROCEDURE — C1751 CATH, INF, PER/CENT/MIDLINE: HCPCS

## 2020-02-11 PROCEDURE — 36573 INSJ PICC RS&I 5 YR+: CPT

## 2020-02-11 PROCEDURE — 2580000003 HC RX 258: Performed by: INTERNAL MEDICINE

## 2020-02-11 PROCEDURE — 87390 HIV-1 AG IA: CPT

## 2020-02-11 PROCEDURE — 97110 THERAPEUTIC EXERCISES: CPT

## 2020-02-11 PROCEDURE — 71045 X-RAY EXAM CHEST 1 VIEW: CPT

## 2020-02-11 PROCEDURE — 85027 COMPLETE CBC AUTOMATED: CPT

## 2020-02-11 PROCEDURE — 86701 HIV-1ANTIBODY: CPT

## 2020-02-11 PROCEDURE — 80061 LIPID PANEL: CPT

## 2020-02-11 PROCEDURE — 97116 GAIT TRAINING THERAPY: CPT

## 2020-02-11 PROCEDURE — 6370000000 HC RX 637 (ALT 250 FOR IP): Performed by: ORTHOPAEDIC SURGERY

## 2020-02-11 PROCEDURE — 84439 ASSAY OF FREE THYROXINE: CPT

## 2020-02-11 PROCEDURE — 6370000000 HC RX 637 (ALT 250 FOR IP): Performed by: INTERNAL MEDICINE

## 2020-02-11 PROCEDURE — 6360000002 HC RX W HCPCS: Performed by: INTERNAL MEDICINE

## 2020-02-11 PROCEDURE — 84443 ASSAY THYROID STIM HORMONE: CPT

## 2020-02-11 RX ORDER — SODIUM CHLORIDE 0.9 % (FLUSH) 0.9 %
10 SYRINGE (ML) INJECTION PRN
Status: CANCELLED | OUTPATIENT
Start: 2020-02-12

## 2020-02-11 RX ORDER — SODIUM CHLORIDE 9 MG/ML
INJECTION, SOLUTION INTRAVENOUS
Status: DISCONTINUED
Start: 2020-02-11 | End: 2020-02-11 | Stop reason: HOSPADM

## 2020-02-11 RX ORDER — DIPHENHYDRAMINE HYDROCHLORIDE 50 MG/ML
50 INJECTION INTRAMUSCULAR; INTRAVENOUS ONCE
Status: CANCELLED | OUTPATIENT
Start: 2020-02-12

## 2020-02-11 RX ORDER — OXYCODONE HYDROCHLORIDE 5 MG/1
5 TABLET ORAL EVERY 6 HOURS PRN
Qty: 28 TABLET | Refills: 0 | Status: SHIPPED | OUTPATIENT
Start: 2020-02-11 | End: 2020-02-11

## 2020-02-11 RX ORDER — FLUCONAZOLE 100 MG/1
600 TABLET ORAL ONCE
Status: COMPLETED | OUTPATIENT
Start: 2020-02-11 | End: 2020-02-11

## 2020-02-11 RX ORDER — SODIUM CHLORIDE 9 MG/ML
INJECTION, SOLUTION INTRAVENOUS CONTINUOUS
Status: CANCELLED | OUTPATIENT
Start: 2020-02-12

## 2020-02-11 RX ORDER — HEPARIN SODIUM (PORCINE) LOCK FLUSH IV SOLN 100 UNIT/ML 100 UNIT/ML
500 SOLUTION INTRAVENOUS PRN
Status: CANCELLED | OUTPATIENT
Start: 2020-02-12

## 2020-02-11 RX ORDER — METHYLPREDNISOLONE SODIUM SUCCINATE 125 MG/2ML
125 INJECTION, POWDER, LYOPHILIZED, FOR SOLUTION INTRAMUSCULAR; INTRAVENOUS ONCE
Status: CANCELLED | OUTPATIENT
Start: 2020-02-12

## 2020-02-11 RX ORDER — SODIUM CHLORIDE 0.9 % (FLUSH) 0.9 %
5 SYRINGE (ML) INJECTION PRN
Status: CANCELLED | OUTPATIENT
Start: 2020-02-12

## 2020-02-11 RX ORDER — OXYCODONE HYDROCHLORIDE 5 MG/1
5 TABLET ORAL EVERY 6 HOURS PRN
Qty: 28 TABLET | Refills: 0 | Status: SHIPPED | OUTPATIENT
Start: 2020-02-11 | End: 2020-02-18

## 2020-02-11 RX ORDER — EPINEPHRINE 1 MG/ML
0.3 INJECTION, SOLUTION, CONCENTRATE INTRAVENOUS PRN
Status: CANCELLED | OUTPATIENT
Start: 2020-02-12

## 2020-02-11 RX ADMIN — FLUCONAZOLE 600 MG: 100 TABLET ORAL at 18:52

## 2020-02-11 RX ADMIN — HYDRALAZINE HYDROCHLORIDE 50 MG: 25 TABLET, FILM COATED ORAL at 08:11

## 2020-02-11 RX ADMIN — ALLOPURINOL 300 MG: 300 TABLET ORAL at 08:10

## 2020-02-11 RX ADMIN — PANTOPRAZOLE SODIUM 40 MG: 40 TABLET, DELAYED RELEASE ORAL at 05:22

## 2020-02-11 RX ADMIN — LEVOTHYROXINE SODIUM 200 MCG: 100 TABLET ORAL at 05:22

## 2020-02-11 RX ADMIN — HYDROCHLOROTHIAZIDE 25 MG: 25 TABLET ORAL at 08:11

## 2020-02-11 RX ADMIN — ONDANSETRON 4 MG: 2 INJECTION INTRAMUSCULAR; INTRAVENOUS at 06:31

## 2020-02-11 RX ADMIN — ENOXAPARIN SODIUM 40 MG: 40 INJECTION SUBCUTANEOUS at 08:12

## 2020-02-11 RX ADMIN — DOCUSATE SODIUM 100 MG: 100 CAPSULE, LIQUID FILLED ORAL at 08:10

## 2020-02-11 RX ADMIN — HYDRALAZINE HYDROCHLORIDE 50 MG: 25 TABLET, FILM COATED ORAL at 14:47

## 2020-02-11 RX ADMIN — CEFAZOLIN SODIUM 2 G: 10 INJECTION, POWDER, FOR SOLUTION INTRAVENOUS at 06:31

## 2020-02-11 RX ADMIN — VALSARTAN 320 MG: 80 TABLET, FILM COATED ORAL at 08:11

## 2020-02-11 RX ADMIN — ERTAPENEM SODIUM 1000 MG: 1 INJECTION, POWDER, LYOPHILIZED, FOR SOLUTION INTRAMUSCULAR; INTRAVENOUS at 18:15

## 2020-02-11 ASSESSMENT — PAIN DESCRIPTION - ORIENTATION: ORIENTATION: RIGHT

## 2020-02-11 ASSESSMENT — PAIN DESCRIPTION - LOCATION: LOCATION: KNEE

## 2020-02-11 ASSESSMENT — PAIN DESCRIPTION - PAIN TYPE: TYPE: SURGICAL PAIN

## 2020-02-11 ASSESSMENT — PAIN SCALES - GENERAL
PAINLEVEL_OUTOF10: 3
PAINLEVEL_OUTOF10: 0

## 2020-02-11 NOTE — PROGRESS NOTES
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 to help children with reading, walking, and anger management)       Objective        Orientation  Overall Orientation Status: Within Functional Limits     Balance  Sitting Balance: Independent  Standing Balance: Modified independent (with SW)  Functional Mobility  Functional - Mobility Device: Standard Walker  Activity: To/from bathroom  Assist Level: Modified independent     Toilet Transfers  Toilet - Technique: Ambulating(with SW)  Equipment Used: Raised toilet seat with rails  Toilet Transfer: Modified independent    ADL  Feeding: Independent  UE Dressing: Independent  LE Dressing: Independent  Toileting: Independent     Tone RUE  RUE Tone: Normotonic  Tone LUE  LUE Tone: Normotonic  Coordination  Movements Are Fluid And Coordinated: Yes     Bed mobility  Supine to Sit: Modified independent(to R with HOB elevated)  Sit to Supine: Modified independent(HOB elevated)  Transfers  Sit to stand: Modified independent(up to SW)  Stand to sit: Modified independent     Cognition  Overall Cognitive Status: WFL      BUE AROM: WFL  BUR strength: WFL        Plan   Plan  Times per week: 1x only             AM-PAC Score        AM-PAC Inpatient Daily Activity Raw Score: 23 (02/11/20 0909)  AM-PAC Inpatient ADL T-Scale Score : 51.12 (02/11/20 0909)  ADL Inpatient CMS 0-100% Score: 15.86 (02/11/20 0909)  ADL Inpatient CMS G-Code Modifier : CI (02/11/20 0909)    Goals  Short term goals  Time Frame for Short term goals: 1 session  Short term goal 1: Pt will complete LE dressing and toilet transfers with supervision-STG met 2/11/2020  Patient Goals   Patient goals : \"to be able to move\"       Therapy Time   Individual Concurrent Group Co-treatment   Time In 1922         Time Out 0903 Minutes 25         Timed Code Treatment Minutes: [facility], OTR/L

## 2020-02-11 NOTE — CONSULTS
pending. He is feeling well and eager for discharge. Current abx:  Fluconazole 600mg daily prior to admission        Past Surgical History:       Diagnosis Date    Arthritis     Cancer ([address]     SKIN    Chronic infection of left knee ([address]     LTKR    Hypertension     Perforated diverticulitis     Retention of urine     Thyroid disease     HYPOTHROID    Vitamin B12 deficiency     hx of         Procedure Laterality Date    ABDOMINAL ADHESION SURGERY  02/05/15    debridement of colcotaneous fistula    ABDOMINAL EXPLORATION SURGERY  02/05/15    APPENDECTOMY      COLONOSCOPY      INCISION AND DRAINAGE Right 7/8/2019    RIGHT KNEE KNEE INCISION AND DRAINAGE WITH performed by Mounika Morales MD at [facility] Right 02/05/15    INGUINAL HERNIA REPAIR Left 04/22/15    left inguinal hernia repair with mesh    JOINT REPLACEMENT      KNEE SURGERY  11/29/2012    right total knee replacement    REVISION TOTAL KNEE ARTHROPLASTY Right 7/8/2019    REMOVAL OF TOTAL KNEE AND PLACEMENT OF CEMENT SPACER                  BIOMET performed by Mounika Morales MD at [address] [place] [address] Right 8/5/2019    INCISION AND DEBRIDEMENT RIGHT KNEE WITH CEMENT SPACER EXCHANGE  CPT CODE - 41718 performed by Mounika Morales MD at [address] [place] [address] Right 10/7/2019    RIGHT KNEE INCISION AND DEBRIDEMENT WITH  PLACEMENT OF CEMENT AND SPACER FROZEN SECTION       MALACHI performed by Mounika Morales MD at [address] [place] [address] Right 12/30/2019    RIGHT KNEE INCISION AND DRAINAGE WITH SPACER EXCHANGE      MALACHI performed by Mounika Morales MD at [address] [place] [address] Right 2/10/2020    RIGHT KNEE INCISION AND DRAINAGE WITH CEMENT SPACER EXCHANGE performed by Mounika Morales MD at [address] [place] [address]       Social History:    TOBACCO:   reports that he has never smoked.  He has blurred vision, eye discharge, visual disturbance and icterus  HEENT:  negative for hearing loss, tinnitus, ear drainage, sinus pressure, nasal congestion, epistaxis and snoring  RESPIRATORY:  No cough, shortness of breath, hemoptysis  CARDIOVASCULAR:  negative for chest pain, palpitations, exertional chest pressure/discomfort, edema, syncope  GASTROINTESTINAL:  negative for nausea, vomiting, diarrhea, constipation, blood in stool and abdominal pain  GENITOURINARY:  negative for frequency, dysuria, urinary incontinence, decreased urine volume, and hematuria  HEMATOLOGIC/LYMPHATIC:  negative for easy bruising, bleeding and lymphadenopathy  ALLERGIC/IMMUNOLOGIC:  negative for recurrent infections, angioedema, anaphylaxis and drug reactions  ENDOCRINE:  negative for weight changes and diabetic symptoms including polyuria, polydipsia and polyphagia  MUSCULOSKELETAL:   Per HPI   NEUROLOGICAL:  negative for headaches, slurred speech, unilateral weakness  PSYCHIATRIC/BEHAVIORAL: negative for hallucinations, behavioral problems, confusion and agitation. Objective:   PHYSICAL EXAM:      VITALS:  BP (!) 143/71   Pulse 64   Temp 98.5 °F (36.9 °C) (Oral)   Resp 16   Ht 6' 6\" (1.981 m)   Wt 244 lb (110.7 kg)   SpO2 98%   BMI 28.20 kg/m²      24HR INTAKE/OUTPUT:      Intake/Output Summary (Last 24 hours) at 2/11/2020 1453  Last data filed at 2/11/2020 1449  Gross per 24 hour   Intake 1000 ml   Output 870 ml   Net 130 ml     CONSTITUTIONAL:  Awake, alert, cooperative, no apparent distress, and appears stated age  [de-identified]: NCAT, PERRL, EOMI. Sclera white, conjunctiva full. OP with moist mucosal membranes, no thrush, tongue protrudes midline  NECK:  Supple, symmetrical, trachea midline, no adenopathy  LUNGS:  no increased work of breathing   ABDOMEN:  normal bowel sounds, soft, flat, NT   PSYCHIATRIC: Oriented to person place and time. No obvious depression or anxiety.   MUSCULOSKELETAL:   Bulky surgical dressing and antifungal with improvement, unfortunately had relapse or persistent infection in the knee  S/p debridement and spacer exchange 12/30/19, culture again with C parapsilosis, for which he remains on fluconazole     Admitted with draining sinus over R knee  S/p I&D and spacer exchange 2/10. Culture now with GNR, ID pending      Allergy flagyl, avelox      Recs:  Pending final culture, will give ertapenem  PICC ordered. Can be done prior to DC or as outpt  Orders sent to [facility] outpatient infusion center for daily administration  Continue fluconazole as ordered    Phone follow-up at end of week to discuss final culture result    Mami RN and Lizz Hargrove M.D. Thank you for the opportunity to participate in the care of your patient.     Please do not hesitate to contact me:   [phone] office  [phone] mobile

## 2020-02-11 NOTE — PROGRESS NOTES
Per Dr. Eric Gerber and Hai Lomeli, [place] patient can be discharged today. Patient will need PICC for IV ATB which can be done while inpatient or outpatient. [facility] to follow. Infusions will be at [facility]. Patient agreeable.

## 2020-02-11 NOTE — PROGRESS NOTES
Department of Orthopedic Surgery  Physician Assistant   Progress Note    Subjective:       Systemic or Specific Complaints:  Ready to d/c home. Pt states pain is managed at this time. No new issues. He was concerned about possible DVT prior to surgery yesterday given increased swelling and redness. Objective:     Patient Vitals for the past 24 hrs:   BP Temp Temp src Pulse Resp SpO2 Height Weight   02/11/20 1226 (!) 143/71 98.5 °F (36.9 °C) Oral 64 16 98 % -- --   02/11/20 0804 (!) 145/91 98.2 °F (36.8 °C) Oral 68 18 98 % -- --   02/11/20 0230 (!) 164/88 97.8 °F (36.6 °C) -- 61 16 97 % -- --   02/10/20 2337 (!) 150/97 97.9 °F (36.6 °C) Axillary 63 16 96 % -- --   02/10/20 2145 (!) 144/90 98.1 °F (36.7 °C) -- 71 -- 96 % -- --   02/10/20 1945 (!) 152/92 97.8 °F (36.6 °C) -- 73 16 93 % -- --   02/10/20 1847 (!) 160/90 97.8 °F (36.6 °C) Oral 68 18 93 % -- --   02/10/20 1826 -- -- Oral -- -- -- -- --   02/10/20 1815 (!) 141/88 -- -- 64 10 92 % -- --   02/10/20 1800 (!) 145/94 -- -- 62 8 97 % -- --   02/10/20 1745 (!) 158/86 -- -- 67 13 97 % -- --   02/10/20 1720 (!) 149/89 -- -- 63 (!) 0 95 % -- --   02/10/20 1710 (!) 145/92 -- -- 61 (!) 0 94 % -- --   02/10/20 1655 (!) 152/95 -- -- 61 (!) 7 96 % -- --   02/10/20 1650 (!) 154/105 -- -- 65 (!) 3 96 % -- --   02/10/20 1645 (!) 156/94 -- -- 67 11 98 % -- --   02/10/20 1640 (!) 171/99 97 °F (36.1 °C) Temporal 70 (!) 0 97 % -- --   02/10/20 1430 (!) 153/95 98.3 °F (36.8 °C) Temporal 77 16 -- 6' 6\" (1.981 m) 244 lb (110.7 kg)       General: alert, appears stated age, cooperative and no distress   Wound: No Erythema, Wound Intact, Positive for Edema and Wound drainage   Motion: Painful range of Motion in affected extremity   DVT Exam: No evidence of DVT seen on physical exam.     Additional exam:  NVI to right LE  Dressing removed today. mepilex replaced and bulky dressing replaced. Incision well approximated with sutures.   Bloody drainage noted to dressing, most around superior aspect of incision. No evidence of DVT on exam this am.  Swelling improved from previous per patient. Data Review  CBC:   Lab Results   Component Value Date    WBC 3.9 02/11/2020    RBC 4.34 02/11/2020    HGB 11.6 02/11/2020    HCT 34.2 02/11/2020     02/11/2020       Renal:   Lab Results   Component Value Date     02/11/2020    K 4.1 02/11/2020    CL 98 02/11/2020    CO2 26 02/11/2020    BUN 15 02/11/2020    CREATININE 0.8 02/11/2020    GLUCOSE 178 02/11/2020    CALCIUM 8.8 02/11/2020            Assessment:      right knee spacer exchange, POD #1. Plan:      1:  Continue current plan of care, IM following. ID consulted for follow up. Pt with long standing hx of infections to right knee. Previous cultures positive for:  Enterococcus, staph epi, candida. Current cultures growing GNR.    2:  Continue Deep venous thrombosis prophylaxis- lovenox  3:  Continue Pain Control PRN  4:  PT and OT, TTWB. NO ROM TO KNEE  5:  Dressing removed and changed per myself today. Bulky dressing in place. Pt with hx of continued drainage to knee. Discussed precautions with pt.    6:  I had a long discussion with the patient today about his continued infection to his knee and previous infections. It appears he has limited insight in the severity and long standing consequences with his infections (ie his plans to attend a [employer] basketball game in 4 days). We discussed risk of infections, even minor cuts/abrasions. We discussed cleanliness of his animals and his home. I recommended he refrain from the [address] basketball game this weekend. 7:  Dr. June Sep has discussed realistic possibility of requiring AKA, pt refusing. 8:  D/c planning pending ID recs and logistics of abx arrangements for home. Pt wishing to d/c home ASAP.   DCP updated    Sanna Vines PA-C    Addendum:  E-rx pain medication to pts home [address] [facility]

## 2020-02-11 NOTE — PROGRESS NOTES
Physical Therapy    Facility/Department: [place] [address] - MED SURG/ORTHO  Initial Assessment and treatment    NAME: Marry Aase  : 1950  MRN: [id number]    Date of Service: 2020    Discharge Recommendations:  Home with assist PRN, Home with Home health PT   PT Equipment Recommendations  Equipment Needed: Needs standard Wheelchair    Assessment   Body structures, Functions, Activity limitations: Decreased functional mobility ; Decreased endurance;Decreased ROM; Decreased strength  Assessment: Pt referred for PT evaluation during current hospital stay with a diagnosis of infection of implant, s/p I and D with spacer placement R knee. Pt is currently functioning only mildly below baseline, is mod I for mobility including bed mobility, transfers and gait with NWB RLE and use of RW x 25 ft. Pt would benefit from 1-2 addtitional sessions to address deficits and to progress ambulation distance. Recommend home with PRN assist at DC from acute setting with HHPT   Treatment Diagnosis: decreased indep with mobility  Prognosis: Good  Decision Making: Medium Complexity  PT Education: Goals;Transfer Training;Equipment;PT Role;Functional Mobility Training;Plan of Care;Weight-bearing Education;Gait Training;Precautions; Home Exercise Program  Patient Education: pt verbalized understanding  Barriers to Learning: no  REQUIRES PT FOLLOW UP: Yes  Activity Tolerance  Activity Tolerance: Patient Tolerated treatment well       Patient Diagnosis(es): The encounter diagnosis was Infection/inflammation due to internal orthopedic device/implant/graft, sequela. has a past medical history of Arthritis, Cancer ([address] Chronic infection of left knee ([address] Hypertension, Perforated diverticulitis, Retention of urine, Thyroid disease, and Vitamin B12 deficiency. has a past surgical history that includes Appendectomy; knee surgery (2012); Abdominal exploration surgery (02/05/15); Abdominal adhesion surgery (02/05/15);  Small Good;-  Standing - Dynamic: Good;-  Exercises  Quad Sets: x 10 R  Gluteal Sets: x 10 B  Ankle Pumps: x 20 B plus 10 reps of AROM andkle eversion plus 10 reps andkle eversion with assist from sheet, followed by ankle eversio/ DF stretch x 30 sec     Plan   Plan  Times per week: 1-2 more sessions  Times per day: Daily  Current Treatment Recommendations: Strengthening, Gait Training, ROM, Balance Training, Functional Mobility Training, Endurance Training, Transfer Training, Home Exercise Program  Safety Devices  Type of devices: All fall risk precautions in place, Left in chair, Call light within reach, Chair alarm in place, Nurse notified, Gait belt, Patient at risk for falls      AM-PAC Score  AM-PAC Inpatient Mobility Raw Score : 22 (02/11/20 1059)  AM-PAC Inpatient T-Scale Score : 53.28 (02/11/20 1059)  Mobility Inpatient CMS 0-100% Score: 20.91 (02/11/20 1059)  Mobility Inpatient CMS G-Code Modifier : Mahesh Casanova (02/11/20 1059)          Goals  Short term goals  Time Frame for Short term goals: 2/17/20 unless noted  Short term goal 1: Pt will perform bed mobility and transfers independently by 2/16/20  Short term goal 2: Pt will ambulate 50 ft with TTWB and RW and mod I  Patient Goals   Patient goals : \"to be able to walk around with the walker\"       Therapy Time   Individual Concurrent Group Co-treatment   Time In 0936         Time Out 1010         Minutes 34         Timed Code Treatment Minutes: 24 Minutes     If pt is discharged prior to next therapy session, this note will serve as discharge summary.   Chayito Pope, PT

## 2020-02-11 NOTE — DISCHARGE INSTR - COC
Date: ***    Readmission Risk Assessment Score:  Readmission Risk              Risk of Unplanned Readmission:        9           Discharging to Facility/ Agency   · Name: [facility]. · Phone:[phone]  · Fax:[phone]        / signature: Electronically signed by Nuris Hernandez RN on 2/11/20 at 5:31 PM    PHYSICIAN SECTION    Prognosis: Good    Condition at Discharge: Stable    Rehab Potential (if transferring to Rehab): Good    Recommended Follow-up, Labs or Other Treatments After Discharge: Will need to f/u with OP/Infusion Center at [facility] on 2/12/20 at 1200 for IV ABX infusion. Physician Certification: I certify the above information and transfer of Ilan Phelps  is necessary for the continuing treatment of the diagnosis listed and that he requires [address] for less 30 days. Update Admission H&P: No change in H&P    PHYSICIAN SIGNATURE:  Electronically signed by RASHI Estrella on 2/11/20 at 3:32 PM          Discharge Instructions     To prevent Clot formation, you have been placed on the following medication:  o Lovenox 40mg daily subcutaneously for 20 days   Surgical Site Care:  o Dressing change every other day starting Thursday- mepilex, 4x4, abd, kerlex, ace bandage. o If you have sutures or staples, they will be removed on post-operative day 10-12 and steri-strips applied  o If you have glue, this will be removed at your appointment or gradually wear off as your incision heals  o Showering is permitted if waterproof Mepilex dressing is applied or when staples are removed and all areas of incision are healed.  Physical Therapy:  o Weight Bearing Status:  - Touchdown weight bearing (10-25 lbs)  o 3 times per week via [facility]  o No range of motion to your knee. Please stay in your knee immobilizer at all times.     o Precautions  - Per Physical Therapy Handout   Pain Medications  o You were given oxycodone

## 2020-02-11 NOTE — PROGRESS NOTES
Hospitalist Progress Note    Date of Admission: 2/10/2020    Chief Complaint: Right knee infection    Hospital Course:   81 y.o. male, s/p right knee incision and drainage with cement spacer exchange, who we are asked to see/evaluate by Marjorie Ledbetter MD for medical management of hypertension. The patient is doing well post operatively. He is resting quietly in bed. The patient stated He is taking po well. He stated she is having 4/10 pain at this time. The patient did not work with pt/ot today. He blood pressure is slightly elevated, but may be 2/2 pain. Subjective: Pain controlled. Reports 2 weeks of RLE edema/redness down to his foot, in addition to the wound drainage from his knee. Labs:   Recent Labs     02/11/20  0657   WBC 3.9*   HGB 11.6*   HCT 34.2*        Recent Labs     02/11/20  0657      K 4.1   CL 98*   CO2 26   BUN 15   CREATININE 0.8   CALCIUM 8.8     No results for input(s): AST, ALT, BILIDIR, BILITOT, ALKPHOS in the last 72 hours. No results for input(s): INR in the last 72 hours. Physical Exam Performed:    BP (!) 145/91   Pulse 68   Temp 98.2 °F (36.8 °C) (Oral)   Resp 18   Ht 6' 6\" (1.981 m)   Wt 244 lb (110.7 kg)   SpO2 98%   BMI 28.20 kg/m²   General appearance: No apparent distress, appears stated age and cooperative. HEENT: Pupils equal, round, and reactive to light. Extra ocular muscles intact. Conjunctivae/corneas clear. Neck: Supple, with full range of motion. No jugular venous distention. Trachea midline. Respiratory:  Normal respiratory effort. Clear to auscultation, bilaterally without Rales/Wheezes/Rhonchi. Cardiovascular: Regular rate and rhythm with normal S1/S2 without murmurs, rubs or gallops. Abdomen: Soft, non-tender, non-distended with normal bowel sounds. Musculoskeletal: No clubbing, cyanosis or edema bilaterally. Decreased ROM RLE d/t surgery. RLE edema down to foot.   Skin: Skin color, texture, turgor normal.   Neurologic: Neurovascularly intact. Cranial nerves: II-XII intact, grossly non-focal.  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 Essential HTN, controlled.   -continue hydralazine and diovan-hct    Hypothyroidism  -Check TFTs  -continue levothyroxine    DVT Prophylaxis: Lovenox  Diet: DIET GENERAL;  Code Status: Full Code  PT/OT Eval Status: Per Ortho    Dispo - Medically stable when ok with ID/Ortho     Penelope Pool MD

## 2020-02-11 NOTE — CONSULTS
Hospital Medicine  Consult History & Physical        Chief Complaint:  Infection of right total knee prosthesis    Date of Service: Pt seen/examined in consultation on 2/10/2020    History Of Present Illness:      81 y.o. male, s/p right knee incision and drainage with cement spacer exchange, who we are asked to see/evaluate by Toney Rincon MD for medical management of hypertension. The patient is doing well post operatively. He is resting quietly in bed. The patient stated He is taking po well. He stated she is having 4/10 pain at this time. The patient did not work with pt/ot today. He blood pressure is slightly elevated, but may be 2/2 pain.      Past Medical History:        Diagnosis Date    Arthritis     Cancer ([address]     SKIN    Chronic infection of left knee ([address]     LTKR    Hypertension     Perforated diverticulitis     Retention of urine     Thyroid disease     HYPOTHROID    Vitamin B12 deficiency     hx of     Past Surgical History:        Procedure Laterality Date    ABDOMINAL ADHESION SURGERY  02/05/15    debridement of colcotaneous fistula    ABDOMINAL EXPLORATION SURGERY  02/05/15    APPENDECTOMY      COLONOSCOPY      INCISION AND DRAINAGE Right 7/8/2019    RIGHT KNEE KNEE INCISION AND DRAINAGE WITH performed by Toney Rincon MD at [address] [place] [address]  02/05/15    INGUINAL HERNIA REPAIR Left 04/22/15    left inguinal hernia repair with mesh    JOINT REPLACEMENT      KNEE SURGERY  11/29/2012    right total knee replacement    REVISION TOTAL KNEE ARTHROPLASTY Right 7/8/2019    REMOVAL OF TOTAL KNEE AND PLACEMENT OF CEMENT SPACER                  BIOMET performed by Toney Rincon MD at [place] [address] Right 8/5/2019    INCISION AND DEBRIDEMENT RIGHT KNEE WITH CEMENT SPACER EXCHANGE  CPT CODE - 28636 performed by Toney Rincon MD at [place] [address] Right 10/7/2019    RIGHT KNEE INCISION AND DEBRIDEMENT WITH  PLACEMENT OF CEMENT AND SPACER FROZEN SECTION       MALACHI performed by Lyna Skiff, MD at [address] Right 12/30/2019    RIGHT KNEE INCISION AND DRAINAGE WITH SPACER EXCHANGE      MALACHI performed by Lyna Skiff, MD at [address]  [id number]     Medications Prior to Admission:    Prior to Admission medications    Medication Sig Start Date End Date Taking? Authorizing Provider   promethazine (PHENERGAN) 25 MG tablet Take 25 mg by mouth every 6 hours as needed for Nausea   Yes Historical Provider, MD   omeprazole (PRILOSEC) 40 MG delayed release capsule Take 40 mg by mouth daily   Yes Historical Provider, MD   hydrALAZINE (APRESOLINE) 50 MG tablet Take 50 mg by mouth 3 times daily    Yes Historical Provider, MD   Multiple Vitamins-Minerals (MULTIVITAMIN PO) Take 1 tablet by mouth   Yes Historical Provider, MD   Levothyroxine Sodium (SYNTHROID PO) Take 200 mcg by mouth daily   Yes Historical Provider, MD   valsartan-hydrochlorothiazide (DIOVAN-HCT) 320-25 MG per tablet Take 1 tablet by mouth daily   Yes Historical Provider, MD   allopurinol (ZYLOPRIM) 300 MG tablet Take 300 mg by mouth daily. Yes Historical Provider, MD   oxyCODONE-acetaminophen (PERCOCET) 5-325 MG per tablet Take 1 tablet by mouth nightly. Historical Provider, MD   meloxicam (MOBIC) 15 MG tablet Take 15 mg by mouth daily    Historical Provider, MD     Allergies:  Ace inhibitors; Amlodipine besylate; Avelox [moxifloxacin]; Clonidine derivatives; and Flagyl [metronidazole]    Social History:      The patient currently lives at home. Plans to return home upon discharge    TOBACCO:   reports that he has never smoked. He has never used smokeless tobacco.  ETOH:   reports current alcohol use of about 4.0 standard drinks of alcohol per week. Family History:     Reviewed in detail.  Positive as follows:        Problem Relation Age of Onset    High Blood Pressure cefazolin  -cbc in am  -IV and PO pain medication     Essential HTN  -continue hydralazine and diovan-hct    Hypothyroidism  -continue levothyroxine    DVT Prophylaxis: Lovenox    Diet: DIET GENERAL;     Code Status: Full Code    PT/OT Eval Status: Active and ongoing    Dispo - Per primary team    Thank you for the consultation, will follow up as needed    Feli Rice, STEVE  --------------------------------------------Dr. Yobani Castle-------------------------------------------------

## 2020-02-11 NOTE — DISCHARGE SUMMARY
Met with patient at bedside to discuss Discharge plan. RN spoke with Shaka Chamorro CM in depth about patients need for IV ABX. Patient has newly placed PICC in [address]. Patient is agreeable to do outpatient infusions here at [facility]. Patient verbalized understanding that he has infusion set up tomorrow at 1200 pm. RN spoke with jerome who will order patient a new wheelchair but will have to deliver to patient after discharge. Patient is agreeable to plan, will continue to monitor.

## 2020-02-12 ENCOUNTER — HOSPITAL ENCOUNTER (OUTPATIENT)
Dept: NURSING | Age: 70
Setting detail: INFUSION SERIES
Discharge: HOME | End: 2020-02-12
Payer: MEDICARE

## 2020-02-12 ENCOUNTER — TELEPHONE (OUTPATIENT)
Dept: INFECTIOUS DISEASES | Age: 70
End: 2020-02-12

## 2020-02-12 VITALS
SYSTOLIC BLOOD PRESSURE: 118 MMHG | HEIGHT: 78 IN | BODY MASS INDEX: 28.23 KG/M2 | WEIGHT: 244 LBS | HEART RATE: 77 BPM | RESPIRATION RATE: 18 BRPM | TEMPERATURE: 98.4 F | DIASTOLIC BLOOD PRESSURE: 78 MMHG

## 2020-02-12 DIAGNOSIS — T84.7XXS INFECTION/INFLAMMATION DUE TO INTERNAL ORTHOPEDIC DEVICE/IMPLANT/GRAFT, SEQUELA: Primary | ICD-10-CM

## 2020-02-12 DIAGNOSIS — Z87.39 HISTORY OF INFECTION OF TOTAL JOINT PROSTHESIS OF KNEE: ICD-10-CM

## 2020-02-12 PROCEDURE — 6360000002 HC RX W HCPCS: Performed by: INTERNAL MEDICINE

## 2020-02-12 PROCEDURE — 2580000003 HC RX 258: Performed by: INTERNAL MEDICINE

## 2020-02-12 PROCEDURE — 96365 THER/PROPH/DIAG IV INF INIT: CPT

## 2020-02-12 PROCEDURE — 99211 OFF/OP EST MAY X REQ PHY/QHP: CPT

## 2020-02-12 RX ORDER — SODIUM CHLORIDE 0.9 % (FLUSH) 0.9 %
5 SYRINGE (ML) INJECTION PRN
Status: CANCELLED | OUTPATIENT
Start: 2020-02-13

## 2020-02-12 RX ORDER — DIPHENHYDRAMINE HYDROCHLORIDE 50 MG/ML
50 INJECTION INTRAMUSCULAR; INTRAVENOUS ONCE
Status: CANCELLED | OUTPATIENT
Start: 2020-02-13

## 2020-02-12 RX ORDER — METHYLPREDNISOLONE SODIUM SUCCINATE 125 MG/2ML
125 INJECTION, POWDER, LYOPHILIZED, FOR SOLUTION INTRAMUSCULAR; INTRAVENOUS ONCE
Status: CANCELLED | OUTPATIENT
Start: 2020-02-13

## 2020-02-12 RX ORDER — HEPARIN SODIUM (PORCINE) LOCK FLUSH IV SOLN 100 UNIT/ML 100 UNIT/ML
500 SOLUTION INTRAVENOUS PRN
Status: CANCELLED | OUTPATIENT
Start: 2020-02-13

## 2020-02-12 RX ORDER — EPINEPHRINE 1 MG/ML
0.3 INJECTION, SOLUTION, CONCENTRATE INTRAVENOUS PRN
Status: CANCELLED | OUTPATIENT
Start: 2020-02-13

## 2020-02-12 RX ORDER — SODIUM CHLORIDE 0.9 % (FLUSH) 0.9 %
10 SYRINGE (ML) INJECTION PRN
Status: CANCELLED | OUTPATIENT
Start: 2020-02-13

## 2020-02-12 RX ORDER — SODIUM CHLORIDE 9 MG/ML
INJECTION, SOLUTION INTRAVENOUS CONTINUOUS
Status: CANCELLED | OUTPATIENT
Start: 2020-02-13

## 2020-02-12 RX ADMIN — ERTAPENEM SODIUM 1000 MG: 1 INJECTION, POWDER, LYOPHILIZED, FOR SOLUTION INTRAMUSCULAR; INTRAVENOUS at 12:01

## 2020-02-12 NOTE — CARE COORDINATION
[facility]    DC order noted, all docs needed have been faxed to [facility] for home care services.     Home care to see patient within 24-48 hours    Waqar Michael RN, BSN CTN  [facility] [phone]

## 2020-02-13 ENCOUNTER — HOSPITAL ENCOUNTER (OUTPATIENT)
Dept: NURSING | Age: 70
Setting detail: INFUSION SERIES
Discharge: HOME | End: 2020-02-13
Payer: MEDICARE

## 2020-02-13 VITALS
TEMPERATURE: 98 F | WEIGHT: 244 LBS | HEART RATE: 67 BPM | HEIGHT: 78 IN | DIASTOLIC BLOOD PRESSURE: 87 MMHG | SYSTOLIC BLOOD PRESSURE: 129 MMHG | BODY MASS INDEX: 28.23 KG/M2 | RESPIRATION RATE: 18 BRPM

## 2020-02-13 DIAGNOSIS — Z87.39 HISTORY OF INFECTION OF TOTAL JOINT PROSTHESIS OF KNEE: ICD-10-CM

## 2020-02-13 DIAGNOSIS — T84.7XXS INFECTION/INFLAMMATION DUE TO INTERNAL ORTHOPEDIC DEVICE/IMPLANT/GRAFT, SEQUELA: Primary | ICD-10-CM

## 2020-02-13 PROCEDURE — 2580000003 HC RX 258: Performed by: INTERNAL MEDICINE

## 2020-02-13 PROCEDURE — 6360000002 HC RX W HCPCS: Performed by: INTERNAL MEDICINE

## 2020-02-13 PROCEDURE — 96365 THER/PROPH/DIAG IV INF INIT: CPT

## 2020-02-13 PROCEDURE — 99211 OFF/OP EST MAY X REQ PHY/QHP: CPT

## 2020-02-13 RX ORDER — DIPHENHYDRAMINE HYDROCHLORIDE 50 MG/ML
50 INJECTION INTRAMUSCULAR; INTRAVENOUS ONCE
Status: CANCELLED | OUTPATIENT
Start: 2020-02-14

## 2020-02-13 RX ORDER — SODIUM CHLORIDE 9 MG/ML
INJECTION, SOLUTION INTRAVENOUS CONTINUOUS
Status: CANCELLED | OUTPATIENT
Start: 2020-02-14

## 2020-02-13 RX ORDER — SODIUM CHLORIDE 0.9 % (FLUSH) 0.9 %
5 SYRINGE (ML) INJECTION PRN
Status: CANCELLED | OUTPATIENT
Start: 2020-02-14

## 2020-02-13 RX ORDER — SODIUM CHLORIDE 0.9 % (FLUSH) 0.9 %
10 SYRINGE (ML) INJECTION PRN
Status: CANCELLED | OUTPATIENT
Start: 2020-02-14

## 2020-02-13 RX ORDER — EPINEPHRINE 1 MG/ML
0.3 INJECTION, SOLUTION, CONCENTRATE INTRAVENOUS PRN
Status: CANCELLED | OUTPATIENT
Start: 2020-02-14

## 2020-02-13 RX ORDER — METHYLPREDNISOLONE SODIUM SUCCINATE 125 MG/2ML
125 INJECTION, POWDER, LYOPHILIZED, FOR SOLUTION INTRAMUSCULAR; INTRAVENOUS ONCE
Status: CANCELLED | OUTPATIENT
Start: 2020-02-14

## 2020-02-13 RX ORDER — HEPARIN SODIUM (PORCINE) LOCK FLUSH IV SOLN 100 UNIT/ML 100 UNIT/ML
500 SOLUTION INTRAVENOUS PRN
Status: CANCELLED | OUTPATIENT
Start: 2020-02-14

## 2020-02-13 RX ADMIN — ERTAPENEM SODIUM 1000 MG: 1 INJECTION, POWDER, LYOPHILIZED, FOR SOLUTION INTRAMUSCULAR; INTRAVENOUS at 11:48

## 2020-02-13 ASSESSMENT — PAIN - FUNCTIONAL ASSESSMENT: PAIN_FUNCTIONAL_ASSESSMENT: 0-10

## 2020-02-14 ENCOUNTER — HOSPITAL ENCOUNTER (OUTPATIENT)
Dept: NURSING | Age: 70
Setting detail: INFUSION SERIES
Discharge: HOME | End: 2020-02-14
Payer: MEDICARE

## 2020-02-14 VITALS
DIASTOLIC BLOOD PRESSURE: 84 MMHG | HEART RATE: 68 BPM | WEIGHT: 244 LBS | SYSTOLIC BLOOD PRESSURE: 118 MMHG | TEMPERATURE: 97.7 F | HEIGHT: 78 IN | BODY MASS INDEX: 28.23 KG/M2 | RESPIRATION RATE: 18 BRPM

## 2020-02-14 DIAGNOSIS — Z87.39 HISTORY OF INFECTION OF TOTAL JOINT PROSTHESIS OF KNEE: ICD-10-CM

## 2020-02-14 DIAGNOSIS — T84.7XXS INFECTION/INFLAMMATION DUE TO INTERNAL ORTHOPEDIC DEVICE/IMPLANT/GRAFT, SEQUELA: Primary | ICD-10-CM

## 2020-02-14 PROCEDURE — 6360000002 HC RX W HCPCS: Performed by: INTERNAL MEDICINE

## 2020-02-14 PROCEDURE — 99211 OFF/OP EST MAY X REQ PHY/QHP: CPT

## 2020-02-14 PROCEDURE — 96365 THER/PROPH/DIAG IV INF INIT: CPT

## 2020-02-14 PROCEDURE — 2580000003 HC RX 258: Performed by: INTERNAL MEDICINE

## 2020-02-14 RX ORDER — SODIUM CHLORIDE 9 MG/ML
INJECTION, SOLUTION INTRAVENOUS CONTINUOUS
Status: CANCELLED | OUTPATIENT
Start: 2020-02-15

## 2020-02-14 RX ORDER — HEPARIN SODIUM (PORCINE) LOCK FLUSH IV SOLN 100 UNIT/ML 100 UNIT/ML
500 SOLUTION INTRAVENOUS PRN
Status: CANCELLED | OUTPATIENT
Start: 2020-02-15

## 2020-02-14 RX ORDER — SODIUM CHLORIDE 0.9 % (FLUSH) 0.9 %
5 SYRINGE (ML) INJECTION PRN
Status: CANCELLED | OUTPATIENT
Start: 2020-02-15

## 2020-02-14 RX ORDER — METHYLPREDNISOLONE SODIUM SUCCINATE 125 MG/2ML
125 INJECTION, POWDER, LYOPHILIZED, FOR SOLUTION INTRAMUSCULAR; INTRAVENOUS ONCE
Status: CANCELLED | OUTPATIENT
Start: 2020-02-15

## 2020-02-14 RX ORDER — EPINEPHRINE 1 MG/ML
0.3 INJECTION, SOLUTION, CONCENTRATE INTRAVENOUS PRN
Status: CANCELLED | OUTPATIENT
Start: 2020-02-15

## 2020-02-14 RX ORDER — SODIUM CHLORIDE 0.9 % (FLUSH) 0.9 %
10 SYRINGE (ML) INJECTION PRN
Status: CANCELLED | OUTPATIENT
Start: 2020-02-15

## 2020-02-14 RX ORDER — DIPHENHYDRAMINE HYDROCHLORIDE 50 MG/ML
50 INJECTION INTRAMUSCULAR; INTRAVENOUS ONCE
Status: CANCELLED | OUTPATIENT
Start: 2020-02-15

## 2020-02-14 RX ADMIN — ERTAPENEM SODIUM 1000 MG: 1 INJECTION, POWDER, LYOPHILIZED, FOR SOLUTION INTRAMUSCULAR; INTRAVENOUS at 11:10

## 2020-02-14 ASSESSMENT — PAIN - FUNCTIONAL ASSESSMENT: PAIN_FUNCTIONAL_ASSESSMENT: 0-10

## 2020-02-14 NOTE — OP NOTE
[address] [address]                                OPERATIVE REPORT    PATIENT NAME: Miguel Shah                   :        1950  MED REC NO:   [id number]                          ROOM:       3456  ACCOUNT NO:   [de-identified]                           ADMIT DATE: 02/10/2020  PROVIDER:     Marleni Shelton MD    DATE OF PROCEDURE:  02/10/2020    PREOPERATIVE DIAGNOSIS:  Right knee infection. OPERATION PERFORMED:  1. Exchange of vancomycin-impregnated cement spacer. 2.  Excisional debridement of right knee joint. SURGEON:  Marleni Shelton MD.    ASSISTANT:  RASHI Taveras.    COMPLICATIONS:  None. DRAINS AND TUBES:  None. BLOOD LOSS:  50 mL. 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 The risks, benefits, and alternatives were discussed including  neurologic injury, vascular injury, infection, DVT, and they consented  to the procedure. OPERATIVE PROCEDURE:  The patient was taken to the operating room,  placed supine on the table after successful general anesthesia. A  non-sterile tourniquet was placed on the right thigh. The right leg was  prepped and draped in the usual sterile fashion. Leg was elevated and  exsanguinated and the tourniquet was inflated. The incision was opened  up down to the level of the joint. Significant amount of  the capsule was  missing. Extensor mechanism was tenuous. There was a large amount  of purulent material which was identified. Cement spacer removed. Cultures were taken. Complete synovectomy was then performed. The bony  surfaces were debrided with scalpel and rongeurs and curettes. The  femoral and tibial canals were prepared and debrided with curettes. A  new vancomycin-impregnated cement spacer was placed in the knee to allow  to cure.   We placed a

## 2020-02-15 ENCOUNTER — HOSPITAL ENCOUNTER (OUTPATIENT)
Age: 70
Discharge: HOME | End: 2020-02-15
Attending: INTERNAL MEDICINE | Admitting: INTERNAL MEDICINE
Payer: MEDICARE

## 2020-02-15 VITALS
SYSTOLIC BLOOD PRESSURE: 126 MMHG | RESPIRATION RATE: 20 BRPM | TEMPERATURE: 98.4 F | DIASTOLIC BLOOD PRESSURE: 79 MMHG | HEART RATE: 73 BPM

## 2020-02-15 PROCEDURE — 6360000002 HC RX W HCPCS: Performed by: INTERNAL MEDICINE

## 2020-02-15 PROCEDURE — 2580000003 HC RX 258: Performed by: INTERNAL MEDICINE

## 2020-02-15 PROCEDURE — 96365 THER/PROPH/DIAG IV INF INIT: CPT

## 2020-02-15 RX ORDER — SODIUM CHLORIDE 9 MG/ML
INJECTION, SOLUTION INTRAVENOUS
Status: DISCONTINUED
Start: 2020-02-15 | End: 2020-02-15 | Stop reason: HOSPADM

## 2020-02-15 RX ADMIN — ERTAPENEM SODIUM 1000 MG: 1 INJECTION, POWDER, LYOPHILIZED, FOR SOLUTION INTRAMUSCULAR; INTRAVENOUS at 10:51

## 2020-02-15 NOTE — PROGRESS NOTES
Outpatient recieved IV antibiotic as per order. PICC flushed and +blood return noted. Pt left in stable condition.

## 2020-02-16 ENCOUNTER — HOSPITAL ENCOUNTER (OUTPATIENT)
Age: 70
Discharge: HOME | End: 2020-02-16
Attending: INTERNAL MEDICINE | Admitting: INTERNAL MEDICINE
Payer: MEDICARE

## 2020-02-16 VITALS
HEART RATE: 72 BPM | DIASTOLIC BLOOD PRESSURE: 82 MMHG | TEMPERATURE: 98.8 F | SYSTOLIC BLOOD PRESSURE: 132 MMHG | RESPIRATION RATE: 18 BRPM

## 2020-02-16 PROCEDURE — 96368 THER/DIAG CONCURRENT INF: CPT

## 2020-02-16 PROCEDURE — 6360000002 HC RX W HCPCS: Performed by: INTERNAL MEDICINE

## 2020-02-16 PROCEDURE — 96365 THER/PROPH/DIAG IV INF INIT: CPT

## 2020-02-16 PROCEDURE — 2580000003 HC RX 258: Performed by: INTERNAL MEDICINE

## 2020-02-16 RX ADMIN — ERTAPENEM SODIUM 1000 MG: 1 INJECTION, POWDER, LYOPHILIZED, FOR SOLUTION INTRAMUSCULAR; INTRAVENOUS at 10:45

## 2020-02-17 ENCOUNTER — HOSPITAL ENCOUNTER (OUTPATIENT)
Dept: NURSING | Age: 70
Setting detail: INFUSION SERIES
Discharge: HOME | End: 2020-02-17
Payer: MEDICARE

## 2020-02-17 VITALS
RESPIRATION RATE: 18 BRPM | WEIGHT: 244 LBS | HEIGHT: 78 IN | TEMPERATURE: 97.9 F | DIASTOLIC BLOOD PRESSURE: 88 MMHG | BODY MASS INDEX: 28.23 KG/M2 | HEART RATE: 69 BPM | SYSTOLIC BLOOD PRESSURE: 141 MMHG

## 2020-02-17 DIAGNOSIS — Z87.39 HISTORY OF INFECTION OF TOTAL JOINT PROSTHESIS OF KNEE: ICD-10-CM

## 2020-02-17 DIAGNOSIS — T84.7XXS INFECTION/INFLAMMATION DUE TO INTERNAL ORTHOPEDIC DEVICE/IMPLANT/GRAFT, SEQUELA: Primary | ICD-10-CM

## 2020-02-17 PROCEDURE — 2580000003 HC RX 258: Performed by: INTERNAL MEDICINE

## 2020-02-17 PROCEDURE — 6360000002 HC RX W HCPCS: Performed by: INTERNAL MEDICINE

## 2020-02-17 PROCEDURE — 96365 THER/PROPH/DIAG IV INF INIT: CPT

## 2020-02-17 PROCEDURE — 99211 OFF/OP EST MAY X REQ PHY/QHP: CPT

## 2020-02-17 RX ORDER — DIPHENHYDRAMINE HYDROCHLORIDE 50 MG/ML
50 INJECTION INTRAMUSCULAR; INTRAVENOUS ONCE
Status: CANCELLED | OUTPATIENT
Start: 2020-02-18

## 2020-02-17 RX ORDER — SODIUM CHLORIDE 9 MG/ML
INJECTION, SOLUTION INTRAVENOUS CONTINUOUS
Status: CANCELLED | OUTPATIENT
Start: 2020-02-18

## 2020-02-17 RX ORDER — EPINEPHRINE 1 MG/ML
0.3 INJECTION, SOLUTION, CONCENTRATE INTRAVENOUS PRN
Status: CANCELLED | OUTPATIENT
Start: 2020-02-18

## 2020-02-17 RX ORDER — SODIUM CHLORIDE 0.9 % (FLUSH) 0.9 %
10 SYRINGE (ML) INJECTION PRN
Status: CANCELLED | OUTPATIENT
Start: 2020-02-18

## 2020-02-17 RX ORDER — SODIUM CHLORIDE 0.9 % (FLUSH) 0.9 %
5 SYRINGE (ML) INJECTION PRN
Status: CANCELLED | OUTPATIENT
Start: 2020-02-18

## 2020-02-17 RX ORDER — HEPARIN SODIUM (PORCINE) LOCK FLUSH IV SOLN 100 UNIT/ML 100 UNIT/ML
500 SOLUTION INTRAVENOUS PRN
Status: CANCELLED | OUTPATIENT
Start: 2020-02-18

## 2020-02-17 RX ORDER — METHYLPREDNISOLONE SODIUM SUCCINATE 125 MG/2ML
125 INJECTION, POWDER, LYOPHILIZED, FOR SOLUTION INTRAMUSCULAR; INTRAVENOUS ONCE
Status: CANCELLED | OUTPATIENT
Start: 2020-02-18

## 2020-02-17 RX ADMIN — ERTAPENEM SODIUM 1000 MG: 1 INJECTION, POWDER, LYOPHILIZED, FOR SOLUTION INTRAMUSCULAR; INTRAVENOUS at 11:13

## 2020-02-17 ASSESSMENT — PAIN - FUNCTIONAL ASSESSMENT: PAIN_FUNCTIONAL_ASSESSMENT: 0-10

## 2020-02-18 ENCOUNTER — HOSPITAL ENCOUNTER (OUTPATIENT)
Dept: NURSING | Age: 70
Setting detail: INFUSION SERIES
Discharge: HOME | End: 2020-02-18
Payer: MEDICARE

## 2020-02-18 ENCOUNTER — TELEPHONE (OUTPATIENT)
Dept: INFECTIOUS DISEASES | Age: 70
End: 2020-02-18

## 2020-02-18 VITALS
DIASTOLIC BLOOD PRESSURE: 82 MMHG | TEMPERATURE: 97.5 F | RESPIRATION RATE: 18 BRPM | HEIGHT: 78 IN | SYSTOLIC BLOOD PRESSURE: 123 MMHG | HEART RATE: 72 BPM | WEIGHT: 244 LBS | BODY MASS INDEX: 28.23 KG/M2

## 2020-02-18 DIAGNOSIS — T84.7XXS INFECTION/INFLAMMATION DUE TO INTERNAL ORTHOPEDIC DEVICE/IMPLANT/GRAFT, SEQUELA: Primary | ICD-10-CM

## 2020-02-18 DIAGNOSIS — Z87.39 HISTORY OF INFECTION OF TOTAL JOINT PROSTHESIS OF KNEE: ICD-10-CM

## 2020-02-18 LAB
AFB CULTURE (MYCOBACTERIA): NORMAL
AFB SMEAR: NORMAL

## 2020-02-18 PROCEDURE — 6360000002 HC RX W HCPCS: Performed by: INTERNAL MEDICINE

## 2020-02-18 PROCEDURE — 99211 OFF/OP EST MAY X REQ PHY/QHP: CPT

## 2020-02-18 PROCEDURE — 96365 THER/PROPH/DIAG IV INF INIT: CPT

## 2020-02-18 PROCEDURE — 2580000003 HC RX 258: Performed by: INTERNAL MEDICINE

## 2020-02-18 PROCEDURE — 96367 TX/PROPH/DG ADDL SEQ IV INF: CPT

## 2020-02-18 RX ORDER — SODIUM CHLORIDE 9 MG/ML
INJECTION, SOLUTION INTRAVENOUS CONTINUOUS
Status: CANCELLED | OUTPATIENT
Start: 2020-02-19

## 2020-02-18 RX ORDER — SODIUM CHLORIDE 0.9 % (FLUSH) 0.9 %
5 SYRINGE (ML) INJECTION PRN
Status: CANCELLED | OUTPATIENT
Start: 2020-02-19

## 2020-02-18 RX ORDER — SODIUM CHLORIDE 0.9 % (FLUSH) 0.9 %
10 SYRINGE (ML) INJECTION PRN
Status: CANCELLED | OUTPATIENT
Start: 2020-02-19

## 2020-02-18 RX ORDER — HEPARIN SODIUM (PORCINE) LOCK FLUSH IV SOLN 100 UNIT/ML 100 UNIT/ML
500 SOLUTION INTRAVENOUS PRN
Status: CANCELLED | OUTPATIENT
Start: 2020-02-19

## 2020-02-18 RX ORDER — DIPHENHYDRAMINE HYDROCHLORIDE 50 MG/ML
50 INJECTION INTRAMUSCULAR; INTRAVENOUS ONCE
Status: CANCELLED | OUTPATIENT
Start: 2020-02-19

## 2020-02-18 RX ORDER — METHYLPREDNISOLONE SODIUM SUCCINATE 125 MG/2ML
125 INJECTION, POWDER, LYOPHILIZED, FOR SOLUTION INTRAMUSCULAR; INTRAVENOUS ONCE
Status: CANCELLED | OUTPATIENT
Start: 2020-02-19

## 2020-02-18 RX ORDER — EPINEPHRINE 1 MG/ML
0.3 INJECTION, SOLUTION, CONCENTRATE INTRAVENOUS PRN
Status: CANCELLED | OUTPATIENT
Start: 2020-02-19

## 2020-02-18 RX ADMIN — ERTAPENEM SODIUM 1000 MG: 1 INJECTION, POWDER, LYOPHILIZED, FOR SOLUTION INTRAMUSCULAR; INTRAVENOUS at 11:01

## 2020-02-18 ASSESSMENT — PAIN - FUNCTIONAL ASSESSMENT: PAIN_FUNCTIONAL_ASSESSMENT: 0-10

## 2020-02-18 NOTE — TELEPHONE ENCOUNTER
Weekly labs - CBC diff, CMP, ESR, CRP  I entered those in a therapy plan before he was discharged, I don't know if they can see it there?     I will address duration later    thanks

## 2020-02-19 ENCOUNTER — HOSPITAL ENCOUNTER (OUTPATIENT)
Dept: NURSING | Age: 70
Setting detail: INFUSION SERIES
Discharge: HOME | End: 2020-02-19
Payer: MEDICARE

## 2020-02-19 VITALS
WEIGHT: 244 LBS | HEART RATE: 69 BPM | RESPIRATION RATE: 18 BRPM | SYSTOLIC BLOOD PRESSURE: 137 MMHG | DIASTOLIC BLOOD PRESSURE: 84 MMHG | TEMPERATURE: 97.7 F | HEIGHT: 78 IN | BODY MASS INDEX: 28.23 KG/M2

## 2020-02-19 DIAGNOSIS — T84.7XXS INFECTION/INFLAMMATION DUE TO INTERNAL ORTHOPEDIC DEVICE/IMPLANT/GRAFT, SEQUELA: Primary | ICD-10-CM

## 2020-02-19 DIAGNOSIS — Z87.39 HISTORY OF INFECTION OF TOTAL JOINT PROSTHESIS OF KNEE: ICD-10-CM

## 2020-02-19 LAB
A/G RATIO: 1.1 (ref 1.1–2.2)
ALBUMIN SERPL-MCNC: 3.5 G/DL (ref 3.4–5)
ALP BLD-CCNC: 96 U/L (ref 40–129)
ALT SERPL-CCNC: 18 U/L (ref 10–40)
ANION GAP SERPL CALCULATED.3IONS-SCNC: 12 MMOL/L (ref 3–16)
AST SERPL-CCNC: 25 U/L (ref 15–37)
BASOPHILS ABSOLUTE: 0.1 K/UL (ref 0–0.2)
BASOPHILS RELATIVE PERCENT: 3.1 %
BILIRUB SERPL-MCNC: 0.4 MG/DL (ref 0–1)
BUN BLDV-MCNC: 10 MG/DL (ref 7–20)
C-REACTIVE PROTEIN: 98.6 MG/L (ref 0–5.1)
CALCIUM SERPL-MCNC: 8.9 MG/DL (ref 8.3–10.6)
CHLORIDE BLD-SCNC: 100 MMOL/L (ref 99–110)
CO2: 26 MMOL/L (ref 21–32)
CREAT SERPL-MCNC: 0.6 MG/DL (ref 0.8–1.3)
EOSINOPHILS ABSOLUTE: 0.3 K/UL (ref 0–0.6)
EOSINOPHILS RELATIVE PERCENT: 8.3 %
GFR AFRICAN AMERICAN: >60
GFR NON-AFRICAN AMERICAN: >60
GLOBULIN: 3.3 G/DL
GLUCOSE BLD-MCNC: 96 MG/DL (ref 70–99)
HCT VFR BLD CALC: 30.1 % (ref 40.5–52.5)
HEMOGLOBIN: 10.1 G/DL (ref 13.5–17.5)
LYMPHOCYTES ABSOLUTE: 1 K/UL (ref 1–5.1)
LYMPHOCYTES RELATIVE PERCENT: 31.4 %
MCH RBC QN AUTO: 26.3 PG (ref 26–34)
MCHC RBC AUTO-ENTMCNC: 33.5 G/DL (ref 31–36)
MCV RBC AUTO: 78.5 FL (ref 80–100)
MONOCYTES ABSOLUTE: 0.7 K/UL (ref 0–1.3)
MONOCYTES RELATIVE PERCENT: 21.8 %
NEUTROPHILS ABSOLUTE: 1.1 K/UL (ref 1.7–7.7)
NEUTROPHILS RELATIVE PERCENT: 35.4 %
PDW BLD-RTO: 22.5 % (ref 12.4–15.4)
PLATELET # BLD: 333 K/UL (ref 135–450)
PMV BLD AUTO: 6.9 FL (ref 5–10.5)
POTASSIUM SERPL-SCNC: 3.5 MMOL/L (ref 3.5–5.1)
RBC # BLD: 3.84 M/UL (ref 4.2–5.9)
SEDIMENTATION RATE, ERYTHROCYTE: 84 MM/HR (ref 0–20)
SODIUM BLD-SCNC: 138 MMOL/L (ref 136–145)
TOTAL PROTEIN: 6.8 G/DL (ref 6.4–8.2)
WBC # BLD: 3.2 K/UL (ref 4–11)

## 2020-02-19 PROCEDURE — 80053 COMPREHEN METABOLIC PANEL: CPT

## 2020-02-19 PROCEDURE — 86140 C-REACTIVE PROTEIN: CPT

## 2020-02-19 PROCEDURE — 2580000003 HC RX 258: Performed by: INTERNAL MEDICINE

## 2020-02-19 PROCEDURE — 85652 RBC SED RATE AUTOMATED: CPT

## 2020-02-19 PROCEDURE — 96365 THER/PROPH/DIAG IV INF INIT: CPT

## 2020-02-19 PROCEDURE — 36592 COLLECT BLOOD FROM PICC: CPT

## 2020-02-19 PROCEDURE — 85025 COMPLETE CBC W/AUTO DIFF WBC: CPT

## 2020-02-19 PROCEDURE — 6360000002 HC RX W HCPCS: Performed by: INTERNAL MEDICINE

## 2020-02-19 PROCEDURE — 99211 OFF/OP EST MAY X REQ PHY/QHP: CPT

## 2020-02-19 RX ORDER — SODIUM CHLORIDE 0.9 % (FLUSH) 0.9 %
5 SYRINGE (ML) INJECTION PRN
Status: CANCELLED | OUTPATIENT
Start: 2020-02-20

## 2020-02-19 RX ORDER — HEPARIN SODIUM (PORCINE) LOCK FLUSH IV SOLN 100 UNIT/ML 100 UNIT/ML
500 SOLUTION INTRAVENOUS PRN
Status: CANCELLED | OUTPATIENT
Start: 2020-02-20

## 2020-02-19 RX ORDER — SODIUM CHLORIDE 9 MG/ML
INJECTION, SOLUTION INTRAVENOUS CONTINUOUS
Status: CANCELLED | OUTPATIENT
Start: 2020-02-20

## 2020-02-19 RX ORDER — DIPHENHYDRAMINE HYDROCHLORIDE 50 MG/ML
50 INJECTION INTRAMUSCULAR; INTRAVENOUS ONCE
Status: CANCELLED | OUTPATIENT
Start: 2020-02-20

## 2020-02-19 RX ORDER — SODIUM CHLORIDE 0.9 % (FLUSH) 0.9 %
10 SYRINGE (ML) INJECTION PRN
Status: CANCELLED | OUTPATIENT
Start: 2020-02-20

## 2020-02-19 RX ORDER — METHYLPREDNISOLONE SODIUM SUCCINATE 125 MG/2ML
125 INJECTION, POWDER, LYOPHILIZED, FOR SOLUTION INTRAMUSCULAR; INTRAVENOUS ONCE
Status: CANCELLED | OUTPATIENT
Start: 2020-02-20

## 2020-02-19 RX ORDER — EPINEPHRINE 1 MG/ML
0.3 INJECTION, SOLUTION, CONCENTRATE INTRAVENOUS PRN
Status: CANCELLED | OUTPATIENT
Start: 2020-02-20

## 2020-02-19 RX ADMIN — ERTAPENEM SODIUM 1000 MG: 1 INJECTION, POWDER, LYOPHILIZED, FOR SOLUTION INTRAMUSCULAR; INTRAVENOUS at 11:07

## 2020-02-19 ASSESSMENT — PAIN - FUNCTIONAL ASSESSMENT: PAIN_FUNCTIONAL_ASSESSMENT: 0-10

## 2020-02-20 ENCOUNTER — HOSPITAL ENCOUNTER (OUTPATIENT)
Dept: NURSING | Age: 70
Setting detail: INFUSION SERIES
Discharge: HOME | End: 2020-02-20
Payer: MEDICARE

## 2020-02-20 VITALS
WEIGHT: 244 LBS | TEMPERATURE: 97.6 F | RESPIRATION RATE: 18 BRPM | HEART RATE: 69 BPM | DIASTOLIC BLOOD PRESSURE: 89 MMHG | BODY MASS INDEX: 28.23 KG/M2 | SYSTOLIC BLOOD PRESSURE: 138 MMHG | HEIGHT: 78 IN

## 2020-02-20 DIAGNOSIS — Z87.39 HISTORY OF INFECTION OF TOTAL JOINT PROSTHESIS OF KNEE: ICD-10-CM

## 2020-02-20 DIAGNOSIS — T84.7XXS INFECTION/INFLAMMATION DUE TO INTERNAL ORTHOPEDIC DEVICE/IMPLANT/GRAFT, SEQUELA: Primary | ICD-10-CM

## 2020-02-20 PROCEDURE — 96365 THER/PROPH/DIAG IV INF INIT: CPT

## 2020-02-20 PROCEDURE — 6360000002 HC RX W HCPCS: Performed by: INTERNAL MEDICINE

## 2020-02-20 PROCEDURE — 99211 OFF/OP EST MAY X REQ PHY/QHP: CPT

## 2020-02-20 PROCEDURE — 2580000003 HC RX 258: Performed by: INTERNAL MEDICINE

## 2020-02-20 RX ADMIN — ERTAPENEM SODIUM 1000 MG: 1 INJECTION, POWDER, LYOPHILIZED, FOR SOLUTION INTRAMUSCULAR; INTRAVENOUS at 11:14

## 2020-02-20 ASSESSMENT — PAIN - FUNCTIONAL ASSESSMENT: PAIN_FUNCTIONAL_ASSESSMENT: 0-10

## 2020-02-21 ENCOUNTER — HOSPITAL ENCOUNTER (OUTPATIENT)
Dept: NURSING | Age: 70
Setting detail: INFUSION SERIES
Discharge: HOME | End: 2020-02-21
Payer: MEDICARE

## 2020-02-21 ENCOUNTER — TELEPHONE (OUTPATIENT)
Dept: INFECTIOUS DISEASES | Age: 70
End: 2020-02-21

## 2020-02-21 VITALS
TEMPERATURE: 98.3 F | HEART RATE: 72 BPM | HEIGHT: 78 IN | WEIGHT: 244 LBS | DIASTOLIC BLOOD PRESSURE: 81 MMHG | SYSTOLIC BLOOD PRESSURE: 127 MMHG | BODY MASS INDEX: 28.23 KG/M2 | RESPIRATION RATE: 18 BRPM

## 2020-02-21 DIAGNOSIS — T84.7XXS INFECTION/INFLAMMATION DUE TO INTERNAL ORTHOPEDIC DEVICE/IMPLANT/GRAFT, SEQUELA: Primary | ICD-10-CM

## 2020-02-21 DIAGNOSIS — Z87.39 HISTORY OF INFECTION OF TOTAL JOINT PROSTHESIS OF KNEE: ICD-10-CM

## 2020-02-21 PROCEDURE — 6360000002 HC RX W HCPCS: Performed by: INTERNAL MEDICINE

## 2020-02-21 PROCEDURE — 96365 THER/PROPH/DIAG IV INF INIT: CPT

## 2020-02-21 PROCEDURE — 99211 OFF/OP EST MAY X REQ PHY/QHP: CPT

## 2020-02-21 PROCEDURE — 2580000003 HC RX 258: Performed by: INTERNAL MEDICINE

## 2020-02-21 RX ADMIN — ERTAPENEM SODIUM 1000 MG: 1 INJECTION, POWDER, LYOPHILIZED, FOR SOLUTION INTRAMUSCULAR; INTRAVENOUS at 11:10

## 2020-02-22 ENCOUNTER — HOSPITAL ENCOUNTER (OUTPATIENT)
Age: 70
Discharge: HOME | End: 2020-02-22
Attending: INTERNAL MEDICINE | Admitting: INTERNAL MEDICINE
Payer: MEDICARE

## 2020-02-22 VITALS
HEART RATE: 77 BPM | DIASTOLIC BLOOD PRESSURE: 75 MMHG | SYSTOLIC BLOOD PRESSURE: 122 MMHG | RESPIRATION RATE: 18 BRPM | TEMPERATURE: 98.3 F

## 2020-02-22 PROCEDURE — 2580000003 HC RX 258

## 2020-02-22 PROCEDURE — 6360000002 HC RX W HCPCS: Performed by: INTERNAL MEDICINE

## 2020-02-22 PROCEDURE — 96365 THER/PROPH/DIAG IV INF INIT: CPT

## 2020-02-22 PROCEDURE — 96368 THER/DIAG CONCURRENT INF: CPT

## 2020-02-22 PROCEDURE — 2580000003 HC RX 258: Performed by: INTERNAL MEDICINE

## 2020-02-22 RX ORDER — SODIUM CHLORIDE 9 MG/ML
INJECTION, SOLUTION INTRAVENOUS
Status: COMPLETED
Start: 2020-02-22 | End: 2020-02-22

## 2020-02-22 RX ADMIN — ERTAPENEM SODIUM 1000 MG: 1 INJECTION, POWDER, LYOPHILIZED, FOR SOLUTION INTRAMUSCULAR; INTRAVENOUS at 10:46

## 2020-02-22 RX ADMIN — SODIUM CHLORIDE: 900 INJECTION, SOLUTION INTRAVENOUS at 11:39

## 2020-02-23 ENCOUNTER — HOSPITAL ENCOUNTER (OUTPATIENT)
Age: 70
Discharge: HOME | End: 2020-02-23
Attending: INTERNAL MEDICINE | Admitting: INTERNAL MEDICINE
Payer: MEDICARE

## 2020-02-23 VITALS
DIASTOLIC BLOOD PRESSURE: 82 MMHG | RESPIRATION RATE: 18 BRPM | SYSTOLIC BLOOD PRESSURE: 127 MMHG | TEMPERATURE: 98.2 F | HEART RATE: 78 BPM

## 2020-02-23 PROCEDURE — 6360000002 HC RX W HCPCS: Performed by: INTERNAL MEDICINE

## 2020-02-23 PROCEDURE — 96368 THER/DIAG CONCURRENT INF: CPT

## 2020-02-23 PROCEDURE — 96365 THER/PROPH/DIAG IV INF INIT: CPT

## 2020-02-23 PROCEDURE — 2580000003 HC RX 258: Performed by: INTERNAL MEDICINE

## 2020-02-23 RX ADMIN — ERTAPENEM SODIUM 1000 MG: 1 INJECTION, POWDER, LYOPHILIZED, FOR SOLUTION INTRAMUSCULAR; INTRAVENOUS at 10:57

## 2020-02-24 ENCOUNTER — HOSPITAL ENCOUNTER (OUTPATIENT)
Dept: NURSING | Age: 70
Setting detail: INFUSION SERIES
Discharge: HOME | End: 2020-02-24
Payer: MEDICARE

## 2020-02-24 VITALS
SYSTOLIC BLOOD PRESSURE: 125 MMHG | TEMPERATURE: 97.6 F | RESPIRATION RATE: 16 BRPM | BODY MASS INDEX: 28.23 KG/M2 | HEIGHT: 78 IN | WEIGHT: 244 LBS | DIASTOLIC BLOOD PRESSURE: 81 MMHG | HEART RATE: 67 BPM

## 2020-02-24 DIAGNOSIS — Z87.39 HISTORY OF INFECTION OF TOTAL JOINT PROSTHESIS OF KNEE: ICD-10-CM

## 2020-02-24 DIAGNOSIS — T84.7XXS INFECTION/INFLAMMATION DUE TO INTERNAL ORTHOPEDIC DEVICE/IMPLANT/GRAFT, SEQUELA: Primary | ICD-10-CM

## 2020-02-24 LAB
A/G RATIO: 0.9 (ref 1.1–2.2)
ALBUMIN SERPL-MCNC: 3.3 G/DL (ref 3.4–5)
ALP BLD-CCNC: 115 U/L (ref 40–129)
ALT SERPL-CCNC: 15 U/L (ref 10–40)
ANION GAP SERPL CALCULATED.3IONS-SCNC: 12 MMOL/L (ref 3–16)
AST SERPL-CCNC: 21 U/L (ref 15–37)
BASOPHILS ABSOLUTE: 0.1 K/UL (ref 0–0.2)
BASOPHILS RELATIVE PERCENT: 1.1 %
BILIRUB SERPL-MCNC: 0.3 MG/DL (ref 0–1)
BUN BLDV-MCNC: 11 MG/DL (ref 7–20)
C-REACTIVE PROTEIN: 77.9 MG/L (ref 0–5.1)
CALCIUM SERPL-MCNC: 8.9 MG/DL (ref 8.3–10.6)
CHLORIDE BLD-SCNC: 101 MMOL/L (ref 99–110)
CO2: 26 MMOL/L (ref 21–32)
CREAT SERPL-MCNC: 0.7 MG/DL (ref 0.8–1.3)
EOSINOPHILS ABSOLUTE: 0.4 K/UL (ref 0–0.6)
EOSINOPHILS RELATIVE PERCENT: 4.6 %
GFR AFRICAN AMERICAN: >60
GFR NON-AFRICAN AMERICAN: >60
GLOBULIN: 3.6 G/DL
GLUCOSE BLD-MCNC: 119 MG/DL (ref 70–99)
HCT VFR BLD CALC: 30.7 % (ref 40.5–52.5)
HEMOGLOBIN: 10.3 G/DL (ref 13.5–17.5)
LYMPHOCYTES ABSOLUTE: 1 K/UL (ref 1–5.1)
LYMPHOCYTES RELATIVE PERCENT: 13.4 %
MCH RBC QN AUTO: 26.1 PG (ref 26–34)
MCHC RBC AUTO-ENTMCNC: 33.6 G/DL (ref 31–36)
MCV RBC AUTO: 77.7 FL (ref 80–100)
MONOCYTES ABSOLUTE: 0.7 K/UL (ref 0–1.3)
MONOCYTES RELATIVE PERCENT: 8.6 %
NEUTROPHILS ABSOLUTE: 5.7 K/UL (ref 1.7–7.7)
NEUTROPHILS RELATIVE PERCENT: 72.3 %
PDW BLD-RTO: 22.2 % (ref 12.4–15.4)
PLATELET # BLD: 363 K/UL (ref 135–450)
PMV BLD AUTO: 7.1 FL (ref 5–10.5)
POTASSIUM SERPL-SCNC: 3.6 MMOL/L (ref 3.5–5.1)
RBC # BLD: 3.95 M/UL (ref 4.2–5.9)
SEDIMENTATION RATE, ERYTHROCYTE: 75 MM/HR (ref 0–20)
SODIUM BLD-SCNC: 139 MMOL/L (ref 136–145)
TOTAL PROTEIN: 6.9 G/DL (ref 6.4–8.2)
WBC # BLD: 7.8 K/UL (ref 4–11)

## 2020-02-24 PROCEDURE — 85652 RBC SED RATE AUTOMATED: CPT

## 2020-02-24 PROCEDURE — 80053 COMPREHEN METABOLIC PANEL: CPT

## 2020-02-24 PROCEDURE — 96365 THER/PROPH/DIAG IV INF INIT: CPT

## 2020-02-24 PROCEDURE — 86140 C-REACTIVE PROTEIN: CPT

## 2020-02-24 PROCEDURE — 85025 COMPLETE CBC W/AUTO DIFF WBC: CPT

## 2020-02-24 PROCEDURE — 36592 COLLECT BLOOD FROM PICC: CPT

## 2020-02-24 PROCEDURE — 6360000002 HC RX W HCPCS: Performed by: INTERNAL MEDICINE

## 2020-02-24 PROCEDURE — 99211 OFF/OP EST MAY X REQ PHY/QHP: CPT

## 2020-02-24 PROCEDURE — 2580000003 HC RX 258: Performed by: INTERNAL MEDICINE

## 2020-02-24 RX ADMIN — ERTAPENEM SODIUM 1000 MG: 1 INJECTION, POWDER, LYOPHILIZED, FOR SOLUTION INTRAMUSCULAR; INTRAVENOUS at 11:14

## 2020-02-24 ASSESSMENT — PAIN - FUNCTIONAL ASSESSMENT: PAIN_FUNCTIONAL_ASSESSMENT: 0-10

## 2020-02-25 ENCOUNTER — HOSPITAL ENCOUNTER (OUTPATIENT)
Dept: NURSING | Age: 70
Setting detail: INFUSION SERIES
Discharge: HOME | End: 2020-02-25
Payer: MEDICARE

## 2020-02-25 VITALS
RESPIRATION RATE: 16 BRPM | DIASTOLIC BLOOD PRESSURE: 89 MMHG | TEMPERATURE: 97.5 F | HEART RATE: 67 BPM | SYSTOLIC BLOOD PRESSURE: 128 MMHG | HEIGHT: 78 IN | WEIGHT: 244 LBS | BODY MASS INDEX: 28.23 KG/M2

## 2020-02-25 DIAGNOSIS — T84.7XXS INFECTION/INFLAMMATION DUE TO INTERNAL ORTHOPEDIC DEVICE/IMPLANT/GRAFT, SEQUELA: Primary | ICD-10-CM

## 2020-02-25 DIAGNOSIS — Z87.39 HISTORY OF INFECTION OF TOTAL JOINT PROSTHESIS OF KNEE: ICD-10-CM

## 2020-02-25 PROCEDURE — 6360000002 HC RX W HCPCS: Performed by: INTERNAL MEDICINE

## 2020-02-25 PROCEDURE — 2580000003 HC RX 258: Performed by: INTERNAL MEDICINE

## 2020-02-25 PROCEDURE — 96365 THER/PROPH/DIAG IV INF INIT: CPT

## 2020-02-25 PROCEDURE — 99211 OFF/OP EST MAY X REQ PHY/QHP: CPT

## 2020-02-25 RX ADMIN — ERTAPENEM SODIUM 1000 MG: 1 INJECTION, POWDER, LYOPHILIZED, FOR SOLUTION INTRAMUSCULAR; INTRAVENOUS at 11:20

## 2020-02-25 ASSESSMENT — PAIN - FUNCTIONAL ASSESSMENT: PAIN_FUNCTIONAL_ASSESSMENT: 0-10

## 2020-02-26 ENCOUNTER — HOSPITAL ENCOUNTER (OUTPATIENT)
Dept: NURSING | Age: 70
Setting detail: INFUSION SERIES
Discharge: HOME | End: 2020-02-26
Payer: MEDICARE

## 2020-02-26 VITALS
SYSTOLIC BLOOD PRESSURE: 130 MMHG | WEIGHT: 244 LBS | RESPIRATION RATE: 16 BRPM | BODY MASS INDEX: 28.23 KG/M2 | TEMPERATURE: 97.6 F | HEART RATE: 68 BPM | DIASTOLIC BLOOD PRESSURE: 88 MMHG | HEIGHT: 78 IN

## 2020-02-26 DIAGNOSIS — T84.7XXS INFECTION/INFLAMMATION DUE TO INTERNAL ORTHOPEDIC DEVICE/IMPLANT/GRAFT, SEQUELA: Primary | ICD-10-CM

## 2020-02-26 DIAGNOSIS — Z87.39 HISTORY OF INFECTION OF TOTAL JOINT PROSTHESIS OF KNEE: ICD-10-CM

## 2020-02-26 PROCEDURE — 6360000002 HC RX W HCPCS: Performed by: INTERNAL MEDICINE

## 2020-02-26 PROCEDURE — 2580000003 HC RX 258: Performed by: INTERNAL MEDICINE

## 2020-02-26 PROCEDURE — 99211 OFF/OP EST MAY X REQ PHY/QHP: CPT

## 2020-02-26 PROCEDURE — 96365 THER/PROPH/DIAG IV INF INIT: CPT

## 2020-02-26 RX ADMIN — ERTAPENEM SODIUM 1000 MG: 1 INJECTION, POWDER, LYOPHILIZED, FOR SOLUTION INTRAMUSCULAR; INTRAVENOUS at 10:55

## 2020-02-26 ASSESSMENT — PAIN - FUNCTIONAL ASSESSMENT: PAIN_FUNCTIONAL_ASSESSMENT: 0-10

## 2020-02-26 NOTE — PROGRESS NOTES
Pt tolerates infusion well Dr Ginny Holt visits and treatment continued for 2 more weeks discussed with patient.  Verbalizes understanding discharged to home in stable condition

## 2020-02-27 ENCOUNTER — HOSPITAL ENCOUNTER (OUTPATIENT)
Dept: NURSING | Age: 70
Setting detail: INFUSION SERIES
Discharge: HOME | End: 2020-02-27
Payer: MEDICARE

## 2020-02-27 VITALS
WEIGHT: 244 LBS | HEIGHT: 78 IN | RESPIRATION RATE: 18 BRPM | TEMPERATURE: 97.6 F | DIASTOLIC BLOOD PRESSURE: 90 MMHG | BODY MASS INDEX: 28.23 KG/M2 | SYSTOLIC BLOOD PRESSURE: 131 MMHG | HEART RATE: 66 BPM

## 2020-02-27 DIAGNOSIS — T84.7XXS INFECTION/INFLAMMATION DUE TO INTERNAL ORTHOPEDIC DEVICE/IMPLANT/GRAFT, SEQUELA: Primary | ICD-10-CM

## 2020-02-27 DIAGNOSIS — Z87.39 HISTORY OF INFECTION OF TOTAL JOINT PROSTHESIS OF KNEE: ICD-10-CM

## 2020-02-27 PROCEDURE — 96365 THER/PROPH/DIAG IV INF INIT: CPT

## 2020-02-27 PROCEDURE — 2580000003 HC RX 258: Performed by: INTERNAL MEDICINE

## 2020-02-27 PROCEDURE — 99211 OFF/OP EST MAY X REQ PHY/QHP: CPT

## 2020-02-27 PROCEDURE — 6360000002 HC RX W HCPCS: Performed by: INTERNAL MEDICINE

## 2020-02-27 RX ADMIN — ERTAPENEM SODIUM 1000 MG: 1 INJECTION, POWDER, LYOPHILIZED, FOR SOLUTION INTRAMUSCULAR; INTRAVENOUS at 11:55

## 2020-02-27 ASSESSMENT — PAIN - FUNCTIONAL ASSESSMENT: PAIN_FUNCTIONAL_ASSESSMENT: 0-10

## 2020-02-28 ENCOUNTER — HOSPITAL ENCOUNTER (OUTPATIENT)
Dept: NURSING | Age: 70
Setting detail: INFUSION SERIES
Discharge: HOME | End: 2020-02-28
Payer: MEDICARE

## 2020-02-28 VITALS
WEIGHT: 244 LBS | HEART RATE: 64 BPM | DIASTOLIC BLOOD PRESSURE: 81 MMHG | RESPIRATION RATE: 18 BRPM | TEMPERATURE: 97.2 F | SYSTOLIC BLOOD PRESSURE: 141 MMHG | BODY MASS INDEX: 28.23 KG/M2 | HEIGHT: 78 IN

## 2020-02-28 DIAGNOSIS — Z87.39 HISTORY OF INFECTION OF TOTAL JOINT PROSTHESIS OF KNEE: ICD-10-CM

## 2020-02-28 DIAGNOSIS — T84.7XXS INFECTION/INFLAMMATION DUE TO INTERNAL ORTHOPEDIC DEVICE/IMPLANT/GRAFT, SEQUELA: Primary | ICD-10-CM

## 2020-02-28 PROCEDURE — 96365 THER/PROPH/DIAG IV INF INIT: CPT

## 2020-02-28 PROCEDURE — 99211 OFF/OP EST MAY X REQ PHY/QHP: CPT

## 2020-02-28 PROCEDURE — 6360000002 HC RX W HCPCS: Performed by: INTERNAL MEDICINE

## 2020-02-28 PROCEDURE — 2580000003 HC RX 258: Performed by: INTERNAL MEDICINE

## 2020-02-28 RX ADMIN — ERTAPENEM SODIUM 1000 MG: 1 INJECTION, POWDER, LYOPHILIZED, FOR SOLUTION INTRAMUSCULAR; INTRAVENOUS at 10:55

## 2020-02-28 ASSESSMENT — PAIN - FUNCTIONAL ASSESSMENT: PAIN_FUNCTIONAL_ASSESSMENT: 0-10

## 2020-02-29 ENCOUNTER — HOSPITAL ENCOUNTER (OUTPATIENT)
Age: 70
Discharge: HOME | End: 2020-02-29
Attending: INTERNAL MEDICINE | Admitting: INTERNAL MEDICINE
Payer: MEDICARE

## 2020-02-29 VITALS
SYSTOLIC BLOOD PRESSURE: 136 MMHG | TEMPERATURE: 98.5 F | HEART RATE: 72 BPM | RESPIRATION RATE: 18 BRPM | DIASTOLIC BLOOD PRESSURE: 69 MMHG

## 2020-02-29 PROCEDURE — 96368 THER/DIAG CONCURRENT INF: CPT

## 2020-02-29 PROCEDURE — 6360000002 HC RX W HCPCS: Performed by: INTERNAL MEDICINE

## 2020-02-29 PROCEDURE — 2580000003 HC RX 258

## 2020-02-29 PROCEDURE — 2580000003 HC RX 258: Performed by: INTERNAL MEDICINE

## 2020-02-29 PROCEDURE — 96365 THER/PROPH/DIAG IV INF INIT: CPT

## 2020-02-29 RX ORDER — SODIUM CHLORIDE 9 MG/ML
INJECTION, SOLUTION INTRAVENOUS
Status: COMPLETED
Start: 2020-02-29 | End: 2020-02-29

## 2020-02-29 RX ADMIN — SODIUM CHLORIDE: 900 INJECTION, SOLUTION INTRAVENOUS at 10:53

## 2020-02-29 RX ADMIN — ERTAPENEM SODIUM 1000 MG: 1 INJECTION, POWDER, LYOPHILIZED, FOR SOLUTION INTRAMUSCULAR; INTRAVENOUS at 10:53

## 2020-03-01 ENCOUNTER — HOSPITAL ENCOUNTER (OUTPATIENT)
Age: 70
Discharge: HOME | End: 2020-03-01
Attending: INTERNAL MEDICINE | Admitting: INTERNAL MEDICINE
Payer: MEDICARE

## 2020-03-01 VITALS
OXYGEN SATURATION: 98 % | DIASTOLIC BLOOD PRESSURE: 51 MMHG | RESPIRATION RATE: 18 BRPM | TEMPERATURE: 98 F | HEART RATE: 55 BPM | SYSTOLIC BLOOD PRESSURE: 133 MMHG

## 2020-03-01 PROCEDURE — 96365 THER/PROPH/DIAG IV INF INIT: CPT

## 2020-03-01 PROCEDURE — 6360000002 HC RX W HCPCS: Performed by: INTERNAL MEDICINE

## 2020-03-01 PROCEDURE — 2580000003 HC RX 258: Performed by: INTERNAL MEDICINE

## 2020-03-01 PROCEDURE — 96368 THER/DIAG CONCURRENT INF: CPT

## 2020-03-01 RX ADMIN — ERTAPENEM SODIUM 1000 MG: 1 INJECTION, POWDER, LYOPHILIZED, FOR SOLUTION INTRAMUSCULAR; INTRAVENOUS at 11:04

## 2020-03-02 ENCOUNTER — HOSPITAL ENCOUNTER (OUTPATIENT)
Dept: NURSING | Age: 70
Setting detail: INFUSION SERIES
Discharge: HOME | End: 2020-03-02
Payer: MEDICARE

## 2020-03-02 ENCOUNTER — TELEPHONE (OUTPATIENT)
Dept: INFECTIOUS DISEASES | Age: 70
End: 2020-03-02

## 2020-03-02 VITALS
BODY MASS INDEX: 28.23 KG/M2 | RESPIRATION RATE: 18 BRPM | SYSTOLIC BLOOD PRESSURE: 141 MMHG | WEIGHT: 244 LBS | DIASTOLIC BLOOD PRESSURE: 87 MMHG | HEIGHT: 78 IN | HEART RATE: 69 BPM | TEMPERATURE: 98.2 F

## 2020-03-02 DIAGNOSIS — Z87.39 HISTORY OF INFECTION OF TOTAL JOINT PROSTHESIS OF KNEE: ICD-10-CM

## 2020-03-02 DIAGNOSIS — T84.7XXS INFECTION/INFLAMMATION DUE TO INTERNAL ORTHOPEDIC DEVICE/IMPLANT/GRAFT, SEQUELA: Primary | ICD-10-CM

## 2020-03-02 LAB
A/G RATIO: 1 (ref 1.1–2.2)
ALBUMIN SERPL-MCNC: 3.3 G/DL (ref 3.4–5)
ALP BLD-CCNC: 129 U/L (ref 40–129)
ALT SERPL-CCNC: 15 U/L (ref 10–40)
ANION GAP SERPL CALCULATED.3IONS-SCNC: 13 MMOL/L (ref 3–16)
ANISOCYTOSIS: ABNORMAL
AST SERPL-CCNC: 25 U/L (ref 15–37)
ATYPICAL LYMPHOCYTE RELATIVE PERCENT: 10 % (ref 0–6)
BANDED NEUTROPHILS RELATIVE PERCENT: 15 % (ref 0–7)
BASOPHILS ABSOLUTE: 0 K/UL (ref 0–0.2)
BASOPHILS RELATIVE PERCENT: 2 %
BILIRUB SERPL-MCNC: 0.5 MG/DL (ref 0–1)
BUN BLDV-MCNC: 15 MG/DL (ref 7–20)
CALCIUM SERPL-MCNC: 8.8 MG/DL (ref 8.3–10.6)
CHLORIDE BLD-SCNC: 100 MMOL/L (ref 99–110)
CO2: 26 MMOL/L (ref 21–32)
CREAT SERPL-MCNC: 0.6 MG/DL (ref 0.8–1.3)
EOSINOPHILS ABSOLUTE: 0.2 K/UL (ref 0–0.6)
EOSINOPHILS RELATIVE PERCENT: 9 %
GFR AFRICAN AMERICAN: >60
GFR NON-AFRICAN AMERICAN: >60
GLOBULIN: 3.4 G/DL
GLUCOSE BLD-MCNC: 91 MG/DL (ref 70–99)
HCT VFR BLD CALC: 30.1 % (ref 40.5–52.5)
HEMATOLOGY PATH CONSULT: YES
HEMOGLOBIN: 9.9 G/DL (ref 13.5–17.5)
LYMPHOCYTES ABSOLUTE: 0.8 K/UL (ref 1–5.1)
LYMPHOCYTES RELATIVE PERCENT: 32 %
MACROCYTES: ABNORMAL
MCH RBC QN AUTO: 26.5 PG (ref 26–34)
MCHC RBC AUTO-ENTMCNC: 32.9 G/DL (ref 31–36)
MCV RBC AUTO: 80.4 FL (ref 80–100)
MONOCYTES ABSOLUTE: 0.2 K/UL (ref 0–1.3)
MONOCYTES RELATIVE PERCENT: 12 %
NEUTROPHILS ABSOLUTE: 0.7 K/UL (ref 1.7–7.7)
NEUTROPHILS RELATIVE PERCENT: 20 %
PDW BLD-RTO: 22.3 % (ref 12.4–15.4)
PLATELET # BLD: 217 K/UL (ref 135–450)
PMV BLD AUTO: 7.3 FL (ref 5–10.5)
POIKILOCYTES: ABNORMAL
POTASSIUM SERPL-SCNC: 3.5 MMOL/L (ref 3.5–5.1)
RBC # BLD: 3.74 M/UL (ref 4.2–5.9)
SEDIMENTATION RATE, ERYTHROCYTE: 67 MM/HR (ref 0–20)
SODIUM BLD-SCNC: 139 MMOL/L (ref 136–145)
TOTAL PROTEIN: 6.7 G/DL (ref 6.4–8.2)
WBC # BLD: 2 K/UL (ref 4–11)

## 2020-03-02 PROCEDURE — 85652 RBC SED RATE AUTOMATED: CPT

## 2020-03-02 PROCEDURE — 80053 COMPREHEN METABOLIC PANEL: CPT

## 2020-03-02 PROCEDURE — 96365 THER/PROPH/DIAG IV INF INIT: CPT

## 2020-03-02 PROCEDURE — 2580000003 HC RX 258: Performed by: INTERNAL MEDICINE

## 2020-03-02 PROCEDURE — 99211 OFF/OP EST MAY X REQ PHY/QHP: CPT

## 2020-03-02 PROCEDURE — 6360000002 HC RX W HCPCS: Performed by: INTERNAL MEDICINE

## 2020-03-02 PROCEDURE — 86140 C-REACTIVE PROTEIN: CPT

## 2020-03-02 PROCEDURE — 85025 COMPLETE CBC W/AUTO DIFF WBC: CPT

## 2020-03-02 RX ADMIN — ERTAPENEM SODIUM 1000 MG: 1 INJECTION, POWDER, LYOPHILIZED, FOR SOLUTION INTRAMUSCULAR; INTRAVENOUS at 11:05

## 2020-03-02 ASSESSMENT — PAIN - FUNCTIONAL ASSESSMENT: PAIN_FUNCTIONAL_ASSESSMENT: 0-10

## 2020-03-02 NOTE — TELEPHONE ENCOUNTER
Note leukopenia  This has occurred during the course of abx therapy in the past.  He actually had a heme onc eval late last year.   I don't have the report    Please have infusion center get another CBC diff on 3/5/20    Can you try to get the consult report from Hematologist    Thanks

## 2020-03-03 ENCOUNTER — HOSPITAL ENCOUNTER (OUTPATIENT)
Dept: NURSING | Age: 70
Setting detail: INFUSION SERIES
Discharge: HOME | End: 2020-03-03
Payer: MEDICARE

## 2020-03-03 VITALS
TEMPERATURE: 98.2 F | HEART RATE: 77 BPM | RESPIRATION RATE: 18 BRPM | WEIGHT: 244 LBS | DIASTOLIC BLOOD PRESSURE: 84 MMHG | SYSTOLIC BLOOD PRESSURE: 122 MMHG | BODY MASS INDEX: 28.23 KG/M2 | HEIGHT: 78 IN

## 2020-03-03 DIAGNOSIS — Z87.39 HISTORY OF INFECTION OF TOTAL JOINT PROSTHESIS OF KNEE: ICD-10-CM

## 2020-03-03 DIAGNOSIS — T84.7XXS INFECTION/INFLAMMATION DUE TO INTERNAL ORTHOPEDIC DEVICE/IMPLANT/GRAFT, SEQUELA: Primary | ICD-10-CM

## 2020-03-03 LAB — HEMATOLOGY PATH CONSULT: NORMAL

## 2020-03-03 PROCEDURE — 96365 THER/PROPH/DIAG IV INF INIT: CPT

## 2020-03-03 PROCEDURE — 99211 OFF/OP EST MAY X REQ PHY/QHP: CPT

## 2020-03-03 PROCEDURE — 6360000002 HC RX W HCPCS: Performed by: INTERNAL MEDICINE

## 2020-03-03 PROCEDURE — 2580000003 HC RX 258: Performed by: INTERNAL MEDICINE

## 2020-03-03 RX ADMIN — ERTAPENEM SODIUM 1000 MG: 1 INJECTION, POWDER, LYOPHILIZED, FOR SOLUTION INTRAMUSCULAR; INTRAVENOUS at 11:19

## 2020-03-03 ASSESSMENT — PAIN - FUNCTIONAL ASSESSMENT: PAIN_FUNCTIONAL_ASSESSMENT: 0-10

## 2020-03-04 ENCOUNTER — HOSPITAL ENCOUNTER (OUTPATIENT)
Dept: NURSING | Age: 70
Setting detail: INFUSION SERIES
Discharge: HOME | End: 2020-03-04
Payer: MEDICARE

## 2020-03-04 VITALS
SYSTOLIC BLOOD PRESSURE: 131 MMHG | BODY MASS INDEX: 28.23 KG/M2 | TEMPERATURE: 98.1 F | DIASTOLIC BLOOD PRESSURE: 83 MMHG | RESPIRATION RATE: 18 BRPM | HEIGHT: 78 IN | WEIGHT: 244 LBS | HEART RATE: 70 BPM

## 2020-03-04 DIAGNOSIS — Z87.39 HISTORY OF INFECTION OF TOTAL JOINT PROSTHESIS OF KNEE: ICD-10-CM

## 2020-03-04 DIAGNOSIS — T84.7XXS INFECTION/INFLAMMATION DUE TO INTERNAL ORTHOPEDIC DEVICE/IMPLANT/GRAFT, SEQUELA: Primary | ICD-10-CM

## 2020-03-04 LAB — C-REACTIVE PROTEIN: 80.1 MG/L (ref 0–5.1)

## 2020-03-04 PROCEDURE — 2580000003 HC RX 258: Performed by: INTERNAL MEDICINE

## 2020-03-04 PROCEDURE — 6360000002 HC RX W HCPCS: Performed by: INTERNAL MEDICINE

## 2020-03-04 PROCEDURE — 96365 THER/PROPH/DIAG IV INF INIT: CPT

## 2020-03-04 PROCEDURE — 99211 OFF/OP EST MAY X REQ PHY/QHP: CPT

## 2020-03-04 RX ADMIN — ERTAPENEM SODIUM 1000 MG: 1 INJECTION, POWDER, LYOPHILIZED, FOR SOLUTION INTRAMUSCULAR; INTRAVENOUS at 11:11

## 2020-03-04 ASSESSMENT — PAIN - FUNCTIONAL ASSESSMENT: PAIN_FUNCTIONAL_ASSESSMENT: 0-10

## 2020-03-05 ENCOUNTER — HOSPITAL ENCOUNTER (OUTPATIENT)
Dept: NURSING | Age: 70
Setting detail: INFUSION SERIES
Discharge: HOME | End: 2020-03-05
Payer: MEDICARE

## 2020-03-05 VITALS
TEMPERATURE: 97.6 F | HEIGHT: 78 IN | RESPIRATION RATE: 18 BRPM | SYSTOLIC BLOOD PRESSURE: 125 MMHG | WEIGHT: 244 LBS | DIASTOLIC BLOOD PRESSURE: 84 MMHG | BODY MASS INDEX: 28.23 KG/M2 | HEART RATE: 64 BPM

## 2020-03-05 DIAGNOSIS — Z87.39 HISTORY OF INFECTION OF TOTAL JOINT PROSTHESIS OF KNEE: ICD-10-CM

## 2020-03-05 DIAGNOSIS — T84.7XXS INFECTION/INFLAMMATION DUE TO INTERNAL ORTHOPEDIC DEVICE/IMPLANT/GRAFT, SEQUELA: Primary | ICD-10-CM

## 2020-03-05 PROCEDURE — 6360000002 HC RX W HCPCS: Performed by: INTERNAL MEDICINE

## 2020-03-05 PROCEDURE — 99211 OFF/OP EST MAY X REQ PHY/QHP: CPT

## 2020-03-05 PROCEDURE — 2580000003 HC RX 258: Performed by: INTERNAL MEDICINE

## 2020-03-05 PROCEDURE — 96365 THER/PROPH/DIAG IV INF INIT: CPT

## 2020-03-05 RX ADMIN — ERTAPENEM SODIUM 1000 MG: 1 INJECTION, POWDER, LYOPHILIZED, FOR SOLUTION INTRAMUSCULAR; INTRAVENOUS at 11:18

## 2020-03-05 ASSESSMENT — PAIN - FUNCTIONAL ASSESSMENT: PAIN_FUNCTIONAL_ASSESSMENT: 0-10

## 2020-03-06 ENCOUNTER — HOSPITAL ENCOUNTER (OUTPATIENT)
Dept: NURSING | Age: 70
Setting detail: INFUSION SERIES
Discharge: HOME | End: 2020-03-06
Payer: MEDICARE

## 2020-03-06 VITALS
TEMPERATURE: 97.9 F | DIASTOLIC BLOOD PRESSURE: 83 MMHG | BODY MASS INDEX: 28.23 KG/M2 | WEIGHT: 244 LBS | RESPIRATION RATE: 18 BRPM | SYSTOLIC BLOOD PRESSURE: 137 MMHG | HEIGHT: 78 IN | HEART RATE: 65 BPM

## 2020-03-06 DIAGNOSIS — T84.7XXS INFECTION/INFLAMMATION DUE TO INTERNAL ORTHOPEDIC DEVICE/IMPLANT/GRAFT, SEQUELA: Primary | ICD-10-CM

## 2020-03-06 DIAGNOSIS — Z87.39 HISTORY OF INFECTION OF TOTAL JOINT PROSTHESIS OF KNEE: ICD-10-CM

## 2020-03-06 PROCEDURE — 2580000003 HC RX 258: Performed by: INTERNAL MEDICINE

## 2020-03-06 PROCEDURE — 96365 THER/PROPH/DIAG IV INF INIT: CPT

## 2020-03-06 PROCEDURE — 99211 OFF/OP EST MAY X REQ PHY/QHP: CPT

## 2020-03-06 PROCEDURE — 6360000002 HC RX W HCPCS: Performed by: INTERNAL MEDICINE

## 2020-03-06 RX ADMIN — ERTAPENEM SODIUM 1000 MG: 1 INJECTION, POWDER, LYOPHILIZED, FOR SOLUTION INTRAMUSCULAR; INTRAVENOUS at 10:49

## 2020-03-06 ASSESSMENT — PAIN - FUNCTIONAL ASSESSMENT: PAIN_FUNCTIONAL_ASSESSMENT: 0-10

## 2020-03-06 NOTE — PROGRESS NOTES
Pt tolerates infusion well discharged to home via wheelchair instructed to go to [facility] for treatment this weekend .  Discharged via wheelchair in stable condition

## 2020-03-07 ENCOUNTER — HOSPITAL ENCOUNTER (OUTPATIENT)
Age: 70
Discharge: HOME | End: 2020-03-07
Attending: INTERNAL MEDICINE | Admitting: INTERNAL MEDICINE
Payer: MEDICARE

## 2020-03-07 PROCEDURE — 96365 THER/PROPH/DIAG IV INF INIT: CPT

## 2020-03-07 PROCEDURE — 96368 THER/DIAG CONCURRENT INF: CPT

## 2020-03-07 PROCEDURE — 2580000003 HC RX 258: Performed by: INTERNAL MEDICINE

## 2020-03-07 PROCEDURE — 6360000002 HC RX W HCPCS: Performed by: INTERNAL MEDICINE

## 2020-03-07 RX ADMIN — ERTAPENEM SODIUM 1000 MG: 1 INJECTION, POWDER, LYOPHILIZED, FOR SOLUTION INTRAMUSCULAR; INTRAVENOUS at 10:48

## 2020-03-08 ENCOUNTER — HOSPITAL ENCOUNTER (OUTPATIENT)
Age: 70
Discharge: HOME | End: 2020-03-08
Attending: INTERNAL MEDICINE | Admitting: INTERNAL MEDICINE
Payer: MEDICARE

## 2020-03-08 VITALS
SYSTOLIC BLOOD PRESSURE: 113 MMHG | RESPIRATION RATE: 20 BRPM | TEMPERATURE: 98.1 F | HEART RATE: 71 BPM | DIASTOLIC BLOOD PRESSURE: 78 MMHG

## 2020-03-08 PROCEDURE — 6360000002 HC RX W HCPCS: Performed by: INTERNAL MEDICINE

## 2020-03-08 PROCEDURE — 96368 THER/DIAG CONCURRENT INF: CPT

## 2020-03-08 PROCEDURE — 96365 THER/PROPH/DIAG IV INF INIT: CPT

## 2020-03-08 PROCEDURE — 2580000003 HC RX 258: Performed by: INTERNAL MEDICINE

## 2020-03-08 RX ADMIN — ERTAPENEM SODIUM 1000 MG: 1 INJECTION, POWDER, LYOPHILIZED, FOR SOLUTION INTRAMUSCULAR; INTRAVENOUS at 11:05

## 2020-03-09 ENCOUNTER — HOSPITAL ENCOUNTER (OUTPATIENT)
Dept: NURSING | Age: 70
Setting detail: INFUSION SERIES
Discharge: HOME | End: 2020-03-09
Payer: MEDICARE

## 2020-03-09 VITALS
WEIGHT: 250 LBS | SYSTOLIC BLOOD PRESSURE: 141 MMHG | DIASTOLIC BLOOD PRESSURE: 96 MMHG | HEIGHT: 78 IN | RESPIRATION RATE: 18 BRPM | HEART RATE: 65 BPM | BODY MASS INDEX: 28.93 KG/M2 | TEMPERATURE: 98.1 F

## 2020-03-09 DIAGNOSIS — Z87.39 HISTORY OF INFECTION OF TOTAL JOINT PROSTHESIS OF KNEE: ICD-10-CM

## 2020-03-09 DIAGNOSIS — T84.7XXS INFECTION/INFLAMMATION DUE TO INTERNAL ORTHOPEDIC DEVICE/IMPLANT/GRAFT, SEQUELA: Primary | ICD-10-CM

## 2020-03-09 LAB
A/G RATIO: 1 (ref 1.1–2.2)
ALBUMIN SERPL-MCNC: 3.6 G/DL (ref 3.4–5)
ALP BLD-CCNC: 134 U/L (ref 40–129)
ALT SERPL-CCNC: 14 U/L (ref 10–40)
ANION GAP SERPL CALCULATED.3IONS-SCNC: 12 MMOL/L (ref 3–16)
AST SERPL-CCNC: 21 U/L (ref 15–37)
BASOPHILS ABSOLUTE: 0.1 K/UL (ref 0–0.2)
BASOPHILS RELATIVE PERCENT: 2 %
BILIRUB SERPL-MCNC: 0.3 MG/DL (ref 0–1)
BUN BLDV-MCNC: 10 MG/DL (ref 7–20)
CALCIUM SERPL-MCNC: 9.2 MG/DL (ref 8.3–10.6)
CHLORIDE BLD-SCNC: 101 MMOL/L (ref 99–110)
CO2: 27 MMOL/L (ref 21–32)
CREAT SERPL-MCNC: 0.6 MG/DL (ref 0.8–1.3)
EOSINOPHILS ABSOLUTE: 0.3 K/UL (ref 0–0.6)
EOSINOPHILS RELATIVE PERCENT: 11.6 %
GFR AFRICAN AMERICAN: >60
GFR NON-AFRICAN AMERICAN: >60
GLOBULIN: 3.5 G/DL
GLUCOSE BLD-MCNC: 95 MG/DL (ref 70–99)
HCT VFR BLD CALC: 31.5 % (ref 40.5–52.5)
HEMOGLOBIN: 10.6 G/DL (ref 13.5–17.5)
LYMPHOCYTES ABSOLUTE: 0.8 K/UL (ref 1–5.1)
LYMPHOCYTES RELATIVE PERCENT: 30.1 %
MCH RBC QN AUTO: 27 PG (ref 26–34)
MCHC RBC AUTO-ENTMCNC: 33.8 G/DL (ref 31–36)
MCV RBC AUTO: 79.7 FL (ref 80–100)
MONOCYTES ABSOLUTE: 0.6 K/UL (ref 0–1.3)
MONOCYTES RELATIVE PERCENT: 20.7 %
NEUTROPHILS ABSOLUTE: 1 K/UL (ref 1.7–7.7)
NEUTROPHILS RELATIVE PERCENT: 35.6 %
PDW BLD-RTO: 21.8 % (ref 12.4–15.4)
PLATELET # BLD: 282 K/UL (ref 135–450)
PMV BLD AUTO: 7.5 FL (ref 5–10.5)
POTASSIUM SERPL-SCNC: 3.6 MMOL/L (ref 3.5–5.1)
RBC # BLD: 3.95 M/UL (ref 4.2–5.9)
SEDIMENTATION RATE, ERYTHROCYTE: 53 MM/HR (ref 0–20)
SODIUM BLD-SCNC: 140 MMOL/L (ref 136–145)
TOTAL PROTEIN: 7.1 G/DL (ref 6.4–8.2)
WBC # BLD: 2.7 K/UL (ref 4–11)

## 2020-03-09 PROCEDURE — 85652 RBC SED RATE AUTOMATED: CPT

## 2020-03-09 PROCEDURE — 2580000003 HC RX 258: Performed by: INTERNAL MEDICINE

## 2020-03-09 PROCEDURE — 86140 C-REACTIVE PROTEIN: CPT

## 2020-03-09 PROCEDURE — 80053 COMPREHEN METABOLIC PANEL: CPT

## 2020-03-09 PROCEDURE — 96365 THER/PROPH/DIAG IV INF INIT: CPT

## 2020-03-09 PROCEDURE — 85025 COMPLETE CBC W/AUTO DIFF WBC: CPT

## 2020-03-09 PROCEDURE — 6360000002 HC RX W HCPCS: Performed by: INTERNAL MEDICINE

## 2020-03-09 PROCEDURE — 99211 OFF/OP EST MAY X REQ PHY/QHP: CPT

## 2020-03-09 PROCEDURE — 36592 COLLECT BLOOD FROM PICC: CPT

## 2020-03-09 RX ADMIN — ERTAPENEM SODIUM 1000 MG: 1 INJECTION, POWDER, LYOPHILIZED, FOR SOLUTION INTRAMUSCULAR; INTRAVENOUS at 11:05

## 2020-03-09 ASSESSMENT — PAIN SCALES - GENERAL: PAINLEVEL_OUTOF10: 0

## 2020-03-09 NOTE — PROGRESS NOTES
Pt tolerates infusion well dressing to PICC line changed per protocol. Patient tolerates well lab work drawn prior to infusion and sent to lab stat as ordered.  Discharged to home via wheelchair in stable condition

## 2020-03-10 ENCOUNTER — HOSPITAL ENCOUNTER (OUTPATIENT)
Dept: NURSING | Age: 70
Setting detail: INFUSION SERIES
Discharge: HOME | End: 2020-03-10
Payer: MEDICARE

## 2020-03-10 VITALS
WEIGHT: 250 LBS | RESPIRATION RATE: 18 BRPM | SYSTOLIC BLOOD PRESSURE: 152 MMHG | HEIGHT: 78 IN | TEMPERATURE: 97.7 F | DIASTOLIC BLOOD PRESSURE: 94 MMHG | HEART RATE: 63 BPM | BODY MASS INDEX: 28.93 KG/M2

## 2020-03-10 DIAGNOSIS — Z87.39 HISTORY OF INFECTION OF TOTAL JOINT PROSTHESIS OF KNEE: ICD-10-CM

## 2020-03-10 DIAGNOSIS — T84.7XXS INFECTION/INFLAMMATION DUE TO INTERNAL ORTHOPEDIC DEVICE/IMPLANT/GRAFT, SEQUELA: Primary | ICD-10-CM

## 2020-03-10 LAB — C-REACTIVE PROTEIN: 31.6 MG/L (ref 0–5.1)

## 2020-03-10 PROCEDURE — 96365 THER/PROPH/DIAG IV INF INIT: CPT

## 2020-03-10 PROCEDURE — 6360000002 HC RX W HCPCS: Performed by: INTERNAL MEDICINE

## 2020-03-10 PROCEDURE — 2580000003 HC RX 258: Performed by: INTERNAL MEDICINE

## 2020-03-10 PROCEDURE — 99211 OFF/OP EST MAY X REQ PHY/QHP: CPT

## 2020-03-10 RX ADMIN — ERTAPENEM SODIUM 1000 MG: 1 INJECTION, POWDER, LYOPHILIZED, FOR SOLUTION INTRAMUSCULAR; INTRAVENOUS at 11:02

## 2020-03-10 ASSESSMENT — PAIN - FUNCTIONAL ASSESSMENT: PAIN_FUNCTIONAL_ASSESSMENT: 0-10

## 2020-03-11 ENCOUNTER — HOSPITAL ENCOUNTER (OUTPATIENT)
Dept: NURSING | Age: 70
Setting detail: INFUSION SERIES
Discharge: HOME | End: 2020-03-11
Payer: MEDICARE

## 2020-03-11 ENCOUNTER — TELEPHONE (OUTPATIENT)
Dept: INFECTIOUS DISEASES | Age: 70
End: 2020-03-11

## 2020-03-11 VITALS
WEIGHT: 250 LBS | RESPIRATION RATE: 18 BRPM | SYSTOLIC BLOOD PRESSURE: 153 MMHG | DIASTOLIC BLOOD PRESSURE: 94 MMHG | HEART RATE: 60 BPM | BODY MASS INDEX: 28.93 KG/M2 | TEMPERATURE: 97.9 F | HEIGHT: 78 IN

## 2020-03-11 DIAGNOSIS — T84.7XXS INFECTION/INFLAMMATION DUE TO INTERNAL ORTHOPEDIC DEVICE/IMPLANT/GRAFT, SEQUELA: Primary | ICD-10-CM

## 2020-03-11 DIAGNOSIS — Z87.39 HISTORY OF INFECTION OF TOTAL JOINT PROSTHESIS OF KNEE: ICD-10-CM

## 2020-03-11 PROCEDURE — 2580000003 HC RX 258: Performed by: INTERNAL MEDICINE

## 2020-03-11 PROCEDURE — 96365 THER/PROPH/DIAG IV INF INIT: CPT

## 2020-03-11 PROCEDURE — 6360000002 HC RX W HCPCS: Performed by: INTERNAL MEDICINE

## 2020-03-11 PROCEDURE — 99211 OFF/OP EST MAY X REQ PHY/QHP: CPT

## 2020-03-11 RX ADMIN — ERTAPENEM SODIUM 1000 MG: 1 INJECTION, POWDER, LYOPHILIZED, FOR SOLUTION INTRAMUSCULAR; INTRAVENOUS at 11:02

## 2020-03-11 ASSESSMENT — PAIN - FUNCTIONAL ASSESSMENT: PAIN_FUNCTIONAL_ASSESSMENT: 0-10

## 2020-03-12 ENCOUNTER — TELEPHONE (OUTPATIENT)
Dept: INFECTIOUS DISEASES | Age: 70
End: 2020-03-12

## 2020-03-13 RX ORDER — CIPROFLOXACIN 500 MG/1
500 TABLET, FILM COATED ORAL 2 TIMES DAILY
Qty: 60 TABLET | Refills: 0 | Status: SHIPPED | OUTPATIENT
Start: 2020-03-13 | End: 2020-04-03 | Stop reason: SDUPTHER

## 2020-03-13 NOTE — TELEPHONE ENCOUNTER
Called [facility]    Previous plan for debridement and flap closure cancelled by patient     No drainage from the knee  \"I'm doing great\"    Still on fluconazole   Last dose ertapenem was 3/11/20     Stop fluconazole  Start cipro 500 po BID   Don't take with dairy, MVI, antacids   Poor tolerance of avelox in the past, discussed, low risk       Maintain PICC for 1 week and if clinically stable, remove.   Order given to [address] [place] [address] in 2 weeks, orders given to home health nurse     Follow-up with me 3 weeks

## 2020-03-20 ENCOUNTER — TELEPHONE (OUTPATIENT)
Dept: INFECTIOUS DISEASES | Age: 70
End: 2020-03-20

## 2020-03-20 NOTE — TELEPHONE ENCOUNTER
Spoke with patient in regards to PICC line being removed. Patient stated [facility] home health removed it today.

## 2020-03-20 NOTE — TELEPHONE ENCOUNTER
Spoke with [facility] infusion and patient IV infusion tx finished on 3/11/2020. PICC line is still in place. Please advise.

## 2020-03-27 LAB
ALBUMIN SERPL-MCNC: 3.9 G/DL (ref 3.5–5)
ALP BLD-CCNC: 97 IU/L (ref 35–135)
ALT SERPL-CCNC: 15 IU/L (ref 10–60)
ANION GAP SERPL CALCULATED.3IONS-SCNC: 10 MMOL/L (ref 6–18)
AST SERPL-CCNC: 19 IU/L (ref 10–40)
BASOPHILS ABSOLUTE: 0.1 THOU/MCL (ref 0–0.2)
BASOPHILS ABSOLUTE: 2 %
BILIRUB SERPL-MCNC: 0.5 MG/DL (ref 0–1.2)
BUN BLDV-MCNC: 15 MG/DL (ref 8–26)
C-REACTIVE PROTEIN WIDE RANGE: 8.2 MG/L
CALCIUM SERPL-MCNC: 9.1 MG/DL (ref 8.5–10.5)
CHLORIDE BLD-SCNC: 104 MEQ/L (ref 101–111)
CO2: 25 MMOL/L (ref 24–36)
CREAT SERPL-MCNC: 0.71 MG/DL (ref 0.64–1.27)
EOSINOPHILS ABSOLUTE: 0.1 THOU/MCL (ref 0.03–0.45)
EOSINOPHILS RELATIVE PERCENT: 4 %
GFR AFRICAN AMERICAN: 110 ML/MIN/1.73 M2
GFR NON-AFRICAN AMERICAN: 95 ML/MIN/1.73 M2
GLUCOSE BLD-MCNC: 88 MG/DL (ref 70–99)
HCT VFR BLD CALC: 37.7 % (ref 40–50)
HEMOGLOBIN: 12.7 G/DL (ref 13.5–16.5)
LYMPHOCYTES ABSOLUTE: 0.7 THOU/MCL (ref 1–4)
LYMPHOCYTES RELATIVE PERCENT: 24 %
MCH RBC QN AUTO: 27.8 PG (ref 27–33)
MCHC RBC AUTO-ENTMCNC: 33.5 G/DL (ref 32–36)
MCV RBC AUTO: 82.9 FL (ref 82–97)
MONOCYTES # BLD: 16 %
MONOCYTES ABSOLUTE: 0.5 THOU/MCL (ref 0.2–0.9)
NEUTROPHILS ABSOLUTE: 1.6 THOU/MCL (ref 1.8–7.7)
PDW BLD-RTO: 20.1 % (ref 12.3–17)
PLATELET # BLD: 201 THOU/MCL (ref 140–375)
PMV BLD AUTO: 8.2 FL (ref 7.4–11.5)
POTASSIUM SERPL-SCNC: 3.5 MEQ/L (ref 3.6–5.1)
RBC # BLD: 4.55 MIL/MCL (ref 4.4–5.8)
SEDIMENTATION RATE, ERYTHROCYTE: 23 MM/HR (ref 0–20)
SEG NEUTROPHILS: 54 %
SODIUM BLD-SCNC: 139 MEQ/L (ref 135–145)
TOTAL PROTEIN: 6.9 G/DL (ref 6–8)
WBC # BLD: 3 THOU/MCL (ref 3.6–10.5)

## 2020-04-03 ENCOUNTER — TELEPHONE (OUTPATIENT)
Dept: INFECTIOUS DISEASES | Age: 70
End: 2020-04-03

## 2020-04-03 RX ORDER — CIPROFLOXACIN 500 MG/1
500 TABLET, FILM COATED ORAL 2 TIMES DAILY
Qty: 28 TABLET | Refills: 0 | Status: SHIPPED | OUTPATIENT
Start: 2020-04-03 | End: 2020-04-17

## 2020-04-03 NOTE — TELEPHONE ENCOUNTER
Called patient  He is actually doing quite well     Reviewed labs  ESR down further  ANC is up     Taking cipro, well tolerated    Will extend the course of po cipro 2 additional weeks    Call with concerns

## 2020-04-20 ENCOUNTER — TELEPHONE (OUTPATIENT)
Dept: INFECTIOUS DISEASES | Age: 70
End: 2020-04-20

## 2020-04-20 NOTE — TELEPHONE ENCOUNTER
Patient called to speak with Dr. Mickey Barroso regarding his knee infection. Did not give any details as to why he was calling.

## 2020-04-23 ENCOUNTER — TELEPHONE (OUTPATIENT)
Dept: INFECTIOUS DISEASES | Age: 70
End: 2020-04-23

## 2020-04-23 LAB
ALBUMIN SERPL-MCNC: 4.7 GM/DL (ref 3.2–4.6)
ALP BLD-CCNC: 98 U/L (ref 40–129)
ALT SERPL-CCNC: 14 U/L
ANION GAP SERPL CALCULATED.3IONS-SCNC: 16 MMOL/L (ref 7–16)
AST SERPL-CCNC: 20 U/L
BASOPHILS # BLD: 1.5 %
BASOPHILS ABSOLUTE: 0.1 X10(3)/MCL (ref 0–0.1)
BILIRUB SERPL-MCNC: 0.5 MG/DL (ref 0.1–1.4)
BUN BLDV-MCNC: 21 MG/DL (ref 8–23)
C-REACTIVE PROTEIN WIDE RANGE: 1.92 MG/L
CALCIUM SERPL-MCNC: 9.5 MG/DL (ref 8.8–10.4)
CHLORIDE BLD-SCNC: 101 MEQ/L (ref 98–107)
CO2: 23 MMOL/L (ref 22–29)
CREAT SERPL-MCNC: 1.01 MG/DL (ref 0.67–1.3)
EOSINOPHIL # BLD: 4 %
EOSINOPHILS ABSOLUTE: 0.2 X10(3)/MCL (ref 0–0.5)
GFR AFRICAN AMERICAN: 89 ML/MIN/1.73 M2
GFR NON-AFRICAN AMERICAN: 77 ML/MIN/1.73 M2
GLUCOSE BLD-MCNC: 104 MG/DL (ref 82–100)
HCT VFR BLD CALC: 43.9 % (ref 40–51)
HEMOGLOBIN: 13.9 G/DL (ref 13.7–17.5)
IMMATURE CELLS ABSOLUTE COUNT: 0 X10(3)/MCL (ref 0–0.1)
IMMATURE GRANULOCYTES: 0.2 %
LYMPHOCYTES # BLD: 17 %
LYMPHOCYTES ABSOLUTE: 0.9 X10(3)/MCL (ref 1.2–3.9)
MCH RBC QN AUTO: 28.2 PG (ref 26–34)
MCHC RBC AUTO-ENTMCNC: 31.7 G/DL (ref 30.7–35.5)
MCV RBC AUTO: 89 FL (ref 80–100)
MONOCYTES # BLD: 7.1 %
MONOCYTES ABSOLUTE: 0.4 X10(3)/MCL (ref 0.3–0.9)
NEUTROPHILS ABSOLUTE: 3.8 X10(3)/MCL (ref 1.6–6.1)
NEUTROPHILS: 70.2 %
PDW BLD-RTO: 16.2 %
PLATELET # BLD: 169 X10(3)/MCL (ref 155–369)
PMV BLD AUTO: 10.8 FL (ref 8.8–12.5)
POTASSIUM SERPL-SCNC: 4.1 MEQ/L (ref 3.5–5)
RBC # BLD: 4.93 X10(6)/MCL (ref 4.6–6.1)
SEDIMENTATION RATE, ERYTHROCYTE: 8 MM/HR (ref 0–20)
SODIUM BLD-SCNC: 140 MEQ/L (ref 136–145)
TOTAL PROTEIN: 6.9 GM/DL (ref 6.4–8.3)
WBC # BLD: 5.5 X10(3)/MCL (ref 3.7–10.3)

## 2020-05-14 ENCOUNTER — OFFICE VISIT (OUTPATIENT)
Dept: ORTHOPEDIC SURGERY | Age: 70
End: 2020-05-14
Payer: MEDICARE

## 2020-05-14 VITALS — HEIGHT: 78 IN | WEIGHT: 250 LBS | BODY MASS INDEX: 28.93 KG/M2

## 2020-05-14 PROCEDURE — 3017F COLORECTAL CA SCREEN DOC REV: CPT | Performed by: ORTHOPAEDIC SURGERY

## 2020-05-14 PROCEDURE — 1036F TOBACCO NON-USER: CPT | Performed by: ORTHOPAEDIC SURGERY

## 2020-05-14 PROCEDURE — G8427 DOCREV CUR MEDS BY ELIG CLIN: HCPCS | Performed by: ORTHOPAEDIC SURGERY

## 2020-05-14 PROCEDURE — G8417 CALC BMI ABV UP PARAM F/U: HCPCS | Performed by: ORTHOPAEDIC SURGERY

## 2020-05-14 PROCEDURE — 4040F PNEUMOC VAC/ADMIN/RCVD: CPT | Performed by: ORTHOPAEDIC SURGERY

## 2020-05-14 PROCEDURE — 1123F ACP DISCUSS/DSCN MKR DOCD: CPT | Performed by: ORTHOPAEDIC SURGERY

## 2020-05-14 PROCEDURE — 99215 OFFICE O/P EST HI 40 MIN: CPT | Performed by: ORTHOPAEDIC SURGERY

## 2020-05-14 NOTE — PROGRESS NOTES
scanned into media on above date)  Past Medical History:   Diagnosis Date    Arthritis     Cancer ([address]     SKIN    Chronic infection of left knee ([address] [facility] [address]     LTKR    Hypertension     Perforated diverticulitis     Retention of urine     Thyroid disease     HYPOTHROID    Vitamin B12 deficiency     hx of       Past Surgical History:  (see most recent intake form scanned into media on above date)  Past Surgical History:   Procedure Laterality Date    ABDOMINAL ADHESION SURGERY  02/05/15    debridement of colcotaneous fistula    ABDOMINAL EXPLORATION SURGERY  02/05/15    APPENDECTOMY      COLONOSCOPY      INCISION AND DRAINAGE Right 7/8/2019    RIGHT KNEE KNEE INCISION AND DRAINAGE WITH performed by Virgilio Knight MD at [address] Right 02/05/15    INGUINAL HERNIA REPAIR Left 04/22/15    left inguinal hernia repair with mesh    JOINT REPLACEMENT      KNEE SURGERY  11/29/2012    right total knee replacement    REVISION TOTAL KNEE ARTHROPLASTY Right 7/8/2019    REMOVAL OF TOTAL KNEE AND PLACEMENT OF CEMENT SPACER                  BIOMET performed by Virgilio Knight MD at [address] [place] [address] Right 8/5/2019    INCISION AND DEBRIDEMENT RIGHT KNEE WITH CEMENT SPACER EXCHANGE  CPT CODE - 60772 performed by Virgilio Knight MD at [address] [place] [address] Right 10/7/2019    RIGHT KNEE INCISION AND DEBRIDEMENT WITH  PLACEMENT OF CEMENT AND SPACER [facility] performed by Virgilio Knight MD at [address] [place] [address] Right 12/30/2019    RIGHT KNEE INCISION AND DRAINAGE WITH SPACER EXCHANGE      MALACHI performed by Virgilio Knihgt MD at [address] [facility] Right 2/10/2020    RIGHT KNEE INCISION AND DRAINAGE WITH CEMENT SPACER EXCHANGE performed by Virgilio Knight MD at [address] [place] [address]  [id number]       Allergies:  (see most recent intake form scanned into media on above date)  Allergies   Allergen Reactions    Ace Inhibitors      COUGH    Amlodipine Besylate      Lower extremity edema    Avelox [Moxifloxacin] Other (See Comments)     Hallucination    Clonidine Derivatives      FATIGUE    Flagyl [Metronidazole] Nausea Only     Stomach cramps       Medications:  (see most recent intake form scanned into media on above date)  Current Outpatient Medications   Medication Sig Dispense Refill    promethazine (PHENERGAN) 25 MG tablet Take 25 mg by mouth every 6 hours as needed for Nausea      omeprazole (PRILOSEC) 40 MG delayed release capsule Take 40 mg by mouth daily      hydrALAZINE (APRESOLINE) 50 MG tablet Take 50 mg by mouth 3 times daily       meloxicam (MOBIC) 15 MG tablet Take 15 mg by mouth daily      Multiple Vitamins-Minerals (MULTIVITAMIN PO) Take 1 tablet by mouth      Levothyroxine Sodium (SYNTHROID PO) Take 200 mcg by mouth daily      valsartan-hydrochlorothiazide (DIOVAN-HCT) 320-25 MG per tablet Take 1 tablet by mouth daily      allopurinol (ZYLOPRIM) 300 MG tablet Take 300 mg by mouth daily. No current facility-administered medications for this visit. Coagulation:  On a blood thinner: No  History of a bleeding disorder: No  History of a previous blood clot: No    Goal for treatment: Improve function and decrease pain    OBJECTIVE  PHYSICAL EXAM  Vital Signs: There were no vitals filed for this visit. Body mass index is 28.9 kg/m². General Appearance: Patient is adequately groomed with no evidence of malnutrition   Orientation: Patient is alert and oriented to person, place and time  Mood and Affect: Neutral/Euthymic(normal) and Angry  Gait and Station: He currently is nonambulatory presenting in a wheelchair. The patient can bear weight on the extremity. Right Knee Examination  Inspection:  Knee alignment: neutral           no swelling noted. No erythema or ecchymosis.    He has multiple anterior knee scars all to face with this patient today. Greater than 50% of that time was spent counseling, coordinating care, discussing and answering questions regarding the risks, benefits, and complications of right knee in detail. I explained to him that despite all that he has been through he appears to be relatively fortunate. He remains healthy he remained strong his knee is currently stable and there is no sign of infection. I certainly do not believe that he needs any additional surgery at this point. I think he needs to except his current condition. He needs to begin to ambulate on the right lower extremity. He will need an elevated shoe on the right. He will need a walker or crutches. He will need a vehicle that will allow him to drive which may require some customization of any of his vehicles because of his extreme height. He needs to walk with his dog starting with just short distances. It took a lot of discussion today to convince him that any current surgical intervention for his knee is not advisable. I have suggested that he come back to see us next week. He should bring his walker. He should bring all of the shoes that he would normally wear he should bring all of his insoles. The plan is to try to build up his right shoe and possibly even remove the insoles from his left shoe to balance his pelvis. We then want him to bear weight on that extremity and we will assist him with that instruction. He does not have transportation so somewhat difficult for him to get physical therapy but we may even have to have physical therapy come to him. I did explain to him that an additional infection could result in an above-knee amputation so he needs to be very cautious of caring for the knee. Follow-up in 1 week. We discussed precautionary measures to prevent further injury, additional treatment options, starting additional mobility.    Patient was asked to follow-up in 1 weeks and we can

## 2020-05-21 ENCOUNTER — OFFICE VISIT (OUTPATIENT)
Dept: ORTHOPEDIC SURGERY | Age: 70
End: 2020-05-21
Payer: MEDICARE

## 2020-05-21 VITALS — BODY MASS INDEX: 28.93 KG/M2 | WEIGHT: 250 LBS | HEIGHT: 78 IN

## 2020-05-21 PROCEDURE — G8417 CALC BMI ABV UP PARAM F/U: HCPCS | Performed by: ORTHOPAEDIC SURGERY

## 2020-05-21 PROCEDURE — 1036F TOBACCO NON-USER: CPT | Performed by: ORTHOPAEDIC SURGERY

## 2020-05-21 PROCEDURE — 4040F PNEUMOC VAC/ADMIN/RCVD: CPT | Performed by: ORTHOPAEDIC SURGERY

## 2020-05-21 PROCEDURE — 99213 OFFICE O/P EST LOW 20 MIN: CPT | Performed by: ORTHOPAEDIC SURGERY

## 2020-05-21 PROCEDURE — 1123F ACP DISCUSS/DSCN MKR DOCD: CPT | Performed by: ORTHOPAEDIC SURGERY

## 2020-05-21 PROCEDURE — 3017F COLORECTAL CA SCREEN DOC REV: CPT | Performed by: ORTHOPAEDIC SURGERY

## 2020-05-21 PROCEDURE — G8427 DOCREV CUR MEDS BY ELIG CLIN: HCPCS | Performed by: ORTHOPAEDIC SURGERY

## 2020-05-21 NOTE — PROGRESS NOTES
MD Ynes Zapata, [place]         Orthopaedic Surgery and Sports Medicine    Patient Name: Torri Goddard  YOB: 1950  Patient's PCP is Leonel Rice (Inactive)    SUBJECTIVE  Chief Complaint:  Knee Pain (right knee)      History of Present Illness:  Torri Goddard is a 81 y.o. male here regarding right knee problems. This is a gentleman that was seen just 1 week ago for a right knee fusion secondary to multiple infections. At the time we talked with him about excepting the fact that he has a knee fusion secondary to multiple infections in his right knee. Interestingly, today he stated that he is actually only had 1 total knee but once that got infected there was extreme difficulty curing the infection and he never had a revision total knee placed. Today he did come back with a much improved attitude. He has been able to drive a car with his right lower extremity. He has attempted to bear weight on the right lower extremity which was difficult because it significantly shorter than his left. But he was able to stand on a 2 before which made him feel significantly better. Today he is more accepting and is excited about determining how much of a shoe lift he will need and how we would proceed. I have retained the history of his infection here below in this portion of the note:  He was initially seen by us for right knee degenerative osteoarthritis and in November 2012 had a right total knee arthroplasty. He did well with that total joint replacement. He later developed left knee degenerative osteoarthritis and went to a different orthopedic surgeon in [place]. Dr. Mor Sullivan performed a left total knee arthroplasty at that time. I am uncertain of the exact year. He did well with that left total knee arthroplasty also.     He then developed some patellar discomfort in his previously placed right total knee. Since he had done well most recently with his left total knee Dr. Viridiana Andersen did a procedure to try to correct some patellar femoral pain. Unfortunately after that procedure he developed an infection in his right total knee. That started the saga which has resulted in multiple surgeries. He had additional surgeries by Dr. Viridiana Andersen for his infected total knee. Was then transferred to the care of Dr. Christina Jaime who also did procedures for his infected total knee. And then finally was transferred to the care of Dr. Debra Herrera for more definitive care of his infected RIGHT knee. He has had 5 surgical procedures on his knee by Dr. Julito Erickson. Those procedures have all consisted of irrigations debridements and placement of antibiotic spacers. He now states that he has had 5 different picklines and multiple courses of antibiotics. His infectious disease doctor is Dr. Francine Poon. He states he currently is no longer on IV antibiotics and he has been told that his infection at this point has resolved. He he currently has an antibiotic spacer in place with a tibial nail spanning the joint. His knee is in about 15 degrees of flexion. All of his incisions are healed. He has minimal if any discomfort. He was recently told by Dr. Julito Erickson that if he were to require another procedure it would be an above-knee amputation. Patient presents today for discussion of options of care. He presents in a wheelchair which he self propels. His right lower extremity is extended on the leg rest.  He holds his foot in equinus. And again he has about 15 degrees of flexion at the knee. The patient is not working. Pain Assessment:  Pain Assessment  Location of Pain: Knee  Location Modifiers: Right  Severity of Pain: 0    Review of Systems:  Nik Khan's review of systems has been performed by intake and observation.  All past and current ROS forms have been scanned into the medical record. He has been instructed to contact his primary care provider regarding ROS issues if not already being addressed at this time. There are no recent changes.  The most recent ROS was scanned into media on 5/14/2020    Past Medical History:  (see most recent intake form scanned into media on above date)  Past Medical History:   Diagnosis Date    Arthritis     Cancer ([facility] [address]     SKIN    Chronic infection of left knee ([address]     LTKR    Hypertension     Perforated diverticulitis     Retention of urine     Thyroid disease     HYPOTHROID    Vitamin B12 deficiency     hx of       Past Surgical History:  (see most recent intake form scanned into media on above date)  Past Surgical History:   Procedure Laterality Date    ABDOMINAL ADHESION SURGERY  02/05/15    debridement of colcotaneous fistula    ABDOMINAL EXPLORATION SURGERY  02/05/15    APPENDECTOMY      COLONOSCOPY      INCISION AND DRAINAGE Right 7/8/2019    RIGHT KNEE KNEE INCISION AND DRAINAGE WITH performed by Dede Ramirez MD at [address] Right 02/05/15    INGUINAL HERNIA REPAIR Left 04/22/15    left inguinal hernia repair with mesh    JOINT REPLACEMENT      KNEE SURGERY  11/29/2012    right total knee replacement    REVISION TOTAL KNEE ARTHROPLASTY Right 7/8/2019    REMOVAL OF TOTAL KNEE AND PLACEMENT OF CEMENT SPACER                  BIOMET performed by Dede Ramirez MD at [address] Right 8/5/2019    INCISION AND DEBRIDEMENT RIGHT KNEE WITH CEMENT SPACER EXCHANGE  CPT CODE - 55756 performed by Dede Ramirez MD at [address] Right 10/7/2019    RIGHT KNEE INCISION AND DEBRIDEMENT WITH  PLACEMENT OF CEMENT AND [address] performed by Dede Ramirez MD at [address] Right 12/30/2019    RIGHT KNEE INCISION AND DRAINAGE WITH SPACER EXCHANGE      MALACHI performed by Nohemi Osman obvious removal of any type of artificial joint implant. He does have a cement spacer in place and then through that spacer there is a tibial nail which was inserted it appears through the anterior aspect of the femoral shaft across the knee joint and into the tibia to provide stability. ASSESSMENT (Medical Decision Making)    Liliam James is a 81 y.o. 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 ICD-10-CM    1. Right knee pain, unspecified chronicity M25.561 [facility] PT - [facility] Physical Therapy           PLAN (Medical Decision Making)  Office Procedures:  Orders Placed This Encounter   Procedures    [facility] PT [facility] Physical Therapy     Referral Priority:   Routine     Referral Type:   Eval and Treat     Referral Reason:   Specialty Services Required     Requested Specialty:   Physical Therapy     Number of Visits Requested:   1       Treatment Plan:    I discussed the diagnosis and treatment options with Liliam James today. Today with the significantly improved outlook, we were able to have him stand. He was able to bear weight on his right lower extremity without significant discomfort. Again it is significantly shortened so we used various height lifts to determine how much it would take to level his pelvis and we felt that about 2-1/4 inches would be appropriate for a left onto his current shoe wear which would allow him to ambulate with a reasonably good gait. We also gave him the site of a cobbler that we think he can assist him in getting this with insole placed on his shoe wear. He normally wears high-grade tennis shoes because he uses orthotics within the shoes. He has multiple pair so he is going to take his best pair down and have the lift placed on those shoes.     I have also suggested that we place him in outpatient physical therapy to work on gait training. He is agreeable to that and that is being arranged as well. He will contact us if he has any problems getting the lift placed on the shoe. He will also contact us after he has the left placed so that we can reevaluate him at that time. Healthy Lifestyle Measures: Patient education measures were discussed and materials distributed where indicated. Posture education and proper lifting and carrying techniques. Weight management was discussed when indicated. Non-steroidal anti-inflammatories medications (NSAIDs) can be used to assist with pain control and to reduce inflammatory changes. These medications may be over-the-counter or prescribed. We discussed taking the NSAID properly and the precautions. The patient understands that this medication may potentially interfere with other medications. Patient was also instructed to immediately discontinue the medication is there is any possible complication. Dorota Waldron was instructed to call the office if his symptoms worsen or if new symptoms appear prior to the next scheduled visit. He is specifically instructed to contact the office between now and schedule appointment if he has concerns related to his condition or if he needs assistance in scheduling any above tests. He is welcome to call for an appointment sooner if he has any additional concerns or questions. This dictation was performed with a verbal recognition program (DRAGON) and it was checked for errors. It is possible that there are still dictated errors within this office note. If so, please bring any errors to my attention for an addendum. All efforts were made to ensure that this office note is accurate.

## 2020-05-27 ENCOUNTER — HOSPITAL ENCOUNTER (OUTPATIENT)
Dept: PHYSICAL THERAPY | Age: 70
Setting detail: THERAPIES SERIES
Discharge: HOME | End: 2020-05-27
Payer: MEDICARE

## 2020-05-27 PROCEDURE — 97110 THERAPEUTIC EXERCISES: CPT

## 2020-05-27 PROCEDURE — 97161 PT EVAL LOW COMPLEX 20 MIN: CPT

## 2020-05-27 NOTE — FLOWSHEET NOTE
[facility],  [address] [place]  Phone: [phone]  Fax [phone]    Physical Therapy Treatment Note/ Progress Report:           Date:  2020    Patient Name:  Zarina Rutledge    :  1950  MRN: [id number]  Restrictions/Precautions:    Medical/Treatment Diagnosis Information:  Diagnosis: M 25.561 R knee pain  Treatment Diagnosis: M25.661 R knee stiffness, M82.81 R LE muscle weakness, Gait abnormality, R 55.8  Insurance/Certification information:  PT Insurance Information: Medicare/ [employer]  Physician Information:  Referring Practitioner: Dr. Mariano Mendes  Has the plan of care been signed (Y/N):        []  Yes  [x]  No     Date of Patient follow up with Physician: unknown      Is this a Progress Report:     []  Yes  [x]  No        If Yes:  Date Range for reporting period:  Beginning 2020  Ending    Progress report will be due (10 Rx or 30 days whichever is less):       Recertification will be due (POC Duration  / 90 days whichever is less): 2020      Visit # Insurance Allowable Requires auth   1 [facility] ? [x]no        []yes:     Functional Scale: LEFS: 16/80 = 20% functional ability, 80% disability   Date assessed: 2020     Therapy Diagnosis/Practice Pattern:     Number of Comorbidities:  []0     [x]1-2    []3+    Latex Allergy:  [x]NO      []YES  Preferred Language for Healthcare:   [x]English       []other:      Pain level:  10     SUBJECTIVE:  See eval     OBJECTIVE: See eval   Observation:    Test measurements:      RESTRICTIONS/PRECAUTIONS: R knee fused and knee spacer with severe LLD.     Exercises/Interventions:     Therapeutic Ex (32650) Rep/Sets/sec Notes/CUES   Heel prop 5#x 5 min Cue to control toe out, HEP    SLR, SLR/ w/ER, hip abd, prone ext 1x20ea HEP                                                      Manual Intervention (66496) Progressing: [] Met: [] Not Met: [] Adjusted  4. Patient will return to volunteer activities without increased symptoms or restriction. [] Progressing: [] Met: [] Not Met: [] Adjusted  5. Pt will be able to ambulate with or without AD for at least 2 blocks or 30 minutes to improve community independence. [] Progressing: [] Met: [] Not Met: [] Adjusted    Progression Towards Functional goals:  [] Patient is progressing as expected towards functional goals listed. [] Progression is slowed due to complexities listed. [] Progression has been slowed due to co-morbidities. [x] Plan just implemented, too soon to assess goals progression  [] Other:     Overall Progression Towards Functional goals/ Treatment Progress Update:  [] Patient is progressing as expected towards functional goals listed. [] Progression is slowed due to complexities/Impairments listed. [] Progression has been slowed due to co-morbidities. [x] Plan just implemented, too soon to assess goals progression <30days   [] Goals require adjustment due to lack of progress  [] Patient is not progressing as expected and requires additional follow up with physician  [] Other    Prognosis for POC: [x] Good [] Fair  [] Poor      Patient requires continued skilled intervention: [x] Yes  [] No    Treatment/Activity Tolerance:  [x] Patient able to complete treatment  [] Patient limited by fatigue  [] Patient limited by pain    [] Patient limited by other medical complications  [] Other:     ASSESSMENT: Pt did well with first session today. He struggles to gain any knee extension ROM and distal quad contraction but we were able to establish an initial HEP today to work on these things. He plans to bring his new shoe with lift and walker next visit to start on WB on the R LE.          PLAN: See juan. Next visit: if has shoe with lift and walker try weightshifting, TKE, cont to work on knee ext ROM and quad function and glute strength as well.   [] Continue per plan of care [] Alter current plan (see comments above)  [x] Plan of care initiated [] Hold pending MD visit [] Discharge      Electronically signed by:  Linda Dorantes PT    Note: If patient does not return for scheduled/ recommended follow up visits, this note will serve as a discharge from care along with most recent update on progress.

## 2020-05-27 NOTE — PLAN OF CARE
[facility], [address]  Phone: [phone]  Fax [phone]     Physical Therapy Certification    Dear Referring Practitioner: Dr. Elana Campuzano,    We had the pleasure of evaluating the following patient for physical therapy services at [address] [place] [address]. A summary of our findings can be found in the initial assessment below. This includes our plan of care. If you have any questions or concerns regarding these findings, please do not hesitate to contact me at the office phone number checked above. Thank you for the referral.       Physician Signature:_______________________________Date:__________________  By signing above (or electronic signature), therapists plan is approved by physician    Patient: Liliam James   : 1950   MRN: [id number]  Referring Physician: Referring Practitioner: Dr. Elana Campuzano      Evaluation Date: 2020      Medical Diagnosis Information:  Diagnosis: M 25.561 R knee pain   Treatment Diagnosis: M25.661 R knee stiffness, M82.81 R LE muscle weakness, Gait abnormality, R 26.2                                         Insurance information: PT Insurance Information: Medicare/ [employer]        Precautions/ Contra-indications: R knee fusion, LLD, history of sepsis from knee surgery  Latex Allergy:  [x]NO      []YES  Preferred Language for Healthcare:   [x]English       []other:    SUBJECTIVE: Patient stated complaint:Pt originally had R TKA 8 years ago, then started to have pain and a revision. 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 He now has a knee spacer and a angela to fuse the R knee straight.  Since final surgery on 2/10/2020 the infection seems to be gone and so his physician recommended PT to try and figure to how to walk on it with the knee spacer and angela, since he wants to avoid

## 2020-05-29 ENCOUNTER — HOSPITAL ENCOUNTER (OUTPATIENT)
Dept: PHYSICAL THERAPY | Age: 70
Setting detail: THERAPIES SERIES
Discharge: HOME | End: 2020-05-29
Payer: MEDICARE

## 2020-05-29 PROCEDURE — 97110 THERAPEUTIC EXERCISES: CPT

## 2020-05-29 NOTE — FLOWSHEET NOTE
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 Medical/Treatment Diagnosis Information:  · Diagnosis: M 25.561 R knee pain  · Treatment Diagnosis: M25.661 R knee stiffness, M82.81 R LE muscle weakness, Gait abnormality, R 32.1  Insurance/Certification information:  PT Insurance Information: Medicare/ [employer]  Physician Information:  Referring Practitioner: Dr. Osiris Mcbride  Has the plan of care been signed (Y/N):        []  Yes  [x]  No     Date of Patient follow up with Physician: unknown      Is this a Progress Report:     []  Yes  [x]  No        If Yes:  Date Range for reporting period:  Beginning 2020  Ending    Progress report will be due (10 Rx or 30 days whichever is less):       Recertification will be due (POC Duration  / 90 days whichever is less): 2020      Visit # Insurance Allowable Requires auth   [facility] ? [x]no        []yes:     Functional Scale: LEFS: 1680 = 20% functional ability, 80% disability   Date assessed: 2020     Therapy Diagnosis/Practice Pattern:     Number of Comorbidities:  []0     [x]1-2    []3+    Latex Allergy:  [x]NO      []YES  Preferred Language for Healthcare:   [x]English       []other:      Pain level:  2/10     SUBJECTIVE:  Pt says he was a little sore after last appointment but feels like the more he does his Hep the better it is going. He also now has his shoe with the elevation and says it is heavy and is making him sore from lifting leg while moving around in [facility].  He says once he got the shoe he did try some walking with walker around the house and even tried to go up a stair but wasn't sure how to do that so he stopped. Being up on it like this has also made it sore. He is also not sure if his walker at home is tall enough for him ( He is 6'6\"). 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 Exercises/Interventions:     Therapeutic Ex (12903) Rep/Sets/sec Notes/CUES   Heel prop 5#x 5 min Cue to control toe out, HEP 5/27   Heel prop + Quad set 1x10x5\" w/ overpresure    SLR, SLR/ w/ER, hip abd,  1x20ea HEP 5/27   S/L hip abd with ext \"L\" 1x15    Weightshifting:  Side/Side  Forward step  Bwd step 2x10 ea Gait belt, walker, cueing for knee ext, QS, GS, posture   Ambulation: 5ft CGA, Walker, TTWB due to mechanics                                      Manual Intervention ([id number])                                   NMR re-education (30240)  CUES NEEDED   Tactile cueing to inc quad contract                                           Therapeutic Activity (34173)                                         Therapeutic Exercise and NMR EXR  [x] (80030) Provided verbal/tactile cueing for activities related to strengthening, flexibility, endurance, ROM for improvements in LE, proximal hip, and core control with self care, mobility, lifting, ambulation. [x] (80280) Provided verbal/tactile cueing for activities related to improving balance, coordination, kinesthetic sense, posture, motor skill, proprioception  to assist with LE, proximal hip, and core control in self care, mobility, lifting, ambulation and eccentric single leg control.      NMR and Therapeutic Activities:    [] (16492 or 26779) Provided verbal/tactile cueing for activities related to improving balance, coordination, kinesthetic sense, posture, motor skill, proprioception and motor activation to allow for proper function of core, proximal hip and LE with self care and ADLs  [] (06831) Gait Re-education- Provided training [] Progressing: [] Met: [] Not Met: [] Adjusted  2. Patient will have a decrease in pain to facilitate improvement in movement, function, and ADLs as indicated by Functional Deficits. [] Progressing: [] Met: [] Not Met: [] Adjusted    Long Term Goals: To be achieved in: 8 weeks  1. Disability index score of 30% or less for the LEFS to assist with reaching prior level of function. [] Progressing: [] Met: [] Not Met: [] Adjusted  2. Patient will demonstrate increased AROM to full knee extension to allow for proper joint functioning as indicated by patients Functional Deficits. [] Progressing: [] Met: [] Not Met: [] Adjusted  3. Patient will demonstrate an increase in Strength to good proximal hip strength and control, within 5lb HHD in LE to allow for proper functional mobility as indicated by patients Functional Deficits. [] Progressing: [] Met: [] Not Met: [] Adjusted  4. Patient will return to volunteer activities without increased symptoms or restriction. [] Progressing: [] Met: [] Not Met: [] Adjusted  5. Pt will be able to ambulate with or without AD for at least 2 blocks or 30 minutes to improve community independence. [] Progressing: [] Met: [] Not Met: [] Adjusted    Progression Towards Functional goals:  [] Patient is progressing as expected towards functional goals listed. [] Progression is slowed due to complexities listed. [] Progression has been slowed due to co-morbidities. [x] Plan just implemented, too soon to assess goals progression  [] Other:     Overall Progression Towards Functional goals/ Treatment Progress Update:  [] Patient is progressing as expected towards functional goals listed. [] Progression is slowed due to complexities/Impairments listed. [] Progression has been slowed due to co-morbidities.   [x] Plan just implemented, too soon to assess goals progression <30days   [] Goals require adjustment due to lack of progress  [] Patient is not progressing as expected and

## 2020-06-03 ENCOUNTER — HOSPITAL ENCOUNTER (OUTPATIENT)
Dept: PHYSICAL THERAPY | Age: 70
Setting detail: THERAPIES SERIES
Discharge: HOME | End: 2020-06-03
Payer: MEDICARE

## 2020-06-03 PROCEDURE — 97110 THERAPEUTIC EXERCISES: CPT

## 2020-06-03 NOTE — FLOWSHEET NOTE
[facility], [address] [place]  Phone: [phone]  Fax [phone]    Physical Therapy Treatment Note/ Progress Report:           Date:  6/3/2020    Patient Name:  Ottoniel Quintero    :  1950  MRN: [id number]  Restrictions/Precautions:  R knee fused and knee spacer with severe LLD. Medical/Treatment Diagnosis Information:  · Diagnosis: M 25.561 R knee pain  · Treatment Diagnosis: M25.661 R knee stiffness, M82.81 R LE muscle weakness, Gait abnormality, R 55.9  Insurance/Certification information:  PT Insurance Information: Medicare/ [facility]  Physician Information:  Referring Practitioner: Dr. Anthony Alejandre  Has the plan of care been signed (Y/N):        []  Yes  [x]  No     Date of Patient follow up with Physician: unknown      Is this a Progress Report:     []  Yes  [x]  No        If Yes:  Date Range for reporting period:  Beginning 2020  Ending    Progress report will be due (10 Rx or 30 days whichever is less):       Recertification will be due (POC Duration  / 90 days whichever is less): 2020      Visit # Insurance Allowable Requires auth   3 [facility]    [x]no        []yes:     Functional Scale: LEFS: 1680 = 20% functional ability, 80% disability   Date assessed: 2020     Therapy Diagnosis/Practice Pattern:     Number of Comorbidities:  []0     [x]1-2    []3+    Latex Allergy:  [x]NO      []YES  Preferred Language for Healthcare:   [x]English       []other:      Pain level:  1/10     SUBJECTIVE:  Pt says he has been working hard to get the knee straighter at home and thinks he has made some improvement. He also looked more closely at his walker at home and says that it is higher than the one we were using in here but finds it is too hard to bring it here because of wheelchair and his service dog is already taking up so much effort.      OBJECTIVE:    Observation:  excessive and hypomobile instruction to the patient for proper LE, core and proximal hip recruitment and positioning and eccentric body weight control with ambulation re-education including up and down stairs     Home Exercise Program:    [x] (59804) Reviewed/Progressed HEP activities related to strengthening, flexibility, endurance, ROM of core, proximal hip and LE for functional self-care, mobility, lifting and ambulation/stair navigation   [] (84196)Reviewed/Progressed HEP activities related to improving balance, coordination, kinesthetic sense, posture, motor skill, proprioception of core, proximal hip and LE for self care, mobility, lifting, and ambulation/stair navigation      Manual Treatments:  PROM / STM / Oscillations-Mobs:  G-I, II, III, IV (PA's, Inf., Post.)  [] (32490) Provided manual therapy to mobilize LE, proximal hip and/or LS spine soft tissue/joints for the purpose of modulating pain, promoting relaxation,  increasing ROM, reducing/eliminating soft tissue swelling/inflammation/restriction, improving soft tissue extensibility and allowing for proper ROM for normal function with self care, mobility, lifting and ambulation. Modalities:     [] GAME READY (VASO)- for significant edema, swelling, pain control. Charges:  Timed Code Treatment Minutes: 60   Total Treatment Minutes: 60       [] EVAL (LOW) 31160 (typically 20 minutes face-to-face)  [] EVAL (MOD) 65932 (typically 30 minutes face-to-face)  [] EVAL (HIGH) 47388 (typically 45 minutes face-to-face)  [] RE-EVAL     [x] PD(03137) x   4  [] IONTO  [] NMR (40246) x    [] VASO  [] Manual (73565) x      [] Other:  [] TA x      [] Mech Traction (72596)  [] ES(attended) (33689)      [] ES (un) (49010):       GOALS:   Patient stated goal: Walk again  [] Progressing: [] Met: [] Not Met: [] Adjusted    Therapist goals for Patient:   Short Term Goals: To be achieved in: 2 weeks  1. Independent in HEP and progression per patient tolerance, in order to prevent re-injury.    []

## 2020-06-05 ENCOUNTER — HOSPITAL ENCOUNTER (OUTPATIENT)
Dept: PHYSICAL THERAPY | Age: 70
Setting detail: THERAPIES SERIES
Discharge: HOME | End: 2020-06-05
Payer: MEDICARE

## 2020-06-05 PROCEDURE — 97116 GAIT TRAINING THERAPY: CPT

## 2020-06-05 PROCEDURE — 97110 THERAPEUTIC EXERCISES: CPT

## 2020-06-05 NOTE — FLOWSHEET NOTE
[facility],  [address] [place]  Phone: [phone]  Fax [phone]    Physical Therapy Treatment Note/ Progress Report:           Date:  2020    Patient Name:  Ottoniel Quintero    :  1950  MRN: [id number]  Restrictions/Precautions:  R knee fused and knee spacer with severe LLD. Medical/Treatment Diagnosis Information:  · Diagnosis: M 25.561 R knee pain  · Treatment Diagnosis: M25.661 R knee stiffness, M82.81 R LE muscle weakness, Gait abnormality, R 13.0  Insurance/Certification information:  PT Insurance Information: Medicare/ [employer]  Physician Information:  Referring Practitioner: Dr. Anthony Alejandre  Has the plan of care been signed (Y/N):        []  Yes  [x]  No     Date of Patient follow up with Physician: unknown      Is this a Progress Report:     []  Yes  [x]  No        If Yes:  Date Range for reporting period:  Beginning 2020  Ending    Progress report will be due (10 Rx or 30 days whichever is less):       Recertification will be due (POC Duration  / 90 days whichever is less): 2020      Visit # Insurance Allowable Requires auth   4 [facility]    [x]no        []yes:     Functional Scale: LEFS: 16/80 = 20% functional ability, 80% disability   Date assessed: 2020     Therapy Diagnosis/Practice Pattern:     Number of Comorbidities:  []0     [x]1-2    []3+    Latex Allergy:  [x]NO      []YES  Preferred Language for Healthcare:   [x]English       []other:      Pain level:  0/10     SUBJECTIVE: Pt says HEP is going okay but then also has a hard time recalling his specific exercises so I am not confident he is following the handout. Pt says he didn't have any pain after last visit. He is still hopefully that possibly he can walk in Aug when he get his new guide dog to train.      OBJECTIVE:    Observation:  excessive and hypomobile diffuse anterior scar tissue   Test measurements: Quad set fair     Knee ROM 5/29/2020 6/3/2020 6/5/2020     R knee extension pre-tx -15 deg -6 -     R knee extension post-tx -8 deg -5 -5         RESTRICTIONS/PRECAUTIONS: R knee fused and knee spacer with severe LLD. Exercises/Interventions:     Therapeutic Ex (90808) Rep/Sets/sec Notes/CUES   Heel prop 10.5#x 5 min Cue to control toe out, HEP 5/27   Heel prop + Quad set 1x10x5\" w/ overpresure    SLR, SLR/ w/ER, hip abd,  HEP 5/27   S/L hip abd with ext \"L\" [id number] 6/5   Long sit TKE 1x15\"5\" green HEP 6/5   Weightshifting:  Side/Side   Forward step  Bwd step 1x15 ea Gait belt, @ hip machine, cueing for knee ext, QS, GS, posture, HEP 6/5   Standing hip ext 2x15 @hip machine   Standing TKE  @ hip machine   Standing hip abd 1x15                        Manual Intervention (72903)                                   NMR re-education (44424)  CUES NEEDED   Tactile cueing to inc quad contract                    Gait (47364)     Ambulation x46ft (14 min) CGA, Walker, 50% WB R LE                  Therapeutic Activity (37082)                                         Therapeutic Exercise and NMR EXR  [x] (00443) Provided verbal/tactile cueing for activities related to strengthening, flexibility, endurance, ROM for improvements in LE, proximal hip, and core control with self care, mobility, lifting, ambulation.  [] (64490) Provided verbal/tactile cueing for activities related to improving balance, coordination, kinesthetic sense, posture, motor skill, proprioception  to assist with LE, proximal hip, and core control in self care, mobility, lifting, ambulation and eccentric single leg control.      NMR and Therapeutic Activities:    [] (21911 or 81158) Provided verbal/tactile cueing for activities related to improving balance, coordination, kinesthetic sense, posture, motor skill, proprioception and motor activation to allow for proper function of core, proximal hip and LE with self care and ADLs  [x] (68186) Gait Re-education- Provided training and instruction to the patient for proper LE, core and proximal hip recruitment and positioning and eccentric body weight control with ambulation re-education including up and down stairs     Home Exercise Program:    [x] (65026) Reviewed/Progressed HEP activities related to strengthening, flexibility, endurance, ROM of core, proximal hip and LE for functional self-care, mobility, lifting and ambulation/stair navigation   [] (06960)Reviewed/Progressed HEP activities related to improving balance, coordination, kinesthetic sense, posture, motor skill, proprioception of core, proximal hip and LE for self care, mobility, lifting, and ambulation/stair navigation      Manual Treatments:  PROM / STM / Oscillations-Mobs:  G-I, II, III, IV (PA's, Inf., Post.)  [] (14051) Provided manual therapy to mobilize LE, proximal hip and/or LS spine soft tissue/joints for the purpose of modulating pain, promoting relaxation,  increasing ROM, reducing/eliminating soft tissue swelling/inflammation/restriction, improving soft tissue extensibility and allowing for proper ROM for normal function with self care, mobility, lifting and ambulation. Modalities:     [] GAME READY (VASO)- for significant edema, swelling, pain control. Charges:  Timed Code Treatment Minutes: 60   Total Treatment Minutes: 60       [] EVAL (LOW) 63361 (typically 20 minutes face-to-face)  [] EVAL (MOD) 27238 (typically 30 minutes face-to-face)  [] EVAL (HIGH) 43310 (typically 45 minutes face-to-face)  [] RE-EVAL     [x] PD(25561) x   3  [] IONTO  [] NMR (15060) x    [] VASO  [] Manual (28320) x      [] Other:  [] TA x      [] Mech Traction (56104)  [] ES(attended) (53189)      [] ES (un) (24692):   [] Gait (56355) x  1    GOALS:   Patient stated goal: Walk again  [] Progressing: [] Met: [] Not Met: [] Adjusted    Therapist goals for Patient:   Short Term Goals: To be achieved in: 2 weeks  1.  Independent in HEP and progression per patient tolerance, in

## 2020-06-10 ENCOUNTER — HOSPITAL ENCOUNTER (OUTPATIENT)
Dept: PHYSICAL THERAPY | Age: 70
Setting detail: THERAPIES SERIES
Discharge: HOME | End: 2020-06-10
Payer: MEDICARE

## 2020-06-10 PROCEDURE — 97110 THERAPEUTIC EXERCISES: CPT

## 2020-06-10 PROCEDURE — 97116 GAIT TRAINING THERAPY: CPT

## 2020-06-10 NOTE — FLOWSHEET NOTE
[facility], [address] [place]  Phone: [phone]  Fax [phone]    Physical Therapy Treatment Note/ Progress Report:           Date:  6/10/2020    Patient Name:  Cem Simmons    :  1950  MRN: [id number]  Restrictions/Precautions:  R knee fused and knee spacer with severe LLD. Medical/Treatment Diagnosis Information:  · Diagnosis: M 25.561 R knee pain  · Treatment Diagnosis: M25.661 R knee stiffness, M82.81 R LE muscle weakness, Gait abnormality, R 14.0  Insurance/Certification information:  PT Insurance Information: Medicare/ [employer]  Physician Information:  Referring Practitioner: Dr. Rodolfo Rodriguez  Has the plan of care been signed (Y/N):        []  Yes  [x]  No     Date of Patient follow up with Physician: unknown      Is this a Progress Report:     []  Yes  [x]  No        If Yes:  Date Range for reporting period:  Beginning 2020  Ending    Progress report will be due (10 Rx or 30 days whichever is less):       Recertification will be due (POC Duration  / 90 days whichever is less): 2020      Visit # Insurance Allowable Requires auth   5 [facility]    [x]no        []yes:     Functional Scale: LEFS: 16/80 = 20% functional ability, 80% disability   Date assessed: 2020     Therapy Diagnosis/Practice Pattern:     Number of Comorbidities:  []0     [x]1-2    []3+    Latex Allergy:  [x]NO      []YES  Preferred Language for Healthcare:   [x]English       []other:      Pain level:  0/10     SUBJECTIVE: Pt says he was amazed with how tired he was after last session, with the first time walking. He didn't really have any increased pain though. He has tired the weightshifting at home but feels like he might fall backwards which worries him but he is good about holding onto his counter for balance. He continues to work hard at straightening his knee at home.      OBJECTIVE:    Observation:  excessive for proper function of core, proximal hip and LE with self care and ADLs  [x] (28668) Gait Re-education- Provided training and instruction to the patient for proper LE, core and proximal hip recruitment and positioning and eccentric body weight control with ambulation re-education including up and down stairs     Home Exercise Program:    [x] (56238) Reviewed/Progressed HEP activities related to strengthening, flexibility, endurance, ROM of core, proximal hip and LE for functional self-care, mobility, lifting and ambulation/stair navigation   [] (05053)Reviewed/Progressed HEP activities related to improving balance, coordination, kinesthetic sense, posture, motor skill, proprioception of core, proximal hip and LE for self care, mobility, lifting, and ambulation/stair navigation      Manual Treatments:  PROM / STM / Oscillations-Mobs:  G-I, II, III, IV (PA's, Inf., Post.)  [] (78677) Provided manual therapy to mobilize LE, proximal hip and/or LS spine soft tissue/joints for the purpose of modulating pain, promoting relaxation,  increasing ROM, reducing/eliminating soft tissue swelling/inflammation/restriction, improving soft tissue extensibility and allowing for proper ROM for normal function with self care, mobility, lifting and ambulation. Modalities:     [] GAME READY (VASO)- for significant edema, swelling, pain control.      Charges:  Timed Code Treatment Minutes: 60   Total Treatment Minutes: 60       [] EVAL (LOW) 80384 (typically 20 minutes face-to-face)  [] EVAL (MOD) 93444 (typically 30 minutes face-to-face)  [] EVAL (HIGH) 98202 (typically 45 minutes face-to-face)  [] RE-EVAL     [x] YL(36458) x   3  [] IONTO  [] NMR (17281) x    [] VASO  [] Manual (98519) x      [] Other:  [] TA x      [] Mech Traction (08239)  [] ES(attended) (14083)      [] ES (un) (92304):   [] Gait (54764) x  1    GOALS:   Patient stated goal: Walk again  [] Progressing: [] Met: [] Not Met: [] Adjusted    Therapist goals for

## 2020-06-12 ENCOUNTER — HOSPITAL ENCOUNTER (OUTPATIENT)
Dept: PHYSICAL THERAPY | Age: 70
Setting detail: THERAPIES SERIES
Discharge: HOME | End: 2020-06-12
Payer: MEDICARE

## 2020-06-12 PROCEDURE — 97110 THERAPEUTIC EXERCISES: CPT

## 2020-06-12 PROCEDURE — 97116 GAIT TRAINING THERAPY: CPT

## 2020-06-12 NOTE — FLOWSHEET NOTE
complexities/Impairments listed. [] Progression has been slowed due to co-morbidities. [x] Plan just implemented, too soon to assess goals progression <30days   [] Goals require adjustment due to lack of progress  [] Patient is not progressing as expected and requires additional follow up with physician  [] Other    Prognosis for POC: [x] Good [] Fair  [] Poor      Patient requires continued skilled intervention: [x] Yes  [] No    Treatment/Activity Tolerance:  [x] Patient able to complete treatment  [] Patient limited by fatigue  [x] Patient limited by pain    [] Patient limited by other medical complications  [] Other:     ASSESSMENT: Pt was mentally very frustrated today and was \"fearful\" of trying to put weight through the R leg even with max UE support to place left foot up onto a 1\" step but was able to lift left leg up and WB through R LE and walker for a step-to gait which should have physically made him able to perform this activity. I told him we wouldn't give up and we would keep practicing it. He had knee pain with first lap of walking but less pain and smoother step-to pattern for 2nd and 3rd laps of walking. He tripled his walking distance today which was great. PLAN: Next visit: inc walking distance, keep trying 1\" step tap at hip machine, educate pt on giving a day of rest from HEP. Consider giving shoulders B ER tband for HEP. [x] Continue per plan of care [] Alter current plan (see comments above)   [] Plan of care initiated [] Hold pending MD visit [] Discharge      Electronically signed by:  Oscar Elizondo PT    Note: If patient does not return for scheduled/ recommended follow up visits, this note will serve as a discharge from care along with most recent update on progress.

## 2020-06-17 ENCOUNTER — HOSPITAL ENCOUNTER (OUTPATIENT)
Dept: PHYSICAL THERAPY | Age: 70
Setting detail: THERAPIES SERIES
Discharge: HOME | End: 2020-06-17
Payer: MEDICARE

## 2020-06-17 PROCEDURE — 97530 THERAPEUTIC ACTIVITIES: CPT

## 2020-06-17 PROCEDURE — 97110 THERAPEUTIC EXERCISES: CPT

## 2020-06-17 NOTE — FLOWSHEET NOTE
 Observation:  excessive and hypomobile diffuse anterior scar tissue   Test measurements: Quad set fair+     Knee ROM 5/29/2020 6/3/2020 6/5/2020 6/10/2020 6/17/2020   R knee extension pre-tx -15 deg -6 -     R knee extension post-tx -8 deg -5 -5 -4 -2 deg       RESTRICTIONS/PRECAUTIONS: R knee fused and knee spacer with severe LLD.     Exercises/Interventions:     Therapeutic Ex (24071) Rep/Sets/sec Notes/CUES   Heel prop Cue to control toe out, HEP 5/27   Heel prop + Quad set    SLR, SLR/ w/ER, hip abd,  HEP 5/27   S/L hip abd with ext \"L\" HEP 6/5   Long sit TKE HEP 6/5   Weightshifting:  Side/Side    1x15 with attempt to step up L foot onto 1\" step last 5 reps Gait belt, @ hip machine, cueing for knee ext, QS, GS, posture, HEP 6/5   Standing hip ext 2x25 R, 3# @hip machine   Standing TKE  @ hip machine   Standing hip abd 2x25 R 3# @ hip machine   Stanidng HR/Toe raise 2x25ea    Gastroc stretch     Stair taps (WB R LE, tap step L LE) 2\" 1x10 W/ walker, CGA        Manual Intervention (55019)                                   NMR re-education (85081)  CUES NEEDED   Tactile cueing to inc quad contract                    Gait (51111)     Ambulation 1x32ft  CGA, Walker, 50% WB R LE; work on step-through pattern                  Therapeutic Activity (24505)     Gait obstacle course 3x74ft Included R foot step-up, B feet step over object, walk across yoga mat, kick ball R, kick ball L, turn corners; CGA, walker                                  Therapeutic Exercise and NMR EXR  [x] (91942) Provided verbal/tactile cueing for activities related to strengthening, flexibility, endurance, ROM for improvements in LE, proximal hip, and core control with self care, mobility, lifting, ambulation.  [] (83682) Provided verbal/tactile cueing for activities related to improving balance, coordination, kinesthetic sense, posture, motor skill, proprioception  to assist with LE, proximal hip, and core control in self care, mobility, soon to assess goals progression  [] Other:     Overall Progression Towards Functional goals/ Treatment Progress Update:  [] Patient is progressing as expected towards functional goals listed. [] Progression is slowed due to complexities/Impairments listed. [] Progression has been slowed due to co-morbidities. [x] Plan just implemented, too soon to assess goals progression <30days   [] Goals require adjustment due to lack of progress  [] Patient is not progressing as expected and requires additional follow up with physician  [] Other    Prognosis for POC: [x] Good [] Fair  [] Poor      Patient requires continued skilled intervention: [x] Yes  [] No    Treatment/Activity Tolerance:  [x] Patient able to complete treatment  [] Patient limited by fatigue  [x] Patient limited by pain    [] Patient limited by other medical complications  [] Other:     ASSESSMENT: Pt made huge progress today. He was able to get much more upright torso, demonstrated much more confidence in R LE support him, and able to walk with step-to pattern fairly easily with walker. This allowed for use to begin to progress to a step-through pattern and I also told him to look into getting wheels for his walker at home to help with the pattern and ease of use since he is becoming more ambulatory. We also did functional training and problem solving with various obstabcle he may encouter as he becomes more ambulatory such as stepping over an object, crossing difference surface and to not drag the R foot causing him to trip, and ball kick to encourage full WB on one limb (R and L) and external pertubation to upright posture. We also began pre-curser step to stairs which is ability to fully WB on R LE enough to clear L foot up to a stair, starting with 2\" step today. Pt quickly fatigued and had more pain with this activity but was able to do 10.           PLAN: Next visit: discuss getting wheeled walker, keep working on stair taps, try side stepping at wall  [x] Continue per plan of care [] Alter current plan (see comments above)   [] Plan of care initiated [] Hold pending MD visit [] Discharge      Electronically signed by:  Kaur Ring PT    Note: If patient does not return for scheduled/ recommended follow up visits, this note will serve as a discharge from care along with most recent update on progress.

## 2020-06-19 ENCOUNTER — HOSPITAL ENCOUNTER (OUTPATIENT)
Dept: PHYSICAL THERAPY | Age: 70
Setting detail: THERAPIES SERIES
Discharge: HOME | End: 2020-06-19
Payer: MEDICARE

## 2020-06-19 PROCEDURE — 97110 THERAPEUTIC EXERCISES: CPT

## 2020-06-19 PROCEDURE — 97530 THERAPEUTIC ACTIVITIES: CPT

## 2020-06-19 NOTE — FLOWSHEET NOTE
[facility],  [address] [place]  Phone: [phone]  Fax [phone]    Physical Therapy Treatment Note/ Progress Report:           Date:  2020    Patient Name:  Cem Simmons    :  1950  MRN: [id number]  Restrictions/Precautions:  R knee fused and knee spacer with severe LLD. Medical/Treatment Diagnosis Information:  · Diagnosis: M 25.561 R knee pain  · Treatment Diagnosis: M25.661 R knee stiffness, M82.81 R LE muscle weakness, Gait abnormality, R 90.9  Insurance/Certification information:  PT Insurance Information: Medicare/ [employer]  Physician Information:  Referring Practitioner: Dr. Rodolfo Rodriguez  Has the plan of care been signed (Y/N):        []  Yes  [x]  No     Date of Patient follow up with Physician: unknown      Is this a Progress Report:     []  Yes  [x]  No        If Yes:  Date Range for reporting period:  Beginning 2020  Ending    Progress report will be due (10 Rx or 30 days whichever is less):       Recertification will be due (POC Duration  / 90 days whichever is less): 2020      Visit # Insurance Allowable Requires auth   [facility]    [x]no        []yes:     Functional Scale: LEFS: 16/80 = 20% functional ability, 80% disability   Date assessed: 2020     Therapy Diagnosis/Practice Pattern:     Number of Comorbidities:  []0     [x]1-2    []3+    Latex Allergy:  [x]NO      []YES  Preferred Language for Healthcare:   [x]English       []other:      Pain level:  0/10     SUBJECTIVE: Pt says he was sore and tired from last visit but psychologically felt really good and positive about what had been accomplished. Today pt entered clinic seeming very agitated and easily frustrated but when questioned about it he said the knee felt fine but was angry about his situation and his dog digging up his flowers this morning.      OBJECTIVE:    Observation:  excessive and hypomobile diffuse in LE, proximal hip, and core control with self care, mobility, lifting, ambulation.  [] (05792) Provided verbal/tactile cueing for activities related to improving balance, coordination, kinesthetic sense, posture, motor skill, proprioception  to assist with LE, proximal hip, and core control in self care, mobility, lifting, ambulation and eccentric single leg control. NMR and Therapeutic Activities:    [x] (43682 or 43652) Provided verbal/tactile cueing for activities related to improving balance, coordination, kinesthetic sense, posture, motor skill, proprioception and motor activation to allow for proper function of core, proximal hip and LE with self care and ADLs  [] (96697) Gait Re-education- Provided training and instruction to the patient for proper LE, core and proximal hip recruitment and positioning and eccentric body weight control with ambulation re-education including up and down stairs     Home Exercise Program:    [] (02180) Reviewed/Progressed HEP activities related to strengthening, flexibility, endurance, ROM of core, proximal hip and LE for functional self-care, mobility, lifting and ambulation/stair navigation   [] (96660)Reviewed/Progressed HEP activities related to improving balance, coordination, kinesthetic sense, posture, motor skill, proprioception of core, proximal hip and LE for self care, mobility, lifting, and ambulation/stair navigation      Manual Treatments:  PROM / STM / Oscillations-Mobs:  G-I, II, III, IV (PA's, Inf., Post.)  [] (50230) Provided manual therapy to mobilize LE, proximal hip and/or LS spine soft tissue/joints for the purpose of modulating pain, promoting relaxation,  increasing ROM, reducing/eliminating soft tissue swelling/inflammation/restriction, improving soft tissue extensibility and allowing for proper ROM for normal function with self care, mobility, lifting and ambulation.      Modalities:     [] GAME READY (VASO)- for significant edema, swelling, pain control. Charges:  Timed Code Treatment Minutes: 60   Total Treatment Minutes: 60       [] EVAL (LOW) 75111 (typically 20 minutes face-to-face)  [] EVAL (MOD) 94521 (typically 30 minutes face-to-face)  [] EVAL (HIGH) 51063 (typically 45 minutes face-to-face)  [] RE-EVAL     [x] FW(97343) x   2  [] IONTO  [] NMR (59311) x    [] VASO  [] Manual (81341) x      [] Other:  [x] TA x   2   [] Mech Traction (10491)  [] ES(attended) (28042)      [] ES (un) (30827):   [] Gait (66926) x      GOALS:   Patient stated goal: Walk again  [] Progressing: [] Met: [] Not Met: [] Adjusted    Therapist goals for Patient:   Short Term Goals: To be achieved in: 2 weeks  1. Independent in HEP and progression per patient tolerance, in order to prevent re-injury. [x] Progressing: [] Met: [] Not Met: [] Adjusted  2. Patient will have a decrease in pain to facilitate improvement in movement, function, and ADLs as indicated by Functional Deficits. [x] Progressing: pt has pain during Wb and exercises but none once done. [] Met: [] Not Met: [] Adjusted    Long Term Goals: To be achieved in: 8 weeks  1. Disability index score of 30% or less for the LEFS to assist with reaching prior level of function. [] Progressing: [] Met: [] Not Met: [] Adjusted  2. Patient will demonstrate increased AROM to full knee extension to allow for proper joint functioning as indicated by patients Functional Deficits. [] Progressing: [] Met: [] Not Met: [] Adjusted  3. Patient will demonstrate an increase in Strength to good proximal hip strength and control, within 5lb HHD in LE to allow for proper functional mobility as indicated by patients Functional Deficits. [] Progressing: [] Met: [] Not Met: [] Adjusted  4. Patient will return to volunteer activities without increased symptoms or restriction. [] Progressing: [] Met: [] Not Met: [] Adjusted  5.  Pt will be able to ambulate with or without AD for at least 2 blocks or 30 minutes to improve community

## 2020-06-24 ENCOUNTER — HOSPITAL ENCOUNTER (OUTPATIENT)
Dept: PHYSICAL THERAPY | Age: 70
Setting detail: THERAPIES SERIES
Discharge: HOME | End: 2020-06-24
Payer: MEDICARE

## 2020-06-24 PROCEDURE — 97116 GAIT TRAINING THERAPY: CPT

## 2020-06-24 PROCEDURE — 97110 THERAPEUTIC EXERCISES: CPT

## 2020-06-24 PROCEDURE — 97530 THERAPEUTIC ACTIVITIES: CPT

## 2020-06-24 NOTE — FLOWSHEET NOTE
car today that it is broken. Therefore I met him out at the car today with our walker to assist him inside today. This also allowed me to educate and assess ability to perform short distance community ambulation such as this to help reduce his dependence on the WC. OBJECTIVE:    Observation:  excessive and hypomobile diffuse anterior scar tissue   Test measurements: Quad set fair+     Knee ROM 5/29/2020 6/3/2020 6/5/2020 6/10/2020 6/17/2020   R knee extension pre-tx -15 deg -6 -     R knee extension post-tx -8 deg -5 -5 -4 -2 deg       RESTRICTIONS/PRECAUTIONS: R knee fused and knee spacer with severe LLD.     Exercises/Interventions:     Therapeutic Ex (23371) Rep/Sets/sec Notes/CUES   Heel prop Cue to control toe out, HEP 5/27   Heel prop + Quad set    SLR, SLR/ w/ER, hip abd,  HEP 5/27   S/L hip abd with ext \"L\" HEP 6/5   Long sit TKE HEP 6/5   Weightshifting:  Side/Side     Gait belt, @ hip machine, cueing for knee ext, QS, GS, posture, HEP 6/5   Standing hip ext 2x15 R, 60lbs @hip machine, HEP 6/19   Standing TKE  @ hip machine   Standing hip abd 2x15 R 60 lbs @ hip machine, HEP 6/19   Stanidng HR/Toe raise 1x25ea HEP 6/19   Gastroc stretch     Stair taps (WB R LE, tap step L LE) 4\" 2x10 W/ walker, CGA   Seated SLR, SLR w/ ER HEP 6/19   Seated ankle PF HEP 6/19   Seated hip add ring 1x25 green ring                   Manual Intervention (85961)                                   NMR re-education (58852)  CUES NEEDED   Tactile cueing to inc quad contract                    Gait (86623)     Ambulation   [id number] outdoor sidewalk to car CGA, Walker, 50% WB R LE;  functional application to walk to car outside rather than use WC whole way                  Therapeutic Activity (91044)     Gait obstacle course 3x74ft Included R foot step-up, B feet step over object, fig 8 cones, turn corners, 4\" stair tap, bwd stepx3; CGA, walker   Side stepping  Holding onto tall wall, CGA, cueing for how to weightshift to move tissue extensibility and allowing for proper ROM for normal function with self care, mobility, lifting and ambulation. Modalities:     [] GAME READY (VASO)- for significant edema, swelling, pain control. Charges:  Timed Code Treatment Minutes: 65   Total Treatment Minutes: 65       [] EVAL (LOW) 58934 (typically 20 minutes face-to-face)  [] EVAL (MOD) 41256 (typically 30 minutes face-to-face)  [] EVAL (HIGH) 54172 (typically 45 minutes face-to-face)  [] RE-EVAL     [x] TT(44935) x   2  [] IONTO  [] NMR (72443) x    [] VASO  [] Manual (17507) x      [] Other:  [x] TA x   1   [] Mech Traction (50298)  [] ES(attended) (18570)      [] ES (un) (68812):   [x] Gait (25842) x 1     GOALS:   Patient stated goal: Walk again  [] Progressing: [] Met: [] Not Met: [] Adjusted    Therapist goals for Patient:   Short Term Goals: To be achieved in: 2 weeks  1. Independent in HEP and progression per patient tolerance, in order to prevent re-injury. [x] Progressing: [] Met: [] Not Met: [] Adjusted  2. Patient will have a decrease in pain to facilitate improvement in movement, function, and ADLs as indicated by Functional Deficits. [x] Progressing: pt has pain during Wb and exercises but none once done. [] Met: [] Not Met: [] Adjusted    Long Term Goals: To be achieved in: 8 weeks  1. Disability index score of 30% or less for the LEFS to assist with reaching prior level of function. [] Progressing: [] Met: [] Not Met: [] Adjusted  2. Patient will demonstrate increased AROM to full knee extension to allow for proper joint functioning as indicated by patients Functional Deficits. [] Progressing: [] Met: [] Not Met: [] Adjusted  3. Patient will demonstrate an increase in Strength to good proximal hip strength and control, within 5lb HHD in LE to allow for proper functional mobility as indicated by patients Functional Deficits. [] Progressing: [] Met: [] Not Met: [] Adjusted  4.  Patient will return to volunteer activities without increased symptoms or restriction. [] Progressing: [] Met: [] Not Met: [] Adjusted  5. Pt will be able to ambulate with or without AD for at least 2 blocks or 30 minutes to improve community independence. [] Progressing: [] Met: [] Not Met: [] Adjusted    Progression Towards Functional goals:  [] Patient is progressing as expected towards functional goals listed. [] Progression is slowed due to complexities listed. [] Progression has been slowed due to co-morbidities. [x] Plan just implemented, too soon to assess goals progression  [] Other:     Overall Progression Towards Functional goals/ Treatment Progress Update:  [] Patient is progressing as expected towards functional goals listed. [] Progression is slowed due to complexities/Impairments listed. [] Progression has been slowed due to co-morbidities. [x] Plan just implemented, too soon to assess goals progression <30days   [] Goals require adjustment due to lack of progress  [] Patient is not progressing as expected and requires additional follow up with physician  [] Other    Prognosis for POC: [x] Good [] Fair  [] Poor      Patient requires continued skilled intervention: [x] Yes  [] No    Treatment/Activity Tolerance:  [x] Patient able to complete treatment  [] Patient limited by fatigue  [] Patient limited by pain    [] Patient limited by other medical complications  [x] Other: some fear avoidance to fully WB onto R LE    ASSESSMENT: Pt did very well and only needed occasional safety and postural cues during ambulation with standard walker from car into building and then back at end of appointment. Pt needed CGA for safety and cueing to slow down for safety. We also discussed on how to manage his guide dog while community ambulating with walker. Pt was happy to see this functional milestone of not using the wheelchair today but reported by end of session feeling overall exhausted from the amount of effort the 170ft walk took.  He reports the leg a little sore but \"not bad\". PLAN: Next visit: discuss getting wheeled walker again if he hasn't looked into it. Similar session. Next week consider 4\" step up and education about pattern to do stairs safely. [x] Continue per plan of care [] Alter current plan (see comments above)   [] Plan of care initiated [] Hold pending MD visit [] Discharge      Electronically signed by:  Ermelinda Rowell PT    Note: If patient does not return for scheduled/ recommended follow up visits, this note will serve as a discharge from care along with most recent update on progress.

## 2020-06-26 ENCOUNTER — HOSPITAL ENCOUNTER (OUTPATIENT)
Dept: PHYSICAL THERAPY | Age: 70
Setting detail: THERAPIES SERIES
Discharge: HOME | End: 2020-06-26
Payer: MEDICARE

## 2020-06-26 PROCEDURE — 97116 GAIT TRAINING THERAPY: CPT

## 2020-06-26 PROCEDURE — 97110 THERAPEUTIC EXERCISES: CPT

## 2020-06-26 NOTE — FLOWSHEET NOTE
HEP 6/19   Seated ankle PF HEP 6/19   Seated hip add ring 1x25 green ring    Tball bridge 2x15         Pt edu for HEP safety 3 min How to safely put band around ankle and sit<>stand to do hip abd and ext   Manual Intervention ([id number])               NMR re-education (69946)  CUES NEEDED   Tactile cueing to inc quad contract                    Gait (62658)     Ambulation   [id number] outdoor sidewalk to car x1 curb CGA, Walker, 50% WB R LE;  functional application to walk to car outside and how to negotiate curb with walker and fused knee                  Therapeutic Activity (49680)     Gait obstacle course  Included R foot step-up, B feet step over object, fig 8 cones, turn corners, 4\" stair tap, bwd stepx3; CGA, walker   Side stepping  Holding onto tall wall, CGA, cueing for how to weightshift to move feet                             Therapeutic Exercise and NMR EXR  [x] (01468) Provided verbal/tactile cueing for activities related to strengthening, flexibility, endurance, ROM for improvements in LE, proximal hip, and core control with self care, mobility, lifting, ambulation.  [] (96841) Provided verbal/tactile cueing for activities related to improving balance, coordination, kinesthetic sense, posture, motor skill, proprioception  to assist with LE, proximal hip, and core control in self care, mobility, lifting, ambulation and eccentric single leg control.      NMR and Therapeutic Activities:    [] (72608 or 47406) Provided verbal/tactile cueing for activities related to improving balance, coordination, kinesthetic sense, posture, motor skill, proprioception and motor activation to allow for proper function of core, proximal hip and LE with self care and ADLs  [x] (18705) Gait Re-education- Provided training and instruction to the patient for proper LE, core and proximal hip recruitment and positioning and eccentric body weight control with ambulation re-education including up and down stairs     Home Exercise

## 2020-06-29 ENCOUNTER — APPOINTMENT (OUTPATIENT)
Dept: PHYSICAL THERAPY | Age: 70
End: 2020-06-29
Payer: MEDICARE

## 2020-06-30 ENCOUNTER — HOSPITAL ENCOUNTER (OUTPATIENT)
Dept: PHYSICAL THERAPY | Age: 70
Setting detail: THERAPIES SERIES
End: 2020-06-30
Payer: MEDICARE

## 2020-07-01 ENCOUNTER — HOSPITAL ENCOUNTER (OUTPATIENT)
Dept: PHYSICAL THERAPY | Age: 70
Setting detail: THERAPIES SERIES
Discharge: HOME | End: 2020-07-01
Payer: MEDICARE

## 2020-07-01 PROCEDURE — 97116 GAIT TRAINING THERAPY: CPT

## 2020-07-01 PROCEDURE — 97530 THERAPEUTIC ACTIVITIES: CPT

## 2020-07-01 PROCEDURE — 97110 THERAPEUTIC EXERCISES: CPT

## 2020-07-01 NOTE — FLOWSHEET NOTE
[facility],  [address] [place]  Phone: [phone]  Fax [phone]    Physical Therapy Treatment Note/ Progress Report:           Date:  2020    Patient Name:  Ruth Fountain    :  1950  MRN: [id number]  Restrictions/Precautions:  R knee fused and knee spacer with severe LLD. Medical/Treatment Diagnosis Information:  · Diagnosis: M 25.561 R knee pain  · Treatment Diagnosis: M25.661 R knee stiffness, M82.81 R LE muscle weakness, Gait abnormality, R 11.8  Insurance/Certification information:  PT Insurance Information: Medicare/ [employer]  Physician Information:  Referring Practitioner: Dr. Thuan Yi  Has the plan of care been signed (Y/N):        [x]  Yes  []  No     Date of Patient follow up with Physician: unknown      Is this a Progress Report:     []  Yes  [x]  No        If Yes:  Date Range for reporting period:  Beginning 2020  Ending     Progress report will be due (10 Rx or 30 days whichever is less): ,       Recertification will be due (POC Duration  / 90 days whichever is less): 2020      Visit # Insurance Allowable Requires auth   11 Med nec    [x]no        []yes:     Functional Scale: LEFS: 16/80 = 20% functional ability, 80% disability   Date assessed: 2020           LEFS: 45/80 = 56% functional ability,  44% disability   Date assessed: 2020    Therapy Diagnosis/Practice Pattern:     Number of Comorbidities:  []0     [x]1-2    []3+    Latex Allergy:  [x]NO      []YES  Preferred Language for Healthcare:   [x]English       []other:      Pain level:  0/10     SUBJECTIVE: Pt's guide dog dies un-expectantly yesterday so mental patient is in a really rough spot today. He felt it was important to still come to PT though. He says the leg is feeling okay.  He got a front wheeled walker that is also bariatric size and feels his walking has really improved because this AD makes it so much easier. OBJECTIVE:    Observation:  excessive and hypomobile diffuse anterior scar tissue   Gait: pt now able to ambulate 175ft with fwd wheeled walker and 50% WB on the R LE with riser shoe, slight forward flexed torso still, also now able to do a small step-through gait pattern.  Test measurments:     Knee ROM 5/29/2020 6/3/2020 6/5/2020 6/10/2020 6/17/2020   R knee extension pre-tx -15 deg -6 -     R knee extension post-tx -8 deg -5 -5 -4 -2 deg     ROM LEFT RIGHT ROM 6/26/2020 Right   HIP Flex 127 127     Knee ext 0 -15  -2 deg   Knee Flex 123 Fused knee  fused   Ankle PF 62 51     Ankle DF 0 -1     Strength  LEFT RIGHT  Right   HIP Flexors 5/5 4+/5  5/5   HIP Abductors 4/5 4/5  4/5   HIP Ext 5/5 3+/5  4/5   Hip ER        Knee EXT (quad) 5/5 4/5 (quad set)  4+/5 (quad set)   Knee Flex (HS) 4/5 NT     Ankle DF 4/5 4/5  5/5   Ankle PF 5/5 5/5  5/5   Ankle Inv 4-/5 4-/5  5-/5   Ankle EV 4-/5 3+/5  3+/5         RESTRICTIONS/PRECAUTIONS: R knee fused and knee spacer with severe LLD.     Exercises/Interventions:     Therapeutic Ex (07483) Rep/Sets/sec Notes/CUES   Heel prop Cue to control toe out, HEP 5/27   Heel prop + Quad set    SLR, SLR/ w/ER, hip abd,  HEP 5/27   S/L hip abd with ext \"L\" HEP 6/5   Long sit TKE HEP 6/5   Weightshifting:  Side/Side     Gait belt, @ hip machine, cueing for knee ext, QS, GS, posture, HEP 6/5   Standing hip ext 2x15 R, 60lbs @hip machine, HEP 6/19   Standing TKE  @ hip machine   Standing hip abd  @ hip machine, HEP 6/19   Stanidng HR/Toe raise  HEP 6/19   Gastroc stretch     Stair taps (WB R LE, tap step L LE) 6\" 1x10 W/ walker, CGA   Seated SLR, SLR w/ ER \"L\" 2x15ea 3#HEP 6/19   Seated ankle PF HEP 6/19   Seated hip add ring 1x30 green ring    Tball bridge  HEP   Step-up 6\" 2x10 CGA and B handrail                       Pt edu for HEP safety  How to safely put band around ankle and sit<>stand to do hip abd and ext   Manual Intervention (39432)               NMR re-education (20514)  CUES NEEDED   Tactile cueing to inc quad contract   SLS 2x4x5\" UE on 1/2 wall WB, Cassidy, and tactile cue to shift hips onto R LE for stance               Gait (78459)     Ambulation 1x58ft gym floor  [id number] outdoor sidewalk to car  CGA, Walker, 50% WB R LE;  functional application to walk to car outside                   Therapeutic Activity (70680)     Gait obstacle course 2x74ft Included B feet step over object, fig 8 cones, kick ball ea foot, turn corners; CGA, walker   Side stepping 2x10ft Holding onto tall wall, CGA, cueing for how to weightshift to move feet                             Therapeutic Exercise and NMR EXR  [x] (48190) Provided verbal/tactile cueing for activities related to strengthening, flexibility, endurance, ROM for improvements in LE, proximal hip, and core control with self care, mobility, lifting, ambulation.  [] (27550) Provided verbal/tactile cueing for activities related to improving balance, coordination, kinesthetic sense, posture, motor skill, proprioception  to assist with LE, proximal hip, and core control in self care, mobility, lifting, ambulation and eccentric single leg control.      NMR and Therapeutic Activities:    [x] (86042 or 71951) Provided verbal/tactile cueing for activities related to improving balance, coordination, kinesthetic sense, posture, motor skill, proprioception and motor activation to allow for proper function of core, proximal hip and LE with self care and ADLs  [x] (27122) Gait Re-education- Provided training and instruction to the patient for proper LE, core and proximal hip recruitment and positioning and eccentric body weight control with ambulation re-education including up and down stairs     Home Exercise Program:    [] (30160) Reviewed/Progressed HEP activities related to strengthening, flexibility, endurance, ROM of core, proximal hip and LE for functional self-care, mobility, lifting and ambulation/stair navigation   [] (45803)Reviewed/Progressed HEP activities related to improving balance, coordination, kinesthetic sense, posture, motor skill, proprioception of core, proximal hip and LE for self care, mobility, lifting, and ambulation/stair navigation      Manual Treatments:  PROM / STM / Oscillations-Mobs:  G-I, II, III, IV (PA's, Inf., Post.)  [] (73520) Provided manual therapy to mobilize LE, proximal hip and/or LS spine soft tissue/joints for the purpose of modulating pain, promoting relaxation,  increasing ROM, reducing/eliminating soft tissue swelling/inflammation/restriction, improving soft tissue extensibility and allowing for proper ROM for normal function with self care, mobility, lifting and ambulation. Modalities:     [] GAME READY (VASO)- for significant edema, swelling, pain control. Charges:  Timed Code Treatment Minutes: 60   Total Treatment Minutes: 60       [] EVAL (LOW) 56078 (typically 20 minutes face-to-face)  [] EVAL (MOD) 97275 (typically 30 minutes face-to-face)  [] EVAL (HIGH) 69219 (typically 45 minutes face-to-face)  [] RE-EVAL     [x] QG(23922) x   2  [] IONTO  [] NMR (63579) x    [] VASO  [] Manual (96643) x      [] Other:  [x] TA x  1    [] Mech Traction (25826)  [] ES(attended) (12480)      [] ES (un) (07402):   [x] Gait (30060) x 1    GOALS:   Patient stated goal: Walk again  [x] Progressing: can now walk 175ft in 10-15 min with walker [] Met: [] Not Met: [] Adjusted    Therapist goals for Patient:   Short Term Goals: To be achieved in: 2 weeks  1. Independent in HEP and progression per patient tolerance, in order to prevent re-injury. [x] Progressing: [] Met: [] Not Met: [] Adjusted  2. Patient will have a decrease in pain to facilitate improvement in movement, function, and ADLs as indicated by Functional Deficits. [x] Progressing: pt has pain during Wb and exercises but none once done. [] Met: [] Not Met: [] Adjusted    Long Term Goals: To be achieved in: 8 weeks  1.  Disability index score of 30% or less for the LEFS to assist with reaching prior level of function. [x] Progressing: now only 44% disability [] Met: [] Not Met: [] Adjusted  2. Patient will demonstrate increased AROM to full knee extension to allow for proper joint functioning as indicated by patients Functional Deficits. [] Progressing: [x] Met: knee flexion not obtainable due to fused knee but now has only -2 deg knee extension and has plateud at this range but allows the knee to be more stable with WB and improve LLD [] Not Met: [] Adjusted  3. Patient will demonstrate an increase in Strength to good proximal hip strength and control, within 5lb HHD in LE to allow for proper functional mobility as indicated by patients Functional Deficits. [x] Progressing: [] Met: [] Not Met: [] Adjusted  4. Patient will return to volunteer activities without increased symptoms or restriction. [] Progressing: [] Met: [x] Not Met: [] Adjusted  5. Pt will be able to ambulate with or without AD for at least 2 blocks or 30 minutes to improve community independence. [] Progressing: [] Met: [x] Not Met: [] Adjusted    Progression Towards Functional goals:  [x] Patient is progressing as expected towards functional goals listed. [] Progression is slowed due to complexities listed. [] Progression has been slowed due to co-morbidities. [] Plan just implemented, too soon to assess goals progression  [] Other:     Overall Progression Towards Functional goals/ Treatment Progress Update:  [x] Patient is progressing as expected towards functional goals listed. [] Progression is slowed due to complexities/Impairments listed. [] Progression has been slowed due to co-morbidities.   [] Plan just implemented, too soon to assess goals progression <30days   [] Goals require adjustment due to lack of progress  [] Patient is not progressing as expected and requires additional follow up with physician  [] Other    Prognosis for POC: [x] Good [] Fair  [] Poor      Patient requires continued skilled intervention: [x] Yes  [] No    Treatment/Activity Tolerance:  [x] Patient able to complete treatment  [] Patient limited by fatigue  [] Patient limited by pain    [] Patient limited by other medical complications  [] Other:     ASSESSMENT: Pt continues to make steady progress. Today's big accomplishment was SLS for 5\" and ascending a 6\" step with the L LE and descending back down with the R LE and trusting the R LE enough to support his to do so. He needed education, cueing and tactile assistance to shift hips and torso over onto R LE enough to achieve SLS on the R which is also needed for tasks like stepping L foot up onto step or to step over object. PLAN: Pt to be seen 2 times a week for another 4 weeks. Next visit: WB time tolerance on the R LE with at wall stepping over and back 1/2 roller with L LE  [x] Continue per plan of care [] Alter current plan (see comments above)   [] Plan of care initiated [] Hold pending MD visit [] Discharge      Electronically signed by:  Clovsi Hinojosa, PT    Note: If patient does not return for scheduled/ recommended follow up visits, this note will serve as a discharge from care along with most recent update on progress.

## 2020-07-08 ENCOUNTER — HOSPITAL ENCOUNTER (OUTPATIENT)
Dept: PHYSICAL THERAPY | Age: 70
Setting detail: THERAPIES SERIES
Discharge: HOME | End: 2020-07-08
Payer: MEDICARE

## 2020-07-08 PROCEDURE — 97530 THERAPEUTIC ACTIVITIES: CPT

## 2020-07-08 PROCEDURE — 97112 NEUROMUSCULAR REEDUCATION: CPT

## 2020-07-08 NOTE — FLOWSHEET NOTE
[facility],  [address] [place]  Phone: [phone]  Fax [phone]    Physical Therapy Treatment Note/ Progress Report:           Date:  2020    Patient Name:  Shari Frausto    :  1950  MRN: [id number]  Restrictions/Precautions:  R knee fused and knee spacer with severe LLD. Medical/Treatment Diagnosis Information:  · Diagnosis: M 25.561 R knee pain  · Treatment Diagnosis: M25.661 R knee stiffness, M82.81 R LE muscle weakness, Gait abnormality, R 80.8  Insurance/Certification information:  PT Insurance Information: Medicare/ [employer]  Physician Information:  Referring Practitioner: Dr. Koko Kurtz  Has the plan of care been signed (Y/N):        [x]  Yes  []  No     Date of Patient follow up with Physician: unknown      Is this a Progress Report:     []  Yes  [x]  No        If Yes:  Date Range for reporting period:  Beginning 2020  Ending     Progress report will be due (10 Rx or 30 days whichever is less): , 7873      Recertification will be due (POC Duration  / 90 days whichever is less): 2020      Visit # Insurance Allowable Requires auth   12 Med nec    [x]no        []yes:     Functional Scale: LEFS: 16/80 = 20% functional ability, 80% disability   Date assessed: 2020           LEFS: 45/80 = 56% functional ability,  44% disability   Date assessed: 2020    Therapy Diagnosis/Practice Pattern:     Number of Comorbidities:  []0     [x]1-2    []3+    Latex Allergy:  [x]NO      []YES  Preferred Language for Healthcare:   [x]English       []other:      Pain level:  1-2/10     SUBJECTIVE: Pt reports that his actual knee joint has been feeling more sore but this confuses him because he didn't think he had anything left in there but assumes he must be feeling the spacer. Denies fever and chills or other signs of infection. He felt postiive after his last session but over the weekend he started to feel like he may never do stairs. OBJECTIVE:    Observation:  excessive and hypomobile diffuse anterior scar tissue, mild knee jt effusion, no tenderness to palpation, no redness, no warmth, no fever/chills, no calf pain.  Gait: pt now able to ambulate 175ft with fwd wheeled walker and 50% WB on the R LE with riser shoe, slight forward flexed torso still, also now able to do a fairly normal size step with step-through gait pattern.  Test measurments:     Knee ROM 5/29/2020 6/3/2020 6/5/2020 6/10/2020 6/17/2020   R knee extension pre-tx -15 deg -6 -     R knee extension post-tx -8 deg -5 -5 -4 -2 deg     ROM LEFT RIGHT ROM 6/26/2020 Right   HIP Flex 127 127     Knee ext 0 -15  -2 deg   Knee Flex 123 Fused knee  fused   Ankle PF 62 51     Ankle DF 0 -1     Strength  LEFT RIGHT  Right   HIP Flexors 5/5 4+/5  5/5   HIP Abductors 4/5 4/5  4/5   HIP Ext 5/5 3+/5  4/5   Hip ER        Knee EXT (quad) 5/5 4/5 (quad set)  4+/5 (quad set)   Knee Flex (HS) 4/5 NT     Ankle DF 4/5 4/5  5/5   Ankle PF 5/5 5/5  5/5   Ankle Inv 4-/5 4-/5  5-/5   Ankle EV 4-/5 3+/5  3+/5         RESTRICTIONS/PRECAUTIONS: R knee fused and knee spacer with severe LLD.     Exercises/Interventions:     Therapeutic Ex (36975) Rep/Sets/sec Notes/CUES   Heel prop Cue to control toe out, HEP 5/27   Heel prop + Quad set    SLR, SLR/ w/ER, hip abd,  HEP 5/27   S/L hip abd with ext \"L\" HEP 6/5   Long sit TKE HEP 6/5   Weightshifting:  Side/Side     Gait belt, @ hip machine, cueing for knee ext, QS, GS, posture, HEP 6/5   Standing hip ext 2x15 R, 60lbs @hip machine, HEP 6/19   Standing TKE  @ hip machine   Standing hip abd  @ hip machine, HEP 6/19   Stanidng HR/Toe raise  HEP 6/19   Gastroc stretch     Stair taps (WB R LE, tap step L LE) 6\" 1x10 W/ walker, CGA   Seated SLR, SLR w/ ER HEP 6/19   Seated ankle PF HEP 6/19   Seated hip add ring     Tball bridge  HEP   Step-up  CGA and B handrail                       Pt edu for HEP safety  How to safely put band around ankle and sit<>stand to do hip abd and ext   Manual Intervention [id number])               NMR re-education (97886)  CUES NEEDED   Tactile cueing to inc quad contract   SLS, modified 2x10x5\" L foot up on stair, cued for torso weight shift onto R LE for FWB, GCA, B handrail; to progressive WB more on the R LE   L foot step overs, R SLS  2x10 With walker and CGA to progressive WB on the R LE          Gait (92369)     Ambulation   [id number] outdoor sidewalk to car  CGA, Walker, 50% WB R LE;  functional application to walk to car outside                   Therapeutic Activity (19584)     Gait obstacle course 2x74ft Included B feet step over object, fig 8 cones, kick ball ea foot, turn corners; CGA, walker   Side stepping [id number] Holding onto tall wall, CGA, cueing for how to weightshift to move feet   Flight of stairs : \"up with the good down, down with the bad\" 3x4 stairs CGA and B handrail                        Therapeutic Exercise and NMR EXR  [] (13711) Provided verbal/tactile cueing for activities related to strengthening, flexibility, endurance, ROM for improvements in LE, proximal hip, and core control with self care, mobility, lifting, ambulation.  [] (28780) Provided verbal/tactile cueing for activities related to improving balance, coordination, kinesthetic sense, posture, motor skill, proprioception  to assist with LE, proximal hip, and core control in self care, mobility, lifting, ambulation and eccentric single leg control.      NMR and Therapeutic Activities:    [x] (17844 or 24563) Provided verbal/tactile cueing for activities related to improving balance, coordination, kinesthetic sense, posture, motor skill, proprioception and motor activation to allow for proper function of core, proximal hip and LE with self care and ADLs  [x] (57760) Gait Re-education- Provided training and instruction to the patient for proper LE, core and proximal hip recruitment and positioning and eccentric body weight Patient will have a decrease in pain to facilitate improvement in movement, function, and ADLs as indicated by Functional Deficits. [x] Progressing: pt has pain during Wb and exercises but none once done. [] Met: [] Not Met: [] Adjusted    Long Term Goals: To be achieved in: 8 weeks  1. Disability index score of 30% or less for the LEFS to assist with reaching prior level of function. [x] Progressing: now only 44% disability [] Met: [] Not Met: [] Adjusted  2. Patient will demonstrate increased AROM to full knee extension to allow for proper joint functioning as indicated by patients Functional Deficits. [] Progressing: [x] Met: knee flexion not obtainable due to fused knee but now has only -2 deg knee extension and has plateud at this range but allows the knee to be more stable with WB and improve LLD [] Not Met: [] Adjusted  3. Patient will demonstrate an increase in Strength to good proximal hip strength and control, within 5lb HHD in LE to allow for proper functional mobility as indicated by patients Functional Deficits. [x] Progressing: [] Met: [] Not Met: [] Adjusted  4. Patient will return to volunteer activities without increased symptoms or restriction. [] Progressing: [] Met: [x] Not Met: [] Adjusted  5. Pt will be able to ambulate with or without AD for at least 2 blocks or 30 minutes to improve community independence. [] Progressing: [] Met: [x] Not Met: [] Adjusted    Progression Towards Functional goals:  [x] Patient is progressing as expected towards functional goals listed. [] Progression is slowed due to complexities listed. [] Progression has been slowed due to co-morbidities. [] Plan just implemented, too soon to assess goals progression  [] Other:     Overall Progression Towards Functional goals/ Treatment Progress Update:  [x] Patient is progressing as expected towards functional goals listed. [] Progression is slowed due to complexities/Impairments listed.   [] Progression has

## 2020-07-10 ENCOUNTER — HOSPITAL ENCOUNTER (OUTPATIENT)
Dept: PHYSICAL THERAPY | Age: 70
Setting detail: THERAPIES SERIES
Discharge: HOME | End: 2020-07-10
Payer: MEDICARE

## 2020-07-10 PROCEDURE — 97110 THERAPEUTIC EXERCISES: CPT

## 2020-07-10 PROCEDURE — 97530 THERAPEUTIC ACTIVITIES: CPT

## 2020-07-10 NOTE — FLOWSHEET NOTE
[facility],  [address] [place]  Phone: [phone]  Fax [phone]    Physical Therapy Treatment Note/ Progress Report:           Date:  7/10/2020    Patient Name:  Cleopatra Landau    :  1950  MRN: [id number]  Restrictions/Precautions:  R knee fused and knee spacer with severe LLD. Medical/Treatment Diagnosis Information:  · Diagnosis: M 25.561 R knee pain  · Treatment Diagnosis: M25.661 R knee stiffness, M82.81 R LE muscle weakness, Gait abnormality, R 72.5  Insurance/Certification information:  PT Insurance Information: Medicare/ [employer]  Physician Information:  Referring Practitioner: Dr. Lowell Youngblood  Has the plan of care been signed (Y/N):        [x]  Yes  []  No     Date of Patient follow up with Physician: unknown      Is this a Progress Report:     []  Yes  [x]  No        If Yes:  Date Range for reporting period:  Beginning 2020  Ending     Progress report will be due (10 Rx or 30 days whichever is less): ,       Recertification will be due (POC Duration  / 90 days whichever is less): 2020      Visit # Insurance Allowable Requires auth   13 Med nec    [x]no        []yes:     Functional Scale: LEFS: 16/80 = 20% functional ability, 80% disability   Date assessed: 2020           LEFS: 45/80 = 56% functional ability,  44% disability   Date assessed: 2020    Therapy Diagnosis/Practice Pattern:     Number of Comorbidities:  []0     [x]1-2    []3+    Latex Allergy:  [x]NO      []YES  Preferred Language for Healthcare:   [x]English       []other:      Pain level:  0/10     SUBJECTIVE: Pt reports he did ice his knee and is seems to have helped with the swelling and soreness he was getting in the joint. Not sore today.  Pt ran an errand yesterday and discovered he had to step over a parking block and was happy we had worked on [employer] in here be cause he was able to do it without problem. OBJECTIVE:    Observation: no longer effused visibly   Gait: step through pattern with front wheeled walker and now WB about 75% on the R LE in R stance and getting much better quad contraction.  Test measurments: Quad set: good-    Knee ROM 5/29/2020 6/3/2020 6/5/2020 6/10/2020 6/17/2020   R knee extension pre-tx -15 deg -6 -     R knee extension post-tx -8 deg -5 -5 -4 -2 deg     ROM LEFT RIGHT ROM 6/26/2020 Right   HIP Flex 127 127     Knee ext 0 -15  -2 deg   Knee Flex 123 Fused knee  fused   Ankle PF 62 51     Ankle DF 0 -1     Strength  LEFT RIGHT  Right   HIP Flexors 5/5 4+/5  5/5   HIP Abductors 4/5 4/5  4/5   HIP Ext 5/5 3+/5  4/5   Hip ER        Knee EXT (quad) 5/5 4/5 (quad set)  4+/5 (quad set)   Knee Flex (HS) 4/5 NT     Ankle DF 4/5 4/5  5/5   Ankle PF 5/5 5/5  5/5   Ankle Inv 4-/5 4-/5  5-/5   Ankle EV 4-/5 3+/5  3+/5         RESTRICTIONS/PRECAUTIONS: R knee fused and knee spacer with severe LLD.     Exercises/Interventions:     Therapeutic Ex (12623) Rep/Sets/sec Notes/CUES   Heel prop Cue to control toe out, HEP 5/27   Heel prop + Quad set    SLR, SLR/ w/ER, hip abd,  HEP 5/27   S/L hip abd with ext \"L\" HEP 6/5   Long sit TKE HEP 6/5   Weightshifting:  Side/Side     Gait belt, @ hip machine, cueing for knee ext, QS, GS, posture, HEP 6/5   Standing hip ext  @hip machine, HEP 6/19   Standing TKE  @ hip machine   Standing hip abd Blue R 2x20 (L stance) HEP 7/10   Stanidng HR/Toe raise  HEP 6/19   Gastroc stretch     Stair taps (WB R LE, tap step L LE)  W/ walker, CGA   Seated SLR, SLR w/ ER HEP 6/19   Seated ankle PF 1x20 blueHEP 6/19, 7/10   Seated hip add ring     Tball bridge  HEP   Step-up  CGA and B handrail                       Pt edu      Manual Intervention (97264)               NMR re-education (85407)  CUES NEEDED   Tactile cueing to inc quad contract   SLS, modified L foot up on stair, cued for torso weight shift onto R LE for FWB, GCA, B handrail; to progressive WB more on the R LE   L foot step overs, R SLS  With walker and CGA to progressive WB on the R LE   L foot ball taps to WB on R LE 2x10 Progressively less WB in UE to point of going single arm     Gait (67692)     Ambulation    CGA, Walker, 50% WB R LE;  functional application to walk to car outside                   Therapeutic Activity (60927)     Gait obstacle course 2x74ft Included B feet step over object, fig 8 cones, tap foot on top of ball, kick ball ea foot, turn corners; SBA, walker   Side stepping [id number] Holding onto tall wall, CGA, cueing for how to weightshift to move feet   Flight of stairs : \"up with the good down, down with the bad\" 2x2x4 stairs CGA and B handrail             Pt edu 5 min Strategizing for doing stairs at home and getting into and out of walk in shower. Therapeutic Exercise and NMR EXR  [x] (09247) Provided verbal/tactile cueing for activities related to strengthening, flexibility, endurance, ROM for improvements in LE, proximal hip, and core control with self care, mobility, lifting, ambulation.  [] (25258) Provided verbal/tactile cueing for activities related to improving balance, coordination, kinesthetic sense, posture, motor skill, proprioception  to assist with LE, proximal hip, and core control in self care, mobility, lifting, ambulation and eccentric single leg control.      NMR and Therapeutic Activities:    [x] (13765 or 56227) Provided verbal/tactile cueing for activities related to improving balance, coordination, kinesthetic sense, posture, motor skill, proprioception and motor activation to allow for proper function of core, proximal hip and LE with self care and ADLs  [] (63457) Gait Re-education- Provided training and instruction to the patient for proper LE, core and proximal hip recruitment and positioning and eccentric body weight control with ambulation re-education including up and down stairs     Home Exercise Program:    [] (50116) Reviewed/Progressed HEP activities related to strengthening, flexibility, endurance, ROM of core, proximal hip and LE for functional self-care, mobility, lifting and ambulation/stair navigation   [] (68373)Reviewed/Progressed HEP activities related to improving balance, coordination, kinesthetic sense, posture, motor skill, proprioception of core, proximal hip and LE for self care, mobility, lifting, and ambulation/stair navigation      Manual Treatments:  PROM / STM / Oscillations-Mobs:  G-I, II, III, IV (PA's, Inf., Post.)  [] (26027) Provided manual therapy to mobilize LE, proximal hip and/or LS spine soft tissue/joints for the purpose of modulating pain, promoting relaxation,  increasing ROM, reducing/eliminating soft tissue swelling/inflammation/restriction, improving soft tissue extensibility and allowing for proper ROM for normal function with self care, mobility, lifting and ambulation. Modalities:     [] GAME READY (VASO)- for significant edema, swelling, pain control. Charges:  Timed Code Treatment Minutes: 45   Total Treatment Minutes: 45       [] EVAL (LOW) 55985 (typically 20 minutes face-to-face)  [] EVAL (MOD) 24817 (typically 30 minutes face-to-face)  [] EVAL (HIGH) 98462 (typically 45 minutes face-to-face)  [] RE-EVAL     [x] GT(98270) x   1  [] IONTO  [] NMR (64223) x    [] VASO  [] Manual (23360) x      [] Other:  [x] TA x  2    [] Mech Traction (71297)  [] ES(attended) (26588)      [] ES (un) (16159):   [] Gait (19416) x     GOALS:   Patient stated goal: Walk again  [x] Progressing: can now walk 175ft in 10-15 min with walker [] Met: [] Not Met: [] Adjusted    Therapist goals for Patient:   Short Term Goals: To be achieved in: 2 weeks  1. Independent in HEP and progression per patient tolerance, in order to prevent re-injury. [x] Progressing: [] Met: [] Not Met: [] Adjusted  2.  Patient will have a decrease in pain to facilitate improvement in movement, function, and ADLs as indicated by Functional Deficits. [x] Progressing: pt has pain during Wb and exercises but none once done. [] Met: [] Not Met: [] Adjusted    Long Term Goals: To be achieved in: 8 weeks  1. Disability index score of 30% or less for the LEFS to assist with reaching prior level of function. [x] Progressing: now only 44% disability [] Met: [] Not Met: [] Adjusted  2. Patient will demonstrate increased AROM to full knee extension to allow for proper joint functioning as indicated by patients Functional Deficits. [] Progressing: [x] Met: knee flexion not obtainable due to fused knee but now has only -2 deg knee extension and has plateud at this range but allows the knee to be more stable with WB and improve LLD [] Not Met: [] Adjusted  3. Patient will demonstrate an increase in Strength to good proximal hip strength and control, within 5lb HHD in LE to allow for proper functional mobility as indicated by patients Functional Deficits. [x] Progressing: [] Met: [] Not Met: [] Adjusted  4. Patient will return to volunteer activities without increased symptoms or restriction. [] Progressing: [] Met: [x] Not Met: [] Adjusted  5. Pt will be able to ambulate with or without AD for at least 2 blocks or 30 minutes to improve community independence. [] Progressing: [] Met: [x] Not Met: [] Adjusted    Progression Towards Functional goals:  [x] Patient is progressing as expected towards functional goals listed. [] Progression is slowed due to complexities listed. [] Progression has been slowed due to co-morbidities. [] Plan just implemented, too soon to assess goals progression  [] Other:     Overall Progression Towards Functional goals/ Treatment Progress Update:  [x] Patient is progressing as expected towards functional goals listed. [] Progression is slowed due to complexities/Impairments listed. [] Progression has been slowed due to co-morbidities.   [] Plan just implemented, too soon to assess goals progression <30days   [] Goals require adjustment due to lack of progress  [] Patient is not progressing as expected and requires additional follow up with physician  [] Other    Prognosis for POC: [x] Good [] Fair  [] Poor      Patient requires continued skilled intervention: [x] Yes  [] No    Treatment/Activity Tolerance:  [x] Patient able to complete treatment  [] Patient limited by fatigue  [] Patient limited by pain    [] Patient limited by other medical complications  [] Other:     ASSESSMENT: Pt continues to make functional progress each week in PT and is starting to show the increased confidence to try the skills we learn in PT and apply them to daily life (stepping over parking block, ascending stairs at home, take a \"normal\" shower, stand at countertop to prepare food.) He still is fairly limited in tolerance, strength, endurance as well as in trust of the limb to FWB on the R LE without UE support. PLAN: Pt will increase number of sessions next week to three times a week as he is reporting each session having a bigger boost in his functional skills. Next visit: consider alter G treadmill one session next week. Go back to hip machine and try light weight on L LE as the R LE is WB.  [] Continue per plan of care [x] Alter current plan (see comments above)   [] Plan of care initiated [] Hold pending MD visit [] Discharge      Electronically signed by:  Martha Philip PT    Note: If patient does not return for scheduled/ recommended follow up visits, this note will serve as a discharge from care along with most recent update on progress.

## 2020-07-13 ENCOUNTER — HOSPITAL ENCOUNTER (OUTPATIENT)
Dept: PHYSICAL THERAPY | Age: 70
Setting detail: THERAPIES SERIES
Discharge: HOME | End: 2020-07-13
Payer: MEDICARE

## 2020-07-13 PROCEDURE — 97112 NEUROMUSCULAR REEDUCATION: CPT

## 2020-07-13 PROCEDURE — 97110 THERAPEUTIC EXERCISES: CPT

## 2020-07-13 PROCEDURE — 97530 THERAPEUTIC ACTIVITIES: CPT

## 2020-07-13 NOTE — FLOWSHEET NOTE
[facility],  [address] [place]  Phone: [phone]  Fax [phone]    Physical Therapy Treatment Note/ Progress Report:           Date:  2020    Patient Name:  Samuel Ricketts    :  1950  MRN: [id number]  Restrictions/Precautions:  R knee fused and knee spacer with severe LLD. Medical/Treatment Diagnosis Information:  · Diagnosis: M 25.561 R knee pain  · Treatment Diagnosis: M25.661 R knee stiffness, M82.81 R LE muscle weakness, Gait abnormality, R 57.7  Insurance/Certification information:  PT Insurance Information: Medicare/ [employer]  Physician Information:  Referring Practitioner: Dr. Richa Yoder  Has the plan of care been signed (Y/N):        [x]  Yes  []  No     Date of Patient follow up with Physician: unknown      Is this a Progress Report:     []  Yes  [x]  No        If Yes:  Date Range for reporting period:  Beginning 2020  Ending     Progress report will be due (10 Rx or 30 days whichever is less): , 3/23/3627      Recertification will be due (POC Duration  / 90 days whichever is less): 2020      Visit # Insurance Allowable Requires auth   14 Med nec    [x]no        []yes:     Functional Scale: LEFS: 16/80 = 20% functional ability, 80% disability   Date assessed: 2020           LEFS: 45/80 = 56% functional ability,  44% disability   Date assessed: 2020    Therapy Diagnosis/Practice Pattern:     Number of Comorbidities:  []0     [x]1-2    []3+    Latex Allergy:  [x]NO      []YES  Preferred Language for Healthcare:   [x]English       []other:      Pain level:  0/10     SUBJECTIVE: Pt says over the weekend while he had friends over a couple different times he went up his stairs at home and he and his friends were very happy with how well he did.  He thinks he feel confident enough that this weekend he will asks his friends to carry his bed back up stairs and return to normal upstairs cueing for knee ext, QS, GS, posture, HEP 6/5   Standing hip ext 2x15 R (L stance), 75lbs; 2x10 R stance 30 lbs @hip machine, HEP 6/19; min A during R stance to control [place] and leg buckling. Standing TKE  @ hip machine   Standing hip abd 2x15 R (L stance), 75lbs; 2x10 R stance 30 lbs HEP 7/10   Stanidng HR/Toe raise  HEP 6/19   Gastroc stretch     Stair taps (WB R LE, tap step L LE)  W/ walker, CGA   Seated SLR, SLR w/ ER HEP 6/19   Seated ankle PF 1x20 blueHEP 6/19, 7/10   Seated hip add ring     Tball bridge  HEP   Step-up  CGA and B handrail   Prone elbow prop for hip flexor stretch 2 min                   Pt edu      Manual Intervention (11180)               NMR re-education (76819)  CUES NEEDED   Tactile cueing to inc quad contract   Standing external pertubations Fwd angles 2x30\", Bwd angles 2x30\"Blue sports cord around waist   SLS, modified L foot up on stair, cued for torso weight shift onto R LE for FWB, GCA, B handrail; to progressive WB more on the R LE   L foot step overs, R SLS  With walker and CGA to progressive WB on the R LE   L foot ball taps to WB on R LE 2x10 Progressively less WB in UE to point of going single arm     Gait (68976)     Ambulation    CGA, Walker, 50% WB R LE;  functional application to walk to car outside                   Therapeutic Activity (32994)     Gait obstacle course  Included B feet step over object, fig 8 cones, tap foot on top of ball, kick ball ea foot, turn corners; SBA, walker   Side stepping [id number] Holding onto tall wall, CGA, cueing for how to weightshift to move feet   Flight of stairs : \"up with the good down, down with the bad\" 2x4 stairs CGA and B handrail             Pt edu  Strategizing for doing stairs at home and getting into and out of walk in shower.           Therapeutic Exercise and NMR EXR  [x] (41896) Provided verbal/tactile cueing for activities related to strengthening, flexibility, endurance, ROM for improvements in LE, proximal hip, Charges:  Timed Code Treatment Minutes: 45   Total Treatment Minutes: 45       [] EVAL (LOW) 80184 (typically 20 minutes face-to-face)  [] EVAL (MOD) 31609 (typically 30 minutes face-to-face)  [] EVAL (HIGH) 17726 (typically 45 minutes face-to-face)  [] RE-EVAL     [x] ZP(27738) x   1  [] IONTO  [x] NMR (49081) x 1   [] VASO  [] Manual (64078) x      [] Other:  [x] TA x  1    [] Mech Traction (94566)  [] ES(attended) (70917)      [] ES (un) (60919):   [] Gait (09859) x     GOALS:   Patient stated goal: Walk again  [x] Progressing: can now walk 175ft in 10-15 min with walker [] Met: [] Not Met: [] Adjusted    Therapist goals for Patient:   Short Term Goals: To be achieved in: 2 weeks  1. Independent in HEP and progression per patient tolerance, in order to prevent re-injury. [x] Progressing: [] Met: [] Not Met: [] Adjusted  2. Patient will have a decrease in pain to facilitate improvement in movement, function, and ADLs as indicated by Functional Deficits. [x] Progressing: pt has pain during Wb and exercises but none once done. [] Met: [] Not Met: [] Adjusted    Long Term Goals: To be achieved in: 8 weeks  1. Disability index score of 30% or less for the LEFS to assist with reaching prior level of function. [x] Progressing: now only 44% disability [] Met: [] Not Met: [] Adjusted  2. Patient will demonstrate increased AROM to full knee extension to allow for proper joint functioning as indicated by patients Functional Deficits. [] Progressing: [x] Met: knee flexion not obtainable due to fused knee but now has only -2 deg knee extension and has plateud at this range but allows the knee to be more stable with WB and improve LLD [] Not Met: [] Adjusted  3. Patient will demonstrate an increase in Strength to good proximal hip strength and control, within 5lb HHD in LE to allow for proper functional mobility as indicated by patients Functional Deficits. [x] Progressing: [] Met: [] Not Met: [] Adjusted  4. Patient will return to volunteer activities without increased symptoms or restriction. [] Progressing: [] Met: [x] Not Met: [] Adjusted  5. Pt will be able to ambulate with or without AD for at least 2 blocks or 30 minutes to improve community independence. [] Progressing: [] Met: [x] Not Met: [] Adjusted    Progression Towards Functional goals:  [x] Patient is progressing as expected towards functional goals listed. [] Progression is slowed due to complexities listed. [] Progression has been slowed due to co-morbidities. [] Plan just implemented, too soon to assess goals progression  [] Other:     Overall Progression Towards Functional goals/ Treatment Progress Update:  [x] Patient is progressing as expected towards functional goals listed. [] Progression is slowed due to complexities/Impairments listed. [] Progression has been slowed due to co-morbidities. [] Plan just implemented, too soon to assess goals progression <30days   [] Goals require adjustment due to lack of progress  [] Patient is not progressing as expected and requires additional follow up with physician  [] Other    Prognosis for POC: [x] Good [] Fair  [] Poor      Patient requires continued skilled intervention: [x] Yes  [] No    Treatment/Activity Tolerance:  [x] Patient able to complete treatment  [] Patient limited by fatigue  [] Patient limited by pain    [] Patient limited by other medical complications  [] Other:     ASSESSMENT: Pt is sometimes able to Wb through R LE and others times not as much as seems more dependent on pt's amount of awareness of how much weight he is purposefully putting through the R LE, when he is not aware (such as given another task to focus on) he is able to put more weight on the R LE. He still needed UE support with the walker  During external perturbations but did not have an LOB outside of [facility].            PLAN: Pt will increase number of sessions next week to three times a week as he is reporting each session having a bigger boost in his functional skills. Next visit: consider alter G treadmill next Monday. Next session similar session. Check in with structured HEP. [x] Continue per plan of care [] Alter current plan (see comments above)   [] Plan of care initiated [] Hold pending MD visit [] Discharge      Electronically signed by:  Zehra Guerra PT    Note: If patient does not return for scheduled/ recommended follow up visits, this note will serve as a discharge from care along with most recent update on progress.

## 2020-07-15 ENCOUNTER — HOSPITAL ENCOUNTER (OUTPATIENT)
Dept: PHYSICAL THERAPY | Age: 70
Setting detail: THERAPIES SERIES
Discharge: HOME | End: 2020-07-15
Payer: MEDICARE

## 2020-07-15 PROCEDURE — 97112 NEUROMUSCULAR REEDUCATION: CPT

## 2020-07-15 PROCEDURE — 97110 THERAPEUTIC EXERCISES: CPT

## 2020-07-15 NOTE — FLOWSHEET NOTE
[facility],  [address] [place]  Phone: [phone]  Fax [phone]    Physical Therapy Treatment Note/ Progress Report:           Date:  7/15/2020    Patient Name:  Kvng Youngblood    :  1950  MRN: [id number]  Restrictions/Precautions:  R knee fused and knee spacer with severe LLD. Medical/Treatment Diagnosis Information:  · Diagnosis: M 25.561 R knee pain  · Treatment Diagnosis: M25.661 R knee stiffness, M82.81 R LE muscle weakness, Gait abnormality, R 91.1  Insurance/Certification information:  PT Insurance Information: Medicare/ [employer]  Physician Information:  Referring Practitioner: Dr. Ching Ch  Has the plan of care been signed (Y/N):        [x]  Yes  []  No     Date of Patient follow up with Physician: unknown      Is this a Progress Report:     []  Yes  [x]  No        If Yes:  Date Range for reporting period:  Beginning 2020  Ending     Progress report will be due (10 Rx or 30 days whichever is less): ,       Recertification will be due (POC Duration  / 90 days whichever is less): 2020      Visit # Insurance Allowable Requires auth   15 Med nec    [x]no        []yes:     Functional Scale: LEFS: 16/80 = 20% functional ability, 80% disability   Date assessed: 2020           LEFS: 45/80 = 56% functional ability,  44% disability   Date assessed: 2020    Therapy Diagnosis/Practice Pattern:     Number of Comorbidities:  []0     [x]1-2    []3+    Latex Allergy:  [x]NO      []YES  Preferred Language for Healthcare:   [x]English       []other:      Pain level:  0/10     SUBJECTIVE: Pt he is now able to stand at the side of his car on his own and get the walker out rather than having to get walker out first and relying on it to move at the side of the car. He is getting gmore and more confident with using his stairs and ramp at home.      OBJECTIVE:    Observation: no longer effused visibly   Gait: step through pattern with front wheeled walker and now WB about 75% on the R LE in R stance and getting much better quad contraction also standing more upright but still has a flex at the hip standing and gait posture.  Test measurments: Quad set: good-    Knee ROM 5/29/2020 6/3/2020 6/5/2020 6/10/2020 6/17/2020   R knee extension pre-tx -15 deg -6 -     R knee extension post-tx -8 deg -5 -5 -4 -2 deg     ROM LEFT RIGHT ROM 6/26/2020 Right   HIP Flex 127 127     Knee ext 0 -15  -2 deg   Knee Flex 123 Fused knee  fused   Ankle PF 62 51     Ankle DF 0 -1     Strength  LEFT RIGHT  Right   HIP Flexors 5/5 4+/5  5/5   HIP Abductors 4/5 4/5  4/5   HIP Ext 5/5 3+/5  4/5   Hip ER        Knee EXT (quad) 5/5 4/5 (quad set)  4+/5 (quad set)   Knee Flex (HS) 4/5 NT     Ankle DF 4/5 4/5  5/5   Ankle PF 5/5 5/5  5/5   Ankle Inv 4-/5 4-/5  5-/5   Ankle EV 4-/5 3+/5  3+/5         RESTRICTIONS/PRECAUTIONS: R knee fused and knee spacer with severe LLD. Exercises/Interventions:     Therapeutic Ex (68791) Rep/Sets/sec Notes/CUES   Heel prop Cue to control toe out, HEP 5/27   Heel prop + Quad set    SLR, SLR/ w/ER, hip abd,  HEP 5/27   S/L hip abd with ext \"L\" HEP 6/5   Long sit TKE HEP 6/5   Weightshifting:  Side/Side     Gait belt, @ hip machine, cueing for knee ext, QS, GS, posture, HEP 6/5   Standing hip ext 2x15 R (L stance), 75lbs; 2x10 R stance 30 lbs @hip machine, HEP 6/19; min A during R stance to control [place] and leg buckling.     Standing TKE  @ hip machine   Standing hip abd 2x15 R (L stance), 75lbs; 2x10 R stance 30 lbs HEP 7/10   Stanidng HR/Toe raise  HEP 6/19   Gastroc stretch Standing R 2x30\"    Stair taps (WB R LE, tap step L LE)  W/ walker, CGA   Seated SLR, SLR w/ ER HEP 6/19   Seated ankle PF HEP 6/19, 7/10   Seated hip add ring     Tball bridge  HEP   Step-up  CGA and B handrail   Prone elbow prop for hip flexor stretch 4 min                   Pt edu      Manual Intervention (22686) NMR re-education (52100)  CUES NEEDED   Tactile cueing to inc quad contract   Standing external pertubations Fwd angles 1x30\", Bwd angles 1x30\"Blue sports cord around waist   SLS, modified 3x30\"L foot up on stair, cued for torso weight shift onto R LE for FWB, GCA, B handrail progress to no UE assist; to progressive WB more on the R LE   L foot step overs, R SLS  With walker and CGA to progressive WB on the R LE   L foot touch various surfaces while R leg stance to progress WB tolerance and stability 4\" step, 2\" step, ball 2x5 taps ea CGA, holding wall and/or walker                  L foot ball taps to WB on R LE 2x10 Progressively less WB in UE to point of going single arm     Gait (36881)     Ambulation    CGA, Walker, 50% WB R LE;  functional application to walk to car outside                   Therapeutic Activity (79416)     Gait obstacle course  Included B feet step over object, fig 8 cones, tap foot on top of ball, kick ball ea foot, turn corners; SBA, walker   Side stepping  Holding onto tall wall, CGA, cueing for how to weightshift to move feet   Flight of stairs : \"up with the good down, down with the bad\" 2x4 stairs CGA and B handrail             Pt edu  Strategizing for doing stairs at home and getting into and out of walk in shower. Therapeutic Exercise and NMR EXR  [x] (77642) Provided verbal/tactile cueing for activities related to strengthening, flexibility, endurance, ROM for improvements in LE, proximal hip, and core control with self care, mobility, lifting, ambulation. [x] (41073) Provided verbal/tactile cueing for activities related to improving balance, coordination, kinesthetic sense, posture, motor skill, proprioception  to assist with LE, proximal hip, and core control in self care, mobility, lifting, ambulation and eccentric single leg control.      NMR and Therapeutic Activities:    [] (09898 or ) Provided verbal/tactile cueing for activities related to improving balance, coordination, kinesthetic sense, posture, motor skill, proprioception and motor activation to allow for proper function of core, proximal hip and LE with self care and ADLs  [] (35909) Gait Re-education- Provided training and instruction to the patient for proper LE, core and proximal hip recruitment and positioning and eccentric body weight control with ambulation re-education including up and down stairs     Home Exercise Program:    [] (63800) Reviewed/Progressed HEP activities related to strengthening, flexibility, endurance, ROM of core, proximal hip and LE for functional self-care, mobility, lifting and ambulation/stair navigation   [] (45159)Reviewed/Progressed HEP activities related to improving balance, coordination, kinesthetic sense, posture, motor skill, proprioception of core, proximal hip and LE for self care, mobility, lifting, and ambulation/stair navigation      Manual Treatments:  PROM / STM / Oscillations-Mobs:  G-I, II, III, IV (PA's, Inf., Post.)  [] (44559) Provided manual therapy to mobilize LE, proximal hip and/or LS spine soft tissue/joints for the purpose of modulating pain, promoting relaxation,  increasing ROM, reducing/eliminating soft tissue swelling/inflammation/restriction, improving soft tissue extensibility and allowing for proper ROM for normal function with self care, mobility, lifting and ambulation. Modalities:     [] GAME READY (VASO)- for significant edema, swelling, pain control.      Charges:  Timed Code Treatment Minutes: 45   Total Treatment Minutes: 45       [] EVAL (LOW) 04784 (typically 20 minutes face-to-face)  [] EVAL (MOD) 19030 (typically 30 minutes face-to-face)  [] EVAL (HIGH) 05801 (typically 45 minutes face-to-face)  [] RE-EVAL     [x] UH(68964) x   1  [] IONTO  [x] NMR (56275) x 1   [] VASO  [] Manual (87818) x      [] Other:  [] TA x      [] Mech Traction (95870)  [] ES(attended) (59160)      [] ES (un) (74859):   [] Gait (86970) x     GOALS: Patient stated goal: Walk again  [x] Progressing: can now walk 175ft in 10-15 min with walker [] Met: [] Not Met: [] Adjusted    Therapist goals for Patient:   Short Term Goals: To be achieved in: 2 weeks  1. Independent in HEP and progression per patient tolerance, in order to prevent re-injury. [x] Progressing: [] Met: [] Not Met: [] Adjusted  2. Patient will have a decrease in pain to facilitate improvement in movement, function, and ADLs as indicated by Functional Deficits. [x] Progressing: pt has pain during Wb and exercises but none once done. [] Met: [] Not Met: [] Adjusted    Long Term Goals: To be achieved in: 8 weeks  1. Disability index score of 30% or less for the LEFS to assist with reaching prior level of function. [x] Progressing: now only 44% disability [] Met: [] Not Met: [] Adjusted  2. Patient will demonstrate increased AROM to full knee extension to allow for proper joint functioning as indicated by patients Functional Deficits. [] Progressing: [x] Met: knee flexion not obtainable due to fused knee but now has only -2 deg knee extension and has plateud at this range but allows the knee to be more stable with WB and improve LLD [] Not Met: [] Adjusted  3. Patient will demonstrate an increase in Strength to good proximal hip strength and control, within 5lb HHD in LE to allow for proper functional mobility as indicated by patients Functional Deficits. [x] Progressing: [] Met: [] Not Met: [] Adjusted  4. Patient will return to volunteer activities without increased symptoms or restriction. [] Progressing: [] Met: [x] Not Met: [] Adjusted  5. Pt will be able to ambulate with or without AD for at least 2 blocks or 30 minutes to improve community independence. [] Progressing: [] Met: [x] Not Met: [] Adjusted    Progression Towards Functional goals:  [x] Patient is progressing as expected towards functional goals listed. [] Progression is slowed due to complexities listed.   [] Progression has been slowed due to co-morbidities. [] Plan just implemented, too soon to assess goals progression  [] Other:     Overall Progression Towards Functional goals/ Treatment Progress Update:  [x] Patient is progressing as expected towards functional goals listed. [] Progression is slowed due to complexities/Impairments listed. [] Progression has been slowed due to co-morbidities. [] Plan just implemented, too soon to assess goals progression <30days   [] Goals require adjustment due to lack of progress  [] Patient is not progressing as expected and requires additional follow up with physician  [] Other    Prognosis for POC: [x] Good [] Fair  [] Poor      Patient requires continued skilled intervention: [x] Yes  [] No    Treatment/Activity Tolerance:  [x] Patient able to complete treatment  [] Patient limited by fatigue  [] Patient limited by pain    [] Patient limited by other medical complications  [] Other:     ASSESSMENT: Pt was able to bear more weight onto the R LE with a straighter posture on the R LE as long as L foot was still touching something (step, ball etc) but still unable to single leg stance completely on the R leg. He understands that we are working towards this so that he can be less reliant on the walker and eventually try to progress to a cane. I gave him a HEP handout today that includes all HEP exercises he has been given so to be clear on what he should be working on at home. PLAN: Pt will increase number of sessions next week to three times a week as he is reporting each session having a bigger boost in his functional skills. Next visit: consider alter G treadmill next Monday. SLS with walker and kick L leg fwd/side/back.   [x] Continue per plan of care [] Alter current plan (see comments above)   [] Plan of care initiated [] Hold pending MD visit [] Discharge      Electronically signed by:  Nima Leal PT    Note: If patient does not return for scheduled/

## 2020-07-17 ENCOUNTER — HOSPITAL ENCOUNTER (OUTPATIENT)
Dept: PHYSICAL THERAPY | Age: 70
Setting detail: THERAPIES SERIES
Discharge: HOME | End: 2020-07-17
Payer: MEDICARE

## 2020-07-17 PROCEDURE — 97110 THERAPEUTIC EXERCISES: CPT

## 2020-07-17 PROCEDURE — 97112 NEUROMUSCULAR REEDUCATION: CPT

## 2020-07-17 NOTE — FLOWSHEET NOTE
visibly   Gait: step through pattern with front wheeled walker and now WB about 75% on the R LE in R stance and getting much better quad contraction also standing more upright but still has a flex at the hip standing and gait posture.  Test measurments: Quad set: good-    Knee ROM 5/29/2020 6/3/2020 6/5/2020 6/10/2020 6/17/2020   R knee extension pre-tx -15 deg -6 -     R knee extension post-tx -8 deg -5 -5 -4 -2 deg     ROM LEFT RIGHT ROM 6/26/2020 Right   HIP Flex 127 127     Knee ext 0 -15  -2 deg   Knee Flex 123 Fused knee  fused   Ankle PF 62 51     Ankle DF 0 -1     Strength  LEFT RIGHT  Right   HIP Flexors 5/5 4+/5  5/5   HIP Abductors 4/5 4/5  4/5   HIP Ext 5/5 3+/5  4/5   Hip ER        Knee EXT (quad) 5/5 4/5 (quad set)  4+/5 (quad set)   Knee Flex (HS) 4/5 NT     Ankle DF 4/5 4/5  5/5   Ankle PF 5/5 5/5  5/5   Ankle Inv 4-/5 4-/5  5-/5   Ankle EV 4-/5 3+/5  3+/5         RESTRICTIONS/PRECAUTIONS: R knee fused and knee spacer with severe LLD. Exercises/Interventions:     Therapeutic Ex (27800) Rep/Sets/sec Notes/CUES   Heel prop Cue to control toe out, HEP 5/27   Heel prop + Quad set    SLR, SLR/ w/ER, hip abd,  HEP 5/27   S/L hip abd with ext \"L\" HEP 6/5   Long sit TKE HEP 6/5   Weightshifting:  Side/Side     Gait belt, @ hip machine, cueing for knee ext, QS, GS, posture, HEP 6/5   Standing hip ext 2x15 R (L stance), 60lbs; 2x10 R stance manual resistance @hip machine, HEP 6/19; min A during R stance to control [place] and leg buckling.     Standing TKE  @ hip machine   Standing hip abd 2x15 R (L stance), 60lbs; 2x10 R stance 30 lbs HEP 7/10   Stanidng HR/Toe raise  HEP 6/19   Gastroc stretch Standing R 2x30\"    Stair taps (WB R LE, tap step L LE)  W/ walker, CGA   Seated SLR, SLR w/ ER HEP 6/19   Seated ankle PF HEP 6/19, 7/10   Seated hip add ring     Tball bridge  HEP   Step-up  CGA and B handrail   Prone elbow prop for hip flexor stretch 3 min                   Pt edu      Manual Intervention (19448)               NMR re-education (93328)  CUES NEEDED   Tactile cueing to inc quad contract   Standing external pertubations Blue sports cord around waist   SLS, modified L foot up on stair, cued for torso weight shift onto R LE for FWB, GCA, B handrail progress to no UE assist; to progressive WB more on the R LE   L foot step overs, R SLS  With walker and CGA to progressive WB on the R LE   L foot touch various surfaces while R leg stance to progress WB tolerance and stability 4\" step, 2\" step, ball 2x5 taps ea CGA, holding wall and/or walker   DL stance equal WB, ball toss x20 Cues for equal WB   DL stance med ball axe chops 2000Gr 2x10 ea way Mirror to watch keeping hips equal btw legs/ equal WB                       L foot ball taps to WB on R LE  Progressively less WB in UE to point of going single arm     Gait (53636)     Ambulation    CGA, Walker, 50% WB R LE;  functional application to walk to car outside                   Therapeutic Activity (93371)     Gait obstacle course 2x74ft Included B feet step over object, fig 8 cones, tap foot on top of ball, kick ball ea foot, turn corners; SBA, walker   Side stepping  Holding onto tall wall, CGA, cueing for how to weightshift to move feet   Flight of stairs : \"up with the good down, down with the bad\"  CGA and B handrail             Pt edu  Strategizing for doing stairs at home and getting into and out of walk in shower. Therapeutic Exercise and NMR EXR  [x] (65889) Provided verbal/tactile cueing for activities related to strengthening, flexibility, endurance, ROM for improvements in LE, proximal hip, and core control with self care, mobility, lifting, ambulation. [x] (23342) Provided verbal/tactile cueing for activities related to improving balance, coordination, kinesthetic sense, posture, motor skill, proprioception  to assist with LE, proximal hip, and core control in self care, mobility, lifting, ambulation and eccentric single leg control. NMR and Therapeutic Activities:    [] (13226 or 24243) Provided verbal/tactile cueing for activities related to improving balance, coordination, kinesthetic sense, posture, motor skill, proprioception and motor activation to allow for proper function of core, proximal hip and LE with self care and ADLs  [] (05134) Gait Re-education- Provided training and instruction to the patient for proper LE, core and proximal hip recruitment and positioning and eccentric body weight control with ambulation re-education including up and down stairs     Home Exercise Program:    [] (45988) Reviewed/Progressed HEP activities related to strengthening, flexibility, endurance, ROM of core, proximal hip and LE for functional self-care, mobility, lifting and ambulation/stair navigation   [] (50917)Reviewed/Progressed HEP activities related to improving balance, coordination, kinesthetic sense, posture, motor skill, proprioception of core, proximal hip and LE for self care, mobility, lifting, and ambulation/stair navigation      Manual Treatments:  PROM / STM / Oscillations-Mobs:  G-I, II, III, IV (PA's, Inf., Post.)  [] (30538) Provided manual therapy to mobilize LE, proximal hip and/or LS spine soft tissue/joints for the purpose of modulating pain, promoting relaxation,  increasing ROM, reducing/eliminating soft tissue swelling/inflammation/restriction, improving soft tissue extensibility and allowing for proper ROM for normal function with self care, mobility, lifting and ambulation. Modalities:     [] GAME READY (VASO)- for significant edema, swelling, pain control.      Charges:  Timed Code Treatment Minutes: 45   Total Treatment Minutes: 45       [] EVAL (LOW) 37719 (typically 20 minutes face-to-face)  [] EVAL (MOD) 62753 (typically 30 minutes face-to-face)  [] EVAL (HIGH) 04929 (typically 45 minutes face-to-face)  [] RE-EVAL     [x] LF(00406) x   2  [] IONTO  [x] NMR (83077) x 1   [] VASO  [] Manual (73303) x      [] Other:  [] TA x      [] [facility] Traction (12041)  [] ES(attended) (22266)      [] ES (un) (31255):   [] Gait (57247) x     GOALS:   Patient stated goal: Walk again  [x] Progressing: can now walk 175ft in 10-15 min with walker [] Met: [] Not Met: [] Adjusted    Therapist goals for Patient:   Short Term Goals: To be achieved in: 2 weeks  1. Independent in HEP and progression per patient tolerance, in order to prevent re-injury. [x] Progressing: [] Met: [] Not Met: [] Adjusted  2. Patient will have a decrease in pain to facilitate improvement in movement, function, and ADLs as indicated by Functional Deficits. [x] Progressing: pt has pain during Wb and exercises but none once done. [] Met: [] Not Met: [] Adjusted    Long Term Goals: To be achieved in: 8 weeks  1. Disability index score of 30% or less for the LEFS to assist with reaching prior level of function. [x] Progressing: now only 44% disability [] Met: [] Not Met: [] Adjusted  2. Patient will demonstrate increased AROM to full knee extension to allow for proper joint functioning as indicated by patients Functional Deficits. [] Progressing: [x] Met: knee flexion not obtainable due to fused knee but now has only -2 deg knee extension and has plateud at this range but allows the knee to be more stable with WB and improve LLD [] Not Met: [] Adjusted  3. Patient will demonstrate an increase in Strength to good proximal hip strength and control, within 5lb HHD in LE to allow for proper functional mobility as indicated by patients Functional Deficits. [x] Progressing: [] Met: [] Not Met: [] Adjusted  4. Patient will return to volunteer activities without increased symptoms or restriction. [] Progressing: [] Met: [x] Not Met: [] Adjusted  5. Pt will be able to ambulate with or without AD for at least 2 blocks or 30 minutes to improve community independence.    [] Progressing: [] Met: [x] Not Met: [] Adjusted    Progression Towards Functional goals:  [x] Patient is progressing as expected towards functional goals listed. [] Progression is slowed due to complexities listed. [] Progression has been slowed due to co-morbidities. [] Plan just implemented, too soon to assess goals progression  [] Other:     Overall Progression Towards Functional goals/ Treatment Progress Update:  [x] Patient is progressing as expected towards functional goals listed. [] Progression is slowed due to complexities/Impairments listed. [] Progression has been slowed due to co-morbidities. [] Plan just implemented, too soon to assess goals progression <30days   [] Goals require adjustment due to lack of progress  [] Patient is not progressing as expected and requires additional follow up with physician  [] Other    Prognosis for POC: [x] Good [] Fair  [] Poor      Patient requires continued skilled intervention: [x] Yes  [] No    Treatment/Activity Tolerance:  [x] Patient able to complete treatment  [] Patient limited by fatigue  [] Patient limited by pain    [] Patient limited by other medical complications  [] Other:     ASSESSMENT: Pt did very well with standing up taller with more upright torso and hips under him to stand on R leg for 1st half of visit but as he fatigued and knee got a little sore \"2/10\" he had more and more trouble WB onto R LE with hips and torso in alignment over the R LE. PLAN: Pt will increase number of sessions next week to three times a week as he is reporting each session having a bigger boost in his functional skills. Next visit: consider alter G treadmill so to progress WB tolerance with R LE SLS tasks. [x] Continue per plan of care [] Alter current plan (see comments above)   [] Plan of care initiated [] Hold pending MD visit [] Discharge      Electronically signed by:  Clovis Hinojosa PT    Note: If patient does not return for scheduled/ recommended follow up visits, this note will serve as a discharge from care along with most recent update on progress.

## 2020-07-20 ENCOUNTER — HOSPITAL ENCOUNTER (OUTPATIENT)
Dept: PHYSICAL THERAPY | Age: 70
Setting detail: THERAPIES SERIES
Discharge: HOME | End: 2020-07-20
Payer: MEDICARE

## 2020-07-20 PROCEDURE — 97112 NEUROMUSCULAR REEDUCATION: CPT

## 2020-07-20 PROCEDURE — 97110 THERAPEUTIC EXERCISES: CPT

## 2020-07-20 NOTE — FLOWSHEET NOTE
[facility],  [address] [place]  Phone: [phone]  Fax [phone]    Physical Therapy Treatment Note/ Progress Report:           Date:  2020    Patient Name:  Marion Murcia    :  1950  MRN: [id number]  Restrictions/Precautions:  R knee fused and knee spacer with severe LLD. Medical/Treatment Diagnosis Information:  · Diagnosis: M 25.561 R knee pain  · Treatment Diagnosis: M25.661 R knee stiffness, M82.81 R LE muscle weakness, Gait abnormality, R 03.4  Insurance/Certification information:  PT Insurance Information: Medicare/ [employer]  Physician Information:  Referring Practitioner: Dr. Gretchen Zaragoza  Has the plan of care been signed (Y/N):        [x]  Yes  []  No     Date of Patient follow up with Physician: unknown      Is this a Progress Report:     []  Yes  [x]  No        If Yes:  Date Range for reporting period:  Beginning 2020  Ending     Progress report will be due (10 Rx or 30 days whichever is less): ,       Recertification will be due (POC Duration  / 90 days whichever is less): 2020      Visit # Insurance Allowable Requires auth   [facility] Jovanny Singer    [x]no        []yes:     Functional Scale: LEFS: 16/80 = 20% functional ability, 80% disability   Date assessed: 2020           LEFS: 45/80 = 56% functional ability,  44% disability   Date assessed: 2020    Therapy Diagnosis/Practice Pattern:     Number of Comorbidities:  []0     [x]1-2    []3+    Latex Allergy:  [x]NO      []YES  Preferred Language for Healthcare:   [x]English       []other:      Pain level:  2/10 medial knee and medial shin     SUBJECTIVE: Pt says he felt really good after last session and very motivated/positive.  This weekend he got his bed moved back upstairs to his bedroom which was nice and able to take shower in his usual shower but found that was a little more awkward and difficult than he thought it would be Standing TKE  @ hip machine   Standing hip abd  HEP 7/10   Stanidng HR/Toe raise  HEP 6/19   Gastroc stretch Standing R 2x30\"    Stair taps (WB R LE, tap step L LE)  W/ walker, CGA   Seated SLR, SLR w/ ER HEP 6/19   Seated ankle PF HEP 6/19, 7/10   Seated hip add ring     Tball bridge  HEP   Step-up  CGA and B handrail   Prone elbow prop for hip flexor stretch 3 min              ALTER-G attempted Alter-G unit unable to calibrate so unable to do today   Pt edu  5' Progressively spending more and more time out of wheelchair and doing more up on his feet   Manual Intervention ([phone])               NMR re-education (50615)  CUES NEEDED   Tactile cueing to inc quad contract   Standing external pertubations Blue sports cord around waist   SLS, modified L foot up on stair, cued for torso weight shift onto R LE for FWB, GCA, B handrail progress to no UE assist; to progressive WB more on the R LE   L foot step overs, R SLS  With walker and CGA to progressive WB on the R LE   L foot touch various surfaces while R leg stance to progress WB tolerance and stability 4\" step, 2\" step, ball 2x5 taps ea CGA, holding wall and/or walker   DL stance equal WB, ball toss 2x20 Cues for equal WB   DL stance med ball axe chops 2000Gr 2x10 ea way Mirror to watch keeping hips equal btw legs/ equal WB   Tandem stance 3 cone tap On large plyo box, 2x5 SBA, Cued for shifting weight onto R forward leg when leaning to tap cone                  L foot ball taps to WB on R LE  Progressively less WB in UE to point of going single arm     Gait (08026)     Ambulation    CGA, Walker, 50% WB R LE;  functional application to walk to car outside                   Therapeutic Activity (83682)     Gait obstacle course  Included B feet step over object, fig 8 cones, tap foot on top of ball, kick ball ea foot, turn corners; SBA, walker   Side stepping  Holding onto tall wall, CGA, cueing for how to weightshift to move feet   Flight of stairs : \"up with the good down, down with the bad\"  CGA and B handrail             Pt edu  Strategizing for doing stairs at home and getting into and out of walk in shower. Therapeutic Exercise and NMR EXR  [x] (75127) Provided verbal/tactile cueing for activities related to strengthening, flexibility, endurance, ROM for improvements in LE, proximal hip, and core control with self care, mobility, lifting, ambulation. [x] (71655) Provided verbal/tactile cueing for activities related to improving balance, coordination, kinesthetic sense, posture, motor skill, proprioception  to assist with LE, proximal hip, and core control in self care, mobility, lifting, ambulation and eccentric single leg control.      NMR and Therapeutic Activities:    [] (47080 or 36179) Provided verbal/tactile cueing for activities related to improving balance, coordination, kinesthetic sense, posture, motor skill, proprioception and motor activation to allow for proper function of core, proximal hip and LE with self care and ADLs  [] (70568) Gait Re-education- Provided training and instruction to the patient for proper LE, core and proximal hip recruitment and positioning and eccentric body weight control with ambulation re-education including up and down stairs     Home Exercise Program:    [] (78228) Reviewed/Progressed HEP activities related to strengthening, flexibility, endurance, ROM of core, proximal hip and LE for functional self-care, mobility, lifting and ambulation/stair navigation   [] (36798)Reviewed/Progressed HEP activities related to improving balance, coordination, kinesthetic sense, posture, motor skill, proprioception of core, proximal hip and LE for self care, mobility, lifting, and ambulation/stair navigation      Manual Treatments:  PROM / STM / Oscillations-Mobs:  G-I, II, III, IV (PA's, Inf., Post.)  [] (42444) Provided manual therapy to mobilize LE, proximal hip and/or LS spine soft tissue/joints for the purpose of modulating pain, promoting relaxation,  increasing ROM, reducing/eliminating soft tissue swelling/inflammation/restriction, improving soft tissue extensibility and allowing for proper ROM for normal function with self care, mobility, lifting and ambulation. Modalities:     [] GAME READY (VASO)- for significant edema, swelling, pain control. Charges:  Timed Code Treatment Minutes: 50   Total Treatment Minutes: 50       [] EVAL (LOW) 30582 (typically 20 minutes face-to-face)  [] EVAL (MOD) 73292 (typically 30 minutes face-to-face)  [] EVAL (HIGH) 67749 (typically 45 minutes face-to-face)  [] RE-EVAL     [x] KZ(17950) x   1  [] IONTO  [x] NMR (60716) x 2   [] VASO  [] Manual (35600) x      [] Other:  [] TA x      [] Mech Traction (37777)  [] ES(attended) (49479)      [] ES (un) (44292):   [] Gait (41041) x     GOALS:   Patient stated goal: Walk again  [x] Progressing: can now walk 175ft in 10-15 min with walker [] Met: [] Not Met: [] Adjusted    Therapist goals for Patient:   Short Term Goals: To be achieved in: 2 weeks  1. Independent in HEP and progression per patient tolerance, in order to prevent re-injury. [x] Progressing: [] Met: [] Not Met: [] Adjusted  2. Patient will have a decrease in pain to facilitate improvement in movement, function, and ADLs as indicated by Functional Deficits. [x] Progressing: pt has pain during Wb and exercises but none once done. [] Met: [] Not Met: [] Adjusted    Long Term Goals: To be achieved in: 8 weeks  1. Disability index score of 30% or less for the LEFS to assist with reaching prior level of function. [x] Progressing: now only 44% disability [] Met: [] Not Met: [] Adjusted  2. Patient will demonstrate increased AROM to full knee extension to allow for proper joint functioning as indicated by patients Functional Deficits.    [] Progressing: [x] Met: knee flexion not obtainable due to fused knee but now has only -2 deg knee extension and has plateud at this range but allows the knee to be more stable with WB and improve LLD [] Not Met: [] Adjusted  3. Patient will demonstrate an increase in Strength to good proximal hip strength and control, within 5lb HHD in LE to allow for proper functional mobility as indicated by patients Functional Deficits. [x] Progressing: [] Met: [] Not Met: [] Adjusted  4. Patient will return to volunteer activities without increased symptoms or restriction. [] Progressing: [] Met: [x] Not Met: [] Adjusted  5. Pt will be able to ambulate with or without AD for at least 2 blocks or 30 minutes to improve community independence. [] Progressing: [] Met: [x] Not Met: [] Adjusted    Progression Towards Functional goals:  [x] Patient is progressing as expected towards functional goals listed. [] Progression is slowed due to complexities listed. [] Progression has been slowed due to co-morbidities. [] Plan just implemented, too soon to assess goals progression  [] Other:     Overall Progression Towards Functional goals/ Treatment Progress Update:  [x] Patient is progressing as expected towards functional goals listed. [] Progression is slowed due to complexities/Impairments listed. [] Progression has been slowed due to co-morbidities. [] Plan just implemented, too soon to assess goals progression <30days   [] Goals require adjustment due to lack of progress  [] Patient is not progressing as expected and requires additional follow up with physician  [] Other    Prognosis for POC: [x] Good [] Fair  [] Poor      Patient requires continued skilled intervention: [x] Yes  [] No    Treatment/Activity Tolerance:  [x] Patient able to complete treatment  [] Patient limited by fatigue  [x] Patient limited by pain    [] Patient limited by other medical complications  [] Other:     ASSESSMENT: Pt was limited by his pain in the medial knee today and unfortunately the alter-G unit had an electrical malfunction and would not work so unable to use it today.  Did spend time talking and encouraging patient to continue weaning himself out of his WC at home after discovering that once he gets tired from morning hygiene routine he sits in [facility] and tends to not get back out of it for good portions of the day. Told him only he can make himself to these kinds of these to progressively spend more time out of WC wither using walker or even just sitting on normal surfaces such as couch/ chair etc.     PLAN: Pt will increase number of sessions next week to three times a week as he is reporting each session having a bigger boost in his functional skills. Next visit: alter G treadmill so to progress WB tolerance with R LE SLS tasks once it works again  [x] Continue per plan of care [] Alter current plan (see comments above)   [] Plan of care initiated [] Hold pending MD visit [] Discharge      Electronically signed by:  Lon Worrell PT    Note: If patient does not return for scheduled/ recommended follow up visits, this note will serve as a discharge from care along with most recent update on progress.

## 2020-07-22 ENCOUNTER — HOSPITAL ENCOUNTER (OUTPATIENT)
Dept: PHYSICAL THERAPY | Age: 70
Setting detail: THERAPIES SERIES
Discharge: HOME | End: 2020-07-22
Payer: MEDICARE

## 2020-07-22 PROCEDURE — 97110 THERAPEUTIC EXERCISES: CPT

## 2020-07-22 PROCEDURE — 97112 NEUROMUSCULAR REEDUCATION: CPT

## 2020-07-22 NOTE — FLOWSHEET NOTE
[facility],  [address] [place]  Phone: [phone]  Fax [phone]    Physical Therapy Treatment Note/ Progress Report:           Date:  2020    Patient Name:  Cleopatra Landau    :  1950  MRN: [id number]  Restrictions/Precautions:  R knee fused and knee spacer with severe LLD. Medical/Treatment Diagnosis Information:  · Diagnosis: M 25.561 R knee pain  · Treatment Diagnosis: M25.661 R knee stiffness, M82.81 R LE muscle weakness, Gait abnormality, R 27.7  Insurance/Certification information:  PT Insurance Information: Medicare/ [employer]  Physician Information:  Referring Practitioner: Dr. Lowell Youngblood  Has the plan of care been signed (Y/N):        [x]  Yes  []  No     Date of Patient follow up with Physician: unknown      Is this a Progress Report:     []  Yes  [x]  No        If Yes:  Date Range for reporting period:  Beginning 2020  Ending     Progress report will be due (10 Rx or 30 days whichever is less): ,       Recertification will be due (POC Duration  / 90 days whichever is less): 2020      Visit # Insurance Allowable Requires auth   [facility]    [x]no        []yes:     Functional Scale: LEFS: 16/80 = 20% functional ability, 80% disability   Date assessed: 2020           LEFS: 45/80 = 56% functional ability,  44% disability   Date assessed: 2020    Therapy Diagnosis/Practice Pattern:     Number of Comorbidities:  []0     [x]1-2    []3+    Latex Allergy:  [x]NO      []YES  Preferred Language for Healthcare:   [x]English       []other:      Pain level:  0/10      SUBJECTIVE: Pt reports he has iced a couple of times after last appointment and the knee is not nearly as sore but still sore enough that he is not going up and down his stairs twice a day but has limited to once a day.  He did go on a 150 ft walk with his friend though that also uses a walker and was happy to do that but also pretty fatigued by it. He says he really tries to push and work hard at the leg even when it gets sore. I recommended though that he also balance in some rest and recovery for the knee as well so that he doesn't get progressively more sore to the point that it affects his function and progress. OBJECTIVE:    Observation: effusion no longer visible. Improving quad and calf muscle bulk    Gait: step through pattern with front wheeled walker and now WB about 75% on the R LE in R stance and getting much better quad contraction also standing more upright but still has a flex at the hip standing and gait posture.  Test measurments: Quad set: good-    Knee ROM 5/29/2020 6/3/2020 6/5/2020 6/10/2020 6/17/2020 7/22/2020   R knee extension pre-tx -15 deg -6 -      R knee extension post-tx -8 deg -5 -5 -4 -2 deg 0 deg     ROM LEFT RIGHT ROM 6/26/2020 Right   HIP Flex 127 127     Knee ext 0 -15  -2 deg   Knee Flex 123 Fused knee  fused   Ankle PF 62 51     Ankle DF 0 -1     Strength  LEFT RIGHT  Right   HIP Flexors 5/5 4+/5  5/5   HIP Abductors 4/5 4/5  4/5   HIP Ext 5/5 3+/5  4/5   Hip ER        Knee EXT (quad) 5/5 4/5 (quad set)  4+/5 (quad set)   Knee Flex (HS) 4/5 NT     Ankle DF 4/5 4/5  5/5   Ankle PF 5/5 5/5  5/5   Ankle Inv 4-/5 4-/5  5-/5   Ankle EV 4-/5 3+/5  3+/5         RESTRICTIONS/PRECAUTIONS: R knee fused and knee spacer with severe LLD. Exercises/Interventions:     Therapeutic Ex (65714) Rep/Sets/sec Notes/CUES   SLR, SLR/ w/ER, hip abd,  HEP 5/27   S/L hip abd with ext \"L\" HEP 6/5   Standing hip ext  @hip machine, HEP 6/19; min A during R stance to control [place] and leg buckling.     Standing hip abd  HEP 7/10   Stanidng HR/Toe raise  HEP 6/19   Gastroc stretch Standing R 2x30\"    Seated SLR, SLR w/ ER HEP 6/19   Seated ankle PF HEP 6/19, 7/10   Seated hip add ring     Tball bridge 1x12  Stopped due to pain in knee   Step-up  CGA and B handrail   Prone elbow prop for hip flexor stretch 3 min    Side stepping with 1/2 roller step-over 3x 8 ft                        ALTER-G  Alter-G unit unable to calibrate so unable to do today   Pt edu  5' Balancing specific there exercise with function and rest.    Manual Intervention ([id number])               NMR re-education (06640)  CUES NEEDED   Tactile cueing to inc quad contract   Standing external pertubations Blue sports cord around waist   SLS, modified 3x10 reaches to anamaria 5\"L foot up on 8', reach to anamaria to reduce UE assist and improve upright torso posture   L foot step overs, R SLS  With walker and CGA to progressive WB on the R LE   L foot touch various surfaces while R leg stance to progress WB tolerance and stability 4\" step, 2\" step, ball 2x5 taps ea CGA, holding wall and/or walker   DL stance equal WB, ball toss  Cues for equal WB   DL stance med ball axe chops 3000Gr 2x10 ea way Mirror to watch keeping hips equal btw legs/ equal WB   Tandem stance 3 cone tap  SBA, Cued for shifting weight onto R forward leg when leaning to tap cone   Standing on airex (one under ea foot) 3x30\" Cued for equal WB             L foot ball taps to WB on R LE  Progressively less WB in UE to point of going single arm     Gait (14581)     Ambulation 1x58ft gym floor   Fwd wheel Walker, nearly full WB R LE; cued for upright posture                   Therapeutic Activity (06426)     Gait obstacle course  Included B feet step over object, fig 8 cones, tap foot on top of ball, kick ball ea foot, turn corners; SBA, walker   Side stepping  Holding onto tall wall, CGA, cueing for how to weightshift to move feet   Flight of stairs : \"up with the good down, down with the bad\"  CGA and B handrail             Pt edu  Strategizing for doing stairs at home and getting into and out of walk in shower.            Therapeutic Exercise and NMR EXR  [x] (73229) Provided verbal/tactile cueing for activities related to strengthening, flexibility, endurance, ROM for improvements in LE, proximal hip, and core control with self care, mobility, lifting, ambulation. [x] (61526) Provided verbal/tactile cueing for activities related to improving balance, coordination, kinesthetic sense, posture, motor skill, proprioception  to assist with LE, proximal hip, and core control in self care, mobility, lifting, ambulation and eccentric single leg control. NMR and Therapeutic Activities:    [] (55355 or 70298) Provided verbal/tactile cueing for activities related to improving balance, coordination, kinesthetic sense, posture, motor skill, proprioception and motor activation to allow for proper function of core, proximal hip and LE with self care and ADLs  [] (44582) Gait Re-education- Provided training and instruction to the patient for proper LE, core and proximal hip recruitment and positioning and eccentric body weight control with ambulation re-education including up and down stairs     Home Exercise Program:    [] (78198) Reviewed/Progressed HEP activities related to strengthening, flexibility, endurance, ROM of core, proximal hip and LE for functional self-care, mobility, lifting and ambulation/stair navigation   [] (27080)Reviewed/Progressed HEP activities related to improving balance, coordination, kinesthetic sense, posture, motor skill, proprioception of core, proximal hip and LE for self care, mobility, lifting, and ambulation/stair navigation      Manual Treatments:  PROM / STM / Oscillations-Mobs:  G-I, II, III, IV (PA's, Inf., Post.)  [] (09379) Provided manual therapy to mobilize LE, proximal hip and/or LS spine soft tissue/joints for the purpose of modulating pain, promoting relaxation,  increasing ROM, reducing/eliminating soft tissue swelling/inflammation/restriction, improving soft tissue extensibility and allowing for proper ROM for normal function with self care, mobility, lifting and ambulation. Modalities:     [] GAME READY (VASO)- for significant edema, swelling, pain control. Charges:  Timed Code Treatment Minutes: 45   Total Treatment Minutes: 45       [] EVAL (LOW) 92951 (typically 20 minutes face-to-face)  [] EVAL (MOD) 43909 (typically 30 minutes face-to-face)  [] EVAL (HIGH) 87630 (typically 45 minutes face-to-face)  [] RE-EVAL     [x] ZW(96749) x   1  [] IONTO  [x] NMR (42973) x 2   [] VASO  [] Manual (82509) x      [] Other:  [] TA x      [] Mech Traction (99020)  [] ES(attended) (16965)      [] ES (un) (99964):   [] Gait (31145) x     GOALS:   Patient stated goal: Walk again  [x] Progressing: can now walk 175ft in 10-15 min with walker [] Met: [] Not Met: [] Adjusted    Therapist goals for Patient:   Short Term Goals: To be achieved in: 2 weeks  1. Independent in HEP and progression per patient tolerance, in order to prevent re-injury. [x] Progressing: [] Met: [] Not Met: [] Adjusted  2. Patient will have a decrease in pain to facilitate improvement in movement, function, and ADLs as indicated by Functional Deficits. [x] Progressing: pt has pain during Wb and exercises but none once done. [] Met: [] Not Met: [] Adjusted    Long Term Goals: To be achieved in: 8 weeks  1. Disability index score of 30% or less for the LEFS to assist with reaching prior level of function. [x] Progressing: now only 44% disability [] Met: [] Not Met: [] Adjusted  2. Patient will demonstrate increased AROM to full knee extension to allow for proper joint functioning as indicated by patients Functional Deficits. [] Progressing: [x] Met: knee flexion not obtainable due to fused knee but now has only -2 deg knee extension and has plateud at this range but allows the knee to be more stable with WB and improve LLD [] Not Met: [] Adjusted  3. Patient will demonstrate an increase in Strength to good proximal hip strength and control, within 5lb HHD in LE to allow for proper functional mobility as indicated by patients Functional Deficits. [x] Progressing: [] Met: [] Not Met: [] Adjusted  4.  Patient in his functional skills. Next visit: progress note  [x] Continue per plan of care [] Alter current plan (see comments above)   [] Plan of care initiated [] Hold pending MD visit [] Discharge      Electronically signed by:  Pratibha Espinoza PT    Note: If patient does not return for scheduled/ recommended follow up visits, this note will serve as a discharge from care along with most recent update on progress.

## 2020-07-24 ENCOUNTER — HOSPITAL ENCOUNTER (OUTPATIENT)
Dept: PHYSICAL THERAPY | Age: 70
Setting detail: THERAPIES SERIES
Discharge: HOME | End: 2020-07-24
Payer: MEDICARE

## 2020-07-24 PROCEDURE — 97116 GAIT TRAINING THERAPY: CPT

## 2020-07-24 PROCEDURE — 97112 NEUROMUSCULAR REEDUCATION: CPT

## 2020-07-24 NOTE — FLOWSHEET NOTE
[facility], [address]  Phone: [phone]  Fax [phone]    Physical Therapy Treatment Note/ Progress Report:           Date:  2020    Patient Name:  Mendoza Rodriguez    :  1950  MRN: [id number]  Restrictions/Precautions:  R knee fused and knee spacer with severe LLD. Medical/Treatment Diagnosis Information:  · Diagnosis: M 25.561 R knee pain  · Treatment Diagnosis: M25.661 R knee stiffness, M82.81 R LE muscle weakness, Gait abnormality, R 34.5  Insurance/Certification information:  PT Insurance Information: [email or web address]  Physician Information:  Referring Practitioner: Dr. Abdi Toney  Has the plan of care been signed (Y/N):        [x]  Yes  []  No     Date of Patient follow up with Physician: unknown      Is this a Progress Report:     []  Yes  [x]  No        If Yes:  Date Range for reporting period:  Beginning 2020  Ending     Progress report will be due (10 Rx or 30 days whichever is less): , 3/29/0501      Recertification will be due (POC Duration  / 90 days whichever is less): 2020    Progress note next visit  Visit # Insurance Allowable Requires auth   19 Med nec    [x]no        []yes:     Functional Scale: LEFS: 16/80 = 20% functional ability, 80% disability   Date assessed: 2020           LEFS: 45/80 = 56% functional ability,  44% disability   Date assessed: 2020    Therapy Diagnosis/Practice Pattern:     Number of Comorbidities:  []0     [x]1-2    []3+    Latex Allergy:  [x]NO      []YES  Preferred Language for Healthcare:   [x]English       []other:      Pain level:  0/10      SUBJECTIVE: Pt says his knee is a lot less sore. Starting yesterday he was able to go back to doing his stairs twice a day and feels he can more comfortably put more weight through the leg. OBJECTIVE:    Observation: effusion no longer visible.  Improving quad and calf muscle bulk  Gait: step through pattern with front wheeled walker and now WB about 75% on the R LE in R stance and getting much better quad contraction also standing more upright but still has a flex at the hip standing and gait posture.  Test measurments: Quad set: good-    Knee ROM 5/29/2020 6/3/2020 6/5/2020 6/10/2020 6/17/2020 7/22/2020   R knee extension pre-tx -15 deg -6 -      R knee extension post-tx -8 deg -5 -5 -4 -2 deg 0 deg     ROM LEFT RIGHT ROM 6/26/2020 Right   HIP Flex 127 127     Knee ext 0 -15  -2 deg   Knee Flex 123 Fused knee  fused   Ankle PF 62 51     Ankle DF 0 -1     Strength  LEFT RIGHT  Right   HIP Flexors 5/5 4+/5  5/5   HIP Abductors 4/5 4/5  4/5   HIP Ext 5/5 3+/5  4/5   Hip ER        Knee EXT (quad) 5/5 4/5 (quad set)  4+/5 (quad set)   Knee Flex (HS) 4/5 NT     Ankle DF 4/5 4/5  5/5   Ankle PF 5/5 5/5  5/5   Ankle Inv 4-/5 4-/5  5-/5   Ankle EV 4-/5 3+/5  3+/5         RESTRICTIONS/PRECAUTIONS: R knee fused and knee spacer with severe LLD. Exercises/Interventions:     Therapeutic Ex (58177) Rep/Sets/sec Notes/CUES   SLR, SLR/ w/ER, hip abd,  HEP 5/27   S/L hip abd with ext \"L\" HEP 6/5   Standing hip ext  @hip machine, HEP 6/19; min A during R stance to control [place] and leg buckling.     Standing hip abd  HEP 7/10   Stanidng HR/Toe raise  HEP 6/19   Gastroc stretch     Seated SLR, SLR w/ ER HEP 6/19   Seated ankle PF HEP 6/19, 7/10   Seated hip add ring     Tball bridge   Stopped due to pain in knee   Step-up  CGA and B handrail   Prone elbow prop for hip flexor stretch    Side stepping with 1/2 roller step-over                             Pt edu  5' How to correlate over gait and posture things he learned into everyday walking   Manual Intervention ([phone])               NMR re-education (47602)  CUES NEEDED   Tactile cueing to inc quad contract   Standing external pertubations Blue sports cord around waist   SLS, modified L foot up on 8', reach to anamaria to reduce UE assist and sense, posture, motor skill, proprioception  to assist with LE, proximal hip, and core control in self care, mobility, lifting, ambulation and eccentric single leg control. NMR and Therapeutic Activities:    [x] (05315 or 97071) Provided verbal/tactile cueing for activities related to improving balance, coordination, kinesthetic sense, posture, motor skill, proprioception and motor activation to allow for proper function of core, proximal hip and LE with self care and ADLs  [x] (08827) Gait Re-education- Provided training and instruction to the patient for proper LE, core and proximal hip recruitment and positioning and eccentric body weight control with ambulation re-education including up and down stairs     Home Exercise Program:    [] (94353) Reviewed/Progressed HEP activities related to strengthening, flexibility, endurance, ROM of core, proximal hip and LE for functional self-care, mobility, lifting and ambulation/stair navigation   [] (00668)Reviewed/Progressed HEP activities related to improving balance, coordination, kinesthetic sense, posture, motor skill, proprioception of core, proximal hip and LE for self care, mobility, lifting, and ambulation/stair navigation      Manual Treatments:  PROM / STM / Oscillations-Mobs:  G-I, II, III, IV (PA's, Inf., Post.)  [] (00457) Provided manual therapy to mobilize LE, proximal hip and/or LS spine soft tissue/joints for the purpose of modulating pain, promoting relaxation,  increasing ROM, reducing/eliminating soft tissue swelling/inflammation/restriction, improving soft tissue extensibility and allowing for proper ROM for normal function with self care, mobility, lifting and ambulation. Modalities:     [] GAME READY (VASO)- for significant edema, swelling, pain control.      Charges:  Timed Code Treatment Minutes: 35   Total Treatment Minutes: 35       [] EVAL (LOW) 35737 (typically 20 minutes face-to-face)  [] EVAL (MOD) 27947 (typically 30 minutes face-to-face)  [] EVAL (HIGH) 65793 (typically 45 minutes face-to-face)  [] RE-EVAL     [x] LR(35249) x   1  [] IONTO  [] NMR (34503) x    [] VASO  [] Manual (93357) x      [] Other:  [] TA x      [] Mech Traction (19115)  [] ES(attended) (88886)      [] ES (un) (36415):   [x] Gait (40597) x 1    GOALS:   Patient stated goal: Walk again  [x] Progressing: can now walk 175ft in 10-15 min with walker [] Met: [] Not Met: [] Adjusted    Therapist goals for Patient:   Short Term Goals: To be achieved in: 2 weeks  1. Independent in HEP and progression per patient tolerance, in order to prevent re-injury. [x] Progressing: [] Met: [] Not Met: [] Adjusted  2. Patient will have a decrease in pain to facilitate improvement in movement, function, and ADLs as indicated by Functional Deficits. [x] Progressing: pt has pain during Wb and exercises but none once done. [] Met: [] Not Met: [] Adjusted    Long Term Goals: To be achieved in: 8 weeks  1. Disability index score of 30% or less for the LEFS to assist with reaching prior level of function. [x] Progressing: now only 44% disability [] Met: [] Not Met: [] Adjusted  2. Patient will demonstrate increased AROM to full knee extension to allow for proper joint functioning as indicated by patients Functional Deficits. [] Progressing: [x] Met: knee flexion not obtainable due to fused knee but now has only -2 deg knee extension and has plateud at this range but allows the knee to be more stable with WB and improve LLD [] Not Met: [] Adjusted  3. Patient will demonstrate an increase in Strength to good proximal hip strength and control, within 5lb HHD in LE to allow for proper functional mobility as indicated by patients Functional Deficits. [x] Progressing: [] Met: [] Not Met: [] Adjusted  4. Patient will return to volunteer activities without increased symptoms or restriction. [] Progressing: [] Met: [x] Not Met: [] Adjusted  5.  Pt will be able to ambulate with or without AD for at least 2 blocks or 30 minutes to improve community independence. [] Progressing: [] Met: [x] Not Met: [] Adjusted    Progression Towards Functional goals:  [x] Patient is progressing as expected towards functional goals listed. [] Progression is slowed due to complexities listed. [] Progression has been slowed due to co-morbidities. [] Plan just implemented, too soon to assess goals progression  [] Other:     Overall Progression Towards Functional goals/ Treatment Progress Update:  [x] Patient is progressing as expected towards functional goals listed. [] Progression is slowed due to complexities/Impairments listed. [] Progression has been slowed due to co-morbidities. [] Plan just implemented, too soon to assess goals progression <30days   [] Goals require adjustment due to lack of progress  [] Patient is not progressing as expected and requires additional follow up with physician  [] Other    Prognosis for POC: [x] Good [] Fair  [] Poor      Patient requires continued skilled intervention: [x] Yes  [] No    Treatment/Activity Tolerance:  [x] Patient able to complete treatment  [x] Patient limited by fatigue  [] Patient limited by pain    [] Patient limited by other medical complications  [] Other:     ASSESSMENT:  Pt was surprised how tired the leg felt after working in the [employer] but happy to report the knee was not sore. He felt so tired though that he requested we not to other there ex today as he still needed to walk into and out of doctor's appt this afternoon. Despite [employer] lifting 50% BW pt still had difficult time due to weakness, neuromusculars habit and lack of trust in the leg to maintain ful upright posture over the R LE when is was primary WB leg. He will continue to need training and cueing for this in order to transition it to across ground walking without WB support.      PLAN: Pt will increase number of sessions next week to three times a week as he is reporting each session

## 2020-07-27 ENCOUNTER — HOSPITAL ENCOUNTER (OUTPATIENT)
Dept: PHYSICAL THERAPY | Age: 70
Setting detail: THERAPIES SERIES
Discharge: HOME | End: 2020-07-27
Payer: MEDICARE

## 2020-07-27 PROCEDURE — 97116 GAIT TRAINING THERAPY: CPT

## 2020-07-27 PROCEDURE — 97110 THERAPEUTIC EXERCISES: CPT

## 2020-07-27 NOTE — PLAN OF CARE
pre-tx -15 deg -6   fused   R knee extension post-tx -8 deg -5 -2 deg 0 deg 0 deg     ROM LEFT RIGHT ROM 7/27/2020 Right   HIP Flex 127 127     Knee ext 0 -15  0 deg   Knee Flex 123 Fused knee  fused   Ankle PF 62 51     Ankle DF 0 -1     Strength  LEFT RIGHT  Right   HIP Flexors 5/5 4+/5  5/5   HIP Abductors 4/5 4/5  4/5   HIP Ext 5/5 3+/5  4+/5   Hip ER        Knee EXT (quad) 5/5 4/5 (quad set)  4+/5 (quad set)   Knee Flex (HS) 4/5 NT     Ankle DF 4/5 4/5     Ankle PF 5/5 5/5     Ankle Inv 4-/5 4-/5     Ankle EV 4-/5 3+/5           RESTRICTIONS/PRECAUTIONS: R knee fused and knee spacer with severe LLD. Exercises/Interventions:     Therapeutic Ex (29268) Rep/Sets/sec Notes/CUES   SLR, SLR/ w/ER, hip abd,  HEP 5/27   S/L hip abd with ext \"L\" HEP 6/5   Standing hip ext  @hip machine, HEP 6/19; min A during R stance to control [place] and leg buckling.     Standing hip abd  HEP 7/10   Stanidng HR/Toe raise  HEP 6/19   Gastroc stretch     Seated SLR, SLR w/ ER HEP 6/19   Seated ankle PF HEP 6/19, 7/10   Seated hip add ring     Tball bridge   Stopped due to pain in knee   Step-up  CGA and B handrail   Prone elbow prop for hip flexor stretch    Side stepping with 1/2 roller step-over    Prone hip ext  3# 2x25    S/L hip abd 2x15    S/L hip circles 2x5 CW/ CCW    S/L LE ER 2x10         Pt edu   How to correlate over gait and posture things he learned into everyday walking   Manual Intervention (08929)               NMR re-education (53437)  CUES NEEDED   Tactile cueing to inc quad contract   Standing external pertubations Blue sports cord around waist   SLS, modified L foot up on 8', reach to anamaria to reduce UE assist and improve upright torso posture   L foot step overs, R SLS  With walker and CGA to progressive WB on the R LE   L foot touch various surfaces while R leg stance to progress WB tolerance and stability CGA, holding wall and/or walker   DL stance equal WB, ball toss Cues for equal WB   DL stance med ball motor skill, proprioception and motor activation to allow for proper function of core, proximal hip and LE with self care and ADLs  [x] (74063) Gait Re-education- Provided training and instruction to the patient for proper LE, core and proximal hip recruitment and positioning and eccentric body weight control with ambulation re-education including up and down stairs     Home Exercise Program:    [] (15975) Reviewed/Progressed HEP activities related to strengthening, flexibility, endurance, ROM of core, proximal hip and LE for functional self-care, mobility, lifting and ambulation/stair navigation   [] (74197)Reviewed/Progressed HEP activities related to improving balance, coordination, kinesthetic sense, posture, motor skill, proprioception of core, proximal hip and LE for self care, mobility, lifting, and ambulation/stair navigation      Manual Treatments:  PROM / STM / Oscillations-Mobs:  G-I, II, III, IV (PA's, Inf., Post.)  [] (19642) Provided manual therapy to mobilize LE, proximal hip and/or LS spine soft tissue/joints for the purpose of modulating pain, promoting relaxation,  increasing ROM, reducing/eliminating soft tissue swelling/inflammation/restriction, improving soft tissue extensibility and allowing for proper ROM for normal function with self care, mobility, lifting and ambulation. Modalities:     [] GAME READY (VASO)- for significant edema, swelling, pain control.      Charges:  Timed Code Treatment Minutes: 45   Total Treatment Minutes: 45       [] EVAL (LOW) 17563 (typically 20 minutes face-to-face)  [] EVAL (MOD) 92228 (typically 30 minutes face-to-face)  [] EVAL (HIGH) 05384 (typically 45 minutes face-to-face)  [] RE-EVAL     [x] PJ(60078) x   2  [] IONTO  [] NMR (08209) x    [] VASO  [] Manual (84919) x      [] Other:  [] TA x      [] Mech Traction (04759)  [] ES(attended) (33063)      [] ES (un) (02382):   [x] Gait (81201) x 1    GOALS:   Patient stated goal: Walk again  [x] Progressing: can now walk 175ft in 10-15 min with walker [] Met: [] Not Met: [] Adjusted    Therapist goals for Patient:   Short Term Goals: To be achieved in: 2 weeks  1. Independent in HEP and progression per patient tolerance, in order to prevent re-injury. [x] Progressing: [] Met: [] Not Met: [] Adjusted  2. Patient will have a decrease in pain to facilitate improvement in movement, function, and ADLs as indicated by Functional Deficits. [x] Progressing: pt has pain during Wb and exercises but none once done. [] Met: [] Not Met: [] Adjusted    Long Term Goals: To be achieved in: 8 weeks  1. Disability index score of 30% or less for the LEFS to assist with reaching prior level of function. [x] Progressing: now only 44% disability [] Met: [] Not Met: [] Adjusted  2. Patient will demonstrate increased AROM to full knee extension to allow for proper joint functioning as indicated by patients Functional Deficits. [] Progressing: [x] Met: knee flexion not obtainable due to fused knee but now has only -2 deg knee extension and has plateud at this range but allows the knee to be more stable with WB and improve LLD [] Not Met: [] Adjusted  3. Patient will demonstrate an increase in Strength to good proximal hip strength and control, within 5lb HHD in LE to allow for proper functional mobility as indicated by patients Functional Deficits. [x] Progressing: [] Met: [] Not Met: [] Adjusted  4. Patient will return to volunteer activities without increased symptoms or restriction. [] Progressing: [] Met: [x] Not Met: [] Adjusted  5. Pt will be able to ambulate with or without AD for at least 2 blocks or 30 minutes to improve community independence. [] Progressing: [] Met: [x] Not Met: [] Adjusted    Progression Towards Functional goals:  [x] Patient is progressing as expected towards functional goals listed. [] Progression is slowed due to complexities listed. [] Progression has been slowed due to co-morbidities.   [] Plan just implemented, too soon to assess goals progression  [] Other:     Overall Progression Towards Functional goals/ Treatment Progress Update:  [x] Patient is progressing as expected towards functional goals listed. [] Progression is slowed due to complexities/Impairments listed. [] Progression has been slowed due to co-morbidities. [] Plan just implemented, too soon to assess goals progression <30days   [] Goals require adjustment due to lack of progress  [] Patient is not progressing as expected and requires additional follow up with physician  [] Other    Prognosis for POC: [x] Good [] Fair  [] Poor      Patient requires continued skilled intervention: [x] Yes  [] No    Treatment/Activity Tolerance:  [x] Patient able to complete treatment  [x] Patient limited by fatigue  [] Patient limited by pain    [] Patient limited by other medical complications  [] Other:     ASSESSMENT:  Pt continues to make steady progress. He now has full knee extension ROM, improving hip strength which is allowing for improved tolerance for WB activities as well as improving posture in WB. He notices significant improve carrying over to ADLs and comfort with community ambulation. He still is unable to FWB through the R LE but is able to do it in alter-G unit at 50-60% body weight. We are working towards progressing from the walker to a quad cane. PLAN: Pt will benefit from continuing PT at three times a week for another 6 weeks and will continue to reassess as he requires less skilled equipment and cueing to reduce to twice a week towards a D/C progression.  Next visit: similar session  [] Continue per plan of care [x] Alter current plan (see comments above)   [] Plan of care initiated [] Hold pending MD visit [] Discharge      Electronically signed by:  Martha Philip PT    Note: If patient does not return for scheduled/ recommended follow up visits, this note will serve as a discharge from care along with most recent update on progress.

## 2020-07-29 ENCOUNTER — HOSPITAL ENCOUNTER (OUTPATIENT)
Dept: PHYSICAL THERAPY | Age: 70
Setting detail: THERAPIES SERIES
Discharge: HOME | End: 2020-07-29
Payer: MEDICARE

## 2020-07-29 PROCEDURE — 97110 THERAPEUTIC EXERCISES: CPT

## 2020-07-29 PROCEDURE — 97116 GAIT TRAINING THERAPY: CPT

## 2020-07-29 NOTE — FLOWSHEET NOTE
appt. He will consider that in the future. He says he gets his new puppy to train as a service dog tomorrow. He is both excited and anxious about being able to care for as well as train the dog but knows he is doing better than what he was doing even just two weeks ago. OBJECTIVE:    Observation: R knee effusion +1 but no tenderness to palpation   Gait: slightly more antalgic gait pattern today with less WB throughR LE and more forward flexed at torso leaning on walker more today.  Test measurments: Quad set: good     Knee ROM 5/29/2020 6/3/2020 6/17/2020 7/22/2020 7/27/2020   R knee extension pre-tx -15 deg -6   fused   R knee extension post-tx -8 deg -5 -2 deg 0 deg 0 deg     ROM LEFT RIGHT ROM 7/27/2020 Right   HIP Flex 127 127     Knee ext 0 -15  0 deg   Knee Flex 123 Fused knee  fused   Ankle PF 62 51     Ankle DF 0 -1     Strength  LEFT RIGHT  Right   HIP Flexors 5/5 4+/5  5/5   HIP Abductors 4/5 4/5  4/5   HIP Ext 5/5 3+/5  4+/5   Hip ER        Knee EXT (quad) 5/5 4/5 (quad set)  4+/5 (quad set)   Knee Flex (HS) 4/5 NT     Ankle DF 4/5 4/5     Ankle PF 5/5 5/5     Ankle Inv 4-/5 4-/5     Ankle EV 4-/5 3+/5           RESTRICTIONS/PRECAUTIONS: R knee fused and knee spacer with severe LLD. Exercises/Interventions:     Therapeutic Ex (05962) Rep/Sets/sec Notes/CUES   SLR, SLR/ w/ER, hip abd,  HEP 5/27   S/L hip abd with ext \"L\" HEP 6/5   Standing hip ext  @hip machine, HEP 6/19; min A during R stance to control Trened[place] and leg buckling.     Standing hip abd  HEP 7/10   Stanidng HR/Toe raise  HEP 6/19   Gastroc stretch     Seated SLR, SLR w/ ER HEP 6/19   Seated ankle PF HEP 6/19, 7/10   Seated hip add ring     Tball bridge   Stopped due to pain in knee   Step-up  CGA and B handrail   Prone elbow prop for hip flexor stretch    Side stepping with 1/2 roller step-over    Prone hip ext  0# 2x20    Prone hip ext w/ abd (\"L\") 2x10    S/L hip abd 2x15 PT brace at pelvis to not roll torso bwd   S/L hip handrail             Pt edu  Strategizing for doing stairs at home and getting into and out of walk in shower. Alter-G pant size: XL    Therapeutic Exercise and NMR EXR  [x] (66756) Provided verbal/tactile cueing for activities related to strengthening, flexibility, endurance, ROM for improvements in LE, proximal hip, and core control with self care, mobility, lifting, ambulation.  [] (29778) Provided verbal/tactile cueing for activities related to improving balance, coordination, kinesthetic sense, posture, motor skill, proprioception  to assist with LE, proximal hip, and core control in self care, mobility, lifting, ambulation and eccentric single leg control.      NMR and Therapeutic Activities:    [] (50768 or 44234) Provided verbal/tactile cueing for activities related to improving balance, coordination, kinesthetic sense, posture, motor skill, proprioception and motor activation to allow for proper function of core, proximal hip and LE with self care and ADLs  [x] (53422) Gait Re-education- Provided training and instruction to the patient for proper LE, core and proximal hip recruitment and positioning and eccentric body weight control with ambulation re-education including up and down stairs     Home Exercise Program:    [] (95534) Reviewed/Progressed HEP activities related to strengthening, flexibility, endurance, ROM of core, proximal hip and LE for functional self-care, mobility, lifting and ambulation/stair navigation   [] (31827)Reviewed/Progressed HEP activities related to improving balance, coordination, kinesthetic sense, posture, motor skill, proprioception of core, proximal hip and LE for self care, mobility, lifting, and ambulation/stair navigation      Manual Treatments:  PROM / STM / Oscillations-Mobs:  G-I, II, III, IV (PA's, Inf., Post.)  [] (78754) Provided manual therapy to mobilize LE, proximal hip and/or LS spine soft tissue/joints for the purpose of modulating pain, promoting relaxation,  increasing ROM, reducing/eliminating soft tissue swelling/inflammation/restriction, improving soft tissue extensibility and allowing for proper ROM for normal function with self care, mobility, lifting and ambulation. Modalities:     [] GAME READY (VASO)- for significant edema, swelling, pain control. Charges:  Timed Code Treatment Minutes: 45   Total Treatment Minutes: 45       [] EVAL (LOW) 80375 (typically 20 minutes face-to-face)  [] EVAL (MOD) 43170 (typically 30 minutes face-to-face)  [] EVAL (HIGH) 15693 (typically 45 minutes face-to-face)  [] RE-EVAL     [x] XH(13747) x   2  [] IONTO  [] NMR (55530) x    [] VASO  [] Manual (25025) x      [] Other:  [] TA x      [] Mech Traction (73633)  [] ES(attended) (03054)      [] ES (un) (12668):   [x] Gait (22728) x 1    GOALS:   Patient stated goal: Walk again  [x] Progressing: can now walk 175ft in 10-15 min with walker [] Met: [] Not Met: [] Adjusted    Therapist goals for Patient:   Short Term Goals: To be achieved in: 2 weeks  1. Independent in HEP and progression per patient tolerance, in order to prevent re-injury. [x] Progressing: [] Met: [] Not Met: [] Adjusted  2. Patient will have a decrease in pain to facilitate improvement in movement, function, and ADLs as indicated by Functional Deficits. [x] Progressing: pt has pain during Wb and exercises but none once done. [] Met: [] Not Met: [] Adjusted    Long Term Goals: To be achieved in: 8 weeks  1. Disability index score of 30% or less for the LEFS to assist with reaching prior level of function. [x] Progressing: now only 44% disability [] Met: [] Not Met: [] Adjusted  2. Patient will demonstrate increased AROM to full knee extension to allow for proper joint functioning as indicated by patients Functional Deficits.    [] Progressing: [x] Met: knee flexion not obtainable due to fused knee but now has only -2 deg knee extension and has plateud at this range but allows the knee to be more stable with WB and improve LLD [] Not Met: [] Adjusted  3. Patient will demonstrate an increase in Strength to good proximal hip strength and control, within 5lb HHD in LE to allow for proper functional mobility as indicated by patients Functional Deficits. [x] Progressing: [] Met: [] Not Met: [] Adjusted  4. Patient will return to volunteer activities without increased symptoms or restriction. [] Progressing: [] Met: [x] Not Met: [] Adjusted  5. Pt will be able to ambulate with or without AD for at least 2 blocks or 30 minutes to improve community independence. [] Progressing: [] Met: [x] Not Met: [] Adjusted    Progression Towards Functional goals:  [x] Patient is progressing as expected towards functional goals listed. [] Progression is slowed due to complexities listed. [] Progression has been slowed due to co-morbidities. [] Plan just implemented, too soon to assess goals progression  [] Other:     Overall Progression Towards Functional goals/ Treatment Progress Update:  [x] Patient is progressing as expected towards functional goals listed. [] Progression is slowed due to complexities/Impairments listed. [] Progression has been slowed due to co-morbidities. [] Plan just implemented, too soon to assess goals progression <30days   [] Goals require adjustment due to lack of progress  [] Patient is not progressing as expected and requires additional follow up with physician  [] Other    Prognosis for POC: [x] Good [] Fair  [] Poor      Patient requires continued skilled intervention: [x] Yes  [] No    Treatment/Activity Tolerance:  [x] Patient able to complete treatment  [x] Patient limited by fatigue  [] Patient limited by pain    [] Patient limited by other medical complications  [] Other:     ASSESSMENT:  Pt unable to do as much in [place] today because knee and patient already tired and sore from morning walk.  However pt was surprised to find that knee was less sore when he got out of [place] at

## 2020-08-03 ENCOUNTER — APPOINTMENT (OUTPATIENT)
Dept: PHYSICAL THERAPY | Age: 70
End: 2020-08-03
Payer: MEDICARE

## 2020-08-05 ENCOUNTER — HOSPITAL ENCOUNTER (OUTPATIENT)
Dept: PHYSICAL THERAPY | Age: 70
Setting detail: THERAPIES SERIES
Discharge: HOME | End: 2020-08-05
Payer: MEDICARE

## 2020-08-05 PROCEDURE — 97112 NEUROMUSCULAR REEDUCATION: CPT

## 2020-08-05 PROCEDURE — 97110 THERAPEUTIC EXERCISES: CPT

## 2020-08-05 NOTE — FLOWSHEET NOTE
issues just muscle soreness from those muscles being weak it means we do need to be doing them. He conceded this point. He reports that he got his new puppy service training dog last Thursday and says he is doing better than he thought with getting around and training him. He can now walk three quarters of a mile at a time and if he comes home and ices he has no pain or swelling. He is also getting much more mobile in his kitchen, not even always using the walker as long as he has his countertop for a little support he can walk sideways and change directions now without much problem. He is very happy with his progress thus far and really wants to work towards progressing to a quad cane. OBJECTIVE:    Observation: R knee effusion +1 but no tenderness to palpation   Gait: nearly normalized with mostly upright posture, smooth reciprocal pattern and nearly equal WB amount and time with each leg while using fwd wheeled walker. I recommended it is time to move to a 4 wheeled walker if he would like.  Test measurments: Quad set: good     Knee ROM 5/29/2020 6/3/2020 6/17/2020 7/22/2020 7/27/2020   R knee extension pre-tx -15 deg -6   fused   R knee extension post-tx -8 deg -5 -2 deg 0 deg 0 deg     ROM LEFT RIGHT ROM 7/27/2020 Right   HIP Flex 127 127     Knee ext 0 -15  0 deg   Knee Flex 123 Fused knee  fused   Ankle PF 62 51     Ankle DF 0 -1     Strength  LEFT RIGHT  Right   HIP Flexors 5/5 4+/5  5/5   HIP Abductors 4/5 4/5  4/5   HIP Ext 5/5 3+/5  4+/5   Hip ER        Knee EXT (quad) 5/5 4/5 (quad set)  4+/5 (quad set)   Knee Flex (HS) 4/5 NT     Ankle DF 4/5 4/5     Ankle PF 5/5 5/5     Ankle Inv 4-/5 4-/5     Ankle EV 4-/5 3+/5           RESTRICTIONS/PRECAUTIONS: R knee fused and knee spacer with severe LLD.     Exercises/Interventions:     Therapeutic Ex (93469) Rep/Sets/sec Notes/CUES   SLR, SLR/ w/ER, hip abd,  HEP 5/27   S/L hip abd with ext \"L\" HEP 6/5   Standing hip ext R stance 2x10 @hip machine, HEP 6/19; min A during R stance to control [place] and leg buckling.     Standing hip abd R stance 2x10 HEP 7/10   Stanidng HR/Toe raise  HEP 6/19   Gastroc stretch     Seated SLR, SLR w/ ER HEP 6/19   Seated ankle PF HEP 6/19, 7/10   Seated hip add ring     Tball bridge   Stopped due to pain in knee   Step-up  CGA and B handrail   Prone elbow prop for hip flexor stretch    Side stepping with 1/2 roller step-over    Prone hip ext     Prone hip ext w/ abd (\"L\")    S/L hip abd PT brace at pelvis to not roll torso bwd   S/L hip circles PT brace at pelvis to not roll torso bwd   S/L LE ER PT brace at pelvis to not roll torso bwd   Supine ITB stretch    Supine HS stretch                   Pt edu   How to correlate over gait and posture things he learned into everyday walking   Manual Intervention (54734)               NMR re-education (24361)  CUES NEEDED   Tactile cueing to inc quad contract   Standing external pertubations Blue sports cord around waist   SLS, modified L foot up on 8', reach to anamaria to reduce UE assist and improve upright torso posture   L foot step overs, R SLS  With walker and CGA to progressive WB on the R LE   L foot touch various surfaces while R leg stance to progress WB tolerance and stability CGA, holding wall and/or walker   DL stance equal WB, ball toss 2x30 Cues for equal WB   DL stance med ball axe chops 3000Gr 2x10 ea way Mirror to watch keeping hips equal btw legs/ equal WB   Tandem stance 3 cone tap  SBA, Cued for shifting weight onto R forward leg when leaning to tap cone   Standing on airex (one under ea foot) 2x30\" Cued for equal WB   Standing on airex DL arm swing 2x30\"    Standing on airex DL turn head 2x30\"    ALTER-G SLB  Cueing for control of Trendelenberg and not extending in spine as WB onto the R LE   R SLS w/ L 3 point tap (modified Y-balance) 2x5                   L foot ball taps to WB on R LE  Progressively less WB in UE to point of going single arm     Gait (85645) Ambulation    Fwd wheel Walker, nearly full WB R LE; cued for upright posture    ALTER-G walking 50-60% BW, 0.8-0.1 mph 1x3', 1x1' Progressively using less UE assistance   Alter-G fwd step to SLS 50-60%BW, 1x3x30\" bilat Cueing for control of Trendelenberg and not extending in spine as WB onto the R LE        Therapeutic Activity (94110)     Gait obstacle course  Included B feet step over object, fig 8 cones, tap foot on top of ball, kick ball ea foot, turn corners; SBA, walker   Side stepping 1x10ft Holding onto tall wall, CGA, cueing for how to weightshift to move feet   Flight of stairs : \"up with the good down, down with the bad\" 1 flight stairs CGA and B handrail             Pt edu  Strategizing for doing stairs at home and getting into and out of walk in shower. Alter-G pant size: XL    Therapeutic Exercise and NMR EXR  [x] (48384) Provided verbal/tactile cueing for activities related to strengthening, flexibility, endurance, ROM for improvements in LE, proximal hip, and core control with self care, mobility, lifting, ambulation. [x] (56942) Provided verbal/tactile cueing for activities related to improving balance, coordination, kinesthetic sense, posture, motor skill, proprioception  to assist with LE, proximal hip, and core control in self care, mobility, lifting, ambulation and eccentric single leg control.      NMR and Therapeutic Activities:    [] (98086 or 74047) Provided verbal/tactile cueing for activities related to improving balance, coordination, kinesthetic sense, posture, motor skill, proprioception and motor activation to allow for proper function of core, proximal hip and LE with self care and ADLs  [] (62815) Gait Re-education- Provided training and instruction to the patient for proper LE, core and proximal hip recruitment and positioning and eccentric body weight control with ambulation re-education including up and down stairs     Home Exercise Program:    [] (29883) Reviewed/Progressed HEP activities related to strengthening, flexibility, endurance, ROM of core, proximal hip and LE for functional self-care, mobility, lifting and ambulation/stair navigation   [] (28569)Reviewed/Progressed HEP activities related to improving balance, coordination, kinesthetic sense, posture, motor skill, proprioception of core, proximal hip and LE for self care, mobility, lifting, and ambulation/stair navigation      Manual Treatments:  PROM / STM / Oscillations-Mobs:  G-I, II, III, IV (PA's, Inf., Post.)  [] (77897) Provided manual therapy to mobilize LE, proximal hip and/or LS spine soft tissue/joints for the purpose of modulating pain, promoting relaxation,  increasing ROM, reducing/eliminating soft tissue swelling/inflammation/restriction, improving soft tissue extensibility and allowing for proper ROM for normal function with self care, mobility, lifting and ambulation. Modalities:     [] GAME READY (VASO)- for significant edema, swelling, pain control. Charges:  Timed Code Treatment Minutes: 45   Total Treatment Minutes: 45       [] EVAL (LOW) 27306 (typically 20 minutes face-to-face)  [] EVAL (MOD) 03368 (typically 30 minutes face-to-face)  [] EVAL (HIGH) 12747 (typically 45 minutes face-to-face)  [] RE-EVAL     [x] ZU(88400) x   1  [] IONTO  [x] NMR (94301) x  2  [] VASO  [] Manual (16279) x      [] Other:  [] TA x      [] Mech Traction (00233)  [] ES(attended) (35195)      [] ES (un) (46279):   [] Gait (30375) x     GOALS:   Patient stated goal: Walk again  [x] Progressing: can now walk 175ft in 10-15 min with walker [] Met: [] Not Met: [] Adjusted    Therapist goals for Patient:   Short Term Goals: To be achieved in: 2 weeks  1. Independent in HEP and progression per patient tolerance, in order to prevent re-injury. [x] Progressing: [] Met: [] Not Met: [] Adjusted  2.  Patient will have a decrease in pain to facilitate improvement in movement, function, and ADLs as indicated by Functional Deficits. [x] Progressing: pt has pain during Wb and exercises but none once done. [] Met: [] Not Met: [] Adjusted    Long Term Goals: To be achieved in: 8 weeks  1. Disability index score of 30% or less for the LEFS to assist with reaching prior level of function. [x] Progressing: now only 44% disability [] Met: [] Not Met: [] Adjusted  2. Patient will demonstrate increased AROM to full knee extension to allow for proper joint functioning as indicated by patients Functional Deficits. [] Progressing: [x] Met: knee flexion not obtainable due to fused knee but now has only -2 deg knee extension and has plateud at this range but allows the knee to be more stable with WB and improve LLD [] Not Met: [] Adjusted  3. Patient will demonstrate an increase in Strength to good proximal hip strength and control, within 5lb HHD in LE to allow for proper functional mobility as indicated by patients Functional Deficits. [x] Progressing: [] Met: [] Not Met: [] Adjusted  4. Patient will return to volunteer activities without increased symptoms or restriction. [] Progressing: [] Met: [x] Not Met: [] Adjusted  5. Pt will be able to ambulate with or without AD for at least 2 blocks or 30 minutes to improve community independence. [] Progressing: [] Met: [x] Not Met: [] Adjusted    Progression Towards Functional goals:  [x] Patient is progressing as expected towards functional goals listed. [] Progression is slowed due to complexities listed. [] Progression has been slowed due to co-morbidities. [] Plan just implemented, too soon to assess goals progression  [] Other:     Overall Progression Towards Functional goals/ Treatment Progress Update:  [x] Patient is progressing as expected towards functional goals listed. [] Progression is slowed due to complexities/Impairments listed. [] Progression has been slowed due to co-morbidities.   [] Plan just implemented, too soon to assess goals progression <30days   [] Goals require adjustment due to lack of progress  [] Patient is not progressing as expected and requires additional follow up with physician  [] Other    Prognosis for POC: [x] Good [] Fair  [] Poor      Patient requires continued skilled intervention: [x] Yes  [] No    Treatment/Activity Tolerance:  [x] Patient able to complete treatment  [x] Patient limited by fatigue  [] Patient limited by pain    [] Patient limited by other medical complications  [] Other:     ASSESSMENT:  Pt is doing much better with normalizing gait pattern and tolerating more WB amount and time on the R LE but still needs cueing during stance stance on R LE to keep more upright torso and tuck his bottom under him. He felt the balance challenges were tiring todaybut is going him more confident in what he is capable of and less fear of falling. PLAN:  Next visit: progress to 60-70% BW in the alter-G next week. Check into use of 4 wheeled walker and or a quad cane  [x] Continue per plan of care [] Alter current plan (see comments above)   [] Plan of care initiated [] Hold pending MD visit [] Discharge      Electronically signed by:  Marnie Haley PT    Note: If patient does not return for scheduled/ recommended follow up visits, this note will serve as a discharge from care along with most recent update on progress.

## 2020-08-07 ENCOUNTER — HOSPITAL ENCOUNTER (OUTPATIENT)
Dept: PHYSICAL THERAPY | Age: 70
Setting detail: THERAPIES SERIES
Discharge: HOME | End: 2020-08-07
Payer: MEDICARE

## 2020-08-07 PROCEDURE — 97110 THERAPEUTIC EXERCISES: CPT

## 2020-08-07 PROCEDURE — 97116 GAIT TRAINING THERAPY: CPT

## 2020-08-07 PROCEDURE — 97530 THERAPEUTIC ACTIVITIES: CPT

## 2020-08-07 NOTE — FLOWSHEET NOTE
[facility],  [address] [place]  Phone: [phone]  Fax [phone]        Physical Therapy Treatment Note/ Progress Report:           Date:  2020    Patient Name:  William Rojas    :  1950  MRN: [id number]  Restrictions/Precautions:  R knee fused and knee spacer with severe LLD. Medical/Treatment Diagnosis Information:  · Diagnosis: M 25.561 R knee pain  · Treatment Diagnosis: M25.661 R knee stiffness, M82.81 R LE muscle weakness, Gait abnormality, R 51.4  Insurance/Certification information:  PT Insurance Information: Medicare/ [employer]  Physician Information:  Referring Practitioner: Dr. Trejo Maryuri  Has the plan of care been signed (Y/N):        [x]  Yes  []  No     Date of Patient follow up with Physician: unknown      Is this a Progress Report:     []  Yes  [x]  No        If Yes:  Date Range for reporting period:  Beginning 2020  Ending     Progress report will be due (10 Rx or 30 days whichever is less): ,       Recertification will be due (POC Duration  / 90 days whichever is less): , 2020    Progress note next visit  Visit # Insurance Allowable Requires auth   22 Med nec    [x]no        []yes:     Functional Scale: LEFS: 16/80 = 20% functional ability, 80% disability   Date assessed: 2020           LEFS: 45/80 = 56% functional ability,  44% disability   Date assessed: 2020           LEFS: 37/80 = 46% functional ability, 54% disability   Date assessed: 2020  Therapy Diagnosis/Practice Pattern:     Number of Comorbidities:  []0     [x]1-2    []3+    Latex Allergy:  [x]NO      []YES  Preferred Language for Healthcare:   [x]English       []other:      Pain level:  0/10      SUBJECTIVE: Pt says he felt gillian just very tired after last session. No knee pain. Yesterday he experienced his first fall.  He was using his WC going down his home ramp and the wheelchair caught on a dog toy on ground and he slid forward out of the chair. He was unable to get himself up off the ground but fortunately he had a friend stop by not much later and helped him get up. He did not hurt anything but now he is afraid of falling because can't get back up. He felt like he did not have enough arm strength to get back into WC   OBJECTIVE:    Observation:    Gait: nearly normalized with mostly upright posture, smooth reciprocal pattern and nearly equal WB amount and time with each leg while using fwd wheeled walker. I cont recommended it is time to move to a 4 wheeled walker if he would like as well as he needs to start thinking of the walker more for balance support than WB support.  Test measurments: Quad set: good     Knee ROM 5/29/2020 6/3/2020 6/17/2020 7/22/2020 7/27/2020   R knee extension pre-tx -15 deg -6   fused   R knee extension post-tx -8 deg -5 -2 deg 0 deg 0 deg     ROM LEFT RIGHT ROM 7/27/2020 Right   HIP Flex 127 127     Knee ext 0 -15  0 deg   Knee Flex 123 Fused knee  fused   Ankle PF 62 51     Ankle DF 0 -1     Strength  LEFT RIGHT  Right   HIP Flexors 5/5 4+/5  5/5   HIP Abductors 4/5 4/5  4/5   HIP Ext 5/5 3+/5  4+/5   Hip ER        Knee EXT (quad) 5/5 4/5 (quad set)  4+/5 (quad set)   Knee Flex (HS) 4/5 NT     Ankle DF 4/5 4/5     Ankle PF 5/5 5/5     Ankle Inv 4-/5 4-/5     Ankle EV 4-/5 3+/5           RESTRICTIONS/PRECAUTIONS: R knee fused and knee spacer with severe LLD. Exercises/Interventions:     Therapeutic Ex (27127) Rep/Sets/sec Notes/CUES   SLR, SLR/ w/ER, hip abd,  HEP 5/27   S/L hip abd with ext \"L\" HEP 6/5   Standing hip ext @hip machine, HEP 6/19; min A during R stance to control [place] and leg buckling.     Standing hip abd HEP 7/10   Stanidng HR/Toe raise  HEP 6/19   Gastroc stretch     Seated SLR, SLR w/ ER HEP 6/19   Seated ankle PF HEP 6/19, 7/10   Seated hip add ring     Tball bridge   Stopped due to pain in knee   Step-up  CGA and B handrail   Prone elbow prop for hip flexor stretch    Side stepping with 1/2 roller step-over    Prone hip ext     Prone hip ext w/ abd (\"L\")    S/L hip abd PT brace at pelvis to not roll torso bwd   S/L hip circles PT brace at pelvis to not roll torso bwd   S/L LE ER PT brace at pelvis to not roll torso bwd   Supine ITB stretch    Supine HS stretch    Tricep dips 1/2 range, 2x3x5 @ low plinth, cued for L shin perpendicular to ground and using L LE more.     1/2 wall push-ups 1x15         Pt edu   How to correlate over gait and posture things he learned into everyday walking   Manual Intervention [id number])               NMR re-education (29433)  CUES NEEDED   Tactile cueing to inc quad contract   Standing external pertubations Blue sports cord around waist   SLS, modified L foot up on 8', reach to anamaria to reduce UE assist and improve upright torso posture   L foot step overs, R SLS  With walker and CGA to progressive WB on the R LE   L foot touch various surfaces while R leg stance to progress WB tolerance and stability CGA, holding wall and/or walker   DL stance equal WB, ball toss Cues for equal WB   DL stance med ball axe chops Mirror to watch keeping hips equal btw legs/ equal WB   Tandem stance 3 cone tap  SBA, Cued for shifting weight onto R forward leg when leaning to tap cone   Standing on airex (one under ea foot) Cued for equal WB   Standing on airex DL arm swing    Standing on airex DL turn head    ALTER-G SLB 60-75%BW, R 2x3x30\" Cueing for control of Trendelenberg and not extending in spine as WB onto the R LE   R SLS w/ L 3 point tap (modified Y-balance)                    L foot ball taps to WB on R LE  Progressively less WB in UE to point of going single arm     Gait (34455)     Ambulation    Fwd wheel Walker, nearly full WB R LE; cued for upright posture    ALTER-G walking 60-70% BW, 0.8-1.1 mph 2x3' Progressively using less UE assistance   Alter-G fwd step to SLS  Cueing for control of Trendelenberg and not extending in spine as WB onto the R LE        Therapeutic Activity (18583)     Gait obstacle course  Included B feet step over object, fig 8 cones, tap foot on top of ball, kick ball ea foot, turn corners; SBA, walker   Side stepping Holding onto tall wall, CGA, cueing for how to weightshift to move feet   Flight of stairs : \"up with the good down, down with the bad\" CGA and B handrail             Pt edu 10 min Going through scenarios of how to get up off the floor using WC, walker or other stable surface as well as prone versus supine. Alter-G pant size: XL    Therapeutic Exercise and NMR EXR  [x] (34195) Provided verbal/tactile cueing for activities related to strengthening, flexibility, endurance, ROM for improvements in LE, proximal hip, and core control with self care, mobility, lifting, ambulation.  [] (80403) Provided verbal/tactile cueing for activities related to improving balance, coordination, kinesthetic sense, posture, motor skill, proprioception  to assist with LE, proximal hip, and core control in self care, mobility, lifting, ambulation and eccentric single leg control.      NMR and Therapeutic Activities:    [x] (86334 or 20594) Provided verbal/tactile cueing for activities related to improving balance, coordination, kinesthetic sense, posture, motor skill, proprioception and motor activation to allow for proper function of core, proximal hip and LE with self care and ADLs  [x] (87709) Gait Re-education- Provided training and instruction to the patient for proper LE, core and proximal hip recruitment and positioning and eccentric body weight control with ambulation re-education including up and down stairs     Home Exercise Program:    [] (72572) Reviewed/Progressed HEP activities related to strengthening, flexibility, endurance, ROM of core, proximal hip and LE for functional self-care, mobility, lifting and ambulation/stair navigation   [] (48081)Reviewed/Progressed HEP activities related to improving balance, coordination, kinesthetic sense, posture, motor skill, proprioception of core, proximal hip and LE for self care, mobility, lifting, and ambulation/stair navigation      Manual Treatments:  PROM / STM / Oscillations-Mobs:  G-I, II, III, IV (PA's, Inf., Post.)  [] (66087) Provided manual therapy to mobilize LE, proximal hip and/or LS spine soft tissue/joints for the purpose of modulating pain, promoting relaxation,  increasing ROM, reducing/eliminating soft tissue swelling/inflammation/restriction, improving soft tissue extensibility and allowing for proper ROM for normal function with self care, mobility, lifting and ambulation. Modalities:     [] GAME READY (VASO)- for significant edema, swelling, pain control. Charges:  Timed Code Treatment Minutes: 45   Total Treatment Minutes: 45       [] EVAL (LOW) 64486 (typically 20 minutes face-to-face)  [] EVAL (MOD) 54558 (typically 30 minutes face-to-face)  [] EVAL (HIGH) 28566 (typically 45 minutes face-to-face)  [] RE-EVAL     [x] RZ(09067) x   1  [] IONTO  [] NMR (77251) x    [] VASO  [] Manual (80305) x      [] Other:  [x] TA x 1     [] Mech Traction (13274)  [] ES(attended) (71445)      [] ES (un) (31643):   [x] Gait (86306) x     GOALS:   Patient stated goal: Walk again  [x] Progressing: can now walk 175ft in 10-15 min with walker [] Met: [] Not Met: [] Adjusted    Therapist goals for Patient:   Short Term Goals: To be achieved in: 2 weeks  1. Independent in HEP and progression per patient tolerance, in order to prevent re-injury. [x] Progressing: [] Met: [] Not Met: [] Adjusted  2. Patient will have a decrease in pain to facilitate improvement in movement, function, and ADLs as indicated by Functional Deficits. [x] Progressing: pt has pain during Wb and exercises but none once done. [] Met: [] Not Met: [] Adjusted    Long Term Goals: To be achieved in: 8 weeks  1.  Disability index score of 30% or less for the LEFS to assist with reaching prior level of function. [x] Progressing: now only 44% disability [] Met: [] Not Met: [] Adjusted  2. Patient will demonstrate increased AROM to full knee extension to allow for proper joint functioning as indicated by patients Functional Deficits. [] Progressing: [x] Met: knee flexion not obtainable due to fused knee but now has only -2 deg knee extension and has plateud at this range but allows the knee to be more stable with WB and improve LLD [] Not Met: [] Adjusted  3. Patient will demonstrate an increase in Strength to good proximal hip strength and control, within 5lb HHD in LE to allow for proper functional mobility as indicated by patients Functional Deficits. [x] Progressing: [] Met: [] Not Met: [] Adjusted  4. Patient will return to volunteer activities without increased symptoms or restriction. [] Progressing: [] Met: [x] Not Met: [] Adjusted  5. Pt will be able to ambulate with or without AD for at least 2 blocks or 30 minutes to improve community independence. [] Progressing: [] Met: [x] Not Met: [] Adjusted    Progression Towards Functional goals:  [x] Patient is progressing as expected towards functional goals listed. [] Progression is slowed due to complexities listed. [] Progression has been slowed due to co-morbidities. [] Plan just implemented, too soon to assess goals progression  [] Other:     Overall Progression Towards Functional goals/ Treatment Progress Update:  [x] Patient is progressing as expected towards functional goals listed. [] Progression is slowed due to complexities/Impairments listed. [] Progression has been slowed due to co-morbidities.   [] Plan just implemented, too soon to assess goals progression <30days   [] Goals require adjustment due to lack of progress  [] Patient is not progressing as expected and requires additional follow up with physician  [] Other    Prognosis for POC: [x] Good [] Fair  [] Poor      Patient requires continued skilled intervention: [x] Yes  [] No    Treatment/Activity Tolerance:  [x] Patient able to complete treatment  [x] Patient limited by fatigue  [] Patient limited by pain    [] Patient limited by other medical complications  [] Other:     ASSESSMENT:  Due to pt's new fear of falling and discovering he could not get himself up off ground yesterday which is a huge safety issue we spent time with pt education on ways to do this. Since his UE strength is a limiting factor for this we also began some UE strengthening to address this safety issue. He was able to WB the most he ever has today with 75% BW on the R LE in SLS for 30 sec today without pain but did tire. I used this to emphasize how capable the leg is to hold his weight and he needs to get out of the habit of leaning and WB heavily on the walker. He has been instructed to think of walker more for balance support than WB support. PLAN:  Next visit: cont working on skills to get up off floor, transition to wheeled walker. Cont to encourage less use of the WC. [x] Continue per plan of care [] Alter current plan (see comments above)   [] Plan of care initiated [] Hold pending MD visit [] Discharge      Electronically signed by:  Masha Welch, PT    Note: If patient does not return for scheduled/ recommended follow up visits, this note will serve as a discharge from care along with most recent update on progress.

## 2020-08-10 ENCOUNTER — HOSPITAL ENCOUNTER (OUTPATIENT)
Dept: PHYSICAL THERAPY | Age: 70
Setting detail: THERAPIES SERIES
Discharge: HOME | End: 2020-08-10
Payer: MEDICARE

## 2020-08-10 PROCEDURE — 97116 GAIT TRAINING THERAPY: CPT

## 2020-08-10 PROCEDURE — 97110 THERAPEUTIC EXERCISES: CPT

## 2020-08-10 NOTE — FLOWSHEET NOTE
[facility],  [address]  Phone: [phone]  Fax [phone]        Physical Therapy Treatment Note/ Progress Report:           Date:  8/10/2020    Patient Name:  Luda Wheat    :  1950  MRN: [id number]  Restrictions/Precautions:  R knee fused and knee spacer with severe LLD. Medical/Treatment Diagnosis Information:  · Diagnosis: M 25.561 R knee pain  · Treatment Diagnosis: M25.661 R knee stiffness, M82.81 R LE muscle weakness, Gait abnormality, R 98.2  Insurance/Certification information:  PT Insurance Information: Medicare/ [employer]  Physician Information:  Referring Practitioner: Dr. Ching  Has the plan of care been signed (Y/N):        [x]  Yes  []  No     Date of Patient follow up with Physician: unknown      Is this a Progress Report:     []  Yes  [x]  No        If Yes:  Date Range for reporting period:  Beginning 2020  Ending     Progress report will be due (10 Rx or 30 days whichever is less): ,       Recertification will be due (POC Duration  / 90 days whichever is less): , 2020    Progress note next visit  Visit # Insurance Allowable Requires auth   23 Med nec    [x]no        []yes:     Functional Scale: LEFS: 16/80 = 20% functional ability, 80% disability   Date assessed: 2020           LEFS: 45/80 = 56% functional ability,  44% disability   Date assessed: 2020           LEFS: 37/80 = 46% functional ability, 54% disability   Date assessed: 2020  Therapy Diagnosis/Practice Pattern:     Number of Comorbidities:  []0     [x]1-2    []3+    Latex Allergy:  [x]NO      []YES  Preferred Language for Healthcare:   [x]English       []other:      Pain level:  0/10 currently but last night 3-4/10 pain      SUBJECTIVE: Pt says he went swimming again last night and just like last time he ended up lindsey very sore in the R LE afterwards.  He had pain 3-4/10 disturbing sleep S/L hip circles PT brace at pelvis to not roll torso bwd   S/L LE ER PT brace at pelvis to not roll torso bwd   Supine ITB stretch    Supine HS stretch    Tricep dips  @ low plinth, cued for L shin perpendicular to ground and using L LE more.     1/2 wall push-ups 2x15    CC: tricep pulldown 7 plates 2G25    Pt edu   How to correlate over gait and posture things he learned into everyday walking   Manual Intervention (04560)               NMR re-education (90321)  CUES NEEDED   Tactile cueing to inc quad contract   Standing external pertubations Blue sports cord around waist   SLS, modified L foot up on 8', reach to anamaria to reduce UE assist and improve upright torso posture   L foot step overs, R SLS  With walker and CGA to progressive WB on the R LE   L foot touch various surfaces while R leg stance to progress WB tolerance and stability CGA, holding wall and/or walker   DL stance equal WB, ball toss Cues for equal WB   DL stance med ball axe chops 3000Gr 2x10 ea wayMirror to watch keeping hips equal btw legs/ equal WB   Tandem stance 3 cone tap  SBA, Cued for shifting weight onto R forward leg when leaning to tap cone   Standing on airex (one under ea foot) Cued for equal WB   Standing on airex DL arm swing    Standing on airex DL turn head    ALTER-G SLB 60-75%BW, R 2x3x30\" Cueing for control of Trendelenberg and not extending in spine as WB onto the R LE   R SLS w/ L 3 point tap (modified Y-balance)                    L foot ball taps to WB on R LE  Progressively less WB in UE to point of going single arm     Gait (48624)     Ambulation    Fwd wheel Walker, nearly full WB R LE; cued for upright posture    ALTER-G walking 60-70% BW, 0.8-1.1 mph 2x3' Cueing or arm swing and upright posture controlling lateral lean   Alter-G fwd step to SLS  Cueing for control of Trendelenberg and not extending in spine as WB onto the R LE        Therapeutic Activity (75132)     Gait obstacle course  Included B feet step over object, fig 8 cones, tap foot on top of ball, kick ball ea foot, turn corners; SBA, walker   Side stepping Holding onto tall wall, CGA, cueing for how to weightshift to move feet   Flight of stairs : \"up with the good down, down with the bad\" CGA and B handrail             Pt edu  Going through scenarios of how to get up off the floor using WC, walker or other stable surface as well as prone versus supine. Alter-G pant size: XL    Therapeutic Exercise and NMR EXR  [x] (66264) Provided verbal/tactile cueing for activities related to strengthening, flexibility, endurance, ROM for improvements in LE, proximal hip, and core control with self care, mobility, lifting, ambulation.  [] (86924) Provided verbal/tactile cueing for activities related to improving balance, coordination, kinesthetic sense, posture, motor skill, proprioception  to assist with LE, proximal hip, and core control in self care, mobility, lifting, ambulation and eccentric single leg control.      NMR and Therapeutic Activities:    [x] (90026 or 73433) Provided verbal/tactile cueing for activities related to improving balance, coordination, kinesthetic sense, posture, motor skill, proprioception and motor activation to allow for proper function of core, proximal hip and LE with self care and ADLs  [x] (06992) Gait Re-education- Provided training and instruction to the patient for proper LE, core and proximal hip recruitment and positioning and eccentric body weight control with ambulation re-education including up and down stairs     Home Exercise Program:    [] (95490) Reviewed/Progressed HEP activities related to strengthening, flexibility, endurance, ROM of core, proximal hip and LE for functional self-care, mobility, lifting and ambulation/stair navigation   [] (03692)Reviewed/Progressed HEP activities related to improving balance, coordination, kinesthetic sense, posture, motor skill, proprioception of core, proximal hip and LE for self care, mobility, lifting, and ambulation/stair navigation      Manual Treatments:  PROM / STM / Oscillations-Mobs:  G-I, II, III, IV (PA's, Inf., Post.)  [] (12445) Provided manual therapy to mobilize LE, proximal hip and/or LS spine soft tissue/joints for the purpose of modulating pain, promoting relaxation,  increasing ROM, reducing/eliminating soft tissue swelling/inflammation/restriction, improving soft tissue extensibility and allowing for proper ROM for normal function with self care, mobility, lifting and ambulation. Modalities:     [] GAME READY (VASO)- for significant edema, swelling, pain control. Charges:   Timed Code Treatment Minutes: 45   Total Treatment Minutes: 45       [] EVAL (LOW) 73544 (typically 20 minutes face-to-face)  [] EVAL (MOD) 76443 (typically 30 minutes face-to-face)  [] EVAL (HIGH) 75690 (typically 45 minutes face-to-face)  [] RE-EVAL     [x] UJ(38671) x   2  [] IONTO  [] NMR (01477) x    [] VASO  [] Manual (55488) x      [] Other:  [] TA x      [] Mech Traction (51802)  [] ES(attended) (89046)      [] ES (un) (62063):   [x] Gait (22911) x 1    GOALS:   Patient stated goal: Walk again  [x] Progressing: can now walk 175ft in 10-15 min with walker [] Met: [] Not Met: [] Adjusted    Therapist goals for Patient:   Short Term Goals: To be achieved in: 2 weeks  1. Independent in HEP and progression per patient tolerance, in order to prevent re-injury. [x] Progressing: [] Met: [] Not Met: [] Adjusted  2. Patient will have a decrease in pain to facilitate improvement in movement, function, and ADLs as indicated by Functional Deficits. [x] Progressing: pt has pain during Wb and exercises but none once done. [] Met: [] Not Met: [] Adjusted    Long Term Goals: To be achieved in: 8 weeks  1. Disability index score of 30% or less for the LEFS to assist with reaching prior level of function. [x] Progressing: now only 44% disability [] Met: [] Not Met: [] Adjusted  2.  Patient will demonstrate increased AROM to full knee extension to allow for proper joint functioning as indicated by patients Functional Deficits. [] Progressing: [x] Met: knee flexion not obtainable due to fused knee but now has only -2 deg knee extension and has plateud at this range but allows the knee to be more stable with WB and improve LLD [] Not Met: [] Adjusted  3. Patient will demonstrate an increase in Strength to good proximal hip strength and control, within 5lb HHD in LE to allow for proper functional mobility as indicated by patients Functional Deficits. [x] Progressing: [] Met: [] Not Met: [] Adjusted  4. Patient will return to volunteer activities without increased symptoms or restriction. [] Progressing: [] Met: [x] Not Met: [] Adjusted  5. Pt will be able to ambulate with or without AD for at least 2 blocks or 30 minutes to improve community independence. [] Progressing: [] Met: [x] Not Met: [] Adjusted    Progression Towards Functional goals:  [x] Patient is progressing as expected towards functional goals listed. [] Progression is slowed due to complexities listed. [] Progression has been slowed due to co-morbidities. [] Plan just implemented, too soon to assess goals progression  [] Other:     Overall Progression Towards Functional goals/ Treatment Progress Update:  [x] Patient is progressing as expected towards functional goals listed. [] Progression is slowed due to complexities/Impairments listed. [] Progression has been slowed due to co-morbidities.   [] Plan just implemented, too soon to assess goals progression <30days   [] Goals require adjustment due to lack of progress  [] Patient is not progressing as expected and requires additional follow up with physician  [] Other    Prognosis for POC: [x] Good [] Fair  [] Poor      Patient requires continued skilled intervention: [x] Yes  [] No    Treatment/Activity Tolerance:  [x] Patient able to complete treatment  [x] Patient limited by fatigue  [] Patient limited by pain    [] Patient limited by other medical complications  [] Other:     ASSESSMENT:  Able to WB with little to no inc in pain with 75% WB on the R LE in the alter-G but still has difficulty with trusting the leg and doing similar thing out of alter-G. I told him to bring in his quadcane and extra wheel for his walker so that we can work on those in here and keep progressing his functional ambulation. PLAN:  Next visit: work on gait pattern and stability with quad cane. [x] Continue per plan of care [] Alter current plan (see comments above)   [] Plan of care initiated [] Hold pending MD visit [] Discharge      Electronically signed by:  Briana Lyle PT    Note: If patient does not return for scheduled/ recommended follow up visits, this note will serve as a discharge from care along with most recent update on progress.

## 2020-08-12 ENCOUNTER — HOSPITAL ENCOUNTER (OUTPATIENT)
Dept: PHYSICAL THERAPY | Age: 70
Setting detail: THERAPIES SERIES
Discharge: HOME | End: 2020-08-12
Payer: MEDICARE

## 2020-08-12 PROCEDURE — 97116 GAIT TRAINING THERAPY: CPT

## 2020-08-12 PROCEDURE — 97110 THERAPEUTIC EXERCISES: CPT

## 2020-08-12 NOTE — FLOWSHEET NOTE
[facility],  [address]  Phone: [phone]  Fax [phone]        Physical Therapy Treatment Note/ Progress Report:           Date:  2020    Patient Name:  Navdeep Barrientos    :  1950  MRN: [id number]  Restrictions/Precautions:  R knee fused and knee spacer with severe LLD. Medical/Treatment Diagnosis Information:  · Diagnosis: M 25.561 R knee pain  · Treatment Diagnosis: M25.661 R knee stiffness, M82.81 R LE muscle weakness, Gait abnormality, R 92.5  Insurance/Certification information:  PT Insurance Information: Medicare/ [employer]  Physician Information:  Referring Practitioner: Dr. Darnell Sorenson  Has the plan of care been signed (Y/N):        [x]  Yes  []  No     Date of Patient follow up with Physician: unknown      Is this a Progress Report:     []  Yes  [x]  No        If Yes:  Date Range for reporting period:  Beginning 2020  Ending     Progress report will be due (10 Rx or 30 days whichever is less): ,       Recertification will be due (POC Duration  / 90 days whichever is less): , 2020    Progress note next visit  Visit # Insurance Allowable Requires auth   24 Med nec    [x]no        []yes:     Functional Scale: LEFS: 16/80 = 20% functional ability, 80% disability   Date assessed: 2020           LEFS: 45/80 = 56% functional ability,  44% disability   Date assessed: 2020           LEFS: 37/80 = 46% functional ability, 54% disability   Date assessed: 2020  Therapy Diagnosis/Practice Pattern:     Number of Comorbidities:  []0     [x]1-2    []3+    Latex Allergy:  [x]NO      []YES  Preferred Language for Healthcare:   [x]English       []other:      Pain level:  0/10      SUBJECTIVE: Pt reports he has been working on SLS and SLB on the R LE at his kitchen countertop and is feeling more confident with it.  He is also noticing that going up his stairs is feeling pretty easy now and can feel stronger. Pt forgot to bring in his quadcane though to practice with. OBJECTIVE:    Observation:    Gait: nearly normalized with mostly upright posture, smooth reciprocal pattern and nearly equal WB amount and time with each leg while using fwd wheeled walker. I cont recommended it is time to move to a 4 wheeled walker if he would like as well as he needs to start thinking of the walker more for balance support than WB support.  Test measurments: Quad set: good     Knee ROM 5/29/2020 6/3/2020 6/17/2020 7/22/2020 7/27/2020   R knee extension pre-tx -15 deg -6   fused   R knee extension post-tx -8 deg -5 -2 deg 0 deg 0 deg     ROM LEFT RIGHT ROM 7/27/2020 Right   HIP Flex 127 127     Knee ext 0 -15  0 deg   Knee Flex 123 Fused knee  fused   Ankle PF 62 51     Ankle DF 0 -1     Strength  LEFT RIGHT  Right   HIP Flexors 5/5 4+/5  5/5   HIP Abductors 4/5 4/5  4/5   HIP Ext 5/5 3+/5  4+/5   Hip ER        Knee EXT (quad) 5/5 4/5 (quad set)  4+/5 (quad set)   Knee Flex (HS) 4/5 NT     Ankle DF 4/5 4/5     Ankle PF 5/5 5/5     Ankle Inv 4-/5 4-/5     Ankle EV 4-/5 3+/5           RESTRICTIONS/PRECAUTIONS: R knee fused and knee spacer with severe LLD. Exercises/Interventions:     Therapeutic Ex (53972) Rep/Sets/sec Notes/CUES   SLR, SLR/ w/ER, hip abd,  HEP 5/27   S/L hip abd with ext \"L\" HEP 6/5   Standing hip ext @hip machine, HEP 6/19; min A during R stance to control [place] and leg buckling.     Standing hip abd HEP 7/10   Stanidng HR/Toe raise  HEP 6/19   Gastroc stretch     Seated SLR, SLR w/ ER HEP 6/19   Seated ankle PF HEP 6/19, 7/10   Seated hip add ring     Tball bridge   Stopped due to pain in knee   Step-up  CGA and B handrail   Prone elbow prop for hip flexor stretch 2 min   Side stepping with 1/2 roller step-over    Prone hip ext  0# 2x20   Prone hip ext w/ abd (\"L\")    S/L hip abd 1x18PT brace at pelvis to not roll torso bwd   S/L hip circles PT brace at pelvis to not roll torso bwd   S/L LE ER PT brace at pelvis to not roll torso bwd   Supine ITB stretch    Supine HS stretch    Tricep dips  @ low plinth, cued for L shin perpendicular to ground and using L LE more.     1/2 wall push-ups 2x15    CC: tricep pulldown 7 plates 1P62    Pt edu   How to correlate over gait and posture things he learned into everyday walking   Manual Intervention (00465)               NMR re-education (59840)  CUES NEEDED   Tactile cueing to inc quad contract   Standing external pertubations Blue sports cord around waist   SLS, modified L foot up on 8', reach to anamaria to reduce UE assist and improve upright torso posture   L foot step overs, R SLS  With walker and CGA to progressive WB on the R LE   L foot touch various surfaces while R leg stance to progress WB tolerance and stability CGA, holding wall and/or walker   DL stance equal WB, ball toss Cues for equal WB   DL stance med ball axe chops Mirror to watch keeping hips equal btw legs/ equal WB   Tandem stance 3 cone tap  SBA, Cued for shifting weight onto R forward leg when leaning to tap cone   Standing on airex (one under ea foot) Cued for equal WB   Standing on airex DL arm swing    Standing on airex DL turn head    ALTER-G SLB 70-80% BW, R 2x3x30\" Cueing for control of Trendelenberg and not extending in spine as WB onto the R LE   R SLS w/ L 3 point tap (modified Y-balance)                    L foot ball taps to WB on R LE  Progressively less WB in UE to point of going single arm     Gait (95466)     Ambulation    Fwd wheel Walker, nearly full WB R LE; cued for upright posture    ALTER-G walking 80% BW, 0.8-1.1 mph 2x2' Cueing or arm swing and upright posture controlling lateral lean   Alter-G fwd step to SLS  Cueing for control of Trendelenberg and not extending in spine as WB onto the R LE        Therapeutic Activity (95382)     Gait obstacle course  Included B feet step over object, fig 8 cones, tap foot on top of ball, kick ball ea foot, turn corners; SBA, walker   Side stepping Holding onto tall wall, CGA, cueing for how to weightshift to move feet   Flight of stairs : \"up with the good down, down with the bad\" CGA and B handrail             Pt edu  Going through scenarios of how to get up off the floor using WC, walker or other stable surface as well as prone versus supine. Alter-G pant size: XL    Therapeutic Exercise and NMR EXR  [x] (77849) Provided verbal/tactile cueing for activities related to strengthening, flexibility, endurance, ROM for improvements in LE, proximal hip, and core control with self care, mobility, lifting, ambulation.  [] (00424) Provided verbal/tactile cueing for activities related to improving balance, coordination, kinesthetic sense, posture, motor skill, proprioception  to assist with LE, proximal hip, and core control in self care, mobility, lifting, ambulation and eccentric single leg control.      NMR and Therapeutic Activities:    [] (66934 or 36570) Provided verbal/tactile cueing for activities related to improving balance, coordination, kinesthetic sense, posture, motor skill, proprioception and motor activation to allow for proper function of core, proximal hip and LE with self care and ADLs  [x] (35091) Gait Re-education- Provided training and instruction to the patient for proper LE, core and proximal hip recruitment and positioning and eccentric body weight control with ambulation re-education including up and down stairs     Home Exercise Program:    [] (66887) Reviewed/Progressed HEP activities related to strengthening, flexibility, endurance, ROM of core, proximal hip and LE for functional self-care, mobility, lifting and ambulation/stair navigation   [] (73175)Reviewed/Progressed HEP activities related to improving balance, coordination, kinesthetic sense, posture, motor skill, proprioception of core, proximal hip and LE for self care, mobility, lifting, and ambulation/stair navigation      Manual Treatments:  PROM / STM / Oscillations-Mobs:  G-I, II, III, IV (PA's, Inf., Post.)  [] (63469) Provided manual therapy to mobilize LE, proximal hip and/or LS spine soft tissue/joints for the purpose of modulating pain, promoting relaxation,  increasing ROM, reducing/eliminating soft tissue swelling/inflammation/restriction, improving soft tissue extensibility and allowing for proper ROM for normal function with self care, mobility, lifting and ambulation. Modalities:     [] GAME READY (VASO)- for significant edema, swelling, pain control. Charges:   Timed Code Treatment Minutes: 35   Total Treatment Minutes: 35       [] EVAL (LOW) 43631 (typically 20 minutes face-to-face)  [] EVAL (MOD) 09466 (typically 30 minutes face-to-face)  [] EVAL (HIGH) 94808 (typically 45 minutes face-to-face)  [] RE-EVAL     [x] HW(28079) x   2  [] IONTO  [] NMR (06532) x    [] VASO  [] Manual (14155) x      [] Other:  [] TA x      [] Mech Traction (66539)  [] ES(attended) (00059)      [] ES (un) (89379):   [x] Gait (20057) x 1    GOALS:   Patient stated goal: Walk again  [x] Progressing: can now walk 175ft in 10-15 min with walker [] Met: [] Not Met: [] Adjusted    Therapist goals for Patient:   Short Term Goals: To be achieved in: 2 weeks  1. Independent in HEP and progression per patient tolerance, in order to prevent re-injury. [x] Progressing: [] Met: [] Not Met: [] Adjusted  2. Patient will have a decrease in pain to facilitate improvement in movement, function, and ADLs as indicated by Functional Deficits. [x] Progressing: pt has pain during Wb and exercises but none once done. [] Met: [] Not Met: [] Adjusted    Long Term Goals: To be achieved in: 8 weeks  1. Disability index score of 30% or less for the LEFS to assist with reaching prior level of function. [x] Progressing: now only 44% disability [] Met: [] Not Met: [] Adjusted  2.  Patient will demonstrate increased AROM to full knee extension to allow for proper joint functioning as indicated by patients Functional Deficits. [] Progressing: [x] Met: knee flexion not obtainable due to fused knee but now has only -2 deg knee extension and has plateud at this range but allows the knee to be more stable with WB and improve LLD [] Not Met: [] Adjusted  3. Patient will demonstrate an increase in Strength to good proximal hip strength and control, within 5lb HHD in LE to allow for proper functional mobility as indicated by patients Functional Deficits. [x] Progressing: [] Met: [] Not Met: [] Adjusted  4. Patient will return to volunteer activities without increased symptoms or restriction. [] Progressing: [] Met: [x] Not Met: [] Adjusted  5. Pt will be able to ambulate with or without AD for at least 2 blocks or 30 minutes to improve community independence. [] Progressing: [] Met: [x] Not Met: [] Adjusted    Progression Towards Functional goals:  [x] Patient is progressing as expected towards functional goals listed. [] Progression is slowed due to complexities listed. [] Progression has been slowed due to co-morbidities. [] Plan just implemented, too soon to assess goals progression  [] Other:     Overall Progression Towards Functional goals/ Treatment Progress Update:  [x] Patient is progressing as expected towards functional goals listed. [] Progression is slowed due to complexities/Impairments listed. [] Progression has been slowed due to co-morbidities.   [] Plan just implemented, too soon to assess goals progression <30days   [] Goals require adjustment due to lack of progress  [] Patient is not progressing as expected and requires additional follow up with physician  [] Other    Prognosis for POC: [x] Good [] Fair  [] Poor      Patient requires continued skilled intervention: [x] Yes  [] No    Treatment/Activity Tolerance:  [x] Patient able to complete treatment  [x] Patient limited by fatigue  [] Patient limited by pain    [] Patient limited by other medical complications  [] Other:     ASSESSMENT:  Pt able to work at 80% weightbearing in alter-G today but with the increased load he was only able to walk 2 min at at time rather than 3. He continues to need cueing as well as further hip strengthening to address torso lateral lean for compensation Trendelenburg in gait. Pt was a little sore in knee after working at the 80% WB. He did request to end 10 min early out of feeling very fatigued and wanted to leave energy to be able to still walk his dog later today. PLAN:  Next visit: work on gait pattern and stability with quad cane. [x] Continue per plan of care [] Alter current plan (see comments above)   [] Plan of care initiated [] Hold pending MD visit [] Discharge      Electronically signed by:  Zulma Noel PT    Note: If patient does not return for scheduled/ recommended follow up visits, this note will serve as a discharge from care along with most recent update on progress.

## 2020-08-14 ENCOUNTER — HOSPITAL ENCOUNTER (OUTPATIENT)
Dept: PHYSICAL THERAPY | Age: 70
Setting detail: THERAPIES SERIES
Discharge: HOME | End: 2020-08-14
Payer: MEDICARE

## 2020-08-14 PROCEDURE — 97530 THERAPEUTIC ACTIVITIES: CPT

## 2020-08-14 PROCEDURE — 97116 GAIT TRAINING THERAPY: CPT

## 2020-08-14 NOTE — FLOWSHEET NOTE
[facility], [address]  Phone: [phone]  Fax [phone]        Physical Therapy Treatment Note/ Progress Report:           Date:  2020    Patient Name:  Navi Pan    :  1950  MRN: [id number]  Restrictions/Precautions:  R knee fused and knee spacer with severe LLD. Medical/Treatment Diagnosis Information:  · Diagnosis: M 25.561 R knee pain  · Treatment Diagnosis: M25.661 R knee stiffness, M82.81 R LE muscle weakness, Gait abnormality, R 38.1  Insurance/Certification information:  PT Insurance Information: Medicare/ Sara Campbell  Physician Information:  Referring Practitioner: Dr. Jaciel Stephens  Has the plan of care been signed (Y/N):        [x]  Yes  []  No     Date of Patient follow up with Physician: unknown      Is this a Progress Report:     []  Yes  [x]  No        If Yes:  Date Range for reporting period:  Beginning 2020  Ending     Progress report will be due (10 Rx or 30 days whichever is less): ,       Recertification will be due (POC Duration  / 90 days whichever is less): , 2020    Progress note next visit  Visit # Insurance Allowable Requires auth   25 Med nec    [x]no        []yes:     Functional Scale: LEFS: 16/80 = 20% functional ability, 80% disability   Date assessed: 2020           LEFS: 45/80 = 56% functional ability,  44% disability   Date assessed: 2020           LEFS: 37/80 = 46% functional ability, 54% disability   Date assessed: 2020  Therapy Diagnosis/Practice Pattern:     Number of Comorbidities:  []0     [x]1-2    []3+    Latex Allergy:  [x]NO      []YES  Preferred Language for Healthcare:   [x]English       []other:      Pain level:  0/10      SUBJECTIVE: Pt reports though he was so tired after last time that he had to go home a take a nap he had no knee pain. He brought in his quadcane to practice with today.  He looks forward to getting rid of the walker but also states some fear about how much less support he is going to have with the cane so he is a little apprehensive. He also admits he finds himself still staying in the [facility] to do things because its just easier and he needs his hands free to do a task. OBJECTIVE:    Observation:    Gait: with quad cane: able to do small reciprocal step but leans torso a little to left and still mild torso flexion, holds right arm rigid for balance without a natural arm swing, mild dec WB on R LE placing some WB support through the quad cane. Slightly unsteady gait requiring CGA.  Test measurments: Quad set: good     Knee ROM 5/29/2020 6/3/2020 6/17/2020 7/22/2020 7/27/2020   R knee extension pre-tx -15 deg -6   fused   R knee extension post-tx -8 deg -5 -2 deg 0 deg 0 deg     ROM LEFT RIGHT ROM 7/27/2020 Right   HIP Flex 127 127     Knee ext 0 -15  0 deg   Knee Flex 123 Fused knee  fused   Ankle PF 62 51     Ankle DF 0 -1     Strength  LEFT RIGHT  Right   HIP Flexors 5/5 4+/5  5/5   HIP Abductors 4/5 4/5  4/5   HIP Ext 5/5 3+/5  4+/5   Hip ER        Knee EXT (quad) 5/5 4/5 (quad set)  4+/5 (quad set)   Knee Flex (HS) 4/5 NT     Ankle DF 4/5 4/5     Ankle PF 5/5 5/5     Ankle Inv 4-/5 4-/5     Ankle EV 4-/5 3+/5           RESTRICTIONS/PRECAUTIONS: R knee fused and knee spacer with severe LLD. Exercises/Interventions:     Therapeutic Ex (34657) Rep/Sets/sec Notes/CUES   SLR, SLR/ w/ER, hip abd,  HEP 5/27   S/L hip abd with ext \"L\" HEP 6/5   Standing hip ext @hip machine, HEP 6/19; min A during R stance to control [place] and leg buckling.     Standing hip abd HEP 7/10   Stanidng HR/Toe raise  HEP 6/19   Gastroc stretch     Seated SLR, SLR w/ ER HEP 6/19   Seated ankle PF HEP 6/19, 7/10   Seated hip add ring     Tball bridge   Stopped due to pain in knee   Step-up  CGA and B handrail   Prone elbow prop for hip flexor stretch    Side stepping with 1/2 roller step-over    Prone hip ext  0# 2x20   Prone hip ext w/ abd (\"L\")    S/L hip abd PT brace at pelvis to not roll torso bwd   S/L hip circles PT brace at pelvis to not roll torso bwd   S/L LE ER PT brace at pelvis to not roll torso bwd   Supine ITB stretch    Supine HS stretch    Tricep dips  @ low plinth, cued for L shin perpendicular to ground and using L LE more.     1/2 wall push-ups 2x15    CC: tricep pulldown 7 plates 5R54    Pt edu   How to correlate over gait and posture things he learned into everyday walking   Manual Intervention (13592)               NMR re-education (86821)  CUES NEEDED   Tactile cueing to inc quad contract   Standing external pertubations Blue sports cord around waist   SLS, modified L foot up on 8', reach to anamaria to reduce UE assist and improve upright torso posture   L foot step overs, R SLS  With walker and CGA to progressive WB on the R LE   L foot touch various surfaces while R leg stance to progress WB tolerance and stability CGA, holding wall and/or walker   DL stance equal WB, ball toss Cues for equal WB   DL stance med ball axe chops Mirror to watch keeping hips equal btw legs/ equal WB   Tandem stance 3 cone tap  SBA, Cued for shifting weight onto R forward leg when leaning to tap cone   Standing on airex (one under ea foot) Cued for equal WB   Standing on airex DL arm swing    Standing on airex DL turn head    ALTER-G SLB  Cueing for control of Trendelenberg and not extending in spine as WB onto the R LE   R SLS w/ L 3 point tap (modified Y-balance)                    L foot ball taps to WB on R LE  Progressively less WB in UE to point of going single arm     Gait (29394)     Ambulation    Fwd wheel Walker, nearly full WB R LE; cued for upright posture    ALTER-G walking  Cueing or arm swing and upright posture controlling lateral lean   Alter-G fwd step to SLS  Cueing for control of Trendelenberg and not extending in spine as WB onto the R LE   Weight shifting w/ quadcane Side/side 1x20  Fwd step 1x10  Fwd step-through 1x10 @ 1/2 wall and CGA for all   Gait training with quad cane: 6x8ft (@ 1/2 wall)  3x58 ft gym floor  [id number] out to car CGA, Cued for pattern, cane placement, upright posture, smaller step size to improve stability. Edu on crossing threshold holds and uneven sidewalk surfaces, CGA                  Therapeutic Activity (05695)     Gait obstacle course  Included B feet step over object, fig 8 cones, tap foot on top of ball, kick ball ea foot, turn corners; SBA, walker   Side stepping Holding onto tall wall, CGA, cueing for how to weightshift to move feet   Flight of stairs : \"up with the good down, down with the bad\" w/ quad cane 2x 4 stepsCGA, unilateral handrail, quad cane   Multidirectional changes in direction (mimicing cooking in kitchen) 1x8 cones places around various surfaces for him to retrieve & stack w/ Quad cane, SBA   Walking w/ quad cane while holding unstable object in R hand 1x58ft SBA                  Pt edu  Going through scenarios of how to get up off the floor using WC, walker or other stable surface as well as prone versus supine. Alter-G pant size: XL    Therapeutic Exercise and NMR EXR  [] (47403) Provided verbal/tactile cueing for activities related to strengthening, flexibility, endurance, ROM for improvements in LE, proximal hip, and core control with self care, mobility, lifting, ambulation.  [] (63103) Provided verbal/tactile cueing for activities related to improving balance, coordination, kinesthetic sense, posture, motor skill, proprioception  to assist with LE, proximal hip, and core control in self care, mobility, lifting, ambulation and eccentric single leg control.      NMR and Therapeutic Activities:    [x] (59358 or 25612) Provided verbal/tactile cueing for activities related to improving balance, coordination, kinesthetic sense, posture, motor skill, proprioception and motor activation to allow for proper function of core, proximal hip and LE with self care and ADLs  [x] (04584) Gait Re-education- Provided training and instruction to the patient for proper LE, core and proximal hip recruitment and positioning and eccentric body weight control with ambulation re-education including up and down stairs     Home Exercise Program:    [x] (76738) Reviewed/Progressed HEP activities related to strengthening, flexibility, endurance, ROM of core, proximal hip and LE for functional self-care, mobility, lifting and ambulation/stair navigation   [] (81895)Reviewed/Progressed HEP activities related to improving balance, coordination, kinesthetic sense, posture, motor skill, proprioception of core, proximal hip and LE for self care, mobility, lifting, and ambulation/stair navigation      Manual Treatments:  PROM / STM / Oscillations-Mobs:  G-I, II, III, IV (PA's, Inf., Post.)  [] (29157) Provided manual therapy to mobilize LE, proximal hip and/or LS spine soft tissue/joints for the purpose of modulating pain, promoting relaxation,  increasing ROM, reducing/eliminating soft tissue swelling/inflammation/restriction, improving soft tissue extensibility and allowing for proper ROM for normal function with self care, mobility, lifting and ambulation. Modalities:     [] GAME READY (VASO)- for significant edema, swelling, pain control. Charges:   Timed Code Treatment Minutes: 45   Total Treatment Minutes: 45       [] EVAL (LOW) 98475 (typically 20 minutes face-to-face)  [] EVAL (MOD) 19821 (typically 30 minutes face-to-face)  [] EVAL (HIGH) 19276 (typically 45 minutes face-to-face)  [] RE-EVAL     [] UZ(03563) x    [] IONTO  [] NMR (27578) x    [] VASO  [] Manual (80968) x      [] Other:  [x] TA x   1   [] Mech Traction (59633)  [] ES(attended) (72338)      [] ES (un) (34567):   [x] Gait (15019) x 2    GOALS:   Patient stated goal: Walk again  [x] Progressing: can now walk 175ft in 10-15 min with walker [] Met: [] Not Met: [] Adjusted    Therapist goals for Patient:   Short Term Goals:  To be achieved in: 2 weeks  1. Independent in HEP and progression per patient tolerance, in order to prevent re-injury. [x] Progressing: [] Met: [] Not Met: [] Adjusted  2. Patient will have a decrease in pain to facilitate improvement in movement, function, and ADLs as indicated by Functional Deficits. [x] Progressing: pt has pain during Wb and exercises but none once done. [] Met: [] Not Met: [] Adjusted     Long Term Goals: To be achieved in: 8 weeks  1. Disability index score of 30% or less for the LEFS to assist with reaching prior level of function. [x] Progressing: now only 44% disability [] Met: [] Not Met: [] Adjusted  2. Patient will demonstrate increased AROM to full knee extension to allow for proper joint functioning as indicated by patients Functional Deficits. [] Progressing: [x] Met: knee flexion not obtainable due to fused knee but now has only -2 deg knee extension and has plateud at this range but allows the knee to be more stable with WB and improve LLD [] Not Met: [] Adjusted  3. Patient will demonstrate an increase in Strength to good proximal hip strength and control, within 5lb HHD in LE to allow for proper functional mobility as indicated by patients Functional Deficits. [x] Progressing: [] Met: [] Not Met: [] Adjusted  4. Patient will return to volunteer activities without increased symptoms or restriction. [] Progressing: [] Met: [x] Not Met: [] Adjusted  5. Pt will be able to ambulate with or without AD for at least 2 blocks or 30 minutes to improve community independence. [] Progressing: [] Met: [x] Not Met: [] Adjusted    Progression Towards Functional goals:  [x] Patient is progressing as expected towards functional goals listed. [] Progression is slowed due to complexities listed. [] Progression has been slowed due to co-morbidities.   [] Plan just implemented, too soon to assess goals progression   [] Other:     Overall Progression Towards Functional goals/ Treatment Progress Update:  [x] Patient is progressing as expected towards functional goals listed. [] Progression is slowed due to complexities/Impairments listed. [] Progression has been slowed due to co-morbidities. [] Plan just implemented, too soon to assess goals progression <30days   [] Goals require adjustment due to lack of progress  [] Patient is not progressing as expected and requires additional follow up with physician  [] Other    Prognosis for POC: [x] Good [] Fair  [] Poor      Patient requires continued skilled intervention: [x] Yes  [] No    Treatment/Activity Tolerance:  [x] Patient able to complete treatment  [x] Patient limited by fatigue  [] Patient limited by pain    [] Patient limited by other medical complications  [] Other:     ASSESSMENT:  Pt did not feel confident about applying ambulation with quad cane in his home setting and admits he still relies on sitting in wheelchair to fix meals in kitchen. He did not feel he could use quad cane to do this task but by my observation it seemed like a reasonable task to start doing at home. Therefore we worked on specific tasks he gave excuses for thinking he couldn't do and still relied on sitting in his WC for I set up small cones on various surfaces scattered around in a 8ft by 4ft space (small kitchen) and he had to retrieve and stack them doing many multi-directional changes in motion with the quad cane. We also ambulated across gym floor with a med ball balance on a cone (holding unstable object) to mimic carrying a glass of water in R hand without spilling and ambulating with the quadcane in the L arm. He had no LOB and no loss of holding the object. He surprised himself what he was capable of doing. I gave him a good talk though about how he needs to start applying our progress in here to his everyday life and only he can do that for himself. He needs to stop relying on the [facility] and built his functional strength and endurance.      PLAN:  Next visit: cont work on gait

## 2020-08-17 ENCOUNTER — HOSPITAL ENCOUNTER (OUTPATIENT)
Dept: PHYSICAL THERAPY | Age: 70
Setting detail: THERAPIES SERIES
Discharge: HOME | End: 2020-08-17
Payer: MEDICARE

## 2020-08-17 PROCEDURE — 97116 GAIT TRAINING THERAPY: CPT

## 2020-08-17 PROCEDURE — 97530 THERAPEUTIC ACTIVITIES: CPT

## 2020-08-17 NOTE — FLOWSHEET NOTE
[facility], [address] [place]  Phone: [phone]  Fax [phone]        Physical Therapy Treatment Note/ Progress Report:           Date:  2020    Patient Name:  Valentino Gregg    :  1950  MRN: [id number]  Restrictions/Precautions:  R knee fused and knee spacer with severe LLD. Medical/Treatment Diagnosis Information:  · Diagnosis: M 25.561 R knee pain  · Treatment Diagnosis: M25.661 R knee stiffness, M82.81 R LE muscle weakness, Gait abnormality, R 75.9  Insurance/Certification information:  PT Insurance Information: Medicare/ [employer]  Physician Information:  Referring Practitioner: Dr. Kim Calle  Has the plan of care been signed (Y/N):        [x]  Yes  []  No     Date of Patient follow up with Physician: unknown      Is this a Progress Report:     []  Yes  [x]  No        If Yes:  Date Range for reporting period:  Beginning 2020  Ending     Progress report will be due (10 Rx or 30 days whichever is less): ,       Recertification will be due (POC Duration  / 90 days whichever is less): , 2020    Progress note next visit  Visit # Insurance Allowable Requires auth   25 Med nec    [x]no        []yes:     Functional Scale: LEFS: 16/80 = 20% functional ability, 80% disability   Date assessed: 2020           LEFS: 45/80 = 56% functional ability,  44% disability   Date assessed: 2020           LEFS: 37/80 = 46% functional ability, 54% disability   Date assessed: 2020  Therapy Diagnosis/Practice Pattern:     Number of Comorbidities:  []0     [x]1-2    []3+    Latex Allergy:  [x]NO      []YES  Preferred Language for Healthcare:   [x]English       []other:      Pain level:  0/10      SUBJECTIVE: Pt reports he felt good last time. His muscles were sore but no knee pain. Pt however did not feel confident enough to try using the cane at home.  He is also still trying to figure out how to wean away from use of the WC. OBJECTIVE:    Observation:    Gait: with quad cane: able to do small reciprocal step but leans torso a little to left and still mild torso flexion, holds right arm rigid for balance without a natural arm swing, mild dec WB on R LE placing some WB support through the quad cane. Slightly unsteady gait requiring CGA.  Test measurments: Quad set: good     Knee ROM 5/29/2020 6/3/2020 6/17/2020 7/22/2020 7/27/2020   R knee extension pre-tx -15 deg -6   fused   R knee extension post-tx -8 deg -5 -2 deg 0 deg 0 deg     ROM LEFT RIGHT ROM 7/27/2020 Right   HIP Flex 127 127     Knee ext 0 -15  0 deg   Knee Flex 123 Fused knee  fused   Ankle PF 62 51     Ankle DF 0 -1     Strength  LEFT RIGHT  Right   HIP Flexors 5/5 4+/5  5/5   HIP Abductors 4/5 4/5  4/5   HIP Ext 5/5 3+/5  4+/5   Hip ER        Knee EXT (quad) 5/5 4/5 (quad set)  4+/5 (quad set)   Knee Flex (HS) 4/5 NT     Ankle DF 4/5 4/5     Ankle PF 5/5 5/5     Ankle Inv 4-/5 4-/5     Ankle EV 4-/5 3+/5           RESTRICTIONS/PRECAUTIONS: R knee fused and knee spacer with severe LLD. Exercises/Interventions:     Therapeutic Ex (40388) Rep/Sets/sec Notes/CUES   SLR, SLR/ w/ER, hip abd,  HEP 5/27   S/L hip abd with ext \"L\" HEP 6/5   Standing hip ext @hip machine, HEP 6/19; min A during R stance to control Trenedenburg and leg buckling.     Standing hip abd HEP 7/10   Stanidng HR/Toe raise  HEP 6/19   Gastroc stretch     Seated SLR, SLR w/ ER HEP 6/19   Seated ankle PF HEP 6/19, 7/10   Seated hip add ring     Tball bridge   Stopped due to pain in knee   Step-up  CGA and B handrail   Prone elbow prop for hip flexor stretch    Side stepping with 1/2 roller step-over    Prone hip ext     Prone hip ext w/ abd (\"L\")    S/L hip abd PT brace at pelvis to not roll torso bwd   S/L hip circles PT brace at pelvis to not roll torso bwd   S/L LE ER PT brace at pelvis to not roll torso bwd   Supine ITB stretch    Supine HS stretch    Tricep dips @ low plinth, cued for L shin perpendicular to ground and using L LE more. 1/2 wall push-ups 2x15    CC: tricep pulldown 7 plates [id number]    Pt edu   How to correlate over gait and posture things he learned into everyday walking   Manual Intervention (60521)               NMR re-education (41278)  CUES NEEDED   Tactile cueing to inc quad contract   Standing external pertubations Blue sports cord around waist   SLS, modified L foot up on 8', reach to anamaria to reduce UE assist and improve upright torso posture   L foot step overs, R SLS  With walker and CGA to progressive WB on the R LE   L foot touch various surfaces while R leg stance to progress WB tolerance and stability CGA, holding wall and/or walker   DL stance equal WB, ball toss Cues for equal WB   DL stance med ball axe chops Mirror to watch keeping hips equal btw legs/ equal WB   Tandem stance 3 cone tap  SBA, Cued for shifting weight onto R forward leg when leaning to tap cone   Standing on airex (one under ea foot) Cued for equal WB   Standing on airex DL arm swing    Standing on airex DL turn head    ALTER-G SLB  Cueing for control of Trendelenberg and not extending in spine as WB onto the R LE   R SLS w/ L 3 point tap (modified Y-balance)                    L foot ball taps to WB on R LE  Progressively less WB in UE to point of going single arm     Gait (81046)     Ambulation    Fwd wheel Walker, nearly full WB R LE; cued for upright posture    ALTER-G walking  Cueing or arm swing and upright posture controlling lateral lean   Alter-G fwd step to SLS  Cueing for control of Trendelenberg and not extending in spine as WB onto the R LE   Weight shifting w/ quadcane  @ 1/2 wall and CGA for all   Gait training with quad cane: 4x8ft fwd/bwd (@ 1/2 wall)  2x58 ft gym floor  [id number] out to car CGA, Cued for pattern, cane placement, upright posture, smaller step size to improve stability.   Edu on crossing threshold holds and uneven sidewalk surfaces, CGA Therapeutic Activity (97598)     Gait obstacle course 2x58ft Quadcane, CGA, step over object, fig 8 cones, kick ball ea foot, turn corners; Side stepping Holding onto tall wall, CGA, cueing for how to weightshift to move feet   Flight of stairs : \"up with the good down, down with the bad\" w/ quad cane 2x2x 4 stepsCGA, unilateral handrail, quad cane   Multidirectional changes in direction (mimicing cooking in kitchen) 1x8 cones places around various surfaces for him to retrieve & stack w/ Quad cane, SBA   Walking w/ quad cane while holding unstable object in R hand 1x58ft SBA                  Pt edu  Going through scenarios of how to get up off the floor using WC, walker or other stable surface as well as prone versus supine. Alter-G pant size: XL    Therapeutic Exercise and NMR EXR  [] (89380) Provided verbal/tactile cueing for activities related to strengthening, flexibility, endurance, ROM for improvements in LE, proximal hip, and core control with self care, mobility, lifting, ambulation.  [] (33151) Provided verbal/tactile cueing for activities related to improving balance, coordination, kinesthetic sense, posture, motor skill, proprioception  to assist with LE, proximal hip, and core control in self care, mobility, lifting, ambulation and eccentric single leg control.      NMR and Therapeutic Activities:    [x] (03109 or 33988) Provided verbal/tactile cueing for activities related to improving balance, coordination, kinesthetic sense, posture, motor skill, proprioception and motor activation to allow for proper function of core, proximal hip and LE with self care and ADLs  [x] (36309) Gait Re-education- Provided training and instruction to the patient for proper LE, core and proximal hip recruitment and positioning and eccentric body weight control with ambulation re-education including up and down stairs     Home Exercise Program:    [x] (09475) Reviewed/Progressed HEP activities related to strengthening, flexibility, endurance, ROM of core, proximal hip and LE for functional self-care, mobility, lifting and ambulation/stair navigation   [] (78471)Reviewed/Progressed HEP activities related to improving balance, coordination, kinesthetic sense, posture, motor skill, proprioception of core, proximal hip and LE for self care, mobility, lifting, and ambulation/stair navigation      Manual Treatments:  PROM / STM / Oscillations-Mobs:  G-I, II, III, IV (PA's, Inf., Post.)  [] (62866) Provided manual therapy to mobilize LE, proximal hip and/or LS spine soft tissue/joints for the purpose of modulating pain, promoting relaxation,  increasing ROM, reducing/eliminating soft tissue swelling/inflammation/restriction, improving soft tissue extensibility and allowing for proper ROM for normal function with self care, mobility, lifting and ambulation. Modalities:     [] GAME READY (VASO)- for significant edema, swelling, pain control. Charges:   Timed Code Treatment Minutes: 45   Total Treatment Minutes: 45       [] EVAL (LOW) 99750 (typically 20 minutes face-to-face)  [] EVAL (MOD) 57728 (typically 30 minutes face-to-face)  [] EVAL (HIGH) 12236 (typically 45 minutes face-to-face)  [] RE-EVAL     [] VS(44195) x    [] IONTO  [] NMR (14294) x    [] VASO  [] Manual (20148) x      [] Other:  [x] TA x   1   [] Mech Traction (88435)  [] ES(attended) (20309)      [] ES (un) (11254):   [x] Gait (05719) x 2    GOALS:   Patient stated goal: Walk again  [x] Progressing: can now walk 175ft in 10-15 min with walker [] Met: [] Not Met: [] Adjusted    Therapist goals for Patient:   Short Term Goals: To be achieved in: 2 weeks  1. Independent in HEP and progression per patient tolerance, in order to prevent re-injury. [x] Progressing: [] Met: [] Not Met: [] Adjusted  2. Patient will have a decrease in pain to facilitate improvement in movement, function, and ADLs as indicated by Functional Deficits.   [x] Progressing: pt has pain during Wb and exercises but none once done. [] Met: [] Not Met: [] Adjusted     Long Term Goals: To be achieved in: 8 weeks  1. Disability index score of 30% or less for the LEFS to assist with reaching prior level of function. [x] Progressing: now only 44% disability [] Met: [] Not Met: [] Adjusted  2. Patient will demonstrate increased AROM to full knee extension to allow for proper joint functioning as indicated by patients Functional Deficits. [] Progressing: [x] Met: knee flexion not obtainable due to fused knee but now has only -2 deg knee extension and has plateud at this range but allows the knee to be more stable with WB and improve LLD [] Not Met: [] Adjusted  3. Patient will demonstrate an increase in Strength to good proximal hip strength and control, within 5lb HHD in LE to allow for proper functional mobility as indicated by patients Functional Deficits. [x] Progressing: [] Met: [] Not Met: [] Adjusted  4. Patient will return to volunteer activities without increased symptoms or restriction. [] Progressing: [] Met: [x] Not Met: [] Adjusted  5. Pt will be able to ambulate with or without AD for at least 2 blocks or 30 minutes to improve community independence. [] Progressing: [] Met: [x] Not Met: [] Adjusted    Progression Towards Functional goals:  [x] Patient is progressing as expected towards functional goals listed. [] Progression is slowed due to complexities listed. [] Progression has been slowed due to co-morbidities. [] Plan just implemented, too soon to assess goals progression   [] Other:     Overall Progression Towards Functional goals/ Treatment Progress Update:  [x] Patient is progressing as expected towards functional goals listed. [] Progression is slowed due to complexities/Impairments listed. [] Progression has been slowed due to co-morbidities.   [] Plan just implemented, too soon to assess goals progression <30days   [] Goals require adjustment due to lack of progress  [] Patient is not progressing as expected and requires additional follow up with physician  [] Other    Prognosis for POC: [x] Good [] Fair  [] Poor      Patient requires continued skilled intervention: [x] Yes  [] No    Treatment/Activity Tolerance:  [x] Patient able to complete treatment  [x] Patient limited by fatigue  [] Patient limited by pain    [] Patient limited by other medical complications  [] Other:     ASSESSMENT:  Focus on gait training with qaud cane primarily today with needed frequent cues for upright posture and to look out head of him and not at the floor. Also while doing functional activities explained how I am designing these task to mimic having to gather ingredients in kitchen or to walk around bedroom/bathroom to get ready in morning. Showed him he is capable of these tasks and that he really needs to work on implementing this ability to his daily life tasks rather than keep relying on the wheelchair or even the walker. I encouraged him to use the cane when around home and then walker when in yard or community. He needs to spend more time out of WC in order to build his overall endurance for life tasks. PLAN:  Next visit: progression with quad cane, side stepping, mimic walking dog, pick/up and down bog bowls. [x] Continue per plan of care [] Alter current plan (see comments above)   [] Plan of care initiated [] Hold pending MD visit [] Discharge      Electronically signed by:  Arielle Cedillo PT    Note: If patient does not return for scheduled/ recommended follow up visits, this note will serve as a discharge from care along with most recent update on progress.

## 2020-08-19 ENCOUNTER — HOSPITAL ENCOUNTER (OUTPATIENT)
Dept: PHYSICAL THERAPY | Age: 70
Setting detail: THERAPIES SERIES
Discharge: HOME | End: 2020-08-19
Payer: MEDICARE

## 2020-08-19 PROCEDURE — 97116 GAIT TRAINING THERAPY: CPT

## 2020-08-19 PROCEDURE — 97530 THERAPEUTIC ACTIVITIES: CPT

## 2020-08-19 NOTE — FLOWSHEET NOTE
to use walker for first time to walk on his small hillside in his yard as well this weekend. Pt reports though being very fearful of falling when walking with cane because once he kicked the cane when walking at home and felt really unsteady. OBJECTIVE:    Observation:    Gait: with quad cane: able to do small reciprocal step but leans torso a little to left and still mild torso flexion, holds right arm rigid for balance without a natural arm swing, mild dec WB on R LE placing some WB support through the quad cane. Slightly unsteady gait requiring CGA.  Test measurments: Quad set: good     Knee ROM 5/29/2020 6/3/2020 6/17/2020 7/22/2020 7/27/2020   R knee extension pre-tx -15 deg -6   fused   R knee extension post-tx -8 deg -5 -2 deg 0 deg 0 deg     ROM LEFT RIGHT ROM 7/27/2020 Right   HIP Flex 127 127     Knee ext 0 -15  0 deg   Knee Flex 123 Fused knee  fused   Ankle PF 62 51     Ankle DF 0 -1     Strength  LEFT RIGHT  Right   HIP Flexors 5/5 4+/5  5/5   HIP Abductors 4/5 4/5  4/5   HIP Ext 5/5 3+/5  4+/5   Hip ER        Knee EXT (quad) 5/5 4/5 (quad set)  4+/5 (quad set)   Knee Flex (HS) 4/5 NT     Ankle DF 4/5 4/5     Ankle PF 5/5 5/5     Ankle Inv 4-/5 4-/5     Ankle EV 4-/5 3+/5           RESTRICTIONS/PRECAUTIONS: R knee fused and knee spacer with severe LLD. Exercises/Interventions:     Therapeutic Ex (21232) Rep/Sets/sec Notes/CUES   SLR, SLR/ w/ER, hip abd,  HEP 5/27   S/L hip abd with ext \"L\" HEP 6/5   Standing hip ext @hip machine, HEP 6/19; min A during R stance to control Trenedenburg and leg buckling.     Standing hip abd HEP 7/10   Stanidng HR/Toe raise  HEP 6/19   Gastroc stretch     Seated SLR, SLR w/ ER HEP 6/19   Seated ankle PF HEP 6/19, 7/10   Seated hip add ring     Tball bridge   Stopped due to pain in knee   Stair taps 6\" 2x15 W/ quadcane, L foot tap stair and WB on R   Step-up  CGA and B handrail   Prone elbow prop for hip flexor stretch    Side stepping with 1/2 roller step-over for all   Gait training with quad cane:   1x58 ft gym floor  [id number] out to car CGA, Cued for pattern, cane placement, upright posture, smaller step size to improve stability. Edu on crossing threshold holds and uneven sidewalk surfaces, CGA                  Therapeutic Activity (76123)     Gait obstacle course 3x58ft Quadcane, CGA, step over object, fig 8 cones, kick ball ea foot, pick-up 5 lbs (dog bowl),  turn corners; Side stepping [id number], SBA, cueing for how to weightshift to move feet   Flight of stairs : \"up with the good down, down with the bad\" w/ quad cane 2x2x 4 stepsCGA, unilateral handrail, quad cane   Multidirectional changes in direction (mimicing cooking in kitchen) 1x8 cones places around various surfaces for him to retrieve & stack w/ Quad cane, SBA   Walking w/ quad cane while holding unstable object in R hand 2x58ft SBA                  Pt edu  Going through scenarios of how to get up off the floor using WC, walker or other stable surface as well as prone versus supine. Alter-G pant size: XL    Therapeutic Exercise and NMR EXR  [] (71955) Provided verbal/tactile cueing for activities related to strengthening, flexibility, endurance, ROM for improvements in LE, proximal hip, and core control with self care, mobility, lifting, ambulation.  [] (06673) Provided verbal/tactile cueing for activities related to improving balance, coordination, kinesthetic sense, posture, motor skill, proprioception  to assist with LE, proximal hip, and core control in self care, mobility, lifting, ambulation and eccentric single leg control.      NMR and Therapeutic Activities:    [x] (27878 or 61538) Provided verbal/tactile cueing for activities related to improving balance, coordination, kinesthetic sense, posture, motor skill, proprioception and motor activation to allow for proper function of core, proximal hip and LE with self care and ADLs  [x] (58223) Gait Re-education- Provided training and instruction to the patient for proper LE, core and proximal hip recruitment and positioning and eccentric body weight control with ambulation re-education including up and down stairs     Home Exercise Program:    [x] (99161) Reviewed/Progressed HEP activities related to strengthening, flexibility, endurance, ROM of core, proximal hip and LE for functional self-care, mobility, lifting and ambulation/stair navigation   [] (52102)Reviewed/Progressed HEP activities related to improving balance, coordination, kinesthetic sense, posture, motor skill, proprioception of core, proximal hip and LE for self care, mobility, lifting, and ambulation/stair navigation      Manual Treatments:  PROM / STM / Oscillations-Mobs:  G-I, II, III, IV (PA's, Inf., Post.)  [] (10723) Provided manual therapy to mobilize LE, proximal hip and/or LS spine soft tissue/joints for the purpose of modulating pain, promoting relaxation,  increasing ROM, reducing/eliminating soft tissue swelling/inflammation/restriction, improving soft tissue extensibility and allowing for proper ROM for normal function with self care, mobility, lifting and ambulation. Modalities:     [] GAME READY (VASO)- for significant edema, swelling, pain control. Charges:   Timed Code Treatment Minutes: 45   Total Treatment Minutes: 45       [] EVAL (LOW) 17483 (typically 20 minutes face-to-face)  [] EVAL (MOD) 60724 (typically 30 minutes face-to-face)  [] EVAL (HIGH) 30933 (typically 45 minutes face-to-face)  [] RE-EVAL     [] LM(75524) x    [] IONTO  [] NMR (48205) x    [] VASO  [] Manual (99066) x      [] Other:  [x] TA x   1   [] Mech Traction (32402)  [] ES(attended) (57215)      [] ES (un) (60556):   [x] Gait (26656) x 2    GOALS:   Patient stated goal: Walk again  [x] Progressing: can now walk 175ft in 10-15 min with walker [] Met: [] Not Met: [] Adjusted    Therapist goals for Patient:   Short Term Goals: To be achieved in: 2 weeks  1.  Independent in HEP and progression per patient tolerance, in order to prevent re-injury. [x] Progressing: [] Met: [] Not Met: [] Adjusted  2. Patient will have a decrease in pain to facilitate improvement in movement, function, and ADLs as indicated by Functional Deficits. [x] Progressing: pt has pain during Wb and exercises but none once done. [] Met: [] Not Met: [] Adjusted     Long Term Goals: To be achieved in: 8 weeks  1. Disability index score of 30% or less for the LEFS to assist with reaching prior level of function. [x] Progressing: now only 44% disability [] Met: [] Not Met: [] Adjusted  2. Patient will demonstrate increased AROM to full knee extension to allow for proper joint functioning as indicated by patients Functional Deficits. [] Progressing: [x] Met: knee flexion not obtainable due to fused knee but now has only -2 deg knee extension and has plateud at this range but allows the knee to be more stable with WB and improve LLD [] Not Met: [] Adjusted  3. Patient will demonstrate an increase in Strength to good proximal hip strength and control, within 5lb HHD in LE to allow for proper functional mobility as indicated by patients Functional Deficits. [x] Progressing: [] Met: [] Not Met: [] Adjusted  4. Patient will return to volunteer activities without increased symptoms or restriction. [] Progressing: [] Met: [x] Not Met: [] Adjusted  5. Pt will be able to ambulate with or without AD for at least 2 blocks or 30 minutes to improve community independence. [] Progressing: [] Met: [x] Not Met: [] Adjusted    Progression Towards Functional goals:  [x] Patient is progressing as expected towards functional goals listed. [] Progression is slowed due to complexities listed. [] Progression has been slowed due to co-morbidities.   [] Plan just implemented, too soon to assess goals progression   [] Other:     Overall Progression Towards Functional goals/ Treatment Progress Update:  [x] Patient is progressing as expected towards functional goals listed. [] Progression is slowed due to complexities/Impairments listed. [] Progression has been slowed due to co-morbidities. [] Plan just implemented, too soon to assess goals progression <30days   [] Goals require adjustment due to lack of progress  [] Patient is not progressing as expected and requires additional follow up with physician  [] Other    Prognosis for POC: [x] Good [] Fair  [] Poor      Patient requires continued skilled intervention: [x] Yes  [] No    Treatment/Activity Tolerance:  [x] Patient able to complete treatment  [x] Patient limited by fatigue  [] Patient limited by pain    [] Patient limited by other medical complications  [] Other:     ASSESSMENT:  Pt was able to increased overall amount of work today with adding laps/reps to a couple of tasks as well as adding stair taps and sidestepping with the cane. He needed frequent cueing and education about upright posture and looking out ahead of him rather than down at the floor which he says he doesn't out of fear of falling. He had no pain today but did feel extremely fatigued. PLAN:  Next visit: progression with quad cane, mimic walking dog, pick/up and down bog bowls. [x] Continue per plan of care [] Alter current plan (see comments above)   [] Plan of care initiated [] Hold pending MD visit [] Discharge      Electronically signed by:  Trina Lao PT    Note: If patient does not return for scheduled/ recommended follow up visits, this note will serve as a discharge from care along with most recent update on progress.

## 2020-08-21 ENCOUNTER — HOSPITAL ENCOUNTER (OUTPATIENT)
Dept: PHYSICAL THERAPY | Age: 70
Setting detail: THERAPIES SERIES
Discharge: HOME | End: 2020-08-21
Payer: MEDICARE

## 2020-08-21 PROCEDURE — 97116 GAIT TRAINING THERAPY: CPT

## 2020-08-21 PROCEDURE — 97530 THERAPEUTIC ACTIVITIES: CPT

## 2020-08-21 NOTE — FLOWSHEET NOTE
[facility],  [address]  Phone: [phone]  Fax [phone]        Physical Therapy Treatment Note/ Progress Report:           Date:  2020    Patient Name:  Steph Sorenson    :  1950  MRN: [id number]  Restrictions/Precautions:  R knee fused and knee spacer with severe LLD.   Medical/Treatment Diagnosis Information:  · Diagnosis: M 25.561 R knee pain  · Treatment Diagnosis: M25.661 R knee stiffness, M82.81 R LE muscle weakness, Gait abnormality, R 91.5  Insurance/Certification information:  PT Insurance Information: Medicare/ [employer]  Physician Information:  Referring Practitioner: Dr. Ching  Has the plan of care been signed (Y/N):        [x]  Yes  []  No     Date of Patient follow up with Physician: unknown      Is this a Progress Report:     []  Yes  [x]  No        If Yes:  Date Range for reporting period:  Beginning 2020  Ending     Progress report will be due (10 Rx or 30 days whichever is less): ,       Recertification will be due (POC Duration  / 90 days whichever is less): , 2020    Progress note next visit  Visit # Insurance Allowable Requires auth   32 Med nec    [x]no        []yes:     Functional Scale: LEFS: 16/80 = 20% functional ability, 80% disability   Date assessed: 2020           LEFS: 45/80 = 56% functional ability,  44% disability   Date assessed: 2020           LEFS: 37/80 = 46% functional ability, 54% disability   Date assessed: 2020  Therapy Diagnosis/Practice Pattern:     Number of Comorbidities:  []0     [x]1-2    []3+    Latex Allergy:  [x]NO      []YES  Preferred Language for Healthcare:   [x]English       []other:      Pain level: Pt had 4/10 pain in tibia and thigh muscles after last visit but back at 0/10      SUBJECTIVE: Pt reports he was very sore and tired after last session to the point that he went home and took a 2 hour nap and was pretty sore in thigh muscle most of the whole next day. He has been working on using WC less around the house and proud to say he will go several hours a time doing things around the house or taking puppy out the yard using his walker now instead of the R Leidy Kessler 23. He is still not confident using the cane at home though yet. OBJECTIVE:    Observation:    Gait: with quad cane: able to do small reciprocal step but leans torso a little to left and still mild torso flexion, holds right arm rigid for balance without a natural arm swing, mild dec WB on R LE placing some WB support through the quad cane. Slightly unsteady gait requiring CGA.  Test measurments: Quad set: good     Knee ROM 5/29/2020 6/3/2020 6/17/2020 7/22/2020 7/27/2020   R knee extension pre-tx -15 deg -6   fused   R knee extension post-tx -8 deg -5 -2 deg 0 deg 0 deg     ROM LEFT RIGHT ROM 7/27/2020 Right   HIP Flex 127 127     Knee ext 0 -15  0 deg   Knee Flex 123 Fused knee  fused   Ankle PF 62 51     Ankle DF 0 -1     Strength  LEFT RIGHT  Right   HIP Flexors 5/5 4+/5  5/5   HIP Abductors 4/5 4/5  4/5   HIP Ext 5/5 3+/5  4+/5   Hip ER        Knee EXT (quad) 5/5 4/5 (quad set)  4+/5 (quad set)   Knee Flex (HS) 4/5 NT     Ankle DF 4/5 4/5     Ankle PF 5/5 5/5     Ankle Inv 4-/5 4-/5     Ankle EV 4-/5 3+/5           RESTRICTIONS/PRECAUTIONS: R knee fused and knee spacer with severe LLD. Exercises/Interventions:     Therapeutic Ex (78732) Rep/Sets/sec Notes/CUES   SLR, SLR/ w/ER, hip abd,  HEP 5/27   S/L hip abd with ext \"L\" HEP 6/5   Standing hip ext @hip machine, HEP 6/19; min A during R stance to control Trenedenburg and leg buckling.     Standing hip abd HEP 7/10   Stanidng HR/Toe raise  HEP 6/19   Gastroc stretch     Seated SLR, SLR w/ ER HEP 6/19   Seated ankle PF HEP 6/19, 7/10   Seated hip add ring     Tball bridge   Stopped due to pain in knee   Stair taps  W/ quadcane, L foot tap stair and WB on R   Step-up  CGA and B handrail   Prone elbow not extending in spine as WB onto the R LE   Weight shifting w/ quadcane  @ 1/2 wall and CGA for all   Gait training with quad cane:   1x58 ft gym floor  [id number] out to car CGA, Cued for pattern, cane placement, upright posture, smaller step size to improve stability. Edu on crossing threshold holds and uneven sidewalk surfaces, CGA                  Therapeutic Activity (25292)     Gait obstacle course 2x58ft Quadcane, CGA, step over object, fig 8 cones, kick ball ea foot, pick-up 5 lbs (dog bowl),  turn corners; Side stepping [id number], SBA, cueing for how to weightshift to move feet   Flight of stairs : \"up with the good down, down with the bad\" w/ quad cane 1x2x 4 stepsCGA, unilateral handrail, quad cane   Multidirectional changes in direction (mimicing cooking in kitchen) 1x8 cones places around various surfaces for him to retrieve & stack w/ Quad cane, SBA   Walking w/ quad cane while holding unstable object in R hand  SBA                  Pt edu  Going through scenarios of how to get up off the floor using WC, walker or other stable surface as well as prone versus supine. Alter-G pant size: XL    Therapeutic Exercise and NMR EXR  [] (77339) Provided verbal/tactile cueing for activities related to strengthening, flexibility, endurance, ROM for improvements in LE, proximal hip, and core control with self care, mobility, lifting, ambulation.  [] (48029) Provided verbal/tactile cueing for activities related to improving balance, coordination, kinesthetic sense, posture, motor skill, proprioception  to assist with LE, proximal hip, and core control in self care, mobility, lifting, ambulation and eccentric single leg control.      NMR and Therapeutic Activities:    [x] (12746 or 20990) Provided verbal/tactile cueing for activities related to improving balance, coordination, kinesthetic sense, posture, motor skill, proprioception and motor activation to allow for proper function of core, proximal hip and LE with self care and ADLs  [x] (79435) Gait Re-education- Provided training and instruction to the patient for proper LE, core and proximal hip recruitment and positioning and eccentric body weight control with ambulation re-education including up and down stairs     Home Exercise Program:    [] (75555) Reviewed/Progressed HEP activities related to strengthening, flexibility, endurance, ROM of core, proximal hip and LE for functional self-care, mobility, lifting and ambulation/stair navigation   [] (01040)Reviewed/Progressed HEP activities related to improving balance, coordination, kinesthetic sense, posture, motor skill, proprioception of core, proximal hip and LE for self care, mobility, lifting, and ambulation/stair navigation      Manual Treatments:  PROM / STM / Oscillations-Mobs:  G-I, II, III, IV (PA's, Inf., Post.)  [] (16031) Provided manual therapy to mobilize LE, proximal hip and/or LS spine soft tissue/joints for the purpose of modulating pain, promoting relaxation,  increasing ROM, reducing/eliminating soft tissue swelling/inflammation/restriction, improving soft tissue extensibility and allowing for proper ROM for normal function with self care, mobility, lifting and ambulation. Modalities:     [] GAME READY (VASO)- for significant edema, swelling, pain control.      Charges:   Timed Code Treatment Minutes: 45   Total Treatment Minutes: 45       [] EVAL (LOW) 03746 (typically 20 minutes face-to-face)  [] EVAL (MOD) 50051 (typically 30 minutes face-to-face)  [] EVAL (HIGH) 96233 (typically 45 minutes face-to-face)  [] RE-EVAL     [] FZ(85107) x    [] IONTO  [] NMR (46817) x    [] VASO  [] Manual (53972) x      [] Other:  [x] TA x   1   [] Mech Traction (84886)  [] ES(attended) (92307)      [] ES (un) (47721):   [x] Gait (38889) x 2    GOALS:   Patient stated goal: Walk again  [x] Progressing: can now walk 175ft in 10-15 min with walker [] Met: [] Not Met: [] Adjusted    Therapist goals for Patient:   Short Term Goals: To be achieved in: 2 weeks  1. Independent in HEP and progression per patient tolerance, in order to prevent re-injury. [x] Progressing: [] Met: [] Not Met: [] Adjusted  2. Patient will have a decrease in pain to facilitate improvement in movement, function, and ADLs as indicated by Functional Deficits. [x] Progressing: pt has pain during Wb and exercises but none once done. [] Met: [] Not Met: [] Adjusted     Long Term Goals: To be achieved in: 8 weeks  1. Disability index score of 30% or less for the LEFS to assist with reaching prior level of function. [x] Progressing: now only 44% disability [] Met: [] Not Met: [] Adjusted  2. Patient will demonstrate increased AROM to full knee extension to allow for proper joint functioning as indicated by patients Functional Deficits. [] Progressing: [x] Met: knee flexion not obtainable due to fused knee but now has only -2 deg knee extension and has plateud at this range but allows the knee to be more stable with WB and improve LLD [] Not Met: [] Adjusted  3. Patient will demonstrate an increase in Strength to good proximal hip strength and control, within 5lb HHD in LE to allow for proper functional mobility as indicated by patients Functional Deficits. [x] Progressing: [] Met: [] Not Met: [] Adjusted  4. Patient will return to volunteer activities without increased symptoms or restriction. [] Progressing: [] Met: [x] Not Met: [] Adjusted  5. Pt will be able to ambulate with or without AD for at least 2 blocks or 30 minutes to improve community independence. [] Progressing: [] Met: [x] Not Met: [] Adjusted    Progression Towards Functional goals:  [x] Patient is progressing as expected towards functional goals listed. [] Progression is slowed due to complexities listed. [] Progression has been slowed due to co-morbidities.   [] Plan just implemented, too soon to assess goals progression   [] Other:     Overall Progression Towards

## 2020-08-24 ENCOUNTER — HOSPITAL ENCOUNTER (OUTPATIENT)
Dept: PHYSICAL THERAPY | Age: 70
Setting detail: THERAPIES SERIES
Discharge: HOME | End: 2020-08-24
Payer: MEDICARE

## 2020-08-24 PROCEDURE — 97110 THERAPEUTIC EXERCISES: CPT

## 2020-08-24 PROCEDURE — 97530 THERAPEUTIC ACTIVITIES: CPT

## 2020-08-24 PROCEDURE — 97116 GAIT TRAINING THERAPY: CPT

## 2020-08-24 NOTE — FLOWSHEET NOTE
and uses the wheelchair very little. He even walked his [place] and some on the [place] with his walker this weekend and went a further distance than he has ever done. He doesn't feel confident though to even try to use the cane at all around his home. He is also worried because his knee continues to feel stiff in the mornings for about 5-10 min and has been sore when WB during the weekend and today. OBJECTIVE:    Observation:    Gait: with quad cane: cont to need nearly constant cueing to look up and not at floor and upright torso posture as well as to take equal jorge and \"smooth out\" his gait pattern rather than \"hobble\"   Test measurments: Quad set: good-     Knee ROM 5/29/2020 6/3/2020 6/17/2020 7/22/2020 7/27/2020   R knee extension pre-tx -15 deg -6   fused   R knee extension post-tx -8 deg -5 -2 deg 0 deg 0 deg     ROM LEFT RIGHT ROM 7/27/2020 Right   HIP Flex 127 127     Knee ext 0 -15  0 deg   Knee Flex 123 Fused knee  fused   Ankle PF 62 51     Ankle DF 0 -1     Strength  LEFT RIGHT  Right   HIP Flexors 5/5 4+/5  5/5   HIP Abductors 4/5 4/5  4/5   HIP Ext 5/5 3+/5  4+/5   Hip ER        Knee EXT (quad) 5/5 4/5 (quad set)  4+/5 (quad set)   Knee Flex (HS) 4/5 NT     Ankle DF 4/5 4/5     Ankle PF 5/5 5/5     Ankle Inv 4-/5 4-/5     Ankle EV 4-/5 3+/5           RESTRICTIONS/PRECAUTIONS: R knee fused and knee spacer with severe LLD. Exercises/Interventions:     Therapeutic Ex (55619) Rep/Sets/sec Notes/CUES   SLR, SLR/ w/ER, hip abd,  HEP 5/27   S/L hip abd with ext \"L\" HEP 6/5   Standing hip ext @hip machine, HEP 6/19; min A during R stance to control Trenedenburg and leg buckling.     Standing hip abd HEP 7/10   Stanidng HR/Toe raise  HEP 6/19   Gastroc stretch     Seated SLR, SLR w/ ER HEP 6/19   Seated ankle PF HEP 6/19, 7/10   Seated hip add ring     Tball bridge   Stopped due to pain in knee   Stair taps  W/ quadcane, L foot tap stair and WB on R   Step-up  CGA and B handrail   Prone elbow prop for hip flexor stretch    Side stepping with 1/2 roller step-over    Prone hip ext     Prone hip ext w/ abd (\"L\")    S/L hip abd PT brace at pelvis to not roll torso bwd   S/L hip circles PT brace at pelvis to not roll torso bwd   S/L LE ER PT brace at pelvis to not roll torso bwd   Supine ITB stretch    Supine HS stretch    Tricep dips  @ low plinth, cued for L shin perpendicular to ground and using L LE more.     1/2 wall push-ups     CC: tricep pulldown     Pt edu  15' See dialogue below in assessment   Manual Intervention ([phone])               NMR re-education (27890)  CUES NEEDED   Tactile cueing to inc quad contract   Standing external pertubations Blue sports cord around waist   SLS, modified L foot up on 8', reach to anamaria to reduce UE assist and improve upright torso posture   L foot step overs, R SLS  With walker and CGA to progressive WB on the R LE   L foot touch various surfaces while R leg stance to progress WB tolerance and stability CGA, holding wall and/or walker   DL stance equal WB, ball toss Cues for equal WB   DL stance med ball axe chops Mirror to watch keeping hips equal btw legs/ equal WB   Tandem stance 3 cone tap  SBA, Cued for shifting weight onto R forward leg when leaning to tap cone   Standing on airex (one under ea foot) Cued for equal WB   Standing on airex DL arm swing    Standing on airex DL turn head    ALTER-G SLB  Cueing for control of Trendelenberg and not extending in spine as WB onto the R LE   R SLS w/ L 3 point tap (modified Y-balance)                         L foot ball taps to WB on R LE  Progressively less WB in UE to point of going single arm     Gait (11446)     Ambulation    Fwd wheel Walker, nearly full WB R LE; cued for upright posture    ALTER-G walking  Cueing or arm swing and upright posture controlling lateral lean   Alter-G fwd step to SLS  Cueing for control of Trendelenberg and not extending in spine as WB onto the R LE   Weight shifting w/ quadcane  @ 1/2 wall and CGA for all   Gait training with quad cane:   1x58 ft gym floor  [id number] out to car  [id number] around clinic SBA, Cued for pattern, cane placement, upright posture, smaller step size to improve stability. Edu on crossing threshold holds and uneven Cued for sidewalk surfaces, SBA  CGA, cued for posture                  Therapeutic Activity (02786)     Gait obstacle course 2x58ft Quadcane, CGA, step over object, fig 8 cones, kick ball ea foot, pick-up 5 lbs (dog bowl),  turn corners; Side stepping Quadcane, SBA, cueing for how to weightshift to move feet   Flight of stairs : \"up with the good down, down with the bad\" w/ quad cane 2x2x 4 stepsCGA, unilateral handrail, quad cane   Multidirectional changes in direction (mimicing cooking in kitchen)  w/ Quad cane, SBA   Walking w/ quad cane while holding unstable object in R hand  SBA                  Pt edu  Going through scenarios of how to get up off the floor using WC, walker or other stable surface as well as prone versus supine. Alter-G pant size: XL    Therapeutic Exercise and NMR EXR  [x] (96143) Provided verbal/tactile cueing for activities related to strengthening, flexibility, endurance, ROM for improvements in LE, proximal hip, and core control with self care, mobility, lifting, ambulation.  [] (34734) Provided verbal/tactile cueing for activities related to improving balance, coordination, kinesthetic sense, posture, motor skill, proprioception  to assist with LE, proximal hip, and core control in self care, mobility, lifting, ambulation and eccentric single leg control.      NMR and Therapeutic Activities:    [x] (12362 or 74086) Provided verbal/tactile cueing for activities related to improving balance, coordination, kinesthetic sense, posture, motor skill, proprioception and motor activation to allow for proper function of core, proximal hip and LE with self care and ADLs  [x] (28824) Gait Re-education- Provided training and instruction to the patient for proper LE, core and proximal hip recruitment and positioning and eccentric body weight control with ambulation re-education including up and down stairs     Home Exercise Program:    [] (75249) Reviewed/Progressed HEP activities related to strengthening, flexibility, endurance, ROM of core, proximal hip and LE for functional self-care, mobility, lifting and ambulation/stair navigation   [] (84712)Reviewed/Progressed HEP activities related to improving balance, coordination, kinesthetic sense, posture, motor skill, proprioception of core, proximal hip and LE for self care, mobility, lifting, and ambulation/stair navigation      Manual Treatments:  PROM / STM / Oscillations-Mobs:  G-I, II, III, IV (PA's, Inf., Post.)  [] (95091) Provided manual therapy to mobilize LE, proximal hip and/or LS spine soft tissue/joints for the purpose of modulating pain, promoting relaxation,  increasing ROM, reducing/eliminating soft tissue swelling/inflammation/restriction, improving soft tissue extensibility and allowing for proper ROM for normal function with self care, mobility, lifting and ambulation. Modalities:     [] GAME READY (VASO)- for significant edema, swelling, pain control. Charges:   Timed Code Treatment Minutes: 45   Total Treatment Minutes: 45       [] EVAL (LOW) 08688 (typically 20 minutes face-to-face)  [] EVAL (MOD) 61191 (typically 30 minutes face-to-face)  [] EVAL (HIGH) 13876 (typically 45 minutes face-to-face)  [] RE-EVAL     [x] UQ(34947) x  1  [] IONTO  [] NMR (53462) x    [] VASO  [] Manual (01730) x      [] Other:  [x] TA x 1     [] [facility] T[facility] (67123)  [] ES(attended) (82693)      [] ES (un) (96702):   [x] Gait (90237) x 1    GOALS:   Patient stated goal: Walk again  [x] Progressing: can now walk 175ft in 10-15 min with walker [] Met: [] Not Met: [] Adjusted    Therapist goals for Patient:   Short Term Goals: To be achieved in: 2 weeks  1.  Independent in HEP and progression per patient tolerance, in order to prevent re-injury. [x] Progressing: [] Met: [] Not Met: [] Adjusted  2. Patient will have a decrease in pain to facilitate improvement in movement, function, and ADLs as indicated by Functional Deficits. [x] Progressing: pt has pain during Wb and exercises but none once done. [] Met: [] Not Met: [] Adjusted     Long Term Goals: To be achieved in: 8 weeks  1. Disability index score of 30% or less for the LEFS to assist with reaching prior level of function. [x] Progressing: now only 44% disability [] Met: [] Not Met: [] Adjusted  2. Patient will demonstrate increased AROM to full knee extension to allow for proper joint functioning as indicated by patients Functional Deficits. [] Progressing: [x] Met: knee flexion not obtainable due to fused knee but now has only -2 deg knee extension and has plateud at this range but allows the knee to be more stable with WB and improve LLD [] Not Met: [] Adjusted  3. Patient will demonstrate an increase in Strength to good proximal hip strength and control, within 5lb HHD in LE to allow for proper functional mobility as indicated by patients Functional Deficits. [x] Progressing: [] Met: [] Not Met: [] Adjusted  4. Patient will return to volunteer activities without increased symptoms or restriction. [] Progressing: [] Met: [x] Not Met: [] Adjusted  5. Pt will be able to ambulate with or without AD for at least 2 blocks or 30 minutes to improve community independence. [] Progressing: [] Met: [x] Not Met: [] Adjusted    Progression Towards Functional goals:  [x] Patient is progressing as expected towards functional goals listed. [] Progression is slowed due to complexities listed. [] Progression has been slowed due to co-morbidities.   [] Plan just implemented, too soon to assess goals progression   [] Other:     Overall Progression Towards Functional goals/ Treatment Progress Update:  [x] Patient is progressing as expected towards functional goals listed. [] Progression is slowed due to complexities/Impairments listed. [] Progression has been slowed due to co-morbidities. [] Plan just implemented, too soon to assess goals progression <30days   [] Goals require adjustment due to lack of progress  [] Patient is not progressing as expected and requires additional follow up with physician  [] Other    Prognosis for POC: [x] Good [] Fair  [] Poor      Patient requires continued skilled intervention: [x] Yes  [] No    Treatment/Activity Tolerance:  [x] Patient able to complete treatment  [x] Patient limited by fatigue  [] Patient limited by pain    [] Patient limited by other medical complications  [] Other:     ASSESSMENT:  Spent a lot of time today reassuring pt that knee soreness with a big increase in WB activity is a typical response and not cause for worry, particularly since as soon as he sits down the pain goes away. Pt insisted on trying to walk with quadcane and walking stick today because he feels more comfortable that way and less risky to fall despite my objections to the contrary. He tried it in here and discovered that indeed it was an awkward way to walk and would only likely increase his risk of falling, so agreed to stick with just the quadcane since he is capable of just using it alone. I explained and encouraged that it takes practice at something to feel confident about it and to gain the strength and endurance to do any task for a longer period of time. It's also a normal response to get sore from increased activity as well but if it would help reassure him and he felt that worried about the knee he is welcome to call his physician. I also told him our goals are patient drive and I will only keep pushing him to progress to the quadcane if that's what he wants to do. If he is content with staying with the walker that is his choice. He said no he really wants to get to only using a cane.  I also told him that while I teach him the skills in PT its really up to him to apply what he learns in here to his daily life. It he can do long distance walking and navigate obstacles etc in here he should be able to at least try to cross his living room using his cane, but it comes down to him actually doing it. He said he understands but still not sure if he can do it despite all the things I have him do here in PT. PLAN:  Next visit: progress note  [x] Continue per plan of care [] Alter current plan (see comments above)   [] Plan of care initiated [] Hold pending MD visit [] Discharge      Electronically signed by:  Rosalba Gay, PT    Note: If patient does not return for scheduled/ recommended follow up visits, this note will serve as a discharge from care along with most recent update on progress.

## 2020-08-26 ENCOUNTER — HOSPITAL ENCOUNTER (OUTPATIENT)
Dept: PHYSICAL THERAPY | Age: 70
Setting detail: THERAPIES SERIES
Discharge: HOME | End: 2020-08-26
Payer: MEDICARE

## 2020-08-26 PROCEDURE — 97116 GAIT TRAINING THERAPY: CPT

## 2020-08-26 PROCEDURE — 97110 THERAPEUTIC EXERCISES: CPT

## 2020-08-26 PROCEDURE — 97530 THERAPEUTIC ACTIVITIES: CPT

## 2020-08-26 NOTE — FLOWSHEET NOTE
[facility], [address] [place]  Phone: [phone]  Fax [phone]        Physical Therapy Treatment Note/ Progress Report:           Date:  2020    Patient Name:  Rohini Cerna    :  1950  MRN: [id number]  Restrictions/Precautions:  R knee fused and knee spacer with severe LLD. Medical/Treatment Diagnosis Information:  · Diagnosis: M 25.561 R knee pain  · Treatment Diagnosis: M25.661 R knee stiffness, M82.81 R LE muscle weakness, Gait abnormality, R 66.9  Insurance/Certification information:  PT Insurance Information: Medicare/ [employer]  Physician Information:  Referring Practitioner: Dr. Masha Vincent  Has the plan of care been signed (Y/N):        [x]  Yes  []  No     Date of Patient follow up with Physician: unknown      Is this a Progress Report:     []  Yes  [x]  No        If Yes:  Date Range for reporting period:  Beginning 2020  Ending     Progress report will be due (10 Rx or 30 days whichever is less): ,       Recertification will be due (POC Duration  / 90 days whichever is less): , 2020    Progress note next visit  Visit # Insurance Allowable Requires auth   29 Med nec    [x]no        []yes:     Functional Scale: LEFS: 16/80 = 20% functional ability, 80% disability   Date assessed: 2020           LEFS: 45/80 = 56% functional ability,  44% disability   Date assessed: 2020           LEFS: 37/80 = 46% functional ability, 54% disability   Date assessed: 2020  Therapy Diagnosis/Practice Pattern:     Number of Comorbidities:  []0     [x]1-2    []3+    Latex Allergy:  [x]NO      []YES  Preferred Language for Healthcare:   [x]English       []other:      Pain level: 0-1/10      PROGRESS NOTE and POC DUE NEXT VISIT    SUBJECTIVE: Pt says the knee is feeling much better. He was even able to walk half a [place] yesterday.  He has also been using the cane at short distance around house and in kitchen now but gets tired after about 15 min of being up and about with cane but happy with the progress of using the cane. OBJECTIVE:    Observation:    Gait: with quad cane: more equal stride, more upright posture, more equal WB between limbs   Test measurments: Quad set: good-     Knee ROM 5/29/2020 6/3/2020 6/17/2020 7/22/2020 7/27/2020   R knee extension pre-tx -15 deg -6   fused   R knee extension post-tx -8 deg -5 -2 deg 0 deg 0 deg     ROM LEFT RIGHT ROM 7/27/2020 Right   HIP Flex 127 127     Knee ext 0 -15  0 deg   Knee Flex 123 Fused knee  fused   Ankle PF 62 51     Ankle DF 0 -1     Strength  LEFT RIGHT  Right   HIP Flexors 5/5 4+/5  5/5   HIP Abductors 4/5 4/5  4/5   HIP Ext 5/5 3+/5  4+/5   Hip ER        Knee EXT (quad) 5/5 4/5 (quad set)  4+/5 (quad set)   Knee Flex (HS) 4/5 NT     Ankle DF 4/5 4/5     Ankle PF 5/5 5/5     Ankle Inv 4-/5 4-/5     Ankle EV 4-/5 3+/5           RESTRICTIONS/PRECAUTIONS: R knee fused and knee spacer with severe LLD. Exercises/Interventions:     Therapeutic Ex (88930) Rep/Sets/sec Notes/CUES   SLR, SLR/ w/ER, hip abd,  HEP 5/27   S/L hip abd with ext \"L\" HEP 6/5   Standing hip ext @hip machine, HEP 6/19; min A during R stance to control [place] and leg buckling.     Standing hip abd HEP 7/10   Stanidng HR/Toe raise  HEP 6/19   Gastroc stretch     Seated SLR, SLR w/ ER HEP 6/19   Seated ankle PF HEP 6/19, 7/10   Seated hip add ring     Tball bridge   Stopped due to pain in knee   Stair taps  W/ quadcane, L foot tap stair and WB on R   Step-up 6\" 2x10 Cane, accepting downward weight onto R   Side step-ups/down 6\" [id number] downward weight onto R   Dynadisc foot taps alternating legs [id number] for posture and torso stability when shifting weight   Prone elbow prop for hip flexor stretch    Side stepping with 1/2 roller step-over    Prone hip ext     Prone hip ext w/ abd (\"L\")    S/L hip abd PT brace at pelvis to not roll torso bwd   S/L hip circles PT brace at pelvis to not roll torso bwd   S/L LE ER PT brace at pelvis to not roll torso bwd   Supine ITB stretch    Supine HS stretch    Tricep dips  @ low plinth, cued for L shin perpendicular to ground and using L LE more.     1/2 wall push-ups     CC: tricep pulldown     Pt edu  15' See dialogue below in assessment   Manual Intervention ([id number] [facility])               NMR re-education (18662)  CUES NEEDED   Tactile cueing to inc quad contract   Standing external pertubations Blue sports cord around waist   SLS, modified L foot up on 8', reach to anamaria to reduce UE assist and improve upright torso posture   L foot step overs, R SLS  With walker and CGA to progressive WB on the R LE   L foot touch various surfaces while R leg stance to progress WB tolerance and stability CGA, holding wall and/or walker   DL stance equal WB, ball toss Cues for equal WB   DL stance med ball axe chops Mirror to watch keeping hips equal btw legs/ equal WB   Tandem stance 3 cone tap  SBA, Cued for shifting weight onto R forward leg when leaning to tap cone   Standing on airex (one under ea foot) Cued for equal WB   Standing on airex DL arm swing    Standing on airex DL turn head    ALTER-G SLB  Cueing for control of Trendelenberg and not extending in spine as WB onto the R LE   R SLS w/ L 3 point tap (modified Y-balance)                         L foot ball taps to WB on R LE  Progressively less WB in UE to point of going single arm     Gait (95464)     Ambulation    Fwd wheel Walker, nearly full WB R LE; cued for upright posture    ALTER-G walking  Cueing or arm swing and upright posture controlling lateral lean   Alter-G fwd step to SLS  Cueing for control of Trendelenberg and not extending in spine as WB onto the R LE   Weight shifting w/ quadcane  @ 1/2 wall and CGA for all   Gait training with quad cane:       [id number] around clinic SBA, Cued for pattern, cane placement, upright posture, smaller re-education including up and down stairs     Home Exercise Program:    [] (95710) Reviewed/Progressed HEP activities related to strengthening, flexibility, endurance, ROM of core, proximal hip and LE for functional self-care, mobility, lifting and ambulation/stair navigation   [] (79464)Reviewed/Progressed HEP activities related to improving balance, coordination, kinesthetic sense, posture, motor skill, proprioception of core, proximal hip and LE for self care, mobility, lifting, and ambulation/stair navigation      Manual Treatments:  PROM / STM / Oscillations-Mobs:  G-I, II, III, IV (PA's, Inf., Post.)  [] (70957) Provided manual therapy to mobilize LE, proximal hip and/or LS spine soft tissue/joints for the purpose of modulating pain, promoting relaxation,  increasing ROM, reducing/eliminating soft tissue swelling/inflammation/restriction, improving soft tissue extensibility and allowing for proper ROM for normal function with self care, mobility, lifting and ambulation. Modalities:     [] GAME READY (VASO)- for significant edema, swelling, pain control. Charges:   Timed Code Treatment Minutes: 45   Total Treatment Minutes: 45       [] EVAL (LOW) 50449 (typically 20 minutes face-to-face)  [] EVAL (MOD) 53271 (typically 30 minutes face-to-face)  [] EVAL (HIGH) 84091 (typically 45 minutes face-to-face)  [] RE-EVAL     [x] TN(79477) x  1  [] IONTO  [] NMR (52729) x    [] VASO  [] Manual (23428) x      [] Other:  [x] TA x 1     [] [facility] (17936[facility]  [] ES(attended) (45599)      [] ES (un) (90133):   [x] Gait (85034) x 1    GOALS:   Patient stated goal: Walk again  [x] Progressing: can now walk 175ft in 10-15 min with walker [] Met: [] Not Met: [] Adjusted    Therapist goals for Patient:   Short Term Goals: To be achieved in: 2 weeks  1. Independent in HEP and progression per patient tolerance, in order to prevent re-injury. [x] Progressing: [] Met: [] Not Met: [] Adjusted  2.  Patient will have a decrease in pain to facilitate improvement in movement, function, and ADLs as indicated by Functional Deficits. [x] Progressing: pt has pain during Wb and exercises but none once done. [] Met: [] Not Met: [] Adjusted     Long Term Goals: To be achieved in: 8 weeks  1. Disability index score of 30% or less for the LEFS to assist with reaching prior level of function. [x] Progressing: now only 44% disability [] Met: [] Not Met: [] Adjusted  2. Patient will demonstrate increased AROM to full knee extension to allow for proper joint functioning as indicated by patients Functional Deficits. [] Progressing: [x] Met: knee flexion not obtainable due to fused knee but now has only -2 deg knee extension and has plateud at this range but allows the knee to be more stable with WB and improve LLD [] Not Met: [] Adjusted  3. Patient will demonstrate an increase in Strength to good proximal hip strength and control, within 5lb HHD in LE to allow for proper functional mobility as indicated by patients Functional Deficits. [x] Progressing: [] Met: [] Not Met: [] Adjusted  4. Patient will return to volunteer activities without increased symptoms or restriction. [] Progressing: [] Met: [x] Not Met: [] Adjusted  5. Pt will be able to ambulate with or without AD for at least 2 blocks or 30 minutes to improve community independence. [] Progressing: [] Met: [x] Not Met: [] Adjusted    Progression Towards Functional goals:  [x] Patient is progressing as expected towards functional goals listed. [] Progression is slowed due to complexities listed. [] Progression has been slowed due to co-morbidities. [] Plan just implemented, too soon to assess goals progression   [] Other:     Overall Progression Towards Functional goals/ Treatment Progress Update:  [x] Patient is progressing as expected towards functional goals listed. [] Progression is slowed due to complexities/Impairments listed.   [] Progression has been slowed due to co-morbidities. [] Plan just implemented, too soon to assess goals progression <30days   [] Goals require adjustment due to lack of progress  [] Patient is not progressing as expected and requires additional follow up with physician  [] Other    Prognosis for POC: [x] Good [] Fair  [] Poor      Patient requires continued skilled intervention: [x] Yes  [] No    Treatment/Activity Tolerance:  [x] Patient able to complete treatment  [x] Patient limited by fatigue  [] Patient limited by pain    [] Patient limited by other medical complications  [] Other:     ASSESSMENT:  Pt did so much better today physically and mentally. He had some knee soreness by end of session but 2-3/10 not as bad as other day and felt reassured that it will go away with ice and rest. Pt's gait pattern was a lot better today with eual jorge and stride length, more upright posture and showed more confidence using the quad cane. PLAN:  Next visit: progress note  [x] Continue per plan of care [] Alter current plan (see comments above)   [] Plan of care initiated [] Hold pending MD visit [] Discharge      Electronically signed by:  Zulma Noel PT    Note: If patient does not return for scheduled/ recommended follow up visits, this note will serve as a discharge from care along with most recent update on progress.

## 2020-08-28 ENCOUNTER — HOSPITAL ENCOUNTER (OUTPATIENT)
Dept: PHYSICAL THERAPY | Age: 70
Setting detail: THERAPIES SERIES
Discharge: HOME | End: 2020-08-28
Payer: MEDICARE

## 2020-08-28 PROCEDURE — 97110 THERAPEUTIC EXERCISES: CPT

## 2020-08-28 PROCEDURE — 97140 MANUAL THERAPY 1/> REGIONS: CPT

## 2020-08-28 NOTE — FLOWSHEET NOTE
[facility], [address] [place] [address]  Phone: [phone]  Fax [phone]      Physical Therapy Re-Certification Plan of Kaia Lizarraga      Dear Dr. Jaciel Stephens  ,    We had the pleasure of treating the following patient for physical therapy services at [address] [place] [address]. A summary of our findings can be found in the updated assessment below. This includes our plan of care. If you have any questions or concerns regarding these findings, please do not hesitate to contact me at the office phone number checked above. Thank you for the referral.     Physician Signature:________________________________Date:__________________  By signing above (or electronic signature), therapists plan is approved by physician    Date Range Of Visits: 7/22/2020 - 8/28/2020  Total Visits to Date: 27  Overall Response to Treatment:   [x]Patient is responding well to treatment and improvement is noted with regards  to goals   []Patient should continue to improve in reasonable time if they continue HEP   []Patient has plateaued and is no longer responding to skilled PT intervention    []Patient is getting worse and would benefit from return to referring MD   []Patient unable to adhere to initial POC   []Other: Pt's general presentation complexity requires more than typical time in PT.     ASSESSMENT:  Pt has made great strides and improvement since the start for PT as well as even from 4 weeks ago. He entered PT being WC dependent and has now progress to being able to rely on wheeled walker for short community ambulation and quad cane for short distance in-home walking. He is so happy to be walking again but still fearful of his abilities to do community ambulation in longer bouts like grocery shopping or with walking dog on leash with either the walker or quad cane.  Recently he has been experiencing more knee pain that can last 24 hours as he has been WB both in amount of BW as well a time in WB on the R LE as we transition to the quad cane and get rid of WC use all together. Pt still demonstrates R LE weakness (see MMT above) as well as still needs skilled guidance with cueing, education, and exercise design to continue to improve his ability for community ambulation and progressive decreased reliance on AD to obtain getting him to the least restricted AD that is reasonable possible (quad cane). PLAN: Pt POC to be extended to be seen 2 times a week for another 5 weeks as we progress toward independence away from PT and towards D/C. Physical Therapy Treatment Note/ Progress Report:           Date:  2020    Patient Name:  Luda Wheat    :  1950  MRN: [id number]  Restrictions/Precautions:  R knee fused and knee spacer with severe LLD.   Medical/Treatment Diagnosis Information:  · Diagnosis: M 25.561 R knee pain  · Treatment Diagnosis: M25.661 R knee stiffness, M82.81 R LE muscle weakness, Gait abnormality, R 79.6  Insurance/Certification information:  PT Insurance Information: Medicare/ [employer]  Physician Information:  Referring Practitioner: Dr. Isaac العلي  Has the plan of care been signed (Y/N):        [x]  Yes  []  No     Date of Patient follow up with Physician: unknown      Is this a Progress Report:     [x]  Yes  []  No        If Yes:  Date Range for reporting period:  Beginning 2020  Ending 2020    Progress report will be due (10 Rx or 30 days whichever is less): ,       Recertification will be due (POC Duration  / 90 days whichever is less): , 10/2/2020    Progress note next visit  Visit # Insurance Allowable Requires auth   30 Med nec    [x]no        []yes:     Functional Scale: LEFS: 1680 = 20% functional ability, 80% disability   Date assessed: 2020           LEFS: 4580 = 56% functional ability,  44% disability   Date assessed: 2020           LEFS: 37/80 = 46% functional ability, 54% disability   Date assessed: 7/27/2020           LEFS: 41/80 = 51% functional ability, 49% disability   Date assessed: 8/28/2020    Therapy Diagnosis/Practice Pattern:     Number of Comorbidities:  []0     [x]1-2    []3+    Latex Allergy:  [x]NO      []YES  Preferred Language for Healthcare:   [x]English       []other:      Pain level: 0-1/10       SUBJECTIVE: Pt says overall he feels about 65% improved. He can now walk hills with his walker, he can walk around car to get gas without AD, he can walk around kitchen without AD or just quad cane, can shower much more easily, can go up and down his stairs at home as needs, he is able to walk short distances around home with just quad cane. He feels he still needs to improve ability to walk with just quad cane in community, gain more endurance (still very tired and needs to sit and rest after getting ready in the morning), he still has times of pain in his knee after he's been walking on it more that he would like to go away. After last visit, walking 250ft with quad cane he had a lot of knee pain that even disturbed sleep, 7-8/10. Most of this pain was up and down the lateral leg (pttracing ITB placement). Pt is really happy with his progress so far overall though.       OBJECTIVE:    Observation:    Gait: with quad cane: more equal stride, more upright posture, more equal WB between limbs   Test measurments: Quad set: good-     Knee ROM 5/29/2020 6/3/2020 6/17/2020 7/22/2020 7/27/2020 8/28/2020   R knee extension pre-tx -15 deg -6   fused fused   R knee extension post-tx -8 deg -5 -2 deg 0 deg 0 deg 0 deg     ROM LEFT RIGHT ROM 7/27/2020 Right  7/27/2020 Right   8/27/2020   HIP Flex 127 127      Knee ext 0 -15  0 deg 0 deg   Knee Flex 123 Fused knee  fused fused   Ankle PF 62 51      Ankle DF 0 -1      Strength  LEFT RIGHT  Right    HIP Flexors 5/5 4+/5  5/5 5-/5   HIP Abductors 4/5 4/5  4/5 4+/5   Hip Add     4/5   HIP Ext 5/5 3+/5  4+/5    Hip ER     overs, R SLS  With walker and CGA to progressive WB on the R LE   L foot touch various surfaces while R leg stance to progress WB tolerance and stability CGA, holding wall and/or walker   DL stance equal WB, ball toss Cues for equal WB   DL stance med ball axe chops Mirror to watch keeping hips equal btw legs/ equal WB   Tandem stance 3 cone tap  SBA, Cued for shifting weight onto R forward leg when leaning to tap cone   Standing on airex (one under ea foot) Cued for equal WB   Standing on airex DL arm swing    Standing on airex DL turn head    ALTER-G SLB  Cueing for control of Trendelenberg and not extending in spine as WB onto the R LE   R SLS w/ L 3 point tap (modified Y-balance)                         L foot ball taps to WB on R LE  Progressively less WB in UE to point of going single arm     Gait (21990)     Ambulation    Fwd wheel Walker, nearly full WB R LE; cued for upright posture    ALTER-G walking  Cueing or arm swing and upright posture controlling lateral lean   Alter-G fwd step to SLS  Cueing for control of Trendelenberg and not extending in spine as WB onto the R LE   Weight shifting w/ quadcane  @ 1/2 wall and CGA for all   Gait training with quad cane:        SBA, Cued for pattern, cane placement, upright posture, smaller step size to improve stability. Edu on crossing threshold holds and uneven Cued for sidewalk surfaces, SBA  CGA, cued for posture                  Therapeutic Activity (69087)     Gait obstacle course  Quadcane, CGA, step over object, fig 8 cones, kick ball ea foot, pick-up 5 lbs (dog bowl),  turn corners;     Side stepping Quadcane, SBA, cueing for how to weightshift to move feet   Flight of stairs : \"up with the good down, down with the bad\" w/ quad cane CGA, unilateral handrail, quad cane   Multidirectional changes in direction (mimicing cooking in kitchen)  w/ Quad cane, SBA   Walking w/ quad cane while holding unstable object in R hand  SBA                  Pt edu  Going through scenarios of how to get up off the floor using WC, walker or other stable surface as well as prone versus supine. Alter-G pant size: XL    Therapeutic Exercise and NMR EXR  [x] (20795) Provided verbal/tactile cueing for activities related to strengthening, flexibility, endurance, ROM for improvements in LE, proximal hip, and core control with self care, mobility, lifting, ambulation.  [] (73947) Provided verbal/tactile cueing for activities related to improving balance, coordination, kinesthetic sense, posture, motor skill, proprioception  to assist with LE, proximal hip, and core control in self care, mobility, lifting, ambulation and eccentric single leg control.      NMR and Therapeutic Activities:    [x] (69158 or 01461) Provided verbal/tactile cueing for activities related to improving balance, coordination, kinesthetic sense, posture, motor skill, proprioception and motor activation to allow for proper function of core, proximal hip and LE with self care and ADLs  [] (19408) Gait Re-education- Provided training and instruction to the patient for proper LE, core and proximal hip recruitment and positioning and eccentric body weight control with ambulation re-education including up and down stairs     Home Exercise Program:    [x] (47761) Reviewed/Progressed HEP activities related to strengthening, flexibility, endurance, ROM of core, proximal hip and LE for functional self-care, mobility, lifting and ambulation/stair navigation   [] (14381)Reviewed/Progressed HEP activities related to improving balance, coordination, kinesthetic sense, posture, motor skill, proprioception of core, proximal hip and LE for self care, mobility, lifting, and ambulation/stair navigation      Manual Treatments:  PROM / STM / Oscillations-Mobs:  G-I, II, III, IV (PA's, Inf., Post.)  [x] (99888) Provided manual therapy to mobilize LE, proximal hip and/or LS spine soft tissue/joints for the purpose of modulating pain, promoting relaxation,  increasing ROM, reducing/eliminating soft tissue swelling/inflammation/restriction, improving soft tissue extensibility and allowing for proper ROM for normal function with self care, mobility, lifting and ambulation. Modalities:     [] GAME READY (VASO)- for significant edema, swelling, pain control. Charges:   Timed Code Treatment Minutes: 45   Total Treatment Minutes: 45       [] EVAL (LOW) 72266 (typically 20 minutes face-to-face)  [] EVAL (MOD) 76205 (typically 30 minutes face-to-face)  [] EVAL (HIGH) 81526 (typically 45 minutes face-to-face)  [] RE-EVAL     [x] IP(17286) x  1  [] IONTO  [] NMR (36110) x    [] VASO  [x] Manual (44341) x 2     [] Other:  [] TA x      [] Mech Traction (57749)  [] ES(attended) (83762)      [] ES (un) (44816):   [] Gait (32347) x     GOALS:   Patient stated goal: Walk again  [x] Progressing: can now walk 175ft in 5-10 min with quad cane [] Met: [] Not Met: [] Adjusted    Therapist goals for Patient:   Short Term Goals: To be achieved in: 2 weeks  1. Independent in HEP and progression per patient tolerance, in order to prevent re-injury. [x] Progressing: [] Met: [] Not Met: [] Adjusted  2. Patient will have a decrease in pain to facilitate improvement in movement, function, and ADLs as indicated by Functional Deficits. [x] Progressing: pt has pain during longer bouts of WB but activity level without pain has dramatically improved (see subjective)[] Met: [] Not Met: [] Adjusted     Long Term Goals: To be achieved in: 8 weeks  1. Disability index score of 30% or less for the LEFS to assist with reaching prior level of function. [x] Progressing: now only 49% disability [] Met: [] Not Met: [] Adjusted  2. Patient will demonstrate increased AROM to full knee extension to allow for proper joint functioning as indicated by patients Functional Deficits.    [] Progressing: [x] Met: knee flexion not obtainable due to fused knee but has maintained 0 deg knee ext for past month [] Not Met: [] Adjusted  3. Patient will demonstrate an increase in Strength to good proximal hip strength and control, within 5lb HHD in LE to allow for proper functional mobility as indicated by patients Functional Deficits. [x] Progressing: [] Met: [] Not Met: [] Adjusted  4. Patient will return to volunteer activities without increased symptoms or restriction. [] Progressing: [] Met: [x] Not Met: [] Adjusted  5. Pt will be able to ambulate with or without AD for at least 2 blocks or 30 minutes to improve community independence. [x] Progressing: able to do this with wheeled walker but not yet with quad cane which is pt's intended AD [] Met: [] Not Met: [] Adjusted    Progression Towards Functional goals:  [x] Patient is progressing as expected towards functional goals listed. [] Progression is slowed due to complexities listed. [] Progression has been slowed due to co-morbidities. [] Plan just implemented, too soon to assess goals progression   [] Other:     Overall Progression Towards Functional goals/ Treatment Progress Update:  [x] Patient is progressing as expected towards functional goals listed. [] Progression is slowed due to complexities/Impairments listed. [] Progression has been slowed due to co-morbidities. [] Plan just implemented, too soon to assess goals progression <30days   [] Goals require adjustment due to lack of progress  [] Patient is not progressing as expected and requires additional follow up with physician  [] Other    Prognosis for POC: [x] Good [] Fair  [] Poor      Patient requires continued skilled intervention: [x] Yes  [] No    Treatment/Activity Tolerance:  [x] Patient able to complete treatment  [] Patient limited by fatigue  [] Patient limited by pain    [] Patient limited by other medical complications  [] Other:     ASSESSMENT:  Pt has made great strides and improvement since the start for PT as well as even from 4 weeks ago.  He entered PT being WC dependent and has now progress to being able to rely on wheeled walker for short community ambulation and quad cane for short distance in-home walking. He is so happy to be walking again but still fearful of his abilities to do community ambulation in longer bouts like grocery shopping or with walking dog on leash with either the walker or quad cane. Recently he has been experiencing more knee pain that can last 24 hours as he has been WB both in amount of BW as well a time in WB on the R LE as we transition to the quad cane and get rid of WC use all together. Pt still demonstrates R LE weakness (see MMT above) as well as still needs skilled guidance with cueing, education, and exercise design to continue to improve his ability for community ambulation and progressive decreased reliance on AD to obtain getting him to the least restricted AD that is reasonable possible (quad cane). PLAN:  Pt to be seen 2 times a week for another 5 weeks as we progress toward independence away from PT and towards D/C. [] Continue per plan of care [x] Alter current plan (see comments above)   [] Plan of care initiated [] Hold pending MD visit [] Discharge       Electronically signed by:  Mario Casanova PT    Note: If patient does not return for scheduled/ recommended follow up visits, this note will serve as a discharge from care along with most recent update on progress.

## 2020-08-31 ENCOUNTER — HOSPITAL ENCOUNTER (OUTPATIENT)
Dept: PHYSICAL THERAPY | Age: 70
Setting detail: THERAPIES SERIES
Discharge: HOME | End: 2020-08-31
Payer: MEDICARE

## 2020-08-31 PROCEDURE — 97110 THERAPEUTIC EXERCISES: CPT

## 2020-08-31 PROCEDURE — 97116 GAIT TRAINING THERAPY: CPT

## 2020-08-31 NOTE — FLOWSHEET NOTE
[facility],  [address] [place]  Phone: [phone]  Fax [phone]        Physical Therapy Treatment Note/ Progress Report:           Date:  2020    Patient Name:  Lashay Polanco    :  1950  MRN: [id number]  Restrictions/Precautions:  R knee fused and knee spacer with severe LLD. Medical/Treatment Diagnosis Information:  · Diagnosis: M 25.561 R knee pain  · Treatment Diagnosis: M25.661 R knee stiffness, M82.81 R LE muscle weakness, Gait abnormality, R 82.8  Insurance/Certification information:  PT Insurance Information: Medicare/ [facility]  Physician Information:  Referring Practitioner: Dr. Sandip Castaneda  Has the plan of care been signed (Y/N):        [x]  Yes  []  No     Date of Patient follow up with Physician: unknown      Is this a Progress Report:     [x]  Yes  []  No        If Yes:  Date Range for reporting period:  Beginning 2020  Ending 2020    Progress report will be due (10 Rx or 30 days whichever is less): ,       Recertification will be due (POC Duration  / 90 days whichever is less): , 10/2/2020    Progress note next visit  Visit # Insurance Allowable Requires auth   31 Med nec    [x]no        []yes:     Functional Scale: LEFS: 16/80 = 20% functional ability, 80% disability   Date assessed: 2020           LEFS: 45/80 = 56% functional ability,  44% disability   Date assessed: 2020           LEFS: 37/80 = 46% functional ability, 54% disability   Date assessed: 2020           LEFS: 41/80 = 51% functional ability, 49% disability   Date assessed: 2020    Therapy Diagnosis/Practice Pattern:     Number of Comorbidities:  []0     [x]1-2    []3+    Latex Allergy:  [x]NO      []YES  Preferred Language for Healthcare:   [x]English       []other:      Pain level: 0-1/10       SUBJECTIVE: Pt says the lateral leg has still been bothering him.  He has tried to massage it out on his own some to but still hurts. The knee itself though feels okay. He has noticed at home, not only is using his cane more and more but he has even found himself taking a few steps across his living room without any AD and realizes half way across he has no AD and it worried him but he finds he can do it. He is also now walking 20 minutes with walker at [facility] with his dog without any problem. OBJECTIVE:    Observation:    Gait: with quad cane: more equal stride, more upright posture, more equal WB between limbs   Test measurments: Quad set: good-     Knee ROM 5/29/2020 6/3/2020 6/17/2020 7/22/2020 7/27/2020 8/28/2020   R knee extension pre-tx -15 deg -6   fused fused   R knee extension post-tx -8 deg -5 -2 deg 0 deg 0 deg 0 deg     ROM LEFT RIGHT ROM 7/27/2020 Right  7/27/2020 Right   8/27/2020   HIP Flex 127 127      Knee ext 0 -15  0 deg 0 deg   Knee Flex 123 Fused knee  fused fused   Ankle PF 62 51      Ankle DF 0 -1      Strength  LEFT RIGHT  Right    HIP Flexors 5/5 4+/5  5/5 5-/5   HIP Abductors 4/5 4/5  4/5 4+/5   Hip Add     4/5   HIP Ext 5/5 3+/5  4+/5    Hip ER         Knee EXT (quad) 5/5 4/5 (quad set)  4+/5 (quad set) 4+/5 quad set   Knee Flex (HS) 4/5 NT      Ankle DF 4/5 4/5      Ankle PF 5/5 5/5      Ankle Inv 4-/5 4-/5      Ankle EV 4-/5 3+/5            RESTRICTIONS/PRECAUTIONS: R knee fused and knee spacer with severe LLD. Exercises/Interventions:     Therapeutic Ex (75925) Rep/Sets/sec Notes/CUES   SLR, SLR/ w/ER, hip abd,  HEP 5/27   S/L hip abd with ext \"L\" HEP 6/5   Standing hip ext @hip machine, HEP 6/19; min A during R stance to control [place] and leg buckling.     Standing hip abd HEP 7/10   Stanidng HR/Toe raise  HEP 6/19   Gastroc stretch     Seated SLR, SLR w/ ER HEP 6/19   S/L hip add    Tball bridges    Supine hip abd    Seated ankle PF HEP 6/19, 7/10   Seated hip add ring     Tball bridge   Stopped due to pain in knee   Stair taps  W/ quadcane, L foot tap stair and WB on R   Step-up 6\" 1x10 Cane, accepting downward weight onto R   Side step-ups/down 6\" [id number] downward weight onto R   Dynadisc foot taps alternating legs [id number] for posture and torso stability when shifting weight   Prone elbow prop for hip flexor stretch    Side stepping with 1/2 roller step-over    Prone hip ext     Prone hip ext w/ abd (\"L\")    S/L hip abd PT brace at pelvis to not roll torso bwd   S/L hip circles PT brace at pelvis to not roll torso bwd   S/L LE ER PT brace at pelvis to not roll torso bwd   Supine ITB stretch    Supine HS stretch    Tricep dips  @ low plinth, cued for L shin perpendicular to ground and using L LE more.     1/2 wall push-ups     CC: tricep pulldown     Pt edu   See dialogue below in assessment   Manual Intervention (94324)     STM: ITB, lateral quad     The Stick edu how to use 3'    NMR re-education (49725)  CUES NEEDED   Tactile cueing to inc quad contract   Standing external pertubations Blue sports cord around waist   SLS, modified L foot up on 8', reach to anamaria to reduce UE assist and improve upright torso posture   L foot step overs, R SLS  With walker and CGA to progressive WB on the R LE   L foot touch various surfaces while R leg stance to progress WB tolerance and stability CGA, holding wall and/or walker   DL stance equal WB, ball toss Cues for equal WB   DL stance med ball axe chops 3000Gr 2x20 ea wayMirror to watch keeping hips equal btw legs/ equal WB   Tandem stance 3 cone tap  SBA, Cued for shifting weight onto R forward leg when leaning to tap cone   Standing on airex (one under ea foot) Cued for equal WB   Standing on airex DL arm swing    Standing on airex DL turn head    ALTER-G SLB  Cueing for control of Trendelenberg and not extending in spine as WB onto the R LE   R SLS w/ L 3 point tap (modified Y-balance)                         L foot ball taps to WB on R LE  Progressively less WB in UE to point of going single arm     Gait (71285) Ambulation    Fwd wheel Walker, nearly full WB R LE; cued for upright posture    ALTER-G walking  Cueing or arm swing and upright posture controlling lateral lean   Alter-G fwd step to SLS  Cueing for control of Trendelenberg and not extending in spine as WB onto the R LE   Weight shifting w/ quadcane  @ 1/2 wall and CGA for all   Gait training with quad cane:     [id number] out to car  [id number] around clinic SBA, Cued for pattern, cane placement, upright posture, smaller step size to improve stability. Edu on crossing threshold holds and uneven Cued for sidewalk surfaces, SBA  CGA, cued for posture                  Therapeutic Activity (56159)     Gait obstacle course 2x58ft Quadcane, CGA, step over object, fig 8 cones, kick ball ea foot, pick-up 5 lbs (dog bowl),  turn corners; Side stepping Quadcane, SBA, cueing for how to weightshift to move feet   Flight of stairs : \"up with the good down, down with the bad\" w/ quad cane CGA, unilateral handrail, quad cane   Multidirectional changes in direction (mimicing cooking in kitchen)  w/ Quad cane, SBA   Walking w/ quad cane while holding unstable object in R hand  SBA                  Pt edu  Going through scenarios of how to get up off the floor using WC, walker or other stable surface as well as prone versus supine. Alter-G pant size: XL    Therapeutic Exercise and NMR EXR  [x] (14842) Provided verbal/tactile cueing for activities related to strengthening, flexibility, endurance, ROM for improvements in LE, proximal hip, and core control with self care, mobility, lifting, ambulation.  [] (55972) Provided verbal/tactile cueing for activities related to improving balance, coordination, kinesthetic sense, posture, motor skill, proprioception  to assist with LE, proximal hip, and core control in self care, mobility, lifting, ambulation and eccentric single leg control.      NMR and Therapeutic Activities:    [] (74898 or 11618) Provided verbal/tactile cueing for activities related to improving balance, coordination, kinesthetic sense, posture, motor skill, proprioception and motor activation to allow for proper function of core, proximal hip and LE with self care and ADLs  [x] (73420) Gait Re-education- Provided training and instruction to the patient for proper LE, core and proximal hip recruitment and positioning and eccentric body weight control with ambulation re-education including up and down stairs     Home Exercise Program:    [x] (45936) Reviewed/Progressed HEP activities related to strengthening, flexibility, endurance, ROM of core, proximal hip and LE for functional self-care, mobility, lifting and ambulation/stair navigation   [] (26314)Reviewed/Progressed HEP activities related to improving balance, coordination, kinesthetic sense, posture, motor skill, proprioception of core, proximal hip and LE for self care, mobility, lifting, and ambulation/stair navigation      Manual Treatments:  PROM / STM / Oscillations-Mobs:  G-I, II, III, IV (PA's, Inf., Post.)  [] (37369) Provided manual therapy to mobilize LE, proximal hip and/or LS spine soft tissue/joints for the purpose of modulating pain, promoting relaxation,  increasing ROM, reducing/eliminating soft tissue swelling/inflammation/restriction, improving soft tissue extensibility and allowing for proper ROM for normal function with self care, mobility, lifting and ambulation. Modalities:     [] GAME READY (VASO)- for significant edema, swelling, pain control.      Charges:   Timed Code Treatment Minutes: 45   Total Treatment Minutes: 45       [] EVAL (LOW) 21252 (typically 20 minutes face-to-face)  [] EVAL (MOD) 92605 (typically 30 minutes face-to-face)  [] EVAL (HIGH) 92055 (typically 45 minutes face-to-face)  [] RE-EVAL     [x] FU(40609) x  2  [] IONTO  [] NMR (07130) x    [] VASO  [] Manual (20190) x    [] Other:  [] TA x      [] Mech Traction (38634)  [] ES(attended) (37829)      [] ES (un) (97585):   [x] Gait (42263) x 1    GOALS:   Patient stated goal: Walk again  [x] Progressing: can now walk 175ft in 5-10 min with quad cane [] Met: [] Not Met: [] Adjusted    Therapist goals for Patient:   Short Term Goals: To be achieved in: 2 weeks  1. Independent in HEP and progression per patient tolerance, in order to prevent re-injury. [x] Progressing: [] Met: [] Not Met: [] Adjusted  2. Patient will have a decrease in pain to facilitate improvement in movement, function, and ADLs as indicated by Functional Deficits. [x] Progressing: pt has pain during longer bouts of WB but activity level without pain has dramatically improved (see subjective)[] Met: [] Not Met: [] Adjusted     Long Term Goals: To be achieved in: 8 weeks  1. Disability index score of 30% or less for the LEFS to assist with reaching prior level of function. [x] Progressing: now only 49% disability [] Met: [] Not Met: [] Adjusted  2. Patient will demonstrate increased AROM to full knee extension to allow for proper joint functioning as indicated by patients Functional Deficits. [] Progressing: [x] Met: knee flexion not obtainable due to fused knee but has maintained 0 deg knee ext for past month [] Not Met: [] Adjusted  3. Patient will demonstrate an increase in Strength to good proximal hip strength and control, within 5lb HHD in LE to allow for proper functional mobility as indicated by patients Functional Deficits. [x] Progressing: [] Met: [] Not Met: [] Adjusted  4. Patient will return to volunteer activities without increased symptoms or restriction. [] Progressing: [] Met: [x] Not Met: [] Adjusted  5. Pt will be able to ambulate with or without AD for at least 2 blocks or 30 minutes to improve community independence.    [x] Progressing: able to do this with wheeled walker but not yet with quad cane which is pt's intended AD [] Met: [] Not Met: [] Adjusted    Progression Towards Functional goals:  [x] Patient is progressing as expected towards functional goals listed. [] Progression is slowed due to complexities listed. [] Progression has been slowed due to co-morbidities. [] Plan just implemented, too soon to assess goals progression   [] Other:     Overall Progression Towards Functional goals/ Treatment Progress Update:  [x] Patient is progressing as expected towards functional goals listed. [] Progression is slowed due to complexities/Impairments listed. [] Progression has been slowed due to co-morbidities. [] Plan just implemented, too soon to assess goals progression <30days   [] Goals require adjustment due to lack of progress  [] Patient is not progressing as expected and requires additional follow up with physician  [] Other    Prognosis for POC: [x] Good [] Fair  [] Poor      Patient requires continued skilled intervention: [x] Yes  [] No    Treatment/Activity Tolerance:  [x] Patient able to complete treatment  [] Patient limited by fatigue  [] Patient limited by pain    [] Patient limited by other medical complications  [] Other:     ASSESSMENT:  Pt lateral thigh pain is consistent with ITB tightness and quad tightness which is difficult for him to stretch due to the knee fusion therefore we are utilizing STM to help. He continues to improve in gait pattern, steadiness and posture using the quad cane with no increased pain with endurance more as the limiting factor. PLAN:  Pt to be seen 2 times a week for another 5 weeks as we progress toward independence away from PT and towards D/C. [x] Continue per plan of care [] Alter current plan (see comments above)   [] Plan of care initiated [] Hold pending MD visit [] Discharge       Electronically signed by:  Zulma Noel PT    Note: If patient does not return for scheduled/ recommended follow up visits, this note will serve as a discharge from care along with most recent update on progress.

## 2020-09-02 ENCOUNTER — OFFICE VISIT (OUTPATIENT)
Dept: ORTHOPEDIC SURGERY | Age: 70
End: 2020-09-02
Payer: MEDICARE

## 2020-09-02 ENCOUNTER — HOSPITAL ENCOUNTER (OUTPATIENT)
Dept: PHYSICAL THERAPY | Age: 70
Setting detail: THERAPIES SERIES
Discharge: HOME | End: 2020-09-02
Payer: MEDICARE

## 2020-09-02 VITALS — BODY MASS INDEX: 29.52 KG/M2 | HEIGHT: 77 IN | WEIGHT: 250 LBS

## 2020-09-02 PROCEDURE — 3017F COLORECTAL CA SCREEN DOC REV: CPT | Performed by: ORTHOPAEDIC SURGERY

## 2020-09-02 PROCEDURE — 99213 OFFICE O/P EST LOW 20 MIN: CPT | Performed by: ORTHOPAEDIC SURGERY

## 2020-09-02 PROCEDURE — 97110 THERAPEUTIC EXERCISES: CPT

## 2020-09-02 PROCEDURE — 97140 MANUAL THERAPY 1/> REGIONS: CPT

## 2020-09-02 PROCEDURE — 1036F TOBACCO NON-USER: CPT | Performed by: ORTHOPAEDIC SURGERY

## 2020-09-02 PROCEDURE — G8428 CUR MEDS NOT DOCUMENT: HCPCS | Performed by: ORTHOPAEDIC SURGERY

## 2020-09-02 PROCEDURE — 97116 GAIT TRAINING THERAPY: CPT

## 2020-09-02 PROCEDURE — 4040F PNEUMOC VAC/ADMIN/RCVD: CPT | Performed by: ORTHOPAEDIC SURGERY

## 2020-09-02 PROCEDURE — G8417 CALC BMI ABV UP PARAM F/U: HCPCS | Performed by: ORTHOPAEDIC SURGERY

## 2020-09-02 PROCEDURE — 1123F ACP DISCUSS/DSCN MKR DOCD: CPT | Performed by: ORTHOPAEDIC SURGERY

## 2020-09-02 NOTE — PROGRESS NOTES
resulted in multiple surgeries. 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 Was then transferred to the care of Dr. Blake Cruz who also did procedures for his infected total knee. And then finally was transferred to the care of Dr. Sruthi Sanderson for more definitive care of his infected RIGHT knee. He has had 5 surgical procedures on his knee by Dr. Jaden Iglesias. Those procedures have all consisted of irrigations debridements and placement of antibiotic spacers. He now states that he has had 5 different PICC lines and multiple courses of antibiotics. His infectious disease doctor is Dr. Luz Mccray. He states he currently is no longer on IV antibiotics and he has been told that his infection at this point has resolved. He he currently has an antibiotic spacer in place with a tibial nail spanning the joint. His knee is in about 15 degrees of flexion. All of his incisions are healed. He has minimal if any discomfort. He was recently told by Dr. Jaden Iglesias that if he were to require another procedure it would be an above-knee amputation. The patient is not working. Pain Assessment:  Pain Assessment  Location of Pain: Knee  Location Modifiers: Right  Severity of Pain: 4  Quality of Pain: Dull, Aching  Frequency of Pain: Intermittent  Aggravating Factors: Stretching, Straightening, Exercise, Walking, Stairs, Standing  Relieving Factors: Rest, Ice  Result of Injury: No  Work-Related Injury: No  Are there other pain locations you wish to document?: No    Review of Systems:  Jayme Khan's review of systems has been performed by intake and observation. All past and current ROS forms have been scanned into the medical record. He has been instructed to contact his primary care provider regarding ROS issues if not already being addressed at this time. There are no recent changes.  The most recent ROS was scanned into media on 05/14/2020    Past Medical History:  (see most recent intake into media on above date)  Allergies   Allergen Reactions    Ace Inhibitors      COUGH    Amlodipine Besylate      Lower extremity edema    Avelox [Moxifloxacin] Other (See Comments)     Hallucination    Clonidine Derivatives      FATIGUE    Flagyl [Metronidazole] Nausea Only     Stomach cramps       Medications:  (see most recent intake form scanned into media on above date)  Current Outpatient Medications   Medication Sig Dispense Refill    promethazine (PHENERGAN) 25 MG tablet Take 25 mg by mouth every 6 hours as needed for Nausea      omeprazole (PRILOSEC) 40 MG delayed release capsule Take 40 mg by mouth daily      hydrALAZINE (APRESOLINE) 50 MG tablet Take 50 mg by mouth 3 times daily       meloxicam (MOBIC) 15 MG tablet Take 15 mg by mouth daily      Multiple Vitamins-Minerals (MULTIVITAMIN PO) Take 1 tablet by mouth      Levothyroxine Sodium (SYNTHROID PO) Take 200 mcg by mouth daily      valsartan-hydrochlorothiazide (DIOVAN-HCT) 320-25 MG per tablet Take 1 tablet by mouth daily      allopurinol (ZYLOPRIM) 300 MG tablet Take 300 mg by mouth daily. No current facility-administered medications for this visit. Coagulation:  On a blood thinner: No  History of a bleeding disorder: No  History of a previous blood clot: No    Goal for treatment: Improve function and decrease pain    OBJECTIVE  PHYSICAL EXAM  Vital Signs: There were no vitals filed for this visit. Body mass index is 29.65 kg/m². General Appearance: Patient is adequately groomed with no evidence of malnutrition   Orientation: Patient is alert and oriented to person, place and time  Mood and Affect: Neutral/Euthymic(normal) and Angry  Gait and Station: He currently is nonambulatory presenting in a wheelchair. The patient can bear weight on the extremity. Right Knee Examination, we reexamined his knee today but there is been no significant change. Inspection:  Knee alignment: neutral           no swelling noted. No erythema or ecchymosis. He has multiple anterior knee scars all of which appear to be healed. There is an area on the medial aspect of his knee in the region of the medial femoral condyle where the underlying tissue is quite prominent and tenting the skin although the skin is still in good condition. Palpation: no tenderness to palpation on the knee no effusion noted. Range of Motion: He has no motion in his knee either flexion extension or medial or lateral instability. It seems to be a solid fusion. Strength: No gross motor weakness noted. All muscle groups appear to be functioning. Motor exam of the lower extremities show quadriceps, hamstrings, foot dorsiflexion and plantarflexion grossly intact. Neurologic: Sensation to both feet is grossly intact to light touch. Vascular: The bilateral lower extremities are warm and well-perfused with brisk capillary refill. Lymphatic: The lymphatic examination bilaterally reveals all areas to be without enlargement or induration    Skin: intact with no cellulitis, rashes, ulcerations, lymphedema or cutaneous lesions noted. Additional Examinations:  Left Lower Extremity: Examination of the left lower extremity does not show any tenderness, deformity or injury. Range of motion is unremarkable. There is no gross instability. There are no rashes, ulcerations or lesions. Strength and tone are normal.      DIAGNOSTICS:   No new x-rays taken today      ASSESSMENT (Medical Decision Making)    Prosper Zurita is a 81 y.o. 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 ICD-10-CM    1. Right knee pain, unspecified chronicity  M25.561            PLAN (Medical Decision Making)  Office Procedures:  No orders of the defined types were placed in this encounter.       Treatment Plan:    I discussed the diagnosis and treatment options with Soo Rodgers today. Today once again his outlook is much improved. We encouraged him to continue to work with physical therapy to hopefully transition from a walker to a cane. We would recommend twice a week through the month of September and then once a week through the month of October. He is planning on getting another pair of shoes made with a lift. He will follow-up with us in approximately 2 months and we can reevaluate him again at that time. Healthy Lifestyle Measures: Patient education measures were discussed and materials distributed where indicated. Posture education and proper lifting and carrying techniques. Weight management was discussed when indicated. Non-steroidal anti-inflammatories medications (NSAIDs) can be used to assist with pain control and to reduce inflammatory changes. These medications may be over-the-counter or prescribed. We discussed taking the NSAID properly and the precautions. The patient understands that this medication may potentially interfere with other medications. Patient was also instructed to immediately discontinue the medication is there is any possible complication. Soo Rodgers was instructed to call the office if his symptoms worsen or if new symptoms appear prior to the next scheduled visit. He is specifically instructed to contact the office between now and schedule appointment if he has concerns related to his condition or if he needs assistance in scheduling any above tests. He is welcome to call for an appointment sooner if he has any additional concerns or questions. Ronald Mckeon PA-C, scribing for and in the presence of Dr. Astrid Kunz   9/2/2020 12:18 PM      I, Dr. Tatiana Schwab, personally performed the services described in this documentation as scribed by Sivan Lowry. GUS Crouch in my presence, and it is both accurate and complete.     Tatiana Schwab, MD    This dictation was performed with a verbal recognition program (DRAGON) and it was checked for errors. It is possible that there are still dictated errors within this office note. If so, please bring any errors to my attention for an addendum. All efforts were made to ensure that this office note is accurate.

## 2020-09-02 NOTE — FLOWSHEET NOTE
[facility], [address]  Phone: [phone]  Fax [phone]        Physical Therapy Treatment Note/ Progress Report:           Date:  2020    Patient Name:  Donn Mireles    :  1950  MRN: [id number]  Restrictions/Precautions:  R knee fused and knee spacer with severe LLD. Medical/Treatment Diagnosis Information:  · Diagnosis: M 25.561 R knee pain  · Treatment Diagnosis: M25.661 R knee stiffness, M82.81 R LE muscle weakness, Gait abnormality, R 82.2  Insurance/Certification information:  PT Insurance Information: Medicare/ [employer]  Physician Information:  Referring Practitioner: Dr. Pardeep Oglesby  Has the plan of care been signed (Y/N):        [x]  Yes  []  No     Date of Patient follow up with Physician: unknown      Is this a Progress Report:     []  Yes  [x]  No        If Yes:  Date Range for reporting period:  Beginning 2020  Ending    Progress report will be due (10 Rx or 30 days whichever is less): , 6569      Recertification will be due (POC Duration  / 90 days whichever is less): , 10/2/2020    Progress note next visit  Visit # Insurance Allowable Requires auth   32 Med nec    [x]no        []yes:     Functional Scale: LEFS: 16/80 = 20% functional ability, 80% disability   Date assessed: 2020           LEFS: 45/80 = 56% functional ability,  44% disability   Date assessed: 2020           LEFS: 37/80 = 46% functional ability, 54% disability   Date assessed: 2020           LEFS: 41/80 = 51% functional ability, 49% disability   Date assessed: 2020    Therapy Diagnosis/Practice Pattern:     Number of Comorbidities:  []0     [x]1-2    []3+    Latex Allergy:  [x]NO      []YES  Preferred Language for Healthcare:   [x]English       []other:      Pain level: 0-1/10       SUBJECTIVE: Pt reports he was not as tired or as sore after last visit as what he had been in past week.  The controlling lateral lean   Alter-G fwd step to SLS  Cueing for control of Trendelenberg and not extending in spine as WB onto the R LE   Weight shifting w/ quadcane  @ 1/2 wall and CGA for all   Gait training with quad cane:       [id number] around clinic SBA, Cued for pattern, cane placement, upright posture, smaller step size to improve stability. Edu on crossing threshold holds and uneven Cued for sidewalk surfaces, SBA  CGA, cued for posture                  Therapeutic Activity (90741)     Gait obstacle course 3x58ft Quadcane, CGA, step over object, fig 8 cones, kick ball ea foot, ,  turn corners; Side stepping Quadcane, SBA, cueing for how to weightshift to move feet   Flight of stairs : \"up with the good down, down with the bad\" w/ quad cane CGA, unilateral handrail, quad cane   Multidirectional changes in direction (mimicing cooking in kitchen)  w/ Quad cane, SBA   Walking w/ quad cane while holding unstable object in R hand  SBA                  Pt edu  Going through scenarios of how to get up off the floor using WC, walker or other stable surface as well as prone versus supine. Alter-G pant size: XL    Therapeutic Exercise and NMR EXR  [x] (11528) Provided verbal/tactile cueing for activities related to strengthening, flexibility, endurance, ROM for improvements in LE, proximal hip, and core control with self care, mobility, lifting, ambulation.  [] (74196) Provided verbal/tactile cueing for activities related to improving balance, coordination, kinesthetic sense, posture, motor skill, proprioception  to assist with LE, proximal hip, and core control in self care, mobility, lifting, ambulation and eccentric single leg control.      NMR and Therapeutic Activities:    [] (26395 or 18425) Provided verbal/tactile cueing for activities related to improving balance, coordination, kinesthetic sense, posture, motor skill, proprioception and motor activation to allow for proper function of core, proximal hip and LE with self care and ADLs  [x] (42960) Gait Re-education- Provided training and instruction to the patient for proper LE, core and proximal hip recruitment and positioning and eccentric body weight control with ambulation re-education including up and down stairs     Home Exercise Program:    [x] (61969) Reviewed/Progressed HEP activities related to strengthening, flexibility, endurance, ROM of core, proximal hip and LE for functional self-care, mobility, lifting and ambulation/stair navigation   [] (04135)Reviewed/Progressed HEP activities related to improving balance, coordination, kinesthetic sense, posture, motor skill, proprioception of core, proximal hip and LE for self care, mobility, lifting, and ambulation/stair navigation      Manual Treatments:  PROM / STM / Oscillations-Mobs:  G-I, II, III, IV (PA's, Inf., Post.)  [x] (04181) Provided manual therapy to mobilize LE, proximal hip and/or LS spine soft tissue/joints for the purpose of modulating pain, promoting relaxation,  increasing ROM, reducing/eliminating soft tissue swelling/inflammation/restriction, improving soft tissue extensibility and allowing for proper ROM for normal function with self care, mobility, lifting and ambulation. Modalities:     [] GAME READY (VASO)- for significant edema, swelling, pain control.      Charges:   Timed Code Treatment Minutes: 55   Total Treatment Minutes: 55       [] EVAL (LOW) 21565 (typically 20 minutes face-to-face)  [] EVAL (MOD) 66390 (typically 30 minutes face-to-face)  [] EVAL (HIGH) 24672 (typically 45 minutes face-to-face)  [] RE-EVAL     [x] SG(59340) x  2  [] IONTO  [] NMR (68091) x    [] VASO  [x] Manual (85705) x 1   [] Other:  [] TA x      [] Mech Traction (34072)  [] ES(attended) (61085)      [] ES (un) (59721):   [x] Gait (75145) x 1    GOALS:   Patient stated goal: Walk again  [x] Progressing: can now walk 175ft in 5-10 min with quad cane [] Met: [] Not Met: [] Adjusted    Therapist goals for Patient:   Short Term Goals: To be achieved in: 2 weeks  1. Independent in HEP and progression per patient tolerance, in order to prevent re-injury. [x] Progressing: [] Met: [] Not Met: [] Adjusted  2. Patient will have a decrease in pain to facilitate improvement in movement, function, and ADLs as indicated by Functional Deficits. [x] Progressing: pt has pain during longer bouts of WB but activity level without pain has dramatically improved (see subjective)[] Met: [] Not Met: [] Adjusted     Long Term Goals: To be achieved in: 8 weeks  1. Disability index score of 30% or less for the LEFS to assist with reaching prior level of function. [x] Progressing: now only 49% disability [] Met: [] Not Met: [] Adjusted  2. Patient will demonstrate increased AROM to full knee extension to allow for proper joint functioning as indicated by patients Functional Deficits. [] Progressing: [x] Met: knee flexion not obtainable due to fused knee but has maintained 0 deg knee ext for past month [] Not Met: [] Adjusted  3. Patient will demonstrate an increase in Strength to good proximal hip strength and control, within 5lb HHD in LE to allow for proper functional mobility as indicated by patients Functional Deficits. [x] Progressing: [] Met: [] Not Met: [] Adjusted  4. Patient will return to volunteer activities without increased symptoms or restriction. [] Progressing: [] Met: [x] Not Met: [] Adjusted  5. Pt will be able to ambulate with or without AD for at least 2 blocks or 30 minutes to improve community independence. [x] Progressing: able to do this with wheeled walker but not yet with quad cane which is pt's intended AD [] Met: [] Not Met: [] Adjusted    Progression Towards Functional goals:  [x] Patient is progressing as expected towards functional goals listed. [] Progression is slowed due to complexities listed. [] Progression has been slowed due to co-morbidities.   [] Plan just implemented, too soon to assess goals progression   [] Other:     Overall Progression Towards Functional goals/ Treatment Progress Update:  [x] Patient is progressing as expected towards functional goals listed. [] Progression is slowed due to complexities/Impairments listed. [] Progression has been slowed due to co-morbidities. [] Plan just implemented, too soon to assess goals progression <30days   [] Goals require adjustment due to lack of progress  [] Patient is not progressing as expected and requires additional follow up with physician  [] Other    Prognosis for POC: [x] Good [] Fair  [] Poor      Patient requires continued skilled intervention: [x] Yes  [] No    Treatment/Activity Tolerance:  [x] Patient able to complete treatment  [] Patient limited by fatigue  [] Patient limited by pain    [] Patient limited by other medical complications  [] Other:     ASSESSMENT:  Pt was able to comfortably do the supine ITB stretch with strap on his own today and reported less pain in lateral thigh after the STM. He continues to demonstrate a smoother gait pattern and speed as well as more steady gait with quad cane each couple of visits. He is also starting to verbalize improving confidence in using the quad cane as well. PLAN:  Pt to be seen 2 times a week for another 5 weeks as we progress toward independence away from PT and towards D/C. [x] Continue per plan of care [] Alter current plan (see comments above)   [] Plan of care initiated [] Hold pending MD visit [] Discharge       Electronically signed by:  Barrie Souza PT    Note: If patient does not return for scheduled/ recommended follow up visits, this note will serve as a discharge from care along with most recent update on progress.

## 2020-09-04 ENCOUNTER — HOSPITAL ENCOUNTER (OUTPATIENT)
Dept: PHYSICAL THERAPY | Age: 70
Setting detail: THERAPIES SERIES
Discharge: HOME | End: 2020-09-04
Payer: MEDICARE

## 2020-09-04 PROCEDURE — 97140 MANUAL THERAPY 1/> REGIONS: CPT

## 2020-09-04 PROCEDURE — 97110 THERAPEUTIC EXERCISES: CPT

## 2020-09-04 PROCEDURE — 97116 GAIT TRAINING THERAPY: CPT

## 2020-09-04 PROCEDURE — 97530 THERAPEUTIC ACTIVITIES: CPT

## 2020-09-04 NOTE — FLOWSHEET NOTE
[facility],  [address] [place]  Phone: [phone]  Fax [phone]        Physical Therapy Treatment Note/ Progress Report:           Date:  2020    Patient Name:  Kristin Fuller    :  1950  MRN: [id number]  Restrictions/Precautions:  R knee fused and knee spacer with severe LLD. Medical/Treatment Diagnosis Information:  · Diagnosis: M 25.561 R knee pain  · Treatment Diagnosis: M25.661 R knee stiffness, M82.81 R LE muscle weakness, Gait abnormality, R 81.9  Insurance/Certification information:  PT Insurance Information: Medicare/ [employer]  Physician Information:  Referring Practitioner: Dr. Leanne Castro  Has the plan of care been signed (Y/N):        [x]  Yes  []  No     Date of Patient follow up with Physician: unknown       Is this a Progress Report:     []  Yes  [x]  No        If Yes:  Date Range for reporting period:  Beginning 2020  Ending    Progress report will be due (10 Rx or 30 days whichever is less): , 3/91/7967      Recertification will be due (POC Duration  / 90 days whichever is less): , 10/2/2020    Progress note next visit  Visit # Insurance Allowable Requires auth   32 Med nec    [x]no        []yes:     Functional Scale: LEFS: 16/80 = 20% functional ability, 80% disability   Date assessed: 2020           LEFS: 45/80 = 56% functional ability,  44% disability   Date assessed: 2020           LEFS: 37/80 = 46% functional ability, 54% disability   Date assessed: 2020           LEFS: 41/80 = 51% functional ability, 49% disability   Date assessed: 2020    Therapy Diagnosis/Practice Pattern:     Number of Comorbidities:  []0     [x]1-2    []3+    Latex Allergy:  [x]NO      []YES  Preferred Language for Healthcare:   [x]English       []other:      Pain level: 0/10       SUBJECTIVE: Pt says the deep tissue massage he got last time really helped with his pain.  He has been pretty LLD.    Exercises/Interventions:     Therapeutic Ex (72735) Rep/Sets/sec Notes/CUES   SLR, SLR/ w/ER, hip abd,  HEP 5/27   S/L hip abd with ext \"L\" HEP 6/5   Standing hip ext @hip machine, HEP 6/19; min A during R stance to control [place] and leg buckling. Standing hip abd HEP 7/10   Stanidng HR/Toe raise  HEP 6/19   Gastroc stretch     Seated SLR w/ ER  HEP 6/19   S/L hip add    Tball bridges    Supine hip abd    Seated ankle PF HEP 6/19, 7/10   Seated hip add ring     Tball bridge   Stopped due to pain in knee   Stair taps  W/ quadcane, L foot tap stair and WB on R   Step-up 6\" 1x10 Cane, accepting downward weight onto R   Side step-ups/down 6\" [id number] downward weight onto R   Dynadisc foot taps alternating legs [id number] for posture and torso stability when shifting weight   Prone elbow prop for hip flexor stretch    Side stepping with 1/2 roller step-over    Prone hip ext     Prone hip ext w/ abd (\"L\")    S/L hip abd PT brace at pelvis to not roll torso bwd   S/L hip circles PT brace at pelvis to not roll torso bwd   S/L LE ER PT brace at pelvis to not roll torso bwd   Supine ITB stretch    Supine HS stretch    Tricep dips  @ low plinth, cued for L shin perpendicular to ground and using L LE more.     1/2 wall push-ups     CC: tricep pulldown     Paloff press  Green 2x20 B Cues for posture and TA, CGA   R stance, L 3-point reach (star balance) 2x10    Supine ITB stretch  HEP 9/2/2020   Pt edu   See dialogue below in assessment   Manual Intervention (26739)     STM: ITB, lateral quad 12'    [employer] edu how to use     NMR re-education (95240)  CUES NEEDED   Tactile cueing to inc quad contract   Standing external pertubations Blue sports cord around waist   SLS, modified L foot up on 8', reach to anamaria to reduce UE assist and improve upright torso posture   L foot step overs, R SLS  With walker and CGA to progressive WB on the R LE   L foot touch various surfaces while R leg stance to progress WB tolerance and stability CGA, holding wall and/or walker   DL stance equal WB, ball toss Cues for equal WB   DL stance med ball axe chops 3000Gr 2x20 ea way, on airexMirror to watch keeping hips equal btw legs/ equal WB   Tandem stance 3 cone tap  SBA, Cued for shifting weight onto R forward leg when leaning to tap cone   Standing on airex (one under ea foot) Cued for equal WB   Standing on airex DL arm swing    Standing on airex DL turn head    ALTER-G SLB  Cueing for control of Trendelenberg and not extending in spine as WB onto the R LE   R SLS w/ L 3 point tap/kick (modified Y-balance) 2x10                        L foot ball taps to WB on R LE  Progressively less WB in UE to point of going single arm     Gait (23239)     Ambulation 1x58ft gym floor   Quadcane, SBA; cued for upright posture    ALTER-G walking  Cueing or arm swing and upright posture controlling lateral lean   Alter-G fwd step to SLS  Cueing for control of Trendelenberg and not extending in spine as WB onto the R LE   Weight shifting w/ quadcane  @ 1/2 wall and CGA for all   Gait training with quad cane:     [id number] out to car   SBA, Cued for pattern, cane placement, upright posture, smaller step size to improve stability. Edu on crossing threshold holds and uneven Cued for sidewalk surfaces, SBA  CGA, cued for posture                  Therapeutic Activity (11190)     Gait obstacle course 3x58ft Quadcane, CGA, step over object, fig 8 cones, kick ball ea foot, ,  turn corners;     Side stepping Quadcane, SBA, cueing for how to weightshift to move feet   Flight of stairs : \"up with the good down, down with the bad\" w/ quad cane CGA, unilateral handrail, quad cane   Multidirectional changes in direction (mimicing cooking in kitchen)  w/ Quad cane, SBA   Walking w/ quad cane while holding unstable object in R hand  SBA                  Pt edu  Going through scenarios of how to get up off the floor using WC, walker or other stable surface as well as prone versus supine. Alter-G pant size: XL    Therapeutic Exercise and NMR EXR  [x] (52502) Provided verbal/tactile cueing for activities related to strengthening, flexibility, endurance, ROM for improvements in LE, proximal hip, and core control with self care, mobility, lifting, ambulation. [x] (59317) Provided verbal/tactile cueing for activities related to improving balance, coordination, kinesthetic sense, posture, motor skill, proprioception  to assist with LE, proximal hip, and core control in self care, mobility, lifting, ambulation and eccentric single leg control.      NMR and Therapeutic Activities:    [x] (81075 or 07220) Provided verbal/tactile cueing for activities related to improving balance, coordination, kinesthetic sense, posture, motor skill, proprioception and motor activation to allow for proper function of core, proximal hip and LE with self care and ADLs  [] (22834) Gait Re-education- Provided training and instruction to the patient for proper LE, core and proximal hip recruitment and positioning and eccentric body weight control with ambulation re-education including up and down stairs     Home Exercise Program:    [] (16987) Reviewed/Progressed HEP activities related to strengthening, flexibility, endurance, ROM of core, proximal hip and LE for functional self-care, mobility, lifting and ambulation/stair navigation   [] (76101)Reviewed/Progressed HEP activities related to improving balance, coordination, kinesthetic sense, posture, motor skill, proprioception of core, proximal hip and LE for self care, mobility, lifting, and ambulation/stair navigation      Manual Treatments:  PROM / STM / Oscillations-Mobs:  G-I, II, III, IV (PA's, Inf., Post.)  [x] (21094) Provided manual therapy to mobilize LE, proximal hip and/or LS spine soft tissue/joints for the purpose of modulating pain, promoting relaxation,  increasing ROM, reducing/eliminating soft tissue swelling/inflammation/restriction, improving soft tissue extensibility and allowing for proper ROM for normal function with self care, mobility, lifting and ambulation. Modalities:     [] GAME READY (VASO)- for significant edema, swelling, pain control. Charges:   Timed Code Treatment Minutes: 53   Total Treatment Minutes: 53       [] EVAL (LOW) 10442 (typically 20 minutes face-to-face)  [] EVAL (MOD) 77869 (typically 30 minutes face-to-face)  [] EVAL (HIGH) 30363 (typically 45 minutes face-to-face)  [] RE-EVAL     [x] EL(18954) x  1  [] IONTO  [x] NMR (91299) x1    [] VASO  [x] Manual (33122) x 1   [] Other:  [x] TA x   1   [] Mech Traction (33023)  [] ES(attended) (78552)      [] ES (un) (82295):   [] Gait (71993) x     GOALS:   Patient stated goal: Walk again  [x] Progressing: can now walk 175ft in 5-10 min with quad cane [] Met: [] Not Met: [] Adjusted    Therapist goals for Patient:   Short Term Goals: To be achieved in: 2 weeks  1. Independent in HEP and progression per patient tolerance, in order to prevent re-injury. [x] Progressing: [] Met: [] Not Met: [] Adjusted  2. Patient will have a decrease in pain to facilitate improvement in movement, function, and ADLs as indicated by Functional Deficits. [x] Progressing: pt has pain during longer bouts of WB but activity level without pain has dramatically improved (see subjective)[] Met: [] Not Met: [] Adjusted     Long Term Goals: To be achieved in: 8 weeks  1. Disability index score of 30% or less for the LEFS to assist with reaching prior level of function. [x] Progressing: now only 49% disability [] Met: [] Not Met: [] Adjusted  2. Patient will demonstrate increased AROM to full knee extension to allow for proper joint functioning as indicated by patients Functional Deficits. [] Progressing: [x] Met: knee flexion not obtainable due to fused knee but has maintained 0 deg knee ext for past month [] Not Met: [] Adjusted  3.  Patient will demonstrate an increase in Strength to good proximal hip strength and control, within 5lb HHD in LE to allow for proper functional mobility as indicated by patients Functional Deficits. [x] Progressing: [] Met: [] Not Met: [] Adjusted  4. Patient will return to volunteer activities without increased symptoms or restriction. [] Progressing: [] Met: [x] Not Met: [] Adjusted  5. Pt will be able to ambulate with or without AD for at least 2 blocks or 30 minutes to improve community independence. [x] Progressing: able to do this with wheeled walker but not yet with quad cane which is pt's intended AD [] Met: [] Not Met: [] Adjusted    Progression Towards Functional goals:  [x] Patient is progressing as expected towards functional goals listed. [] Progression is slowed due to complexities listed. [] Progression has been slowed due to co-morbidities. [] Plan just implemented, too soon to assess goals progression   [] Other:     Overall Progression Towards Functional goals/ Treatment Progress Update:  [x] Patient is progressing as expected towards functional goals listed. [] Progression is slowed due to complexities/Impairments listed. [] Progression has been slowed due to co-morbidities. [] Plan just implemented, too soon to assess goals progression <30days   [] Goals require adjustment due to lack of progress  [] Patient is not progressing as expected and requires additional follow up with physician  [] Other    Prognosis for POC: [x] Good [] Fair  [] Poor      Patient requires continued skilled intervention: [x] Yes  [] No    Treatment/Activity Tolerance:  [x] Patient able to complete treatment  [] Patient limited by fatigue  [] Patient limited by pain    [] Patient limited by other medical complications  [] Other:     ASSESSMENT:  Pt did really well today. He was moving at a smoother and faster pace during ambulation and between exercises today since the pain from the muscle tightness is better in his leg.  He continues to need the most cueing for better upright posture (shoulders back, bottom tucked under, not looking at floor). He left visit today feeling really motivated and confident about continuing to work towards functional independence around his home utilizing the quad cane. PLAN:  Pt to be seen 2 times a week for another 5 weeks as we progress toward independence away from PT and towards D/C. [x] Continue per plan of care [] Alter current plan (see comments above)   [] Plan of care initiated [] Hold pending MD visit [] Discharge       Electronically signed by:  Carol Carter PT    Note: If patient does not return for scheduled/ recommended follow up visits, this note will serve as a discharge from care along with most recent update on progress.

## 2020-09-09 ENCOUNTER — HOSPITAL ENCOUNTER (OUTPATIENT)
Dept: PHYSICAL THERAPY | Age: 70
Setting detail: THERAPIES SERIES
Discharge: HOME | End: 2020-09-09
Payer: MEDICARE

## 2020-09-09 PROCEDURE — 97112 NEUROMUSCULAR REEDUCATION: CPT | Performed by: PHYSICAL THERAPIST

## 2020-09-09 PROCEDURE — 97140 MANUAL THERAPY 1/> REGIONS: CPT | Performed by: PHYSICAL THERAPIST

## 2020-09-09 PROCEDURE — 97530 THERAPEUTIC ACTIVITIES: CPT | Performed by: PHYSICAL THERAPIST

## 2020-09-09 PROCEDURE — 97110 THERAPEUTIC EXERCISES: CPT | Performed by: PHYSICAL THERAPIST

## 2020-09-09 NOTE — FLOWSHEET NOTE
[facility],  [address]  Phone: [phone]  Fax [phone]        Physical Therapy Treatment Note/ Progress Report:           Date:  2020    Patient Name:  Luda Wheat    :  1950  MRN: [id number]  Restrictions/Precautions:  R knee fused and knee spacer with severe LLD. Medical/Treatment Diagnosis Information:  · Diagnosis: M 25.561 R knee pain  · Treatment Diagnosis: M25.661 R knee stiffness, M82.81 R LE muscle weakness, Gait abnormality, R 16.0  Insurance/Certification information:  PT Insurance Information: [email or web address]/ [email or web address]  Physician Information:  Referring Practitioner: Dr. Isaac العلي  Has the plan of care been signed (Y/N):        [x]  Yes  []  No     Date of Patient follow up with Physician: unknown       Is this a Progress Report:     []  Yes  [x]  No        If Yes:  Date Range for reporting period:  Beginning 2020  Ending    Progress report will be due (10 Rx or 30 days whichever is less): ,       Recertification will be due (POC Duration  / 90 days whichever is less): , 10/2/2020    Progress note next visit  Visit # Insurance Allowable Requires auth   35 Med nec    [x]no        []yes:     Functional Scale: LEFS: 16/80 = 20% functional ability, 80% disability   Date assessed: 2020           LEFS: 45/80 = 56% functional ability,  44% disability   Date assessed: 2020           LEFS: 37/80 = 46% functional ability, 54% disability   Date assessed: 2020           LEFS: 41/80 = 51% functional ability, 49% disability   Date assessed: 2020    Therapy Diagnosis/Practice Pattern:     Number of Comorbidities:  []0     [x]1-2    []3+    Latex Allergy:  [x]NO      []YES  Preferred Language for Healthcare:   [x]English       []other:      Pain level: 0/10       SUBJECTIVE: He saw Dr. Mike Navarro since last session.   He reports that the MD was impressed by his walking ability. He reports he fatigues. Dr. Richa Yoder told him he would always need a cane for ambulation. He is able to walk the dog for at least a 1/4 mile with the walker. He feels really comfortable with the walker. He reports he is able to get around the car and get gas just by holding on to the car. He can also press the brake with the right leg over this past month. OBJECTIVE:    Observation:    Gait: with quad cane: more equal stride, more upright posture, more equal WB between limbs, majority of time only needs SBA with quad cane at this point with occasional CGA   Test measurments: Quad set: good-     Knee ROM 5/29/2020 6/3/2020 6/17/2020 7/22/2020 7/27/2020 8/28/2020   R knee extension pre-tx -15 deg -6   fused fused   R knee extension post-tx -8 deg -5 -2 deg 0 deg 0 deg 0 deg     ROM LEFT RIGHT ROM 7/27/2020 Right  7/27/2020 Right   8/27/2020   HIP Flex 127 127      Knee ext 0 -15  0 deg 0 deg   Knee Flex 123 Fused knee  fused fused   Ankle PF 62 51      Ankle DF 0 -1      Strength  LEFT RIGHT  Right    HIP Flexors 5/5 4+/5  5/5 5-/5   HIP Abductors 4/5 4/5  4/5 4+/5   Hip Add     4/5   HIP Ext 5/5 3+/5  4+/5    Hip ER         Knee EXT (quad) 5/5 4/5 (quad set)  4+/5 (quad set) 4+/5 quad set   Knee Flex (HS) 4/5 NT      Ankle DF 4/5 4/5      Ankle PF 5/5 5/5      Ankle Inv 4-/5 4-/5      Ankle EV 4-/5 3+/5            RESTRICTIONS/PRECAUTIONS: R knee fused and knee spacer with severe LLD. Exercises/Interventions:     Therapeutic Ex (22471) Rep/Sets/sec Notes/CUES   SLR, SLR/ w/ER, hip abd,  HEP 5/27   S/L hip abd with ext \"L\" HEP 6/5   Standing hip ext @hip machine, HEP 6/19; min A during R stance to control Trenedenburg and leg buckling.     Standing hip abd HEP 7/10   Stanidng HR/Toe raise  HEP 6/19   Gastroc stretch     Seated SLR w/ ER  HEP 6/19   S/L hip add    Tball bridges    Supine hip abd    Seated ankle PF HEP 6/19, 7/10   Seated hip add ring     Tball bridge   Stopped due to pain in knee Stair taps  W/ quadcane, L foot tap stair and WB on R   Prone prop  15 sec 5x Felt big stretch on first one and felt better each one after. Step-up 6\" 1x10 Cane, accepting downward weight onto R   Side step-ups/down 6\" [id number] downward weight onto R   Dynadisc foot taps alternating legs [id number] for posture and torso stability when shifting weight   Prone elbow prop for hip flexor stretch    Side stepping with 1/2 roller step-over    Prone hip ext     Prone hip ext w/ abd (\"L\")    S/L hip abd PT brace at pelvis to not roll torso bwd   S/L hip circles PT brace at pelvis to not roll torso bwd   S/L LE ER PT brace at pelvis to not roll torso bwd   Supine ITB stretch    Supine HS stretch    Tricep dips  @ low plinth, cued for L shin perpendicular to ground and using L LE more.     1/2 wall push-ups     CC: tricep pulldown     Cues for posture and TA, CGA   R stance, L 3-point reach (star balance) 2x10    Supine ITB stretch  HEP 9/2/2020   Pt edu   See dialogue below in assessment   Manual Intervention (09903)     STM: ITB, lateral quad 8'    The Stick edu how to use     NMR re-education (47883)  CUES NEEDED   Tactile cueing to inc quad contract   Standing external pertubations Blue sports cord around waist   SLS, modified L foot up on 8', reach to anamaria to reduce UE assist and improve upright torso posture   L foot step overs, R SLS  With walker and CGA to progressive WB on the R LE   L foot touch various surfaces while R leg stance to progress WB tolerance and stability CGA, holding wall and/or walker   DL stance equal WB, ball toss Cues for equal WB   DL stance med ball axe chops 3000Gr 2x15 ea way, on airexMirror to watch keeping hips equal btw legs/ equal WB   Tandem stance 3 cone tap  SBA, Cued for shifting weight onto R forward leg when leaning to tap cone   Standing on airex (one under ea foot) Cued for equal WB   Standing on airex DL arm swing    Standing on airex DL turn head    ALTER-G SLB  Cueing for PT and towards D/C. [x] Continue per plan of care [] Alter current plan (see comments above)   [] Plan of care initiated [] Hold pending MD visit [] Discharge       Electronically signed by:  Indiana Cody PT    Note: If patient does not return for scheduled/ recommended follow up visits, this note will serve as a discharge from care along with most recent update on progress.

## 2020-09-11 ENCOUNTER — HOSPITAL ENCOUNTER (OUTPATIENT)
Dept: PHYSICAL THERAPY | Age: 70
Setting detail: THERAPIES SERIES
Discharge: HOME | End: 2020-09-11
Payer: MEDICARE

## 2020-09-11 PROCEDURE — 97110 THERAPEUTIC EXERCISES: CPT | Performed by: PHYSICAL THERAPIST

## 2020-09-11 PROCEDURE — 97112 NEUROMUSCULAR REEDUCATION: CPT | Performed by: PHYSICAL THERAPIST

## 2020-09-11 PROCEDURE — 97530 THERAPEUTIC ACTIVITIES: CPT | Performed by: PHYSICAL THERAPIST

## 2020-09-11 PROCEDURE — 97140 MANUAL THERAPY 1/> REGIONS: CPT | Performed by: PHYSICAL THERAPIST

## 2020-09-11 NOTE — FLOWSHEET NOTE
[facility],  [address]  Phone: [phone]  Fax [phone]        Physical Therapy Treatment Note/ Progress Report:           Date:  2020    Patient Name:  Luda Wheat    :  1950  MRN: [id number]  Restrictions/Precautions:  R knee fused and knee spacer with severe LLD. Medical/Treatment Diagnosis Information:  · Diagnosis: M 25.561 R knee pain  · Treatment Diagnosis: M25.661 R knee stiffness, M82.81 R LE muscle weakness, Gait abnormality, R 39.4  Insurance/Certification information:  PT Insurance Information: Medicare/ [employer]  Physician Information:  Referring Practitioner: Dr. Isaac العلي  Has the plan of care been signed (Y/N):        [x]  Yes  []  No     Date of Patient follow up with Physician: unknown       Is this a Progress Report:     []  Yes  [x]  No        If Yes:  Date Range for reporting period:  Beginning 2020  Ending    Progress report will be due (10 Rx or 30 days whichever is less): , 3/56/5260      Recertification will be due (POC Duration  / 90 days whichever is less): , 10/2/2020    Progress note next visit  Visit # Insurance Allowable Requires auth   29 Med nec    [x]no        []yes:     Functional Scale: LEFS: 16/80 = 20% functional ability, 80% disability   Date assessed: 2020           LEFS: 45/80 = 56% functional ability,  44% disability   Date assessed: 2020           LEFS: 37/80 = 46% functional ability, 54% disability   Date assessed: 2020           LEFS: 41/80 = 51% functional ability, 49% disability   Date assessed: 2020    Therapy Diagnosis/Practice Pattern:     Number of Comorbidities:  []0     [x]1-2    []3+    Latex Allergy:  [x]NO      []YES  Preferred Language for Healthcare:   [x]English       []other:      Pain level: 0/10       SUBJECTIVE: He reports that he has been practicing laying on his stomach to stretch the front of his hip.   He has also been consciously using his cane more in the house and this is feeling more comfortable to him. The past 2 nights he did not have knee aching at night and did not need to resort to using Biofreeze to go to sleep. He is currently installing an elevator in his house. OBJECTIVE:    Observation:    Gait: with quad cane: more equal stride, more upright posture, more equal WB between limbs, majority of time only needs SBA with quad cane at this point with occasional CGA   Test measurments: Quad set: good-     Knee ROM 5/29/2020 6/3/2020 6/17/2020 7/22/2020 7/27/2020 8/28/2020   R knee extension pre-tx -15 deg -6   fused fused   R knee extension post-tx -8 deg -5 -2 deg 0 deg 0 deg 0 deg     ROM LEFT RIGHT ROM 7/27/2020 Right  7/27/2020 Right   8/27/2020   HIP Flex 127 127      Knee ext 0 -15  0 deg 0 deg   Knee Flex 123 Fused knee  fused fused   Ankle PF 62 51      Ankle DF 0 -1      Strength  LEFT RIGHT  Right    HIP Flexors 5/5 4+/5  5/5 5-/5   HIP Abductors 4/5 4/5  4/5 4+/5   Hip Add     4/5   HIP Ext 5/5 3+/5  4+/5    Hip ER         Knee EXT (quad) 5/5 4/5 (quad set)  4+/5 (quad set) 4+/5 quad set   Knee Flex (HS) 4/5 NT      Ankle DF 4/5 4/5      Ankle PF 5/5 5/5      Ankle Inv 4-/5 4-/5      Ankle EV 4-/5 3+/5            RESTRICTIONS/PRECAUTIONS: R knee fused and knee spacer with severe LLD. Exercises/Interventions:     Therapeutic Ex (73659) Rep/Sets/sec Notes/CUES   SLR, SLR/ w/ER, hip abd,  HEP 5/27   S/L hip abd with ext \"L\" HEP 6/5   Standing hip ext @hip machine, HEP 6/19; min A during R stance to control Trenedenburg and leg buckling.     Standing hip abd HEP 7/10   Stanidng HR/Toe raise  HEP 6/19   Gastroc stretch     Seated SLR w/ ER  HEP 6/19   S/L hip add    Tball bridges    Supine hip abd    Seated ankle PF HEP 6/19, 7/10   Seated hip add ring     Tball bridge   Stopped due to pain in knee   Stair taps  W/ quadcane, L foot tap stair and WB on R   Prone prop  15 sec 5x Felt big stretch on first one and felt better each one after. Step-up 6\" 1x15 Cane, accepting downward weight onto R with CGA   Side step-ups/down 6\" [id number] downward weight onto R   Dynadisc foot taps alternating legs [id number] for posture and torso stability when shifting weight, with CGA   Prone elbow prop for hip flexor stretch    Side stepping with 1/2 roller step-over    Prone hip ext     Prone hip ext w/ abd (\"L\")    S/L hip abd PT brace at pelvis to not roll torso bwd   S/L hip circles PT brace at pelvis to not roll torso bwd   S/L LE ER PT brace at pelvis to not roll torso bwd   Supine ITB stretch    Supine HS stretch    Tricep dips  @ low plinth, cued for L shin perpendicular to ground and using L LE more.     1/2 wall push-ups     CC: tricep pulldown     Cues for posture and TA, CGA      Supine ITB stretch  HEP 9/2/2020   Pt edu   See dialogue below in assessment   Manual Intervention (52937)     STM: ITB, lateral quad with Hawk  pro 8'    Manual ITB stretch in supine  3 min         NMR re-education (56088)  CUES NEEDED   Tactile cueing to inc quad contract   Standing external pertubations Blue sports cord around waist   SLS, modified L foot up on 8', reach to anamaria to reduce UE assist and improve upright torso posture   L foot step overs, R SLS  With walker and CGA to progressive WB on the R LE   L foot touch various surfaces while R leg stance to progress WB tolerance and stability CGA, holding wall and/or walker   DL stance equal WB, ball toss Cues for equal WB   DL stance med ball axe chops 3000Gr 2x15 ea way, on airexMirror to watch keeping hips equal btw legs/ equal WB   Tandem stance 3 cone tap  SBA, Cued for shifting weight onto R forward leg when leaning to tap cone   Standing on airex (one under ea foot) Cued for equal WB   Standing on airex DL arm swing    Standing on airex DL turn head    ALTER-G SLB  Cueing for control of Trendelenberg and not extending in spine as WB onto the R LE   R SLS w/ L 3 point tap/kick (modified Y-balance) 2x10                        L foot ball taps to WB on R LE  Progressively less WB in UE to point of going single arm     Gait (22920)     Ambulation 1x58ft gym floor   Quadcane, SBA; cued for upright posture    ALTER-G walking  Cueing or arm swing and upright posture controlling lateral lean   Alter-G fwd step to SLS  Cueing for control of Trendelenberg and not extending in spine as WB onto the R LE   Weight shifting w/ quadcane  @ 1/2 wall and CGA for all   Gait training with quad cane:     [id number] out to car   SBA, Cued for pattern, cane placement, upright posture, smaller step size to improve stability. Edu on crossing threshold holds and uneven Cued for sidewalk surfaces, SBA  CGA, cued for posture                  Therapeutic Activity (19451)     Gait obstacle course 3x58ft Quadcane, CGA, step over object, fig 8 cones, kick ball ea foot, ,  turn corners; Side stepping Quadcane, SBA, cueing for how to weightshift to move feet   Flight of stairs : \"up with the good down, down with the bad\" w/ quad cane 5x up and down x 4 steps CGA, unilateral handrail, quad cane   Multidirectional changes in direction (mimicing cooking in kitchen)  w/ Quad cane, SBA   Walking w/ quad cane while holding unstable object in R hand  SBA                  Pt edu  Going through scenarios of how to get up off the floor using WC, walker or other stable surface as well as prone versus supine. Alter-G pant size: XL    Therapeutic Exercise and NMR EXR  [x] (98057) Provided verbal/tactile cueing for activities related to strengthening, flexibility, endurance, ROM for improvements in LE, proximal hip, and core control with self care, mobility, lifting, ambulation.   [x] (97658) Provided verbal/tactile cueing for activities related to improving balance, coordination, kinesthetic sense, posture, motor skill, proprioception  to assist with LE, proximal hip, and core control in self care, mobility, lifting, ambulation and eccentric single leg control. NMR and Therapeutic Activities:    [x] (20290 or 64866) Provided verbal/tactile cueing for activities related to improving balance, coordination, kinesthetic sense, posture, motor skill, proprioception and motor activation to allow for proper function of core, proximal hip and LE with self care and ADLs  [] (66614) Gait Re-education- Provided training and instruction to the patient for proper LE, core and proximal hip recruitment and positioning and eccentric body weight control with ambulation re-education including up and down stairs     Home Exercise Program:    [] (44417) Reviewed/Progressed HEP activities related to strengthening, flexibility, endurance, ROM of core, proximal hip and LE for functional self-care, mobility, lifting and ambulation/stair navigation   [] (66406)Reviewed/Progressed HEP activities related to improving balance, coordination, kinesthetic sense, posture, motor skill, proprioception of core, proximal hip and LE for self care, mobility, lifting, and ambulation/stair navigation      Manual Treatments:  PROM / STM / Oscillations-Mobs:  G-I, II, III, IV (PA's, Inf., Post.)  [x] (90408) Provided manual therapy to mobilize LE, proximal hip and/or LS spine soft tissue/joints for the purpose of modulating pain, promoting relaxation,  increasing ROM, reducing/eliminating soft tissue swelling/inflammation/restriction, improving soft tissue extensibility and allowing for proper ROM for normal function with self care, mobility, lifting and ambulation. Modalities:     [] GAME READY (VASO)- for significant edema, swelling, pain control.      Charges:   Timed Code Treatment Minutes: 53   Total Treatment Minutes: 53       [] EVAL (LOW) 05978 (typically 20 minutes face-to-face)  [] EVAL (MOD) 38915 (typically 30 minutes face-to-face)  [] EVAL (HIGH) 56650 (typically 45 minutes face-to-face)  [] RE-EVAL     [x] LG(88149) x  1  [] IONTO  [x] NMR (67764) x1    [] VASO  [x] Manual (24184) x 1   [] Other:  [x] TA x   1   [] Mech Traction (76775)  [] ES(attended) (09508)      [] ES (un) (15281):   [] Gait (92411) x     MEDICARE CAP EXCEPTION DOCUMENTATION      I certify that this patient meets one of the below criteria necessary for becoming an exception to the Medicare cap on therapy services:    [x]  The patient has a condition identified by an ICD-10 code that has a direct and significant impact on the need for therapy. (Significantly impacts the rate of recovery.)                   [x]  The patient has a complexity identified by an ICD-10 code that has a direct and significant impact on the need for therapy. (Significantly impacts the rate of recovery and is associated with a primary condition.)         [x]  The patient has associated variables that influence the amount of treatment to include:  Social support, self-efficacy/motivation, prognosis, time since onset/acuity. []  The patient has generalized musculoskeletal conditions or a condition affecting multiple sites that will have a direct impact on the rate of recovery. []  The patient had a prior episode of outpatient therapy during this calendar year for a different condition. []  The patient has a mental or cognitive disorder in addition to the condition being treated that will have a direct and significant impact on the rate of recovery. GOALS:   Patient stated goal: Walk again  [x] Progressing: can now walk 175ft in 5-10 min with quad cane [] Met: [] Not Met: [] Adjusted    Therapist goals for Patient:   Short Term Goals: To be achieved in: 2 weeks  1. Independent in HEP and progression per patient tolerance, in order to prevent re-injury. [x] Progressing: [] Met: [] Not Met: [] Adjusted  2. Patient will have a decrease in pain to facilitate improvement in movement, function, and ADLs as indicated by Functional Deficits.   [x] Progressing: pt has pain during longer bouts of WB but activity level without pain has dramatically improved (see subjective)[] Met: [] Not Met: [] Adjusted     Long Term Goals: To be achieved in: 8 weeks  1. Disability index score of 30% or less for the LEFS to assist with reaching prior level of function. [x] Progressing: now only 49% disability [] Met: [] Not Met: [] Adjusted  2. Patient will demonstrate increased AROM to full knee extension to allow for proper joint functioning as indicated by patients Functional Deficits. [] Progressing: [x] Met: knee flexion not obtainable due to fused knee but has maintained 0 deg knee ext for past month [] Not Met: [] Adjusted  3. Patient will demonstrate an increase in Strength to good proximal hip strength and control, within 5lb HHD in LE to allow for proper functional mobility as indicated by patients Functional Deficits. [x] Progressing: [] Met: [] Not Met: [] Adjusted  4. Patient will return to volunteer activities without increased symptoms or restriction. [] Progressing: [] Met: [x] Not Met: [] Adjusted  5. Pt will be able to ambulate with or without AD for at least 2 blocks or 30 minutes to improve community independence. [x] Progressing: able to do this with wheeled walker but not yet with quad cane which is pt's intended AD [] Met: [] Not Met: [] Adjusted    Progression Towards Functional goals:  [x] Patient is progressing as expected towards functional goals listed. [] Progression is slowed due to complexities listed. [] Progression has been slowed due to co-morbidities. [] Plan just implemented, too soon to assess goals progression   [] Other:     Overall Progression Towards Functional goals/ Treatment Progress Update:  [x] Patient is progressing as expected towards functional goals listed. [] Progression is slowed due to complexities/Impairments listed. [] Progression has been slowed due to co-morbidities.   [] Plan just implemented, too soon to assess goals progression <30days   [] Goals require adjustment due to lack of progress  [] Patient is not progressing as expected and requires additional follow up with physician  [] Other    Prognosis for POC: [x] Good [] Fair  [] Poor      Patient requires continued skilled intervention: [x] Yes  [] No    Treatment/Activity Tolerance:  [x] Patient able to complete treatment  [] Patient limited by fatigue  [] Patient limited by pain    _ Patient limited by other medical complications      ASSESSMENT: Patient demonstrated increased ability to balance with single limb tasks when cued to keep his fingers off of wall or railing. He continues to need the most cueing for better upright posture (shoulders back, bottom tucked under, not looking at floor). He left visit today feeling really motivated and confident about continuing to work towards functional independence around his home utilizing the quad cane. PLAN:  Pt to be seen 2 times a week for another 5 weeks as we progress toward independence away from PT and towards D/C. [x] Continue per plan of care [] Alter current plan (see comments above)   [] Plan of care initiated [] Hold pending MD visit [] Discharge       Electronically signed by:  Jennie Barragan, PT    Note: If patient does not return for scheduled/ recommended follow up visits, this note will serve as a discharge from care along with most recent update on progress.

## 2020-09-16 ENCOUNTER — HOSPITAL ENCOUNTER (OUTPATIENT)
Dept: PHYSICAL THERAPY | Age: 70
Setting detail: THERAPIES SERIES
Discharge: HOME | End: 2020-09-16
Payer: MEDICARE

## 2020-09-16 PROCEDURE — 97112 NEUROMUSCULAR REEDUCATION: CPT

## 2020-09-16 PROCEDURE — 97116 GAIT TRAINING THERAPY: CPT

## 2020-09-16 PROCEDURE — 97140 MANUAL THERAPY 1/> REGIONS: CPT

## 2020-09-16 NOTE — FLOWSHEET NOTE
DL turn head    ALTER-G SLB  Cueing for control of Trendelenberg and not extending in spine as WB onto the R LE   R SLS w/ L 3 point tap/kick (modified Y-balance) 2x10                        L foot ball taps to WB on R LE  Progressively less WB in UE to point of going single arm     Gait (39587)     Ambulation    Quadcane, SBA; cued for upright posture    ALTER-G walking  Cueing or arm swing and upright posture controlling lateral lean   Alter-G fwd step to SLS  Cueing for control of Trendelenberg and not extending in spine as WB onto the R LE   Weight shifting w/ quadcane  @ 1/2 wall and CGA for all   Gait training with quad cane:     [id number] out to car  2j877ih around clinic SBA, Cued for pattern, cane placement, upright posture, smaller step size to improve stability. Edu on crossing threshold holds and uneven , SBA  SBA,                   Therapeutic Activity (20002)     Gait obstacle course Quadcane, CGA, step over object, fig 8 cones, kick ball ea foot, ,  turn corners; Side stepping Quadcane, SBA, cueing for how to weightshift to move feet   Flight of stairs : \"up with the good down, down with the bad\" w/ quad cane CGA, unilateral handrail, quad cane   Multidirectional changes in direction (mimicing cooking in kitchen)  w/ Quad cane, SBA   Walking w/ quad cane while holding unstable object in R hand  SBA   Walking while carrying \"grocery bag\" and step up and over 6\" step 2x10ft, 1x6\", 3lbs med ball inside pillowcase              Pt edu  Going through scenarios of how to get up off the floor using WC, walker or other stable surface as well as prone versus supine. Alter-G pant size: XL    Therapeutic Exercise and NMR EXR  [x] (07083) Provided verbal/tactile cueing for activities related to strengthening, flexibility, endurance, ROM for improvements in LE, proximal hip, and core control with self care, mobility, lifting, ambulation.   [x] (64249) Provided verbal/tactile cueing for activities related to improving balance, coordination, kinesthetic sense, posture, motor skill, proprioception  to assist with LE, proximal hip, and core control in self care, mobility, lifting, ambulation and eccentric single leg control. NMR and Therapeutic Activities:    [x] (10547 or 68632) Provided verbal/tactile cueing for activities related to improving balance, coordination, kinesthetic sense, posture, motor skill, proprioception and motor activation to allow for proper function of core, proximal hip and LE with self care and ADLs  [x] (66852) Gait Re-education- Provided training and instruction to the patient for proper LE, core and proximal hip recruitment and positioning and eccentric body weight control with ambulation re-education including up and down stairs     Home Exercise Program:    [] (31529) Reviewed/Progressed HEP activities related to strengthening, flexibility, endurance, ROM of core, proximal hip and LE for functional self-care, mobility, lifting and ambulation/stair navigation   [] (37202)Reviewed/Progressed HEP activities related to improving balance, coordination, kinesthetic sense, posture, motor skill, proprioception of core, proximal hip and LE for self care, mobility, lifting, and ambulation/stair navigation      Manual Treatments:  PROM / STM / Oscillations-Mobs:  G-I, II, III, IV (PA's, Inf., Post.)  [x] (38589) Provided manual therapy to mobilize LE, proximal hip and/or LS spine soft tissue/joints for the purpose of modulating pain, promoting relaxation,  increasing ROM, reducing/eliminating soft tissue swelling/inflammation/restriction, improving soft tissue extensibility and allowing for proper ROM for normal function with self care, mobility, lifting and ambulation. Modalities:     [] GAME READY (VASO)- for significant edema, swelling, pain control.      Charges:   Timed Code Treatment Minutes: 55   Total Treatment Minutes: 55       [] EVAL (LOW) 44555 (typically 20 minutes face-to-face)  [] EVAL (MOD) 66012 (typically 30 minutes face-to-face)  [] EVAL (HIGH) 07494 (typically 45 minutes face-to-face)  [] RE-EVAL     [] AA(06055) x  1  [] IONTO  [x] NMR (90686) x1    [] VASO  [x] Manual (86389) x 1   [] Other:  [] TA x      [] Mech Traction (49133)  [] ES(attended) (88365)      [] ES (un) (74334):   [x] Gait (60045) x 2    MEDICARE CAP EXCEPTION DOCUMENTATION      I certify that this patient meets one of the below criteria necessary for becoming an exception to the Medicare cap on therapy services:    [x]  The patient has a condition identified by an ICD-10 code that has a direct and significant impact on the need for therapy. (Significantly impacts the rate of recovery.)                   [x]  The patient has a complexity identified by an ICD-10 code that has a direct and significant impact on the need for therapy. (Significantly impacts the rate of recovery and is associated with a primary condition.)         [x]  The patient has associated variables that influence the amount of treatment to include:  Social support, self-efficacy/motivation, prognosis, time since onset/acuity. []  The patient has generalized musculoskeletal conditions or a condition affecting multiple sites that will have a direct impact on the rate of recovery. []  The patient had a prior episode of outpatient therapy during this calendar year for a different condition. []  The patient has a mental or cognitive disorder in addition to the condition being treated that will have a direct and significant impact on the rate of recovery. GOALS:   Patient stated goal: Walk again  [x] Progressing: can now walk 175ft in 5-10 min with quad cane [] Met: [] Not Met: [] Adjusted    Therapist goals for Patient:   Short Term Goals: To be achieved in: 2 weeks  1. Independent in HEP and progression per patient tolerance, in order to prevent re-injury. [x] Progressing: [] Met: [] Not Met: [] Adjusted  2.  Patient will complexities/Impairments listed. [] Progression has been slowed due to co-morbidities. [] Plan just implemented, too soon to assess goals progression <30days   [] Goals require adjustment due to lack of progress  [] Patient is not progressing as expected and requires additional follow up with physician  [] Other    Prognosis for POC: [x] Good [] Fair  [] Poor      Patient requires continued skilled intervention: [x] Yes  [] No    Treatment/Activity Tolerance:  [x] Patient able to complete treatment  [] Patient limited by fatigue  [] Patient limited by pain    _ Patient limited by other medical complications      ASSESSMENT: Patient continues to gain confidence and endurance with being able to walk with quadcane the longer total distance in clinic today and only requiring SBA. He was also able to do functional taks of carrying small \"gracery bag\" and up over a step to mimic his route from car into his home via garage that he reports as a big functional limitation still. He was able to go it but unsteady slightly on feet and limited in endurance with the task. PLAN:  Pt to be seen 2 times a week for another 5 weeks as we progress toward independence away from PT and towards D/C or further reduction to once a week as recommended by his physician. [x] Continue per plan of care [] Alter current plan (see comments above)   [] Plan of care initiated [] Hold pending MD visit [] Discharge       Electronically signed by:  Clovis Hinojosa PT    Note: If patient does not return for scheduled/ recommended follow up visits, this note will serve as a discharge from care along with most recent update on progress.

## 2020-09-18 ENCOUNTER — HOSPITAL ENCOUNTER (OUTPATIENT)
Dept: PHYSICAL THERAPY | Age: 70
Setting detail: THERAPIES SERIES
Discharge: HOME | End: 2020-09-18
Payer: MEDICARE

## 2020-09-18 PROCEDURE — 97116 GAIT TRAINING THERAPY: CPT

## 2020-09-18 PROCEDURE — 97112 NEUROMUSCULAR REEDUCATION: CPT

## 2020-09-18 PROCEDURE — 97140 MANUAL THERAPY 1/> REGIONS: CPT

## 2020-09-18 NOTE — FLOWSHEET NOTE
[facility],  [address] [place]  Phone: [phone]  Fax [phone]        Physical Therapy Treatment Note/ Progress Report:           Date:  2020    Patient Name:  Soo Rodgers    :  1950  MRN: [id number]  Restrictions/Precautions:  R knee fused and knee spacer with severe LLD. Medical/Treatment Diagnosis Information:  · Diagnosis: M 25.561 R knee pain  · Treatment Diagnosis: M25.661 R knee stiffness, M82.81 R LE muscle weakness, Gait abnormality, R 40.3  Insurance/Certification information:  PT Insurance Information: Medicare/ [employer]  Physician Information:  Referring Practitioner: Dr. Ines Matute  Has the plan of care been signed (Y/N):        [x]  Yes  []  No     Date of Patient follow up with Physician: unknown       Is this a Progress Report:     []  Yes  [x]  No        If Yes:  Date Range for reporting period:  Beginning 2020  Ending    Progress report will be due (10 Rx or 30 days whichever is less): , 6209      Recertification will be due (POC Duration  / 90 days whichever is less): , 10/2/2020    Progress note next visit  Visit # Insurance Allowable Requires auth   35 Med nec    [x]no        []yes:     Functional Scale: LEFS: 16/80 = 20% functional ability, 80% disability   Date assessed: 2020           LEFS: 45/80 = 56% functional ability,  44% disability   Date assessed: 2020           LEFS: 37/80 = 46% functional ability, 54% disability   Date assessed: 2020           LEFS: 41/80 = 51% functional ability, 49% disability   Date assessed: 2020    Therapy Diagnosis/Practice Pattern:     Number of Comorbidities:  []0     [x]1-2    []3+    Latex Allergy:  [x]NO      []YES  Preferred Language for Healthcare:   [x]English       []other:      Pain level: 0/10       SUBJECTIVE: Pt reports he felt pretty good after last visit.  He liked that we got a lot more walking done and Cane, accepting downward weight onto R with CGA   Side step-ups/down Accepting downward weight onto R   Dynadisc foot taps alternating legs 1d80Munk for posture and torso stability when shifting weight, with CGA   Prone elbow prop for hip flexor stretch    Side stepping with 1/2 roller step-over    Prone hip ext     Prone hip ext w/ abd (\"L\")    S/L hip abd PT brace at pelvis to not roll torso bwd   S/L hip circles PT brace at pelvis to not roll torso bwd   S/L LE ER PT brace at pelvis to not roll torso bwd   Supine ITB stretch    Supine HS stretch    Tricep dips  @ low plinth, cued for L shin perpendicular to ground and using L LE more.     1/2 wall push-ups     CC: tricep pulldown     Cues for posture and TA, CGA      Supine ITB stretch 3x30\" R HEP 9/2/2020                       Pt edu   See dialogue below in assessment   Manual Intervention (06955)     STM: ITB, lateral quad  11'    Manual ITB stretch in supine           NMR re-education (08061)  CUES NEEDED   Tactile cueing to inc quad contract   Standing external pertubations Body blade B UE hold; veritcal, horiz 2x30\" eaCued for posture and equal WB   SLS, modified L foot up on 8', reach to anamaria to reduce UE assist and improve upright torso posture   L foot step overs, R SLS  With walker and CGA to progressive WB on the R LE   L foot touch various surfaces while R leg stance to progress WB tolerance and stability CGA, holding wall and/or walker   DL stance equal WB, ball toss Cues for equal WB   DL stance med ball axe chops 3000Gr 2x15 ea way, on airexMirror to watch keeping hips equal btw legs/ equal WB   Tandem stance 3 cone tap  SBA, Cued for shifting weight onto R forward leg when leaning to tap cone   Standing on airex (one under ea foot) Cued for equal WB   Standing on airex DL arm swing    Standing on airex DL turn head    ALTER-G SLB  Cueing for control of Trendelenberg and not extending in spine as WB onto the R LE   R SLS w/ L 3 point tap/kick (modified Y-balance) 2x15                        L foot ball taps to WB on R LE  Progressively less WB in UE to point of going single arm     Gait (79540)     Ambulation    Quadcane, SBA; cued for upright posture    ALTER-G walking  Cueing or arm swing and upright posture controlling lateral lean   Alter-G fwd step to SLS  Cueing for control of Trendelenberg and not extending in spine as WB onto the R LE   Weight shifting w/ quadcane  @ 1/2 wall and CGA for all   Gait training with quad cane:     1x658df out to car  [id number] around clinic SBA, Cued for pattern, cane placement, upright posture, smaller step size to improve stability. Edu on crossing threshold holds and uneven , SBA  SBA,                   Therapeutic Activity (10565)     Gait obstacle course Debbicane, CGA, step over object, fig 8 cones, kick ball ea foot, ,  turn corners; Side stepping Quadcane, SBA, cueing for how to weightshift to move feet   Flight of stairs : \"up with the good down, down with the bad\" w/ quad cane CGA, unilateral handrail, quad cane   Multidirectional changes in direction (mimicing cooking in kitchen)  w/ Quad cane, SBA   Walking w/ quad cane while holding unstable object in R hand  SBA   Walking while carrying \"grocery bag\" and step up and over 6\" step 2x10ft + step-over, 6\", 3lbs med ball inside pillowcase              Pt edu  Going through scenarios of how to get up off the floor using WC, walker or other stable surface as well as prone versus supine. Alter-G pant size: XL    Therapeutic Exercise and NMR EXR  [x] (57834) Provided verbal/tactile cueing for activities related to strengthening, flexibility, endurance, ROM for improvements in LE, proximal hip, and core control with self care, mobility, lifting, ambulation.   [x] (42723) Provided verbal/tactile cueing for activities related to improving balance, coordination, kinesthetic sense, posture, motor skill, proprioception  to assist with LE, proximal hip, and core control in self care, mobility, lifting, ambulation and eccentric single leg control. NMR and Therapeutic Activities:    [x] (51657 or 73530) Provided verbal/tactile cueing for activities related to improving balance, coordination, kinesthetic sense, posture, motor skill, proprioception and motor activation to allow for proper function of core, proximal hip and LE with self care and ADLs  [x] (67868) Gait Re-education- Provided training and instruction to the patient for proper LE, core and proximal hip recruitment and positioning and eccentric body weight control with ambulation re-education including up and down stairs     Home Exercise Program:    [] (34585) Reviewed/Progressed HEP activities related to strengthening, flexibility, endurance, ROM of core, proximal hip and LE for functional self-care, mobility, lifting and ambulation/stair navigation   [] (94499)Reviewed/Progressed HEP activities related to improving balance, coordination, kinesthetic sense, posture, motor skill, proprioception of core, proximal hip and LE for self care, mobility, lifting, and ambulation/stair navigation      Manual Treatments:  PROM / STM / Oscillations-Mobs:  G-I, II, III, IV (PA's, Inf., Post.)  [x] (91755) Provided manual therapy to mobilize LE, proximal hip and/or LS spine soft tissue/joints for the purpose of modulating pain, promoting relaxation,  increasing ROM, reducing/eliminating soft tissue swelling/inflammation/restriction, improving soft tissue extensibility and allowing for proper ROM for normal function with self care, mobility, lifting and ambulation. Modalities:     [] GAME READY (VASO)- for significant edema, swelling, pain control.      Charges:   Timed Code Treatment Minutes: 53   Total Treatment Minutes: 53       [] EVAL (LOW) 30938 (typically 20 minutes face-to-face)  [] EVAL (MOD) 43519 (typically 30 minutes face-to-face)  [] EVAL (HIGH) 42379 (typically 45 minutes face-to-face)  [] RE-EVAL     [] DZ(45130) x  1  [] IONTO  [x] NMR (06052) x1    [] VASO  [x] Manual (01682) x 1   [] Other:  [] TA x      [] Mech Traction (43314)  [] ES(attended) (43282)      [] ES (un) (28781):   [x] Gait (76557) x 2    MEDICARE CAP EXCEPTION DOCUMENTATION      I certify that this patient meets one of the below criteria necessary for becoming an exception to the Medicare cap on therapy services:    [x]  The patient has a condition identified by an ICD-10 code that has a direct and significant impact on the need for therapy. (Significantly impacts the rate of recovery.)                   [x]  The patient has a complexity identified by an ICD-10 code that has a direct and significant impact on the need for therapy. (Significantly impacts the rate of recovery and is associated with a primary condition.)         [x]  The patient has associated variables that influence the amount of treatment to include:  Social support, self-efficacy/motivation, prognosis, time since onset/acuity. []  The patient has generalized musculoskeletal conditions or a condition affecting multiple sites that will have a direct impact on the rate of recovery. []  The patient had a prior episode of outpatient therapy during this calendar year for a different condition. []  The patient has a mental or cognitive disorder in addition to the condition being treated that will have a direct and significant impact on the rate of recovery. GOALS:   Patient stated goal: Walk again  [x] Progressing: can now walk 175ft in 5-10 min with quad cane [] Met: [] Not Met: [] Adjusted    Therapist goals for Patient:   Short Term Goals: To be achieved in: 2 weeks  1. Independent in HEP and progression per patient tolerance, in order to prevent re-injury. [x] Progressing: [] Met: [] Not Met: [] Adjusted  2. Patient will have a decrease in pain to facilitate improvement in movement, function, and ADLs as indicated by Functional Deficits.   [x] to assess goals progression <30days   [] Goals require adjustment due to lack of progress  [] Patient is not progressing as expected and requires additional follow up with physician  [] Other    Prognosis for POC: [x] Good [] Fair  [] Poor      Patient requires continued skilled intervention: [x] Yes  [] No    Treatment/Activity Tolerance:  [x] Patient able to complete treatment  [] Patient limited by fatigue  [] Patient limited by pain    _ Patient limited by other medical complications      ASSESSMENT: Pt was able to more accurately kick the cones with L leg while keeping balance on the R LE w/ cane use today. This much stability work directly Wb over the R LE very quickly fatigued the leg though. He continues to improve his tolerance for increased walking distance and smoother more confident gait cycle. PLAN:  Pt to be seen 2 times a week for another 5 weeks as we progress toward independence away from PT and towards D/C or further reduction to once a week as recommended by his physician. [x] Continue per plan of care [] Alter current plan (see comments above)    [] Plan of care initiated [] Hold pending MD visit [] Discharge       Electronically signed by:  Dom Aceves PT    Note: If patient does not return for scheduled/ recommended follow up visits, this note will serve as a discharge from care along with most recent update on progress.

## 2020-09-21 ENCOUNTER — APPOINTMENT (OUTPATIENT)
Dept: PHYSICAL THERAPY | Age: 70
End: 2020-09-21
Payer: MEDICARE

## 2020-09-22 ENCOUNTER — HOSPITAL ENCOUNTER (OUTPATIENT)
Dept: PHYSICAL THERAPY | Age: 70
Setting detail: THERAPIES SERIES
Discharge: HOME | End: 2020-09-22
Payer: MEDICARE

## 2020-09-22 PROCEDURE — 97140 MANUAL THERAPY 1/> REGIONS: CPT | Performed by: PHYSICAL THERAPIST

## 2020-09-22 PROCEDURE — 97110 THERAPEUTIC EXERCISES: CPT | Performed by: PHYSICAL THERAPIST

## 2020-09-22 PROCEDURE — 97112 NEUROMUSCULAR REEDUCATION: CPT | Performed by: PHYSICAL THERAPIST

## 2020-09-22 NOTE — FLOWSHEET NOTE
[facility],  [address] [place]  Phone: [phone]  Fax [phone]        Physical Therapy Treatment Note/ Progress Report:           Date:  2020    Patient Name:  Nadine Echols    :  1950  MRN: [id number]  Restrictions/Precautions:  R knee fused and knee spacer with severe LLD. Medical/Treatment Diagnosis Information:  · Diagnosis: M 25.561 R knee pain  · Treatment Diagnosis: M25.661 R knee stiffness, M82.81 R LE muscle weakness, Gait abnormality, R 15.6  Insurance/Certification information:  PT Insurance Information: Medicare/ [employer]  Physician Information:  Referring Practitioner: Dr. Anel Vyas  Has the plan of care been signed (Y/N):        [x]  Yes  []  No     Date of Patient follow up with Physician: after PT is ended       Is this a Progress Report:     []  Yes  [x]  No        If Yes:  Date Range for reporting period:  Beginning 2020  Ending    Progress report will be due (10 Rx or 30 days whichever is less): ,       Recertification will be due (POC Duration  / 90 days whichever is less): , 10/2/2020    Progress note next visit  Visit # Insurance Allowable Requires auth   39 Med nec    [x]no        []yes:     Functional Scale: LEFS: 16/80 = 20% functional ability, 80% disability   Date assessed: 2020           LEFS: 45/80 = 56% functional ability,  44% disability   Date assessed: 2020           LEFS: 37/80 = 46% functional ability, 54% disability   Date assessed: 2020           LEFS: 41/80 = 51% functional ability, 49% disability   Date assessed: 2020    Therapy Diagnosis/Practice Pattern:     Number of Comorbidities:  []0     [x]1-2    []3+    Latex Allergy:  [x]NO      []YES  Preferred Language for Healthcare:   [x]English       []other:      Pain level: 0/10       SUBJECTIVE: Pt reports he is disgusted today.   He thinks his new shoes with the new sole is forcing his foot out. He is frustrated because he took the dog for a walk for about 1/2 mile with the walker and felt good, but became sore afterward. He was not able to be as active the rest of the day. He has put larger wheels on the walker which helps on uneven terrain. OBJECTIVE:    Observation:    Gait: with quad cane: more equal stride, more upright posture, more equal WB between limbs, majority of time only needs SBA with quad cane at this point with occasional CGA ; pt able to walk 238ft in 2 min 23 sec   Test measurments: Quad set: good-     Knee ROM 5/29/2020 6/3/2020 6/17/2020 7/22/2020 7/27/2020 8/28/2020   R knee extension pre-tx -15 deg -6   fused fused   R knee extension post-tx -8 deg -5 -2 deg 0 deg 0 deg 0 deg     ROM LEFT RIGHT ROM 7/27/2020 Right  7/27/2020 Right   8/27/2020   HIP Flex 127 127      Knee ext 0 -15  0 deg 0 deg   Knee Flex 123 Fused knee  fused fused   Ankle PF 62 51      Ankle DF 0 -1      Strength  LEFT RIGHT  Right    HIP Flexors 5/5 4+/5  5/5 5-/5   HIP Abductors 4/5 4/5  4/5 4+/5   Hip Add     4/5   HIP Ext 5/5 3+/5  4+/5    Hip ER         Knee EXT (quad) 5/5 4/5 (quad set)  4+/5 (quad set) 4+/5 quad set   Knee Flex (HS) 4/5 NT      Ankle DF 4/5 4/5      Ankle PF 5/5 5/5      Ankle Inv 4-/5 4-/5      Ankle EV 4-/5 3+/5            RESTRICTIONS/PRECAUTIONS: R knee fused and knee spacer with severe LLD. Exercises/Interventions:     Therapeutic Ex (95375) Rep/Sets/sec Notes/CUES   SLR, SLR/ w/ER, hip abd,  HEP 5/27   S/L hip abd with ext \"L\" HEP 6/5   Standing hip ext @hip machine, HEP 6/19; min A during R stance to control [place] and leg buckling.     Standing hip abd HEP 7/10   Stanidng HR/Toe raise  HEP 6/19   Gastroc stretch     Seated SLR w/ ER  HEP 6/19   S/L hip add    Seated add ring 2x20x5\"   Tball bridges    Supine hip abd    Seated ankle PF HEP 6/19, 7/10   Seated hip add ring     Tball bridge   Stopped due to pain in knee   Stair taps  W/ ellye, L foot tap stair and WB on R   Prone prop  Felt big stretch on first one and felt better each one after. Step-up Cane, accepting downward weight onto R with CGA   Side step-ups/down Accepting downward weight onto R   Dynadisc foot taps alternating legs [id number] for posture and torso stability when shifting weight, with CGA   Prone elbow prop for hip flexor stretch    Side stepping with 1/2 roller step-over    Prone hip ext     Prone hip ext w/ abd (\"L\")    S/L hip abd PT brace at pelvis to not roll torso bwd   S/L hip circles PT brace at pelvis to not roll torso bwd   S/L LE ER PT brace at pelvis to not roll torso bwd   Supine ITB stretch    Supine HS stretch    Tricep dips  @ low plinth, cued for L shin perpendicular to ground and using L LE more.     1/2 wall push-ups     CC: tricep pulldown     Cues for posture and TA, CGA   R stance, L 3-point reach (star balance) 2x10    HEP 9/2/2020                       Pt edu   See dialogue below in assessment   Manual Intervention (97139)     STM: ITB, lateral quad  10'    Manual ITB stretch in supine           NMR re-education (89573)  CUES NEEDED   Tactile cueing to inc quad contract   Standing external pertubations Body blade B UE hold; veritcal, horiz 2x30\" eaCued for posture and equal WB   SLS, modified L foot up on 8', reach to anamaria to reduce UE assist and improve upright torso posture   L foot step overs, R SLS  With walker and CGA to progressive WB on the R LE   L foot touch various surfaces while R leg stance to progress WB tolerance and stability CGA, holding wall and/or walker   DL stance equal WB, ball toss Cues for equal WB   DL stance med ball axe chops 3000Gr 2x15 ea way, on airexMirror to watch keeping hips equal btw legs/ equal WB   Tandem stance 3 cone tap  SBA, Cued for shifting weight onto R forward leg when leaning to tap cone   Standing on airex (one under ea foot) Cued for equal WB   Standing on airex DL arm swing    Standing on airex DL turn head    ALTER-G SLB  Cueing for control of Trendelenberg and not extending in spine as WB onto the R LE   R SLS w/ L 3 point tap/kick (modified Y-balance) 2x15                        L foot ball taps to WB on R LE  Progressively less WB in UE to point of going single arm     Gait (91853)     Ambulation    Quadcane, SBA; cued for upright posture    ALTER-G walking  Cueing or arm swing and upright posture controlling lateral lean   Alter-G fwd step to SLS  Cueing for control of Trendelenberg and not extending in spine as WB onto the R LE   Weight shifting w/ quadcane  @ 1/2 wall and CGA for all   Gait training with quad cane:     [id number] out to car  [id number] around clinic SBA, Cued for pattern, cane placement, upright posture, smaller step size to improve stability. Edu on crossing threshold holds and uneven , SBA  SBA,                   Therapeutic Activity (22502)     Gait obstacle course 3x25 feetQuadcane, CGA, step over object, fig 8 cones, kick ball ea foot, ,  turn corners; Side stepping Quadcane, SBA, cueing for how to weightshift to move feet   Flight of stairs : \"up with the good down, down with the bad\" w/ quad cane CGA, unilateral handrail, quad cane   Multidirectional changes in direction (mimicing cooking in kitchen)  w/ Quad cane, SBA   Walking w/ quad cane while holding unstable object in R hand  SBA   Walking while carrying \"grocery bag\" and step up and over 6\" step 2x10ft + step-over, 6\", 3lbs med ball inside pillowcase              Pt edu  Going through scenarios of how to get up off the floor using WC, walker or other stable surface as well as prone versus supine. Alter-G pant size: XL    Therapeutic Exercise and NMR EXR  [x] (59030) Provided verbal/tactile cueing for activities related to strengthening, flexibility, endurance, ROM for improvements in LE, proximal hip, and core control with self care, mobility, lifting, ambulation.   [x] (31347) Provided verbal/tactile cueing for activities related to EVAL (MOD) 78982 (typically 30 minutes face-to-face)  [] EVAL (HIGH) 29527 (typically 45 minutes face-to-face)  [] RE-EVAL     [x] CW(33547) x  2  [] IONTO  [x] NMR (49666) x1    [] VASO  [x] Manual (61355) x 1   [] Other:  [] TA x      [] Mech Traction (18471)  [] ES(attended) (79129)      [] ES (un) (44478):   [] Gait (15634) x    MEDICARE CAP EXCEPTION DOCUMENTATION      I certify that this patient meets one of the below criteria necessary for becoming an exception to the Medicare cap on therapy services:    [x]  The patient has a condition identified by an ICD-10 code that has a direct and significant impact on the need for therapy. (Significantly impacts the rate of recovery.)                   [x]  The patient has a complexity identified by an ICD-10 code that has a direct and significant impact on the need for therapy. (Significantly impacts the rate of recovery and is associated with a primary condition.)         [x]  The patient has associated variables that influence the amount of treatment to include:  Social support, self-efficacy/motivation, prognosis, time since onset/acuity. []  The patient has generalized musculoskeletal conditions or a condition affecting multiple sites that will have a direct impact on the rate of recovery. []  The patient had a prior episode of outpatient therapy during this calendar year for a different condition. []  The patient has a mental or cognitive disorder in addition to the condition being treated that will have a direct and significant impact on the rate of recovery. GOALS:   Patient stated goal: Walk again  [x] Progressing: can now walk 175ft in 5-10 min with quad cane [] Met: [] Not Met: [] Adjusted    Therapist goals for Patient:   Short Term Goals: To be achieved in: 2 weeks  1. Independent in HEP and progression per patient tolerance, in order to prevent re-injury. [x] Progressing: [] Met: [] Not Met: [] Adjusted  2.  Patient will have a decrease in pain to facilitate improvement in movement, function, and ADLs as indicated by Functional Deficits. [x] Progressing: pt has pain during longer bouts of WB but activity level without pain has dramatically improved (see subjective)[] Met: [] Not Met: [] Adjusted     Long Term Goals: To be achieved in: 8 weeks  1. Disability index score of 30% or less for the LEFS to assist with reaching prior level of function. [x] Progressing: now only 49% disability [] Met: [] Not Met: [] Adjusted  2. Patient will demonstrate increased AROM to full knee extension to allow for proper joint functioning as indicated by patients Functional Deficits. [] Progressing: [x] Met: knee flexion not obtainable due to fused knee but has maintained 0 deg knee ext for past month [] Not Met: [] Adjusted  3. Patient will demonstrate an increase in Strength to good proximal hip strength and control, within 5lb HHD in LE to allow for proper functional mobility as indicated by patients Functional Deficits. [x] Progressing: [] Met: [] Not Met: [] Adjusted  4. Patient will return to volunteer activities without increased symptoms or restriction. [] Progressing: [] Met: [x] Not Met: [] Adjusted  5. Pt will be able to ambulate with or without AD for at least 2 blocks or 30 minutes to improve community independence. [x] Progressing: able to do this with wheeled walker but not yet with quad cane which is pt's intended AD [] Met: [] Not Met: [] Adjusted    Progression Towards Functional goals:  [x] Patient is progressing as expected towards functional goals listed. [] Progression is slowed due to complexities listed. [] Progression has been slowed due to co-morbidities. [] Plan just implemented, too soon to assess goals progression   [] Other:     Overall Progression Towards Functional goals/ Treatment Progress Update:  [x] Patient is progressing as expected towards functional goals listed.     [] Progression is slowed due to complexities/Impairments listed. [] Progression has been slowed due to co-morbidities. [] Plan just implemented, too soon to assess goals progression <30days   [] Goals require adjustment due to lack of progress  [] Patient is not progressing as expected and requires additional follow up with physician  [] Other    Prognosis for POC: [x] Good [] Fair  [] Poor      Patient requires continued skilled intervention: [x] Yes  [] No    Treatment/Activity Tolerance:  [x] Patient able to complete treatment  [] Patient limited by fatigue  [] Patient limited by pain    _ Patient limited by other medical complications      ASSESSMENT: Pt fatigued more quickly today with foot tapping exercises. He still requires either SBA or CGA for balance activity. This much stability work directly Wb over the R LE very quickly fatigued the leg though. He continues to improve his tolerance for increased walking distance and smoother more confident gait cycle. PLAN:  Pt to decrease frequency of PT to 1x/week after this week for up to 4 weeks and try to wean into home program only. [x] Continue per plan of care [] Alter current plan (see comments above)    [] Plan of care initiated [] Hold pending MD visit [] Discharge       Electronically signed by:  Praveen Griffin PT    Note: If patient does not return for scheduled/ recommended follow up visits, this note will serve as a discharge from care along with most recent update on progress.

## 2020-09-23 ENCOUNTER — APPOINTMENT (OUTPATIENT)
Dept: PHYSICAL THERAPY | Age: 70
End: 2020-09-23
Payer: MEDICARE

## 2020-09-25 ENCOUNTER — HOSPITAL ENCOUNTER (OUTPATIENT)
Dept: PHYSICAL THERAPY | Age: 70
Setting detail: THERAPIES SERIES
Discharge: HOME | End: 2020-09-25
Payer: MEDICARE

## 2020-09-25 PROCEDURE — 97140 MANUAL THERAPY 1/> REGIONS: CPT | Performed by: PHYSICAL THERAPIST

## 2020-09-25 PROCEDURE — 97110 THERAPEUTIC EXERCISES: CPT | Performed by: PHYSICAL THERAPIST

## 2020-09-25 PROCEDURE — 97112 NEUROMUSCULAR REEDUCATION: CPT | Performed by: PHYSICAL THERAPIST

## 2020-09-25 NOTE — FLOWSHEET NOTE
[facility], [address], [address] [place] [address]  Phone: [phone]  Fax [phone]        Physical Therapy Treatment Note/ Progress Report:           Date:  2020    Patient Name:  Shari Frausto    :  1950  MRN: [id number]  Restrictions/Precautions:  R knee fused and knee spacer with severe LLD. Medical/Treatment Diagnosis Information:  · Diagnosis: M 25.561 R knee pain  · Treatment Diagnosis: M25.661 R knee stiffness, M82.81 R LE muscle weakness, Gait abnormality, R 61.1  Insurance/Certification information:  PT Insurance Information: Medicare/ [employer]  Physician Information:  Referring Practitioner: Dr. Koko Kurtz  Has the plan of care been signed (Y/N):        [x]  Yes  []  No     Date of Patient follow up with Physician: after PT is ended       Is this a Progress Report:     []  Yes  [x]  No        If Yes:  Date Range for reporting period:  Beginning 2020  Ending    Progress report will be due (10 Rx or 30 days whichever is less): ,       Recertification will be due (POC Duration  / 90 days whichever is less): , 10/2/2020    Progress note next visit  Visit # Insurance Allowable Requires auth   40 Med nec    [x]no        []yes:     Functional Scale: LEFS: 16/80 = 20% functional ability, 80% disability   Date assessed: 2020           LEFS: 45/80 = 56% functional ability,  44% disability   Date assessed: 2020           LEFS: 37/80 = 46% functional ability, 54% disability   Date assessed: 2020           LEFS: 41/80 = 51% functional ability, 49% disability   Date assessed: 2020    Therapy Diagnosis/Practice Pattern:     Number of Comorbidities:  []0     [x]1-2    []3+    Latex Allergy:  [x]NO      []YES  Preferred Language for Healthcare:   [x]English       []other:      Pain level: 0-3 or 4/10       SUBJECTIVE: Pt reports he is felt good after last session.   He was able to walk his dog yesterday, and was able to go up steps (one at a time) without pain. Went to bed feeling good. OBJECTIVE:    Observation:    Gait: with quad cane: more equal stride, more upright posture, more equal WB between limbs, majority of time only needs SBA with quad cane at this point with occasional CGA ; pt able to walk 238ft in 2 min 23 sec   Test measurments: Quad set: good-     Knee ROM 5/29/2020 6/3/2020 6/17/2020 7/22/2020 7/27/2020 8/28/2020   R knee extension pre-tx -15 deg -6   fused fused   R knee extension post-tx -8 deg -5 -2 deg 0 deg 0 deg 0 deg     ROM LEFT RIGHT ROM 7/27/2020 Right  7/27/2020 Right   8/27/2020   HIP Flex 127 127      Knee ext 0 -15  0 deg 0 deg   Knee Flex 123 Fused knee  fused fused   Ankle PF 62 51      Ankle DF 0 -1      Strength  LEFT RIGHT  Right    HIP Flexors 5/5 4+/5  5/5 5-/5   HIP Abductors 4/5 4/5  4/5 4+/5   Hip Add     4/5   HIP Ext 5/5 3+/5  4+/5    Hip ER         Knee EXT (quad) 5/5 4/5 (quad set)  4+/5 (quad set) 4+/5 quad set   Knee Flex (HS) 4/5 NT      Ankle DF 4/5 4/5      Ankle PF 5/5 5/5      Ankle Inv 4-/5 4-/5      Ankle EV 4-/5 3+/5            RESTRICTIONS/PRECAUTIONS: R knee fused and knee spacer with severe LLD. Exercises/Interventions:     Therapeutic Ex (23917) Rep/Sets/sec Notes/CUES   SLR, SLR/ w/ER, hip abd,  HEP 5/27   S/L hip abd with ext \"L\" HEP 6/5   Standing hip ext @hip machine, HEP 6/19; min A during R stance to control [place] and leg buckling. Standing hip abd HEP 7/10   Stanidng HR/Toe raise  HEP 6/19   Gastroc stretch     Seated SLR w/ ER  HEP 6/19   S/L hip add    Seated add ring 2x20x5\"   Tball bridges    Supine hip abd    Seated ankle PF HEP 6/19, 7/10   Seated hip add ring     Tball bridge   Stopped due to pain in knee   Stair taps  W/ quadcane, L foot tap stair and WB on R   Prone prop  Felt big stretch on first one and felt better each one after.    Step-up Cane, accepting downward weight onto R with CGA   Side step-ups/down Accepting downward weight onto R   Dynadisc foot taps alternating legs [id number] for posture and torso stability when shifting weight, with CGA   Prone elbow prop for hip flexor stretch    Side stepping with 1/2 roller step-over    Prone hip ext     Prone hip ext w/ abd (\"L\")    S/L hip abd PT brace at pelvis to not roll torso bwd   S/L hip circles PT brace at pelvis to not roll torso bwd   S/L LE ER PT brace at pelvis to not roll torso bwd   Supine ITB stretch    Supine HS stretch    Tricep dips  @ low plinth, cued for L shin perpendicular to ground and using L LE more.     1/2 wall push-ups     CC: tricep pulldown     Cues for posture and TA, CGA   R stance, L 3-point reach (star balance) 2x15    HEP 9/2/2020                       Pt edu   See dialogue below in assessment   Manual Intervention (60007)     STM: ITB, lateral quad  10'    Manual ITB stretch in supine           NMR re-education (09530)  CUES NEEDED   Tactile cueing to inc quad contract   Standing external pertubations Body blade B UE hold; veritcal, horiz 2x30\" eaCued for posture and equal WB   SLS, modified L foot up on 8', reach to anamaria to reduce UE assist and improve upright torso posture   L foot step overs, R SLS  With walker and CGA to progressive WB on the R LE   L foot touch various surfaces while R leg stance to progress WB tolerance and stability CGA, holding wall and/or walker   DL stance equal WB, ball toss Cues for equal WB   DL stance med ball axe chops 3000Gr 2x15 ea way, on airexMirror to watch keeping hips equal btw legs/ equal WB   Tandem stance 3 cone tap  SBA, Cued for shifting weight onto R forward leg when leaning to tap cone   Standing on airex (one under ea foot) Cued for equal WB   Standing on airex DL arm swing    Standing on airex DL turn head    ALTER-G SLB  Cueing for control of Trendelenberg and not extending in spine as WB onto the R LE   R SLS w/ L 3 point tap/kick (modified Y-balance) 2x15                        L foot ball taps to WB on R LE  Progressively less WB in UE to point of going single arm     Gait (09375)     Ambulation    Quadcane, SBA; cued for upright posture    ALTER-G walking  Cueing or arm swing and upright posture controlling lateral lean   Alter-G fwd step to SLS  Cueing for control of Trendelenberg and not extending in spine as WB onto the R LE   Weight shifting w/ quadcane  @ 1/2 wall and CGA for all   Gait training with quad cane:     [id number] out to car  [id number] around clinic SBA, Cued for pattern, cane placement, upright posture, smaller step size to improve stability. Edu on crossing threshold holds and uneven , SBA  SBA,                   Therapeutic Activity (58906)     Quadcane, CGA, step over object, fig 8 cones, kick ball ea foot, ,  turn corners; Side stepping Quadcane, SBA, cueing for how to weightshift to move feet   Flight of stairs : \"up with the good down, down with the bad\" w/ quad cane CGA, unilateral handrail, quad cane   Multidirectional changes in direction (mimicing cooking in kitchen)  w/ Quad cane, SBA   Walking w/ quad cane while holding unstable object in R hand  SBA   Walking while carrying \"grocery bag\" and step up and over 6\" step 2x10ft + step-over, 6\", 3lbs med ball inside pillowcase              Pt edu  Going through scenarios of how to get up off the floor using WC, walker or other stable surface as well as prone versus supine. Alter-G pant size: XL    Therapeutic Exercise and NMR EXR  [x] (29102) Provided verbal/tactile cueing for activities related to strengthening, flexibility, endurance, ROM for improvements in LE, proximal hip, and core control with self care, mobility, lifting, ambulation.   [x] (25599) Provided verbal/tactile cueing for activities related to improving balance, coordination, kinesthetic sense, posture, motor skill, proprioception  to assist with LE, proximal hip, and core control in self care, mobility, lifting, ambulation and eccentric single leg control. NMR and Therapeutic Activities:    [x] (68180 or 96045) Provided verbal/tactile cueing for activities related to improving balance, coordination, kinesthetic sense, posture, motor skill, proprioception and motor activation to allow for proper function of core, proximal hip and LE with self care and ADLs  [x] (55604) Gait Re-education- Provided training and instruction to the patient for proper LE, core and proximal hip recruitment and positioning and eccentric body weight control with ambulation re-education including up and down stairs     Home Exercise Program:    [] (94734) Reviewed/Progressed HEP activities related to strengthening, flexibility, endurance, ROM of core, proximal hip and LE for functional self-care, mobility, lifting and ambulation/stair navigation   [] (18259)Reviewed/Progressed HEP activities related to improving balance, coordination, kinesthetic sense, posture, motor skill, proprioception of core, proximal hip and LE for self care, mobility, lifting, and ambulation/stair navigation      Manual Treatments:  PROM / STM / Oscillations-Mobs:  G-I, II, III, IV (PA's, Inf., Post.)  [x] (34995) Provided manual therapy to mobilize LE, proximal hip and/or LS spine soft tissue/joints for the purpose of modulating pain, promoting relaxation,  increasing ROM, reducing/eliminating soft tissue swelling/inflammation/restriction, improving soft tissue extensibility and allowing for proper ROM for normal function with self care, mobility, lifting and ambulation. Modalities:     [] GAME READY (VASO)- for significant edema, swelling, pain control.      Charges:   Timed Code Treatment Minutes: 45   Total Treatment Minutes: 45       [] EVAL (LOW) 84409 (typically 20 minutes face-to-face)  [] EVAL (MOD) 04646 (typically 30 minutes face-to-face)  [] EVAL (HIGH) 58505 (typically 45 minutes face-to-face)  [] RE-EVAL     [x] PV(71617) x 1  [] IONTO  [x] NMR (06141) x1    [] VASO  [x] Manual (79085) x 1 [] Other:  [] TA x      [] [facility] Traction (65849)  [] ES(attended) (95354)      [] ES (un) (41684):   [] Gait (84252) x    MEDICARE CAP EXCEPTION DOCUMENTATION      I certify that this patient meets one of the below criteria necessary for becoming an exception to the Medicare cap on therapy services:    [x]  The patient has a condition identified by an ICD-10 code that has a direct and significant impact on the need for therapy. (Significantly impacts the rate of recovery.)                   [x]  The patient has a complexity identified by an ICD-10 code that has a direct and significant impact on the need for therapy. (Significantly impacts the rate of recovery and is associated with a primary condition.)         [x]  The patient has associated variables that influence the amount of treatment to include:  Social support, self-efficacy/motivation, prognosis, time since onset/acuity. []  The patient has generalized musculoskeletal conditions or a condition affecting multiple sites that will have a direct impact on the rate of recovery. []  The patient had a prior episode of outpatient therapy during this calendar year for a different condition. []  The patient has a mental or cognitive disorder in addition to the condition being treated that will have a direct and significant impact on the rate of recovery. GOALS:   Patient stated goal: Walk again  [x] Progressing: can now walk 175ft in 5-10 min with quad cane [] Met: [] Not Met: [] Adjusted    Therapist goals for Patient:   Short Term Goals: To be achieved in: 2 weeks  1. Independent in HEP and progression per patient tolerance, in order to prevent re-injury. [x] Progressing: [] Met: [] Not Met: [] Adjusted  2. Patient will have a decrease in pain to facilitate improvement in movement, function, and ADLs as indicated by Functional Deficits.   [x] Progressing: pt has pain during longer bouts of WB but activity level without pain has dramatically improved (see subjective)[] Met: [] Not Met: [] Adjusted     Long Term Goals: To be achieved in: 8 weeks  1. Disability index score of 30% or less for the LEFS to assist with reaching prior level of function. [x] Progressing: now only 49% disability [] Met: [] Not Met: [] Adjusted  2. Patient will demonstrate increased AROM to full knee extension to allow for proper joint functioning as indicated by patients Functional Deficits. [] Progressing: [x] Met: knee flexion not obtainable due to fused knee but has maintained 0 deg knee ext for past month [] Not Met: [] Adjusted  3. Patient will demonstrate an increase in Strength to good proximal hip strength and control, within 5lb HHD in LE to allow for proper functional mobility as indicated by patients Functional Deficits. [x] Progressing: [] Met: [] Not Met: [] Adjusted  4. Patient will return to volunteer activities without increased symptoms or restriction. [] Progressing: [] Met: [x] Not Met: [] Adjusted  5. Pt will be able to ambulate with or without AD for at least 2 blocks or 30 minutes to improve community independence. [x] Progressing: able to do this with wheeled walker but not yet with quad cane which is pt's intended AD [] Met: [] Not Met: [] Adjusted    Progression Towards Functional goals:  [x] Patient is progressing as expected towards functional goals listed. [] Progression is slowed due to complexities listed. [] Progression has been slowed due to co-morbidities. [] Plan just implemented, too soon to assess goals progression   [] Other:     Overall Progression Towards Functional goals/ Treatment Progress Update:  [x] Patient is progressing as expected towards functional goals listed. [] Progression is slowed due to complexities/Impairments listed. [] Progression has been slowed due to co-morbidities.   [] Plan just implemented, too soon to assess goals progression <30days   [] Goals require adjustment due to lack of progress  [] Patient is not progressing as expected and requires additional follow up with physician  [] Other    Prognosis for POC: [x] Good [] Fair  [] Poor      Patient requires continued skilled intervention: [x] Yes  [] No    Treatment/Activity Tolerance:  [x] Patient able to complete treatment  [] Patient limited by fatigue  [] Patient limited by pain    _ Patient limited by other medical complications      ASSESSMENT: Pt did not fatigue as quickly as last session. He also did not complain of as much soreness. This much stability work directly Wb over the R LE very quickly fatigued the leg though. He continues to improve his tolerance for increased walking distance and smoother more confident gait cycle. PLAN:  Pt to try HEP only for 1 week and return for reassessment of readiness for discharge to HEP only. [x] Continue per plan of care [] Alter current plan (see comments above)    [] Plan of care initiated [] Hold pending MD visit [] Discharge       Electronically signed by:  Jennie Barragan PT    Note: If patient does not return for scheduled/ recommended follow up visits, this note will serve as a discharge from care along with most recent update on progress.

## 2020-09-30 ENCOUNTER — APPOINTMENT (OUTPATIENT)
Dept: PHYSICAL THERAPY | Age: 70
End: 2020-09-30
Payer: MEDICARE

## 2020-10-07 ENCOUNTER — HOSPITAL ENCOUNTER (OUTPATIENT)
Dept: PHYSICAL THERAPY | Age: 70
Setting detail: THERAPIES SERIES
Discharge: HOME | End: 2020-10-07
Payer: MEDICARE

## 2020-10-07 PROCEDURE — 97112 NEUROMUSCULAR REEDUCATION: CPT | Performed by: PHYSICAL THERAPIST

## 2020-10-07 PROCEDURE — 97110 THERAPEUTIC EXERCISES: CPT | Performed by: PHYSICAL THERAPIST

## 2020-10-07 PROCEDURE — 97140 MANUAL THERAPY 1/> REGIONS: CPT | Performed by: PHYSICAL THERAPIST

## 2020-10-07 NOTE — FLOWSHEET NOTE
[place] and leg buckling. Standing hip abd HEP 7/10   Stanidng HR/Toe raise  HEP 6/19   Gastroc stretch     Seated SLR w/ ER  HEP 6/19   S/L hip add    Seated add ring 30x5\"   Tball bridges    Supine hip abd    Seated ankle PF HEP 6/19, 7/10   Seated hip add ring     Tball bridge   Stopped due to pain in knee   Stair taps  W/ quadcane, L foot tap stair and WB on R   Prone prop  Felt big stretch on first one and felt better each one after. Step-up Cane, accepting downward weight onto R with CGA   Side step-ups/down Accepting downward weight onto R   Dynadisc foot taps alternating legs [id number] for posture and torso stability when shifting weight, with CGA   Prone elbow prop for hip flexor stretch    Side stepping with 1/2 roller step-over    Prone hip ext     Prone hip ext w/ abd (\"L\")    S/L hip abd PT brace at pelvis to not roll torso bwd   S/L hip circles PT brace at pelvis to not roll torso bwd   S/L LE ER PT brace at pelvis to not roll torso bwd   Supine ITB stretch    Supine HS stretch    Tricep dips  @ low plinth, cued for L shin perpendicular to ground and using L LE more.     1/2 wall push-ups     CC: tricep pulldown     Cues for posture and TA, CGA   R stance, L 3-point reach (star balance) 1x10    HEP 9/2/2020                       Pt edu   See dialogue below in assessment   Manual Intervention (85810)     STM: ITB, lateral quad  10'    Manual ITB stretch in supine           NMR re-education (16563)  CUES NEEDED   Tactile cueing to inc quad contract   Standing external pertubations Body blade B UE hold; veritcal, horiz 2x30\" eaCued for posture and equal WB   SLS, modified L foot up on 8', reach to anamaria to reduce UE assist and improve upright torso posture   L foot step overs, R SLS  With walker and CGA to progressive WB on the R LE   L foot touch various surfaces while R leg stance to progress WB tolerance and stability CGA, holding wall and/or walker   DL stance equal WB, ball toss Cues for equal WB   DL stance med ball axe chops 3000Gr 2x15 ea way, on airexMirror to watch keeping hips equal btw legs/ equal WB   Tandem stance 3 cone tap  SBA, Cued for shifting weight onto R forward leg when leaning to tap cone   Standing on airex (one under ea foot) Cued for equal WB   Standing on airex DL arm swing    Standing on airex DL turn head    ALTER-G SLB  Cueing for control of Trendelenberg and not extending in spine as WB onto the R LE   R SLS w/ L 3 point tap/kick (modified Y-balance) 2x15                        L foot ball taps to WB on R LE  Progressively less WB in UE to point of going single arm     Gait (10702)     Ambulation    Quadcane, SBA; cued for upright posture    ALTER-G walking  Cueing or arm swing and upright posture controlling lateral lean   Alter-G fwd step to SLS  Cueing for control of Trendelenberg and not extending in spine as WB onto the R LE   Weight shifting w/ quadcane  @ 1/2 wall and CGA for all   Gait training with quad cane:     [id number] out to car  [id number] around clinic SBA, Cued for pattern, cane placement, upright posture, smaller step size to improve stability. Edu on crossing threshold holds and uneven , SBA  SBA,                   Therapeutic Activity (73117)     Quadcane, CGA, step over object, fig 8 cones, kick ball ea foot, ,  turn corners; Side stepping Quadcane, SBA, cueing for how to weightshift to move feet   Multidirectional changes in direction (mimicing cooking in kitchen)  w/ Quad cane, SBA   Walking w/ quad cane while holding unstable object in R hand  SBA   Walking while carrying \"grocery bag\" and step up and over 6\" step 2x10ft + step-over, 6\", 3lbs med ball inside pillowcase              Pt edu  Going through scenarios of how to get up off the floor using WC, walker or other stable surface as well as prone versus supine.        Alter-G pant size: XL    Therapeutic Exercise and NMR EXR  [x] (80917) Provided verbal/tactile cueing for activities related to strengthening, flexibility, endurance, ROM for improvements in LE, proximal hip, and core control with self care, mobility, lifting, ambulation. [x] (64392) Provided verbal/tactile cueing for activities related to improving balance, coordination, kinesthetic sense, posture, motor skill, proprioception  to assist with LE, proximal hip, and core control in self care, mobility, lifting, ambulation and eccentric single leg control. NMR and Therapeutic Activities:    [x] (70472 or 78678) Provided verbal/tactile cueing for activities related to improving balance, coordination, kinesthetic sense, posture, motor skill, proprioception and motor activation to allow for proper function of core, proximal hip and LE with self care and ADLs  [x] (05896) Gait Re-education- Provided training and instruction to the patient for proper LE, core and proximal hip recruitment and positioning and eccentric body weight control with ambulation re-education including up and down stairs     Home Exercise Program:    [] (21538) Reviewed/Progressed HEP activities related to strengthening, flexibility, endurance, ROM of core, proximal hip and LE for functional self-care, mobility, lifting and ambulation/stair navigation   [] (70339)Reviewed/Progressed HEP activities related to improving balance, coordination, kinesthetic sense, posture, motor skill, proprioception of core, proximal hip and LE for self care, mobility, lifting, and ambulation/stair navigation      Manual Treatments:  PROM / STM / Oscillations-Mobs:  G-I, II, III, IV (PA's, Inf., Post.)  [x] (87983) Provided manual therapy to mobilize LE, proximal hip and/or LS spine soft tissue/joints for the purpose of modulating pain, promoting relaxation,  increasing ROM, reducing/eliminating soft tissue swelling/inflammation/restriction, improving soft tissue extensibility and allowing for proper ROM for normal function with self care, mobility, lifting and ambulation.      Modalities: Short Term Goals: To be achieved in: 2 weeks  1. Independent in HEP and progression per patient tolerance, in order to prevent re-injury. [] Progressing: [x] Met: [] Not Met: [] Adjusted  2. Patient will have a decrease in pain to facilitate improvement in movement, function, and ADLs as indicated by Functional Deficits. [] Progressing: pt has pain during longer bouts of WB but activity level without pain has dramatically improved (see subjective)[x] Met: [] Not Met: [] Adjusted     Long Term Goals: To be achieved in: 8 weeks  1. Disability index score of 30% or less for the LEFS to assist with reaching prior level of function. [x] Progressing: now only 49% disability [] Met: [] Not Met: [] Adjusted  2. Patient will demonstrate increased AROM to full knee extension to allow for proper joint functioning as indicated by patients Functional Deficits. [] Progressing: [x] Met: knee flexion not obtainable due to fused knee but has maintained 0 deg knee ext for past month [] Not Met: [] Adjusted  3. Patient will demonstrate an increase in Strength to good proximal hip strength and control, within 5lb HHD in LE to allow for proper functional mobility as indicated by patients Functional Deficits. [] Progressing: [x] Met: [] Not Met: [] Adjusted  4. Patient will return to volunteer activities without increased symptoms or restriction. [] Progressing: [x] Met: [] Not Met: [] Adjusted  5. Pt will be able to ambulate with or without AD for at least 2 blocks or 30 minutes to improve community independence. [] Progressing: able to do this with wheeled walker but not yet with quad cane which is pt's intended AD [x] Met: [] Not Met: [] Adjusted    Progression Towards Functional goals:  [x] Patient is progressing as expected towards functional goals listed. [] Progression is slowed due to complexities listed. [] Progression has been slowed due to co-morbidities.   [] Plan just implemented, too soon to assess goals

## 2020-10-13 ENCOUNTER — HOSPITAL ENCOUNTER (OUTPATIENT)
Dept: PHYSICAL THERAPY | Age: 70
Setting detail: THERAPIES SERIES
Discharge: HOME | End: 2020-10-13
Payer: MEDICARE

## 2020-10-13 PROCEDURE — 97140 MANUAL THERAPY 1/> REGIONS: CPT | Performed by: PHYSICAL THERAPIST

## 2020-10-13 PROCEDURE — 97110 THERAPEUTIC EXERCISES: CPT | Performed by: PHYSICAL THERAPIST

## 2020-10-13 PROCEDURE — 97112 NEUROMUSCULAR REEDUCATION: CPT | Performed by: PHYSICAL THERAPIST

## 2020-10-13 NOTE — FLOWSHEET NOTE
4/10       SUBJECTIVE: Pt reports he was sore after the last session for 2 days. He would like to make this his last day. He is doing well at home. He is installing an elevator in Nov.  He reports is michael to go up and down steps 2x in a day. OBJECTIVE:    Observation:    Gait: with quad cane: more equal stride, more upright posture, more equal WB between limbs, majority of time only needs SBA with quad cane at this point with occasional CGA ; pt able to walk 238ft in 2 min 23 sec   Test measurments: Quad set: good-     Knee ROM 5/29/2020 6/3/2020 6/17/2020 7/22/2020 7/27/2020 8/28/2020 10/6/2020   R knee extension pre-tx -15 deg -6   fused fused -10   R knee extension post-tx -8 deg -5 -2 deg 0 deg 0 deg 0 deg 0 deg     ROM LEFT RIGHT ROM 7/27/2020 Right  7/27/2020 Right   8/27/2020 RIGHT  10/6/20   HIP Flex 127 127       Knee ext 0 -15  0 deg 0 deg 0 deg   Knee Flex 123 Fused knee  fused fused fused   Ankle PF 62 51       Ankle DF 0 -1       Strength  LEFT RIGHT  Right  Right   HIP Flexors 5/5 4+/5  5/5 5-/5 5/5   HIP Abductors 4/5 4/5  4/5 4+/5 5-/5   Hip Add     4/5 5/5   HIP Ext 5/5 3+/5  4+/5     Hip ER          Knee EXT (quad) 5/5 4/5 (quad set)  4+/5 (quad set) 4+/5 quad set 5/5 quad set   Knee Flex (HS) 4/5 NT       Ankle DF 4/5 4/5       Ankle PF 5/5 5/5       Ankle Inv 4-/5 4-/5       Ankle EV 4-/5 3+/5             RESTRICTIONS/PRECAUTIONS: R knee fused and knee spacer with severe LLD. Exercises/Interventions:     Therapeutic Ex (61285) Rep/Sets/sec Notes/CUES   SLR, SLR/ w/ER, hip abd,  HEP 5/27   S/L hip abd with ext \"L\" HEP 6/5   Standing hip ext @hip machine, HEP 6/19; min A during R stance to control [place] and leg buckling.     Standing hip abd HEP 7/10   Stanidng HR/Toe raise  HEP 6/19   Gastroc stretch     Seated SLR w/ ER  HEP 6/19   S/L hip add    Seated add ring 30x5\"   Tball bridges    Supine hip abd    Seated ankle PF HEP 6/19, 7/10   Seated hip add ring     Tball bridge Stopped due to pain in knee   Stair taps  W/ quadcane, L foot tap stair and WB on R   Prone prop  Felt big stretch on first one and felt better each one after. Step-up Cane, accepting downward weight onto R with CGA   Side step-ups/down Accepting downward weight onto R   Dynadisc foot taps alternating legs [id number] for posture and torso stability when shifting weight, with CGA   Prone elbow prop for hip flexor stretch    Side stepping with 1/2 roller step-over    Prone hip ext     Prone hip ext w/ abd (\"L\")    S/L hip abd PT brace at pelvis to not roll torso bwd   S/L hip circles PT brace at pelvis to not roll torso bwd   S/L LE ER PT brace at pelvis to not roll torso bwd   Supine ITB stretch    Supine HS stretch    Tricep dips  @ low plinth, cued for L shin perpendicular to ground and using L LE more.     1/2 wall push-ups     CC: tricep pulldown     Cues for posture and TA, CGA   R stance, L 3-point reach (star balance) 1x10    HEP 9/2/2020                       Pt edu   See dialogue below in assessment   Manual Intervention (50217)     STM: ITB, lateral quad  10'    Manual ITB stretch in supine           NMR re-education (03181)  CUES NEEDED   Tactile cueing to inc quad contract   Standing external pertubations Body blade B UE hold; veritcal, horiz 2x30\" eaCued for posture and equal WB   SLS, modified L foot up on 8', reach to anamaria to reduce UE assist and improve upright torso posture   L foot step overs, R SLS  With walker and CGA to progressive WB on the R LE   L foot touch various surfaces while R leg stance to progress WB tolerance and stability CGA, holding wall and/or walker   DL stance equal WB, ball toss Cues for equal WB   DL stance med ball axe chops 3000Gr 2x20 ea way, on airexMirror to watch keeping hips equal btw legs/ equal WB   Tandem stance 3 cone tap  SBA, Cued for shifting weight onto R forward leg when leaning to tap cone   Standing on airex (one under ea foot) Cued for equal WB   Standing on airex DL arm swing    Standing on airex DL turn head    ALTER-G SLB  Cueing for control of Trendelenberg and not extending in spine as WB onto the R LE   R SLS w/ L 3 point tap/kick (modified Y-balance) 2x15                        L foot ball taps to WB on R LE  Progressively less WB in UE to point of going single arm     Gait (78187)     Ambulation    Quadcane, SBA; cued for upright posture    ALTER-G walking  Cueing or arm swing and upright posture controlling lateral lean   Alter-G fwd step to SLS  Cueing for control of Trendelenberg and not extending in spine as WB onto the R LE   Weight shifting w/ quadcane  @ 1/2 wall and CGA for all   Gait training with quad cane:     [id number] out to car  [id number] around clinic SBA, Cued for pattern, cane placement, upright posture, smaller step size to improve stability. Edu on crossing threshold holds and uneven , SBA  SBA,                   Therapeutic Activity (12693)     Quadcane, CGA, step over object, fig 8 cones, kick ball ea foot, ,  turn corners; Side stepping Quadcane, SBA, cueing for how to weightshift to move feet   Multidirectional changes in direction (mimicing cooking in kitchen)  w/ Quad cane, SBA   Walking w/ quad cane while holding unstable object in R hand  SBA   Walking while carrying \"grocery bag\" and step up and over 6\" step 2x10ft + step-over, 6\", 3lbs med ball inside pillowcase              Pt edu  Going through scenarios of how to get up off the floor using WC, walker or other stable surface as well as prone versus supine. Alter-G pant size: XL    Therapeutic Exercise and NMR EXR  [x] (99949) Provided verbal/tactile cueing for activities related to strengthening, flexibility, endurance, ROM for improvements in LE, proximal hip, and core control with self care, mobility, lifting, ambulation.   [x] (43416) Provided verbal/tactile cueing for activities related to improving balance, coordination, kinesthetic sense, posture, motor skill, as indicated by Functional Deficits. [] Progressing: pt has pain during longer bouts of WB but activity level without pain has dramatically improved (see subjective)[x] Met: [] Not Met: [] Adjusted     Long Term Goals: To be achieved in: 8 weeks  1. Disability index score of 30% or less for the LEFS to assist with reaching prior level of function. [x] Progressing: now only 49% disability [] Met: [] Not Met: [] Adjusted  2. Patient will demonstrate increased AROM to full knee extension to allow for proper joint functioning as indicated by patients Functional Deficits. [] Progressing: [x] Met: knee flexion not obtainable due to fused knee but has maintained 0 deg knee ext for past month [] Not Met: [] Adjusted  3. Patient will demonstrate an increase in Strength to good proximal hip strength and control, within 5lb HHD in LE to allow for proper functional mobility as indicated by patients Functional Deficits. [] Progressing: [x] Met: [] Not Met: [] Adjusted  4. Patient will return to volunteer activities without increased symptoms or restriction. [] Progressing: [x] Met: [] Not Met: [] Adjusted  5. Pt will be able to ambulate with or without AD for at least 2 blocks or 30 minutes to improve community independence. [] Progressing: able to do this with wheeled walker but not yet with quad cane which is pt's intended AD [x] Met: [] Not Met: [] Adjusted    Progression Towards Functional goals:  [x] Patient is progressing as expected towards functional goals listed. [] Progression is slowed due to complexities listed. [] Progression has been slowed due to co-morbidities. [] Plan just implemented, too soon to assess goals progression   [] Other:     Overall Progression Towards Functional goals/ Treatment Progress Update:  [x] Patient is progressing as expected towards functional goals listed. [] Progression is slowed due to complexities/Impairments listed.   [] Progression has been slowed due to co-morbidities. [] Plan just implemented, too soon to assess goals progression <30days   [] Goals require adjustment due to lack of progress  [] Patient is not progressing as expected and requires additional follow up with physician  [] Other    Prognosis for POC: [x] Good [] Fair  [] Poor      Patient requires continued skilled intervention: [x] Yes  [] No    Treatment/Activity Tolerance:  [x] Patient able to complete treatment  [] Patient limited by fatigue  [] Patient limited by pain    _ Patient limited by other medical complications      ASSESSMENT: Pt has plateaued with functional activity in therapy. He reports he is able to do most things with modification, and walks his dog with use of the wheeled walker. He met some goals and progressed on all goals. PLAN:  D/C to HEP at this time. [] Continue per plan of care [] Alter current plan (see comments above)    [] Plan of care initiated [] Hold pending MD visit [x] Discharge       Electronically signed by:  Vincenza Lesch, PT    Note: If patient does not return for scheduled/ recommended follow up visits, this note will serve as a discharge from care along with most recent update on progress.

## 2020-12-09 ENCOUNTER — OFFICE VISIT (OUTPATIENT)
Dept: ORTHOPEDIC SURGERY | Age: 70
End: 2020-12-09
Payer: MEDICARE

## 2020-12-09 VITALS — WEIGHT: 250 LBS | HEIGHT: 77 IN | BODY MASS INDEX: 29.52 KG/M2

## 2020-12-09 PROCEDURE — 99213 OFFICE O/P EST LOW 20 MIN: CPT | Performed by: ORTHOPAEDIC SURGERY

## 2020-12-09 PROCEDURE — G8417 CALC BMI ABV UP PARAM F/U: HCPCS | Performed by: ORTHOPAEDIC SURGERY

## 2020-12-09 PROCEDURE — 1123F ACP DISCUSS/DSCN MKR DOCD: CPT | Performed by: ORTHOPAEDIC SURGERY

## 2020-12-09 PROCEDURE — 3017F COLORECTAL CA SCREEN DOC REV: CPT | Performed by: ORTHOPAEDIC SURGERY

## 2020-12-09 PROCEDURE — 4040F PNEUMOC VAC/ADMIN/RCVD: CPT | Performed by: ORTHOPAEDIC SURGERY

## 2020-12-09 PROCEDURE — G8484 FLU IMMUNIZE NO ADMIN: HCPCS | Performed by: ORTHOPAEDIC SURGERY

## 2020-12-09 PROCEDURE — G8428 CUR MEDS NOT DOCUMENT: HCPCS | Performed by: ORTHOPAEDIC SURGERY

## 2020-12-09 PROCEDURE — 1036F TOBACCO NON-USER: CPT | Performed by: ORTHOPAEDIC SURGERY

## 2020-12-09 NOTE — PROGRESS NOTES
KNEE INCISION AND DRAINAGE WITH SPACER EXCHANGE      MALACHI performed by Cloretta Alpers, MD at [address] Right 2/10/2020    RIGHT KNEE INCISION AND DRAINAGE WITH CEMENT SPACER EXCHANGE performed by Cloretta Alpers, MD at [address] [id number]       Allergies:  (see most recent intake form scanned into media on above date)  Allergies   Allergen Reactions    Ace Inhibitors      COUGH    Amlodipine Besylate      Lower extremity edema    Avelox [Moxifloxacin] Other (See Comments)     Hallucination    Clonidine Derivatives      FATIGUE    Flagyl [Metronidazole] Nausea Only     Stomach cramps       Medications:  (see most recent intake form scanned into media on above date)  Current Outpatient Medications   Medication Sig Dispense Refill    promethazine (PHENERGAN) 25 MG tablet Take 25 mg by mouth every 6 hours as needed for Nausea      omeprazole (PRILOSEC) 40 MG delayed release capsule Take 40 mg by mouth daily      hydrALAZINE (APRESOLINE) 50 MG tablet Take 50 mg by mouth 3 times daily       meloxicam (MOBIC) 15 MG tablet Take 15 mg by mouth daily      Multiple Vitamins-Minerals (MULTIVITAMIN PO) Take 1 tablet by mouth      Levothyroxine Sodium (SYNTHROID PO) Take 200 mcg by mouth daily      valsartan-hydrochlorothiazide (DIOVAN-HCT) 320-25 MG per tablet Take 1 tablet by mouth daily      allopurinol (ZYLOPRIM) 300 MG tablet Take 300 mg by mouth daily. No current facility-administered medications for this visit. Coagulation:  On a blood thinner: No  History of a bleeding disorder: No  History of a previous blood clot: No    Goal for treatment: Improve function and decrease pain    OBJECTIVE  PHYSICAL EXAM  Vital Signs: There were no vitals filed for this visit. Body mass index is 29.65 kg/m².   General Appearance: Patient is adequately groomed with no evidence of malnutrition   Orientation: Patient is alert and oriented to person, place and time  Mood and Affect: Neutral/Euthymic(normal) and Angry  Gait and Station: He currently is nonambulatory presenting in a wheelchair. The patient can bear weight on the extremity. Right Knee Examination, we reexamined his knee today but there is been no significant change. Inspection:  Knee alignment: neutral           no swelling noted. No erythema or ecchymosis. He has multiple anterior knee scars all of which appear to be healed. There is an area on the medial aspect of his knee in the region of the medial femoral condyle where the underlying tissue is quite prominent and tenting the skin although the skin is still in good condition. Palpation: no tenderness to palpation on the knee no effusion noted. Range of Motion: He has no motion in his knee either flexion extension or medial or lateral instability. It seems to be a solid fusion. Strength: No gross motor weakness noted. All muscle groups appear to be functioning. Motor exam of the lower extremities show quadriceps, hamstrings, foot dorsiflexion and plantarflexion grossly intact. Neurologic: Sensation to both feet is grossly intact to light touch. Vascular: The bilateral lower extremities are warm and well-perfused with brisk capillary refill. Lymphatic: The lymphatic examination bilaterally reveals all areas to be without enlargement or induration    Skin: intact with no cellulitis, rashes, ulcerations, lymphedema or cutaneous lesions noted. Additional Examinations:  Left Lower Extremity: Examination of the left lower extremity does not show any tenderness, deformity or injury. Range of motion is unremarkable. There is no gross instability. There are no rashes, ulcerations or lesions.   Strength and tone are normal.      DIAGNOSTICS:   No new x-rays taken today      ASSESSMENT (Medical Decision Making)    Barron Berrios is a 78 y.o. male with the following diagnosis: Right knee status post services described in this documentation as scribed by Tony Fitzpatrick. GUS Crouch in my presence, and it is both accurate and complete. Hamida Logan MD    This dictation was performed with a verbal recognition program [facility]) and it was checked for errors. It is possible that there are still dictated errors within this office note. If so, please bring any errors to my attention for an addendum. All efforts were made to ensure that this office note is accurate.

## 2020-12-14 ENCOUNTER — OFFICE VISIT (OUTPATIENT)
Dept: ORTHOPEDIC SURGERY | Age: 70
End: 2020-12-14
Payer: MEDICARE

## 2020-12-14 VITALS — WEIGHT: 250 LBS | BODY MASS INDEX: 29.52 KG/M2 | HEIGHT: 77 IN

## 2020-12-14 PROCEDURE — 3017F COLORECTAL CA SCREEN DOC REV: CPT | Performed by: ORTHOPAEDIC SURGERY

## 2020-12-14 PROCEDURE — 4040F PNEUMOC VAC/ADMIN/RCVD: CPT | Performed by: ORTHOPAEDIC SURGERY

## 2020-12-14 PROCEDURE — G8417 CALC BMI ABV UP PARAM F/U: HCPCS | Performed by: ORTHOPAEDIC SURGERY

## 2020-12-14 PROCEDURE — 99213 OFFICE O/P EST LOW 20 MIN: CPT | Performed by: ORTHOPAEDIC SURGERY

## 2020-12-14 PROCEDURE — 1036F TOBACCO NON-USER: CPT | Performed by: ORTHOPAEDIC SURGERY

## 2020-12-14 PROCEDURE — G8427 DOCREV CUR MEDS BY ELIG CLIN: HCPCS | Performed by: ORTHOPAEDIC SURGERY

## 2020-12-14 PROCEDURE — 1123F ACP DISCUSS/DSCN MKR DOCD: CPT | Performed by: ORTHOPAEDIC SURGERY

## 2020-12-14 PROCEDURE — G8484 FLU IMMUNIZE NO ADMIN: HCPCS | Performed by: ORTHOPAEDIC SURGERY

## 2020-12-14 NOTE — PROGRESS NOTES
Chief Complaint    New Patient (right knee has had 9 surgeries in 7 years, had multiple infections after a replacement about 10 years ago)      History of Present Illness:  Mona Smalls is a 78 y.o. male who comes in today for another opinion regarding his complicated right knee issue. He is being sent to us today by Dr. Kori Borges. Patient has a very long complicated history starting with a total knee replacement that was done by Dr. Franko Ayala. He had additional surgeries by Dr. Daly Blanco for his infected total knee. Was then transferred to the care of Dr. Cedric Jain who also did procedures for his infected total knee. And then finally was transferred to the care of Dr. Blayne Montenegro for more definitive care of his infected RIGHT knee. He has had 5 surgical procedures on his knee by Dr. Bety Mcclure. Those procedures have all consisted of irrigations debridements and placement of antibiotic spacers. He now states that he has had 5 different PICC lines and multiple courses of antibiotics. His infectious disease doctor is Dr. Gordon Mayfield. He states he currently is no longer on IV antibiotics and he has been told that his infection at this point has resolved.     He he currently has an antibiotic spacer in place with a tibial nail spanning the joint. His knee is in about 15 degrees of flexion. All of his incisions are healed. He has minimal if any discomfort. He was recently told by Dr. Bety Mcclure that if he were to require another procedure it would be an above-knee amputation. He comes in today interested in desiring to have his right knee fusion converted to a right total knee replacement.     Pain Assessment  Location of Pain: Knee  Location Modifiers: Right  Severity of Pain: 5  Quality of Pain: Dull, Aching  Duration of Pain: Persistent  Frequency of Pain: Constant  Aggravating Factors: Walking, Standing  Limiting Behavior: No  Relieving Factors: Rest  Result of Injury: No  Work-Related Injury: No Are there other pain locations you wish to document?: No]  Medical History:  Past Medical History:   Diagnosis Date    Arthritis     Cancer ([facility]     SKIN    Chronic infection of left knee ([facility]     LTKR    Hypertension     Perforated diverticulitis     Retention of urine     Thyroid disease     HYPOTHROID    Vitamin B12 deficiency     hx of     Patient Active Problem List    Diagnosis Date Noted    Infection/inflammation due to internal orthopedic device/implant/graft, sequela     Failure of outpatient treatment     Thyroid disease     Overweight     Infected prosthetic knee joint, initial encounter ([facility]     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    Hypertension 07/08/2019    Obesity 07/08/2019    Asymptomatic cholelithiasis 04/08/2015    Inguinal hernia unilateral, non-recurrent 04/08/2015    Dyspepsia 04/08/2015    Sepsis ([facility] 12/10/2014    Right TKR 11/30/2012     Current Outpatient Medications   Medication Sig Dispense Refill    promethazine (PHENERGAN) 25 MG tablet Take 25 mg by mouth every 6 hours as needed for Nausea      omeprazole (PRILOSEC) 40 MG delayed release capsule Take 40 mg by mouth daily      hydrALAZINE (APRESOLINE) 50 MG tablet Take 50 mg by mouth 3 times daily       meloxicam (MOBIC) 15 MG tablet Take 15 mg by mouth daily      Multiple Vitamins-Minerals (MULTIVITAMIN PO) Take 1 tablet by mouth      Levothyroxine Sodium (SYNTHROID PO) Take 200 mcg by mouth daily      valsartan-hydrochlorothiazide (DIOVAN-HCT) 320-25 MG per tablet Take 1 tablet by mouth daily      allopurinol (ZYLOPRIM) 300 MG tablet Take 300 mg by mouth daily. No current facility-administered medications for this visit. Review of Systems:  Relevant review of systems reviewed and available in the patient's chart    Vital Signs: There were no vitals filed for this visit.     General Exam: Constitutional: Patient is adequately groomed with no evidence of malnutrition  DTRs: Deep tendon reflexes are intact  Mental Status: The patient is oriented to time, place and person. The patient's mood and affect are appropriate. Lymphatic: The lymphatic examination bilaterally reveals all areas to be without enlargement or induration. Vascular: Examination reveals no swelling or calf tenderness. Peripheral pulses are palpable and 2+. Neurological: The patient has good coordination. There is no weakness or sensory deficit. Body mass index is 29.64 kg/m². Right knee Examination:    Inspection:  No erythema or signs of infection. There are no cutaneous lesions. Multiple anterior knee scars all of which are healed. No evidence of any infection. There is approximately 2 inch leg length discrepancy and the patient wears a fairly large shoe lift. Palpation: There is minimal tenderness    Range of Motion: 15 degrees of flexion    Strength:  4+/5 quadriceps and hamstrings    Special Tests: The knee is stable to varus valgus stressing/anterior posterior drawer. Negative Homans test.                                 Skin: There are no rashes, ulcerations or lesions. Gait: mildly antalgic favoring the left side    Reflex 2+ patellar    Examination of the left knee reveals intact skin. There is no focal tenderness. The patient demonstrates full painless range of motion with regard to flexion and extension. Strength is 5/5 throughout all planes. Ligamentous stability is grossly intact. Examination of the right hip reveals intact skin. The patient demonstrates full painless range of motion with regards to flexion, abduction, internal and external rotation. There is no tenderness about the greater trochanter. There is a negative straight leg raise against resistance. Strength is 5/5 throughout all planes.     Radiology: A knee fusion with bone cement and a tibial angela fixing the fusion site. There may be some motion at the fusion site, possibly a pseudoarthrosis since there is some windshield wiper ring at the top of the nail. Impression:  Encounter Diagnoses   Name Primary?  History of total right knee replacement Yes    Infection/inflammation due to internal orthopedic device/implant/graft, sequela        Office Procedures:  Orders Placed This Encounter   Procedures    SEDIMENTATION RATE     Standing Status:   Future     Standing Expiration Date:   12/14/2021    C-REACTIVE PROTEIN     Standing Status:   Future     Standing Expiration Date:   12/14/2021    CBC WITH AUTO DIFFERENTIAL     Standing Status:   Future     Standing Expiration Date:   12/14/2021       Treatment Plan: Consider amount of time was spent with the patient regarding his current condition. He has a pain-free knee effusion. I am not sure if the infection is eradicated, we would recommend doing a acute phase reactants including CBC, sed rate and C-reactive protein. If the patient is free of infection, I would recommend nonsurgical treatment. We did talk at length about above-knee amputation as a curative procedure for his condition. That with fitting of the prosthesis could give him a very functional outcome.

## 2024-04-25 ENCOUNTER — HOSPITAL ENCOUNTER (OUTPATIENT)
Dept: PHYSICAL THERAPY | Age: 74
Setting detail: THERAPIES SERIES
Discharge: HOME | End: 2024-04-25
Payer: MEDICARE

## 2024-04-25 DIAGNOSIS — R29.898 WEAKNESS OF RIGHT LOWER EXTREMITY: ICD-10-CM

## 2024-04-25 DIAGNOSIS — G89.29 CHRONIC PAIN OF RIGHT KNEE: Primary | ICD-10-CM

## 2024-04-25 DIAGNOSIS — M25.561 CHRONIC PAIN OF RIGHT KNEE: Primary | ICD-10-CM

## 2024-04-25 DIAGNOSIS — R26.2 DIFFICULTY WALKING: ICD-10-CM

## 2024-04-25 PROCEDURE — 97110 THERAPEUTIC EXERCISES: CPT | Performed by: PHYSICAL THERAPIST

## 2024-04-25 PROCEDURE — 97161 PT EVAL LOW COMPLEX 20 MIN: CPT | Performed by: PHYSICAL THERAPIST

## 2024-04-25 NOTE — PLAN OF CARE
difficulty with changes of positions or transfers between positions  Reduced ability to perform lifting, reaching, carrying tasks  Reduced ability to squat  Reduced ability to ambulate prolonged functional periods/distances/surfaces    Participation Restrictions:   Reduced participation in home management   Reduced participation in social activities    Classification :   Signs/symptoms consistent with functional hip weakness/NMR control        Barriers to/and or personal factors that will affect rehab potential:   Co-morbidities  past PT/medical experience    Physical Therapy Evaluation Complexity Justification  [x] A history of present problem and 3 or more personal factors and/or co-morbidities that impact the plan of care  [x] A total of 1-2 elements  found upon examination of body systems using standardized tests and measures addressing any of the following: body structures, functions (impairments), activity limitations, and/or participation restrictions  [x] A clinical presentation with stable and/or uncomplicated characteristics   [x] Clinical decision making of LOW (87234 - Typically 20 minutes face-to-face) complexity using standardized patient assessment instrument and/or measurable assessment of functional outcome.    Today's Assessment: See above    Medical Necessity Documentation:  I certify that this patient meets the below criteria necessary for medical necessity for care and/or justification of therapy services:  The patient has functional impairments and/or activity limitations and would benefit from continued outpatient therapy services to address the deficits outlined in the patients goals  The patient has a musculoskeletal condition(s) with a corresponding ICD-10 code that is of complexity and severity that require skilled therapeutic intervention. This has a direct and significant impact on the need for therapy and significantly impacts the rate of recovery.   The patient has a complexity

## 2024-05-02 ENCOUNTER — HOSPITAL ENCOUNTER (OUTPATIENT)
Dept: PHYSICAL THERAPY | Age: 74
Setting detail: THERAPIES SERIES
Discharge: HOME | End: 2024-05-02
Payer: MEDICARE

## 2024-05-02 PROCEDURE — 97110 THERAPEUTIC EXERCISES: CPT | Performed by: PHYSICAL THERAPIST

## 2024-05-02 PROCEDURE — 97112 NEUROMUSCULAR REEDUCATION: CPT | Performed by: PHYSICAL THERAPIST

## 2024-05-02 NOTE — FLOWSHEET NOTE
Other:    Other:    Total Timed Code Tx Minutes 30' 2       Total Treatment Minutes 30'        Charge Justification:  (90827) THERAPEUTIC EXERCISE - Provided verbal/tactile cueing for activities related to strengthening, flexibility, endurance, ROM performed to prevent loss of range of motion, maintain or improve muscular strength or increase flexibility, following either an injury or surgery.   (15400) HOME EXERCISE PROGRAM - Reviewed/Progressed HEP activities related to strengthening, flexibility, endurance, ROM performed to prevent loss of range of motion, maintain or improve muscular strength or increase flexibility, following either an injury or surgery.      GOALS     Patient stated goal: \"walk with walker\"  [] Progressing: [] Met: [] Not Met: [] Adjusted    Therapist goals for Patient:   Short Term Goals: To be achieved in: 2 weeks  1. Independent in HEP and progression per patient tolerance, in order to prevent re-injury.   [] Progressing: [x] Met: [] Not Met: [] Adjusted  2. Patient will have a decrease in pain to 2/10 to facilitate improvement in movement, function, and ADLs as indicated by Functional Deficits.  [] Progressing: [] Met: [] Not Met: [] Adjusted    Long Term Goals: To be achieved in: 8 weeks  1. Disability index score of 50% or less for the LEFS to assist with reaching prior level of function with activities such as standing and performing household chores.  [] Progressing: [] Met: [] Not Met: [] Adjusted  2. Patient will demonstrate increased AROM of knee extension  to -10 deg without pain to allow for proper joint functioning to enable patient to WB on RLE without knee buckling in order to prevent falls.   [] Progressing: [] Met: [] Not Met: [] Adjusted  3. Patient will demonstrate increased Strength in hip abduction and flexion to 4/5 to allow for proper functional mobility to enable patient to safely roll over in bed.    [] Progressing: [] Met: [] Not Met: [] Adjusted  4. Patient will be

## 2024-05-06 ENCOUNTER — HOSPITAL ENCOUNTER (OUTPATIENT)
Dept: PHYSICAL THERAPY | Age: 74
Setting detail: THERAPIES SERIES
Discharge: HOME | End: 2024-05-06
Payer: MEDICARE

## 2024-05-06 PROCEDURE — 97110 THERAPEUTIC EXERCISES: CPT

## 2024-05-06 PROCEDURE — 97112 NEUROMUSCULAR REEDUCATION: CPT

## 2024-05-06 NOTE — FLOWSHEET NOTE
[facility]- Outpatient Rehabilitation and Therapy  [address], [place] [address] office: [phone] fax: [phone]        Physical Therapy: TREATMENT/PROGRESS NOTE   Patient: Fermín Khan (73 y.o. male)   Examination Date: 2024   :  1950 MRN: [id number]   Visit #: 3   Insurance Allowable Auth Needed   MC []Yes    [x]No    Insurance: Payor: MEDICARE / Plan: MEDICARE PART A AND B / Product Type: *No Product type* /   Insurance ID: [id number] - (Medicare)  Secondary Insurance (if applicable): [employer]IG[employer]   Treatment Diagnosis:     ICD-10-CM    1. Chronic pain of right knee  M25.561     G89.29       2. Difficulty walking  R26.2       3. Weakness of right lower extremity  R29.898          Medical Diagnosis:  Pain in right knee [M25.561]  Presence of right artificial knee joint [Z96.651]   Referring Physician: Kwaku Alamo MD  PCP: Gissell Hinds (Inactive)     Plan of care signed (Y/N): YES    Date of Patient follow up with Physician: PRN     Progress Report/POC: NO  POC update due: (10 visits /OR AUTH LIMITS, whichever is less)  2024                                             Precautions/ Contra-indications:           Latex allergy:  NO  Pacemaker:    NO  Contraindications for Manipulation: NA  Date of Surgery: multiple right knee surgeries over the past 10 years  Other:    Red Flags:  None    C-SSRS Triggered by Intake questionnaire:   [x] No, Questionnaire did not trigger screening.   [] Yes, Patient intake triggered further evaluation      [] C-SSRS Screening completed  [] PCP notified via Plan of Care  [] Emergency services notified     Preferred Language for Healthcare:   [x] English       [] other:    SUBJECTIVE EXAMINATION     Patient stated complaint: He states no update on bariatric walker but that he did try to buy one on [employer] and when it showed up it was a regular walker. He may have to search on [employer]. Was also wondering about if he

## 2024-05-15 ENCOUNTER — HOSPITAL ENCOUNTER (OUTPATIENT)
Dept: PHYSICAL THERAPY | Age: 74
Setting detail: THERAPIES SERIES
Discharge: HOME | End: 2024-05-15
Payer: MEDICARE

## 2024-05-15 PROCEDURE — G0283 ELEC STIM OTHER THAN WOUND: HCPCS | Performed by: PHYSICAL THERAPIST

## 2024-05-15 PROCEDURE — 97116 GAIT TRAINING THERAPY: CPT | Performed by: PHYSICAL THERAPIST

## 2024-05-15 PROCEDURE — 97110 THERAPEUTIC EXERCISES: CPT | Performed by: PHYSICAL THERAPIST

## 2024-05-15 NOTE — FLOWSHEET NOTE
[facility]- Outpatient Rehabilitation and Therapy  [address], [place] [address] office: [phone] fax: [phone]        Physical Therapy: TREATMENT/PROGRESS NOTE   Patient: Fermín Khan (73 y.o. male)   Examination Date: 05/15/2024   :  1950 MRN: [id number]   Visit #: 4   Insurance Allowable Auth Needed   MC []Yes    [x]No    Insurance: Payor: MEDICARE / Plan: MEDICARE PART A AND B / Product Type: *No Product type* /   Insurance ID: [id number] - (Medicare)  Secondary Insurance (if applicable): CIGNA   Treatment Diagnosis:     ICD-10-CM    1. Chronic pain of right knee  M25.561     G89.29       2. Difficulty walking  R26.2       3. Weakness of right lower extremity  R29.898          Medical Diagnosis:  Pain in right knee [M25.561]  Presence of right artificial knee joint [Z96.651]   Referring Physician: Kwaku Alamo MD  PCP: Gissell Hinds (Inactive)     Plan of care signed (Y/N): YES    Date of Patient follow up with Physician: PRN     Progress Report/POC: NO  POC update due: (10 visits /OR AUTH LIMITS, whichever is less)  2024                                             Precautions/ Contra-indications:           Latex allergy:  NO  Pacemaker:    NO  Contraindications for Manipulation: NA  Date of Surgery: multiple right knee surgeries over the past 10 years  Other:    Red Flags:  None    C-SSRS Triggered by Intake questionnaire:   [x] No, Questionnaire did not trigger screening.   [] Yes, Patient intake triggered further evaluation      [] C-SSRS Screening completed  [] PCP notified via Plan of Care  [] Emergency services notified     Preferred Language for Healthcare:   [x] English       [] other:    SUBJECTIVE EXAMINATION     Patient stated complaint: Patient has RW with him today. Reports that he tried using it in the home with a friend for short distances. To get script from family MD to get a walker covered through Medicare. Goal is to walk

## 2024-05-22 ENCOUNTER — APPOINTMENT (OUTPATIENT)
Dept: PHYSICAL THERAPY | Age: 74
End: 2024-05-22
Payer: MEDICARE

## 2024-05-24 ENCOUNTER — HOSPITAL ENCOUNTER (OUTPATIENT)
Dept: PHYSICAL THERAPY | Age: 74
Setting detail: THERAPIES SERIES
Discharge: HOME | End: 2024-05-24
Payer: MEDICARE

## 2024-05-24 PROCEDURE — 97116 GAIT TRAINING THERAPY: CPT | Performed by: PHYSICAL THERAPIST

## 2024-05-24 PROCEDURE — 97110 THERAPEUTIC EXERCISES: CPT | Performed by: PHYSICAL THERAPIST

## 2024-05-24 NOTE — FLOWSHEET NOTE
[facility]- Outpatient Rehabilitation and Therapy  [address], [place] [address] office: [phone] fax: [phone]        Physical Therapy: TREATMENT/PROGRESS NOTE   Patient: Fermín Khan (73 y.o. male)   Examination Date: 2024   :  1950 MRN: [id number]   Visit #: 5   Insurance Allowable Auth Needed   MC []Yes    [x]No    Insurance: Payor: MEDICARE / Plan: MEDICARE PART A AND B / Product Type: *No Product type* /   Insurance ID: [id number] - (Medicare)  Secondary Insurance (if applicable): CIGNA   Treatment Diagnosis:     ICD-10-CM    1. Chronic pain of right knee  M25.561     G89.29       2. Difficulty walking  R26.2       3. Weakness of right lower extremity  R29.898          Medical Diagnosis:  Pain in right knee [M25.561]  Presence of right artificial knee joint [Z96.651]   Referring Physician: Kwaku Alamo MD  PCP: Gissell Hinds (Inactive)     Plan of care signed (Y/N): YES    Date of Patient follow up with Physician: PRN     Progress Report/POC: NO  POC update due: (10 visits /OR AUTH LIMITS, whichever is less)  2024                                             Precautions/ Contra-indications:           Latex allergy:  NO  Pacemaker:    NO  Contraindications for Manipulation: NA  Date of Surgery: multiple right knee surgeries over the past 10 years  Other:    Red Flags:  None    C-SSRS Triggered by Intake questionnaire:   [x] No, Questionnaire did not trigger screening.   [] Yes, Patient intake triggered further evaluation      [] C-SSRS Screening completed  [] PCP notified via Plan of Care  [] Emergency services notified     Preferred Language for Healthcare:   [x] English       [] other:    SUBJECTIVE EXAMINATION     Patient stated complaint: Patient has RW with him today. Reports that he tried using it in the home with a friend for short distances. To get script from family MD to get a walker covered through Medicare. Goal is to walk

## 2024-05-29 ENCOUNTER — HOSPITAL ENCOUNTER (OUTPATIENT)
Dept: PHYSICAL THERAPY | Age: 74
Setting detail: THERAPIES SERIES
Discharge: HOME | End: 2024-05-29
Payer: MEDICARE

## 2024-05-29 PROCEDURE — 97110 THERAPEUTIC EXERCISES: CPT | Performed by: PHYSICAL THERAPIST

## 2024-05-29 PROCEDURE — 97116 GAIT TRAINING THERAPY: CPT | Performed by: PHYSICAL THERAPIST

## 2024-05-29 NOTE — FLOWSHEET NOTE
Iontophoresis (21557)    VASO (85645)     Ultrasound (78247)    Group Therapy (32018)     Estim Attended (88433)    Canalith Repositioning (97738)     Other:    Other:    Total Timed Code Tx Minutes 40' 3       Total Treatment Minutes 40'        Charge Justification:  (70216) THERAPEUTIC EXERCISE - Provided verbal/tactile cueing for activities related to strengthening, flexibility, endurance, ROM performed to prevent loss of range of motion, maintain or improve muscular strength or increase flexibility, following either an injury or surgery.   (76648) HOME EXERCISE PROGRAM - Reviewed/Progressed HEP activities related to strengthening, flexibility, endurance, ROM performed to prevent loss of range of motion, maintain or improve muscular strength or increase flexibility, following either an injury or surgery.  (50261) GAIT RE-EDUCATION - Provided training and instruction to the patient for proper joint and muscle recruitment and positioning and eccentric body weight control with ambulation re-education including up and down stairs. Therapeutic procedure, one or more areas, each 15 minutes;       GOALS     Patient stated goal: \"walk with walker\"  [] Progressing: [] Met: [] Not Met: [] Adjusted    Therapist goals for Patient:   Short Term Goals: To be achieved in: 2 weeks  1. Independent in HEP and progression per patient tolerance, in order to prevent re-injury.   [] Progressing: [] Met: [] Not Met: [] Adjusted  2. Patient will have a decrease in pain to 2/10 to facilitate improvement in movement, function, and ADLs as indicated by Functional Deficits.  [] Progressing: [] Met: [] Not Met: [] Adjusted    Long Term Goals: To be achieved in: 8 weeks  1. Disability index score of 50% or less for the LEFS to assist with reaching prior level of function with activities such as standing and performing household chores.  [] Progressing: [] Met: [] Not Met: [] Adjusted  2. Patient will demonstrate increased AROM of knee

## 2024-05-31 ENCOUNTER — HOSPITAL ENCOUNTER (OUTPATIENT)
Dept: PHYSICAL THERAPY | Age: 74
Setting detail: THERAPIES SERIES
Discharge: HOME | End: 2024-05-31
Payer: MEDICARE

## 2024-05-31 PROCEDURE — 97116 GAIT TRAINING THERAPY: CPT | Performed by: PHYSICAL THERAPIST

## 2024-05-31 PROCEDURE — 97110 THERAPEUTIC EXERCISES: CPT | Performed by: PHYSICAL THERAPIST

## 2024-05-31 NOTE — FLOWSHEET NOTE
[facility]- Outpatient Rehabilitation and Therapy  [address], [place] [address] office: [phone] fax: [phone]        Physical Therapy: TREATMENT/PROGRESS NOTE   Patient: Fermín Khan (73 y.o. male)   Examination Date: 2024   :  1950 MRN: [id number]   Visit #: 7   Insurance Allowable Auth Needed   MC []Yes    [x]No    Insurance: Payor: MEDICARE / Plan: MEDICARE PART A AND B / Product Type: *No Product type* /   Insurance ID: [id number] - (Medicare)  Secondary Insurance (if applicable): CIGNA   Treatment Diagnosis:     ICD-10-CM    1. Chronic pain of right knee  M25.561     G89.29       2. Difficulty walking  R26.2       3. Weakness of right lower extremity  R29.898          Medical Diagnosis:  Pain in right knee [M25.561]  Presence of right artificial knee joint [Z96.651]   Referring Physician: Kwaku Alamo MD  PCP: Gissell Hinds (Inactive)     Plan of care signed (Y/N): YES    Date of Patient follow up with Physician: PRN     Progress Report/POC: NO  POC update due: (10 visits /OR AUTH LIMITS, whichever is less)  2024                                             Precautions/ Contra-indications:           Latex allergy:  NO  Pacemaker:    NO  Contraindications for Manipulation: NA  Date of Surgery: multiple right knee surgeries over the past 10 years  Other:    Red Flags:  None    C-SSRS Triggered by Intake questionnaire:   [x] No, Questionnaire did not trigger screening.   [] Yes, Patient intake triggered further evaluation      [] C-SSRS Screening completed  [] PCP notified via Plan of Care  [] Emergency services notified     Preferred Language for Healthcare:   [x] English       [] other:    SUBJECTIVE EXAMINATION     Patient stated complaint: Pt was able to get dog to vet after last visit.  He had to rest the remainder of the day.  Pt had help working in his garden yesterday.   Pt reported that his knee throbbed at night.     Eval hx:

## 2024-06-05 ENCOUNTER — HOSPITAL ENCOUNTER (OUTPATIENT)
Dept: PHYSICAL THERAPY | Age: 74
Setting detail: THERAPIES SERIES
Discharge: HOME | End: 2024-06-05
Payer: MEDICARE

## 2024-06-05 PROCEDURE — 97110 THERAPEUTIC EXERCISES: CPT | Performed by: PHYSICAL THERAPIST

## 2024-06-05 PROCEDURE — 97140 MANUAL THERAPY 1/> REGIONS: CPT | Performed by: PHYSICAL THERAPIST

## 2024-06-05 NOTE — FLOWSHEET NOTE
[facility]- Outpatient Rehabilitation and Therapy  [address], [place] [address] office: [phone] fax: [phone]        Physical Therapy: TREATMENT/PROGRESS NOTE   Patient: Fermín Khan (73 y.o. male)   Examination Date: 2024   :  1950 MRN: [id number]   Visit #: 8   Insurance Allowable Auth Needed   MC []Yes    [x]No    Insurance: Payor: MEDICARE / Plan: MEDICARE PART A AND B / Product Type: *No Product type* /   Insurance ID: [id number] - (Medicare)  Secondary Insurance (if applicable): CIGNA   Treatment Diagnosis:     ICD-10-CM    1. Chronic pain of right knee  M25.561     G89.29       2. Difficulty walking  R26.2       3. Weakness of right lower extremity  R29.898          Medical Diagnosis:  Pain in right knee [M25.561]  Presence of right artificial knee joint [Z96.651]   Referring Physician: Kwaku Alamo MD  PCP: Gissell Hinds (Inactive)     Plan of care signed (Y/N): YES    Date of Patient follow up with Physician: PRN     Progress Report/POC: NO  POC update due: (10 visits /OR AUTH LIMITS, whichever is less)  2024                                             Precautions/ Contra-indications:           Latex allergy:  NO  Pacemaker:    NO  Contraindications for Manipulation: NA  Date of Surgery: multiple right knee surgeries over the past 10 years  Other:    Red Flags:  None    C-SSRS Triggered by Intake questionnaire:   [x] No, Questionnaire did not trigger screening.   [] Yes, Patient intake triggered further evaluation      [] C-SSRS Screening completed  [] PCP notified via Plan of Care  [] Emergency services notified     Preferred Language for Healthcare:   [x] English       [] other:    SUBJECTIVE EXAMINATION     Patient stated complaint: Pt had to park further away.  He felt less winded today.  Pt feels like he is getting stronger.  He can walk with is walker on his newly paved driveway    Eval hx:  Patient he has a history of

## 2024-06-07 ENCOUNTER — HOSPITAL ENCOUNTER (OUTPATIENT)
Dept: PHYSICAL THERAPY | Age: 74
Setting detail: THERAPIES SERIES
Discharge: HOME | End: 2024-06-07
Payer: MEDICARE

## 2024-06-07 PROCEDURE — 97110 THERAPEUTIC EXERCISES: CPT | Performed by: PHYSICAL THERAPIST

## 2024-06-07 NOTE — FLOWSHEET NOTE
[facility]- Outpatient Rehabilitation and Therapy  [address], [place] [address] office: [phone] fax: [phone]        Physical Therapy: TREATMENT/PROGRESS NOTE   Patient: Fermín Khan (73 y.o. male)   Examination Date: 2024   :  1950 MRN: [id number]   Visit #: 9   Insurance Allowable Auth Needed   MC []Yes    [x]No    Insurance: Payor: MEDICARE / Plan: MEDICARE PART A AND B / Product Type: *No Product type* /   Insurance ID: [id number] - (Medicare)  Secondary Insurance (if applicable): CIGNA   Treatment Diagnosis:     ICD-10-CM    1. Chronic pain of right knee  M25.561     G89.29       2. Difficulty walking  R26.2       3. Weakness of right lower extremity  R29.898          Medical Diagnosis:  Pain in right knee [M25.561]  Presence of right artificial knee joint [Z96.651]   Referring Physician: Kwaku Alamo MD  PCP: Gissell Hinds (Inactive)     Plan of care signed (Y/N): YES    Date of Patient follow up with Physician: PRN     Progress Report/POC: NO  POC update due: (10 visits /OR AUTH LIMITS, whichever is less)  2024                                             Precautions/ Contra-indications:           Latex allergy:  NO  Pacemaker:    NO  Contraindications for Manipulation: NA  Date of Surgery: multiple right knee surgeries over the past 10 years  Other:    Red Flags:  None    C-SSRS Triggered by Intake questionnaire:   [x] No, Questionnaire did not trigger screening.   [] Yes, Patient intake triggered further evaluation      [] C-SSRS Screening completed  [] PCP notified via Plan of Care  [] Emergency services notified     Preferred Language for Healthcare:   [x] English       [] other:    SUBJECTIVE EXAMINATION     Patient stated complaint: Pt reports that his is easier to roll in bed.   ELOINA Owens hx:  Patient he has a history of right knee pain for many years. Had R TKA about 10 years ago and it did okay. After he had his L knee

## 2024-06-12 ENCOUNTER — HOSPITAL ENCOUNTER (OUTPATIENT)
Dept: PHYSICAL THERAPY | Age: 74
Setting detail: THERAPIES SERIES
Discharge: HOME | End: 2024-06-12
Payer: MEDICARE

## 2024-06-12 PROCEDURE — 97110 THERAPEUTIC EXERCISES: CPT | Performed by: PHYSICAL THERAPIST

## 2024-06-12 NOTE — FLOWSHEET NOTE
[facility]- Outpatient Rehabilitation and Therapy  [address], [place] [address] office: [phone] fax: [phone]        Physical Therapy: TREATMENT/PROGRESS NOTE   Patient: Fermín Khan (73 y.o. male)   Examination Date: 2024   :  1950 MRN: [id number]   Visit #: 10   Insurance Allowable Auth Needed   MC []Yes    [x]No    Insurance: Payor: MEDICARE / Plan: MEDICARE PART A AND B / Product Type: *No Product type* /   Insurance ID: [id number] - (Medicare)  Secondary Insurance (if applicable): CIGNA   Treatment Diagnosis:     ICD-10-CM    1. Chronic pain of right knee  M25.561     G89.29       2. Difficulty walking  R26.2       3. Weakness of right lower extremity  R29.898          Medical Diagnosis:  Pain in right knee [M25.561]  Presence of right artificial knee joint [Z96.651]   Referring Physician: Kwaku Alamo MD  PCP: Gissell Hinds (Inactive)     Plan of care signed (Y/N): YES    Date of Patient follow up with Physician: PRN     Progress Report/POC: NO  POC update due: (10 visits /OR AUTH LIMITS, whichever is less)  2024                                             Precautions/ Contra-indications:           Latex allergy:  NO  Pacemaker:    NO  Contraindications for Manipulation: NA  Date of Surgery: multiple right knee surgeries over the past 10 years  Other:    Red Flags:  None    C-SSRS Triggered by Intake questionnaire:   [x] No, Questionnaire did not trigger screening.   [] Yes, Patient intake triggered further evaluation      [] C-SSRS Screening completed  [] PCP notified via Plan of Care  [] Emergency services notified     Preferred Language for Healthcare:   [x] English       [] other:    SUBJECTIVE EXAMINATION     Patient stated complaint: Pt would like to work on stairs.  Re eval on .     Eval hx:  Patient he has a history of right knee pain for many years. Had R TKA about 10 years ago and it did okay. After he had his L knee

## 2024-06-14 ENCOUNTER — HOSPITAL ENCOUNTER (OUTPATIENT)
Dept: PHYSICAL THERAPY | Age: 74
Setting detail: THERAPIES SERIES
Discharge: HOME | End: 2024-06-14
Payer: MEDICARE

## 2024-06-14 PROCEDURE — 97110 THERAPEUTIC EXERCISES: CPT | Performed by: PHYSICAL THERAPIST

## 2024-06-14 PROCEDURE — G0283 ELEC STIM OTHER THAN WOUND: HCPCS | Performed by: PHYSICAL THERAPIST

## 2024-06-14 PROCEDURE — 97112 NEUROMUSCULAR REEDUCATION: CPT | Performed by: PHYSICAL THERAPIST

## 2024-06-14 NOTE — FLOWSHEET NOTE
[facility]- Outpatient Rehabilitation and Therapy  [address], [place] [address] office: [phone] fax: [phone]        Physical Therapy: TREATMENT/PROGRESS NOTE   Patient: Fermín Khan (73 y.o. male)   Examination Date: 2024   :  1950 MRN: [id number]   Visit #: 11   Insurance Allowable Auth Needed   MC []Yes    [x]No    Insurance: Payor: MEDICARE / Plan: MEDICARE PART A AND B / Product Type: *No Product type* /   Insurance ID: [id number] - (Medicare)  Secondary Insurance (if applicable): CIGNA   Treatment Diagnosis:     ICD-10-CM    1. Chronic pain of right knee  M25.561     G89.29       2. Difficulty walking  R26.2       3. Weakness of right lower extremity  R29.898          Medical Diagnosis:  Pain in right knee [M25.561]  Presence of right artificial knee joint [Z96.651]   Referring Physician: Kwaku Alamo MD  PCP: Gissell Hinds (Inactive)     Plan of care signed (Y/N): YES    Date of Patient follow up with Physician: PRN     Progress Report/POC: NO N.V  POC update due: (10 visits /OR AUTH LIMITS, whichever is less)  2024                                             Precautions/ Contra-indications:           Latex allergy:  NO  Pacemaker:    NO  Contraindications for Manipulation: NA  Date of Surgery: multiple right knee surgeries over the past 10 years  Other:    Red Flags:  None    C-SSRS Triggered by Intake questionnaire:   [x] No, Questionnaire did not trigger screening.   [] Yes, Patient intake triggered further evaluation      [] C-SSRS Screening completed  [] PCP notified via Plan of Care  [] Emergency services notified     Preferred Language for Healthcare:   [x] English       [] other:    SUBJECTIVE EXAMINATION     Patient stated complaint: Reports no new issues in general. Feels winded a lot with activity around the house and in PT.    Eval hx:  Patient he has a history of right knee pain for many years. Had R TKA about 10 years

## 2024-06-19 ENCOUNTER — APPOINTMENT (OUTPATIENT)
Dept: PHYSICAL THERAPY | Age: 74
End: 2024-06-19
Payer: MEDICARE

## 2024-06-21 ENCOUNTER — HOSPITAL ENCOUNTER (OUTPATIENT)
Dept: PHYSICAL THERAPY | Age: 74
Setting detail: THERAPIES SERIES
Discharge: HOME | End: 2024-06-21
Payer: MEDICARE

## 2024-06-21 PROCEDURE — 97112 NEUROMUSCULAR REEDUCATION: CPT | Performed by: PHYSICAL THERAPIST

## 2024-06-21 PROCEDURE — 97110 THERAPEUTIC EXERCISES: CPT | Performed by: PHYSICAL THERAPIST

## 2024-06-21 NOTE — FLOWSHEET NOTE
[facility]- Outpatient Rehabilitation and Therapy  [address], [place] [address] office: [phone] fax: [phone]        Physical Therapy: TREATMENT/PROGRESS NOTE   Patient: Fermín Khan (73 y.o. male)   Examination Date: 2024   :  1950 MRN: [id number]   Visit #: 12   Insurance Allowable Auth Needed   MC []Yes    [x]No    Insurance: Payor: MEDICARE / Plan: MEDICARE PART A AND B / Product Type: *No Product type* /   Insurance ID: [id number] - (Medicare)  Secondary Insurance (if applicable): CIGNA   Treatment Diagnosis:     ICD-10-CM    1. Chronic pain of right knee  M25.561     G89.29       2. Difficulty walking  R26.2       3. Weakness of right lower extremity  R29.898          Medical Diagnosis:  Pain in right knee [M25.561]  Presence of right artificial knee joint [Z96.651]   Referring Physician: Kwaku Alamo MD  PCP: Gissell Hinds (Inactive)     Plan of care signed (Y/N): YES    Date of Patient follow up with Physician: PRN     Progress Report/POC: NO N.V  POC update due: (10 visits /OR AUTH LIMITS, whichever is less)  2024                                             Precautions/ Contra-indications:           Latex allergy:  NO  Pacemaker:    NO  Contraindications for Manipulation: NA  Date of Surgery: multiple right knee surgeries over the past 10 years  Other:    Red Flags:  None    C-SSRS Triggered by Intake questionnaire:   [x] No, Questionnaire did not trigger screening.   [] Yes, Patient intake triggered further evaluation      [] C-SSRS Screening completed  [] PCP notified via Plan of Care  [] Emergency services notified     Preferred Language for Healthcare:   [x] English       [] other:    SUBJECTIVE EXAMINATION     Patient stated complaint: Pt missed wed due to household issues.   Pt has been more fatigued with warm weather.     Eval hx:  Patient he has a history of right knee pain for many years. Had R TKA about 10 years ago and

## 2024-06-26 ENCOUNTER — HOSPITAL ENCOUNTER (OUTPATIENT)
Dept: PHYSICAL THERAPY | Age: 74
Setting detail: THERAPIES SERIES
Discharge: HOME | End: 2024-06-26
Payer: MEDICARE

## 2024-06-26 PROCEDURE — 97110 THERAPEUTIC EXERCISES: CPT | Performed by: PHYSICAL THERAPIST

## 2024-06-26 PROCEDURE — 97112 NEUROMUSCULAR REEDUCATION: CPT | Performed by: PHYSICAL THERAPIST

## 2024-06-26 NOTE — FLOWSHEET NOTE
patient does not return for scheduled/recommended follow up visits, this note will serve as a discharge from care along with the most recent update on progress.

## 2024-06-28 ENCOUNTER — APPOINTMENT (OUTPATIENT)
Dept: PHYSICAL THERAPY | Age: 74
End: 2024-06-28
Payer: MEDICARE

## 2024-07-01 ENCOUNTER — HOSPITAL ENCOUNTER (OUTPATIENT)
Dept: PHYSICAL THERAPY | Age: 74
Setting detail: THERAPIES SERIES
Discharge: HOME | End: 2024-07-01
Payer: MEDICARE

## 2024-07-01 PROCEDURE — 97112 NEUROMUSCULAR REEDUCATION: CPT | Performed by: PHYSICAL THERAPIST

## 2024-07-01 PROCEDURE — 97110 THERAPEUTIC EXERCISES: CPT | Performed by: PHYSICAL THERAPIST

## 2024-07-01 NOTE — FLOWSHEET NOTE
[facility]- Outpatient Rehabilitation and Therapy  [address], [place] [address] office: [phone] fax: [phone]        Physical Therapy: TREATMENT/PROGRESS NOTE   Patient: Fermín Khan (73 y.o. male)   Examination Date: 2024   :  1950 MRN: [id number]   Visit #: 14   Insurance Allowable Auth Needed   MC []Yes    [x]No    Insurance: Payor: MEDICARE / Plan: MEDICARE PART A AND B / Product Type: *No Product type* /   Insurance ID: [id number] - (Medicare)  Secondary Insurance (if applicable): CIGNA   Treatment Diagnosis:     ICD-10-CM    1. Chronic pain of right knee  M25.561     G89.29       2. Difficulty walking  R26.2       3. Weakness of right lower extremity  R29.898          Medical Diagnosis:  Pain in right knee [M25.561]  Presence of right artificial knee joint [Z96.651]   Referring Physician: Kwaku Alamo MD  PCP: Gissell Hinds (Inactive)     Plan of care signed (Y/N): YES    Date of Patient follow up with Physician: PRN     Progress Report/POC: NO   POC update due: (10 visits /OR AUTH LIMITS, whichever is less)  2024                                             Precautions/ Contra-indications:           Latex allergy:  NO  Pacemaker:    NO  Contraindications for Manipulation: NA  Date of Surgery: multiple right knee surgeries over the past 10 years  Other:    Red Flags:  None    C-SSRS Triggered by Intake questionnaire:   [x] No, Questionnaire did not trigger screening.   [] Yes, Patient intake triggered further evaluation      [] C-SSRS Screening completed  [] PCP notified via Plan of Care  [] Emergency services notified     Preferred Language for Healthcare:   [x] English       [] other:    SUBJECTIVE EXAMINATION     Patient stated complaint: Pt reports feeling winded when walking in clinic with walker.  Pt has been working on [employer] from [employer]     Roman hx:  Patient he has a history of right knee pain for many years. Had R TKA about 10

## 2024-07-03 ENCOUNTER — HOSPITAL ENCOUNTER (OUTPATIENT)
Dept: PHYSICAL THERAPY | Age: 74
Setting detail: THERAPIES SERIES
Discharge: HOME | End: 2024-07-03
Payer: MEDICARE

## 2024-07-03 PROCEDURE — 97110 THERAPEUTIC EXERCISES: CPT | Performed by: PHYSICAL THERAPIST

## 2024-07-03 PROCEDURE — 97112 NEUROMUSCULAR REEDUCATION: CPT | Performed by: PHYSICAL THERAPIST

## 2024-07-03 NOTE — FLOWSHEET NOTE
[facility]- Outpatient Rehabilitation and Therapy  [address], [place] [address] office: [phone] fax: [phone]        Physical Therapy: TREATMENT/PROGRESS NOTE   Patient: Fermín Khan (73 y.o. male)   Examination Date: 2024   :  1950 MRN: [id number]   Visit #: 15   Insurance Allowable Auth Needed   MC []Yes    [x]No    Insurance: Payor: MEDICARE / Plan: MEDICARE PART A AND B / Product Type: *No Product type* /   Insurance ID: [id number] - (Medicare)  Secondary Insurance (if applicable): [employer]IG[employer]   Treatment Diagnosis:     ICD-10-CM    1. Chronic pain of right knee  M25.561     G89.29       2. Difficulty walking  R26.2       3. Weakness of right lower extremity  R29.898          Medical Diagnosis:  Pain in right knee [M25.561]  Presence of right artificial knee joint [Z96.651]   Referring Physician: Kwaku Alamo MD  PCP: Gissell Hinds (Inactive)     Plan of care signed (Y/N): YES    Date of Patient follow up with Physician: PRN     Progress Report/POC: NO   POC update due: (10 visits /OR AUTH LIMITS, whichever is less)  2024                                             Precautions/ Contra-indications:           Latex allergy:  NO  Pacemaker:    NO  Contraindications for Manipulation: NA  Date of Surgery: multiple right knee surgeries over the past 10 years  Other:    Red Flags:  None    C-SSRS Triggered by Intake questionnaire:   [x] No, Questionnaire did not trigger screening.   [] Yes, Patient intake triggered further evaluation      [] C-SSRS Screening completed  [] PCP notified via Plan of Care  [] Emergency services notified     Preferred Language for Healthcare:   [x] English       [] other:    SUBJECTIVE EXAMINATION     Patient stated complaint: Pt reports that with the exercises, his blood pressure is lower.  Back is sore with staining deck.      Eval hx:  Patient he has a history of right knee pain for many years. Had R TKA about 10

## 2024-07-10 ENCOUNTER — HOSPITAL ENCOUNTER (OUTPATIENT)
Dept: PHYSICAL THERAPY | Age: 74
Setting detail: THERAPIES SERIES
Discharge: HOME | End: 2024-07-10
Payer: MEDICARE

## 2024-07-10 PROCEDURE — 97110 THERAPEUTIC EXERCISES: CPT | Performed by: PHYSICAL THERAPIST

## 2024-07-10 PROCEDURE — 97112 NEUROMUSCULAR REEDUCATION: CPT | Performed by: PHYSICAL THERAPIST

## 2024-07-10 NOTE — FLOWSHEET NOTE
[facility]- Outpatient Rehabilitation and Therapy  [address], [place] [address] office: [phone] fax: [phone]        Physical Therapy: TREATMENT/PROGRESS NOTE   Patient: Fermín Khan (73 y.o. male)   Examination Date: 07/10/2024   :  1950 MRN: [id number]   Visit #: 16   Insurance Allowable Auth Needed   MC []Yes    [x]No    Insurance: Payor: MEDICARE / Plan: MEDICARE PART A AND B / Product Type: *No Product type* /   Insurance ID: [id number] - (Medicare)  Secondary Insurance (if applicable): CIG[employer]   Treatment Diagnosis:     ICD-10-CM    1. Chronic pain of right knee  M25.561     G89.29       2. Difficulty walking  R26.2       3. Weakness of right lower extremity  R29.898          Medical Diagnosis:  Pain in right knee [M25.561]  Presence of right artificial knee joint [Z96.651]   Referring Physician: Kwaku Alamo MD  PCP: Gissell Hinds (Inactive)     Plan of care signed (Y/N): YES    Date of Patient follow up with Physician: PRN     Progress Report/POC: YES, Date Range for this report: 24 to 7/10/24    POC update due: (10 visits /OR AUTH LIMITS, whichever is less)  2024                                             Precautions/ Contra-indications:           Latex allergy:  NO  Pacemaker:    NO  Contraindications for Manipulation: NA  Date of Surgery: multiple right knee surgeries over the past 10 years  Other:    Red Flags:  None    C-SSRS Triggered by Intake questionnaire:   [x] No, Questionnaire did not trigger screening.   [] Yes, Patient intake triggered further evaluation      [] C-SSRS Screening completed  [] PCP notified via Plan of Care  [] Emergency services notified     Preferred Language for Healthcare:   [x] English       [] other:    SUBJECTIVE EXAMINATION     Patient stated complaint: Pt feels low on energy today.  Pt feels crepitus in right knee.     Eval hx:  Patient he has a history of right knee pain for many years. Had JAZMIN

## 2024-07-12 ENCOUNTER — APPOINTMENT (OUTPATIENT)
Dept: PHYSICAL THERAPY | Age: 74
End: 2024-07-12
Payer: MEDICARE

## 2024-07-16 ENCOUNTER — HOSPITAL ENCOUNTER (OUTPATIENT)
Dept: PHYSICAL THERAPY | Age: 74
Setting detail: THERAPIES SERIES
Discharge: HOME | End: 2024-07-16
Payer: MEDICARE

## 2024-07-16 PROCEDURE — 97112 NEUROMUSCULAR REEDUCATION: CPT | Performed by: PHYSICAL THERAPIST

## 2024-07-16 PROCEDURE — 97110 THERAPEUTIC EXERCISES: CPT | Performed by: PHYSICAL THERAPIST

## 2024-07-16 NOTE — FLOWSHEET NOTE
[facility]- Outpatient Rehabilitation and Therapy  [address], [place] [address] office: [phone] fax: [phone]        Physical Therapy: TREATMENT/PROGRESS NOTE   Patient: Fermín Khan (73 y.o. male)   Examination Date: 2024   :  1950 MRN: [id number]   Visit #: 17   Insurance Allowable Auth Needed   MC []Yes    [x]No    Insurance: Payor: MEDICARE / Plan: MEDICARE PART A AND B / Product Type: *No Product type* /   Insurance ID: [id number] - (Medicare)  Secondary Insurance (if applicable): CIGNA   Treatment Diagnosis:     ICD-10-CM    1. Chronic pain of right knee  M25.561     G89.29       2. Difficulty walking  R26.2       3. Weakness of right lower extremity  R29.898          Medical Diagnosis:  Pain in right knee [M25.561]  Presence of right artificial knee joint [Z96.651]   Referring Physician: Kwaku Alamo MD  PCP: Gissell Hinds (Inactive)     Plan of care signed (Y/N): YES    Date of Patient follow up with Physician: PRN     Progress Report/POC: YES, Date Range for this report: 24 to 7/10/24    POC update due: (10 visits /OR AUTH LIMITS, whichever is less)  2024                                             Precautions/ Contra-indications:           Latex allergy:  NO  Pacemaker:    NO  Contraindications for Manipulation: NA  Date of Surgery: multiple right knee surgeries over the past 10 years  Other:    Red Flags:  None    C-SSRS Triggered by Intake questionnaire:   [x] No, Questionnaire did not trigger screening.   [] Yes, Patient intake triggered further evaluation      [] C-SSRS Screening completed  [] PCP notified via Plan of Care  [] Emergency services notified     Preferred Language for Healthcare:   [x] English       [] other:    SUBJECTIVE EXAMINATION     Patient stated complaint: Pt was able to walk into clinic all the way to reception area.   Pt walked into dentist office yesterday.  Pt can tell his endurance is

## 2024-07-18 ENCOUNTER — APPOINTMENT (OUTPATIENT)
Dept: PHYSICAL THERAPY | Age: 74
End: 2024-07-18
Payer: MEDICARE

## 2024-07-23 ENCOUNTER — HOSPITAL ENCOUNTER (OUTPATIENT)
Dept: PHYSICAL THERAPY | Age: 74
Setting detail: THERAPIES SERIES
Discharge: HOME | End: 2024-07-23
Payer: MEDICARE

## 2024-07-23 PROCEDURE — 97110 THERAPEUTIC EXERCISES: CPT | Performed by: PHYSICAL THERAPIST

## 2024-07-23 PROCEDURE — 97112 NEUROMUSCULAR REEDUCATION: CPT | Performed by: PHYSICAL THERAPIST

## 2024-07-23 NOTE — FLOWSHEET NOTE
4+/5 DNT      Extension 4+/5 Fair - quad tone             ANKLE  DF 4+/5 4/5      PF        Inversion        Eversion          Exercises/Interventions       Therapeutic Ex (32600) and NMR re-education (00172) resistance Sets/time Reps Notes/Cues/Progressions   Tableside HS stretch  seated   Gastroc belt stretch  seated   Quad sets  10 mins  Estim Biphasic  10on/10off   Gluteal sets  5\" 30 x seated    10on/10off  23    Wrist flex and ext s :20 3x      Seated thoracic rotation  X 10   Supine ball add iso :05 X 30      TB row BL 2 x 10     TB No $ GR 2 x 10                Standing hip abd  right 4# 3 x 10     Standing hip ext 4# 3 x 10     Standing hip flex 4# 3 X 10  Too fatigued today to perform               Manual Intervention (78602)  TIME                                        Therapeutic Activity (73894)  Sets/time                                   Modalities:    No modalities applied this session    Education/Home Exercise Program: Patient HEP program created electronically.  Refer to [employer] access code: [email or web address]    Access Code: [email or web address]  URL: [email or web address]  Date: 05/06/2024  Prepared by: Sahil Sandoval    Exercises  - Long Sitting Calf Stretch with Strap  - 2 x daily - 7 x weekly - 1 sets - 3 reps - 30 hold  - Seated Table Hamstring Stretch  - 2 x daily - 7 x weekly - 1 sets - 3 reps - 30 hold  - Supine Quad Set  - 1 x daily - 7 x weekly - 2-3 sets - 10 reps - 5-10 hold  - Supine Gluteal Sets  - 1 x daily - 7 x weekly - 2-3 sets - 10 reps - 5-10 hold    ASSESSMENT   Today's Assessment:    Pt unable to perform ARAMIS due to fatigue.  Pt felt challenged by walk in and out of clinic.   Pt feels a night of poor rest is contributing to fatigue.     Medical Necessity Documentation:  I certify that this patient meets the below criteria necessary for medical necessity for care and/or justification of therapy services:  The patient has functional impairments and/or activity limitations and would benefit

## 2024-07-24 ENCOUNTER — APPOINTMENT (OUTPATIENT)
Dept: PHYSICAL THERAPY | Age: 74
End: 2024-07-24
Payer: MEDICARE

## 2024-07-30 ENCOUNTER — HOSPITAL ENCOUNTER (OUTPATIENT)
Dept: PHYSICAL THERAPY | Age: 74
Setting detail: THERAPIES SERIES
Discharge: HOME | End: 2024-07-30
Payer: MEDICARE

## 2024-07-30 PROCEDURE — 97112 NEUROMUSCULAR REEDUCATION: CPT | Performed by: PHYSICAL THERAPIST

## 2024-07-30 PROCEDURE — 97110 THERAPEUTIC EXERCISES: CPT | Performed by: PHYSICAL THERAPIST

## 2024-07-30 NOTE — FLOWSHEET NOTE
[facility]- Outpatient Rehabilitation and Therapy  [address], [place] [address] office: [phone] fax: [phone]        Physical Therapy: TREATMENT/PROGRESS NOTE   Patient: Fermín Khan (73 y.o. male)   Examination Date: 2024   :  1950 MRN: [id number]   Visit #: 19   Insurance Allowable Auth Needed   MC []Yes    [x]No    Insurance: Payor: MEDICARE / Plan: MEDICARE PART A AND B / Product Type: *No Product type* /   Insurance ID: [id number] - (Medicare)  Secondary Insurance (if applicable): CIGNA   Treatment Diagnosis:     ICD-10-CM    1. Chronic pain of right knee  M25.561     G89.29       2. Difficulty walking  R26.2       3. Weakness of right lower extremity  R29.898          Medical Diagnosis:  Pain in right knee [M25.561]  Presence of right artificial knee joint [Z96.651]   Referring Physician: Kwaku Alamo MD  PCP: Gissell Hinds (Inactive)     Plan of care signed (Y/N): YES    Date of Patient follow up with Physician: PRN     Progress Report/POC: YES, Date Range for this report: 24 to 7/10/24    POC update due: (10 visits /OR AUTH LIMITS, whichever is less)  2024                                             Precautions/ Contra-indications:           Latex allergy:  NO  Pacemaker:    NO  Contraindications for Manipulation: NA  Date of Surgery: multiple right knee surgeries over the past 10 years  Other:    Red Flags:  None    C-SSRS Triggered by Intake questionnaire:   [x] No, Questionnaire did not trigger screening.   [] Yes, Patient intake triggered further evaluation      [] C-SSRS Screening completed  [] PCP notified via Plan of Care  [] Emergency services notified     Preferred Language for Healthcare:   [x] English       [] other:    SUBJECTIVE EXAMINATION     Patient stated complaint: No change in symptoms. .     Eval hx:  Patient he has a history of right knee pain for many years. Had R TKA about 10 years ago and it did okay.

## 2024-08-02 ENCOUNTER — HOSPITAL ENCOUNTER (OUTPATIENT)
Dept: PHYSICAL THERAPY | Age: 74
Setting detail: THERAPIES SERIES
Discharge: HOME | End: 2024-08-02
Payer: MEDICARE

## 2024-08-02 PROCEDURE — 97140 MANUAL THERAPY 1/> REGIONS: CPT | Performed by: PHYSICAL THERAPIST

## 2024-08-02 PROCEDURE — 97110 THERAPEUTIC EXERCISES: CPT | Performed by: PHYSICAL THERAPIST

## 2024-08-02 NOTE — FLOWSHEET NOTE
[facility]- Outpatient Rehabilitation and Therapy  [address], [place] [address] office: [phone] fax: [phone]        Physical Therapy: TREATMENT/PROGRESS NOTE   Patient: Fermín Khan (73 y.o. male)   Examination Date: 2024   :  1950 MRN: [id number]   Visit #: 20   Insurance Allowable Auth Needed   MC []Yes    [x]No    Insurance: Payor: MEDICARE / Plan: MEDICARE PART A AND B / Product Type: *No Product type* /   Insurance ID: [id number] - (Medicare)  Secondary Insurance (if applicable):    Treatment Diagnosis:     ICD-10-CM    1. Chronic pain of right knee  M25.561     G89.29       2. Difficulty walking  R26.2       3. Weakness of right lower extremity  R29.898          Medical Diagnosis:  Pain in right knee [M25.561]  Presence of right artificial knee joint [Z96.651]   Referring Physician: Kwaku Alamo MD  PCP: Gissell Hinds (Inactive)     Plan of care signed (Y/N): YES    Date of Patient follow up with Physician: PRN     Progress Report/POC: YES, Date Range for this report: 24 to 7/10/24    POC update due: (10 visits /OR AUTH LIMITS, whichever is less)  2024                                             Precautions/ Contra-indications:           Latex allergy:  NO  Pacemaker:    NO  Contraindications for Manipulation: NA  Date of Surgery: multiple right knee surgeries over the past 10 years  Other:    Red Flags:  None    C-SSRS Triggered by Intake questionnaire:   [x] No, Questionnaire did not trigger screening.   [] Yes, Patient intake triggered further evaluation      [] C-SSRS Screening completed  [] PCP notified via Plan of Care  [] Emergency services notified     Preferred Language for Healthcare:   [x] English       [] other:    SUBJECTIVE EXAMINATION     Patient stated complaint: pt did not sleep well.  Knee pain seems worse today.  His knee has never buckled before, but he is nervous about it    Eval hx:  Patient he has a

## 2024-08-06 ENCOUNTER — HOSPITAL ENCOUNTER (OUTPATIENT)
Dept: PHYSICAL THERAPY | Age: 74
Setting detail: THERAPIES SERIES
Discharge: HOME | End: 2024-08-06
Payer: MEDICARE

## 2024-08-06 PROCEDURE — 97110 THERAPEUTIC EXERCISES: CPT | Performed by: PHYSICAL THERAPIST

## 2024-08-06 PROCEDURE — 97140 MANUAL THERAPY 1/> REGIONS: CPT | Performed by: PHYSICAL THERAPIST

## 2024-08-06 NOTE — FLOWSHEET NOTE
[facility]- Outpatient Rehabilitation and Therapy  [address], [place] [address] office: [phone] fax: [phone]        Physical Therapy: TREATMENT/PROGRESS NOTE   Patient: Fermín Khan (73 y.o. male)   Examination Date: 2024   :  1950 MRN: [id number]   Visit #: 21   Insurance Allowable Auth Needed   MC []Yes    [x]No    Insurance: Payor: MEDICARE / Plan: MEDICARE PART A AND B / Product Type: *No Product type* /   Insurance ID: [id number] - (Medicare)  Secondary Insurance (if applicable):    Treatment Diagnosis:     ICD-10-CM    1. Chronic pain of right knee  M25.561     G89.29       2. Difficulty walking  R26.2       3. Weakness of right lower extremity  R29.898          Medical Diagnosis:  Pain in right knee [M25.561]  Presence of right artificial knee joint [Z96.651]   Referring Physician: Kwaku Alamo MD  PCP: Gissell Hinds (Inactive)     Plan of care signed (Y/N): YES    Date of Patient follow up with Physician: PRN     Progress Report/POC: NO   POC update due: (10 visits /OR AUTH LIMITS, whichever is less)  2024                                             Precautions/ Contra-indications:           Latex allergy:  NO  Pacemaker:    NO  Contraindications for Manipulation: NA  Date of Surgery: multiple right knee surgeries over the past 10 years  Other:    Red Flags:  None    C-SSRS Triggered by Intake questionnaire:   [x] No, Questionnaire did not trigger screening.   [] Yes, Patient intake triggered further evaluation      [] C-SSRS Screening completed  [] PCP notified via Plan of Care  [] Emergency services notified     Preferred Language for Healthcare:   [x] English       [] other:    SUBJECTIVE EXAMINATION     Patient stated complaint: Re juan ALVAREZ  pt reports that he did a lot of cleaning from his w/c yesterday.  Today, he has experienced more pain.  Pt is concerned with basketball season.  He will not be able to get into arena from

## 2024-08-09 ENCOUNTER — HOSPITAL ENCOUNTER (OUTPATIENT)
Dept: PHYSICAL THERAPY | Age: 74
Setting detail: THERAPIES SERIES
Discharge: HOME | End: 2024-08-09
Payer: MEDICARE

## 2024-08-09 PROCEDURE — 97112 NEUROMUSCULAR REEDUCATION: CPT | Performed by: PHYSICAL THERAPIST

## 2024-08-09 PROCEDURE — 97110 THERAPEUTIC EXERCISES: CPT | Performed by: PHYSICAL THERAPIST

## 2024-08-09 NOTE — FLOWSHEET NOTE
able to ambulate 20' with the least restrictive AD in order to be able to ambulate safely around his house without falling. (patient specific functional goal)    [x] Progressing: [] Met: [] Not Met: [] Adjusted     Overall Progression Towards Functional goals/ Treatment Progress Update:  [x] Patient is progressing as expected towards functional goals listed.    [] Progression is slowed due to complexities/Impairments listed.  [] Progression has been slowed due to co-morbidities.  [] Plan just implemented, too soon (<30days) to assess goals progression   [] Goals require adjustment due to lack of progress  [] Patient is not progressing as expected and requires additional follow up with physician  [] Other:     TREATMENT PLAN     Frequency/Duration: 1-2x/week for 8 weeks for the following treatment interventions:    Interventions:  Therapeutic Exercise (35352) including: strength training, ROM, and functional mobility  Neuromuscular Re-education (95546) activation and proprioception, including postural re-education.    Gait Training (90438) for normalization of ambulation patterns and AD training.   Manual Therapy (24977) as indicated to include: Soft Tissue Mobilization, Dry Needling/IASTM, and Trigger Point Release  Modalities as needed that may include: Cryotherapy  Patient education on joint protection, postural re-education, activity modification, and progression of HEP    Plan: Cont POC- Continue emphasis/focus on improving proper muscle recruitment and activation/motor control patterns and reduce/eliminate soft tissue swelling/inflammation/restriction. Next visit plan to adjust HEP and continue current phase     Electronically Signed by Kusum Alberto PT,    Date: 08/09/2024     Note: Portions of this note have been templated and/or copied from initial evaluation, reassessments and prior notes for documentation efficiency.    Note: If patient does not return for scheduled/recommended follow up visits, this

## 2024-08-13 ENCOUNTER — HOSPITAL ENCOUNTER (OUTPATIENT)
Dept: PHYSICAL THERAPY | Age: 74
Setting detail: THERAPIES SERIES
Discharge: HOME | End: 2024-08-13
Payer: MEDICARE

## 2024-08-13 PROCEDURE — 97110 THERAPEUTIC EXERCISES: CPT | Performed by: PHYSICAL THERAPIST

## 2024-08-13 PROCEDURE — 97112 NEUROMUSCULAR REEDUCATION: CPT | Performed by: PHYSICAL THERAPIST

## 2024-08-13 NOTE — PLAN OF CARE
[facility] [place] re-education (60252) resistance Sets/time Reps Notes/Cues/Progressions   Tableside HS stretch  seated   Gastroc belt stretch  seated   Quad sets  10 mins  Estim Biphasic  10on/10off   Gluteal sets  5\" 30 x seated    10on/10off  23    Wrist flex and ext s :20 3x      Seated thoracic rotation  X 10   Supine ball add iso :05 X 30      TB row BL 3 x 10     TB No $ BL 3 x 10      SB bridge  unable     crunch  X 10 :05       Standing hip abd  right 3 x 10     Standing hip ext 3 x 10     Standing hip flex 3 X 10  Too fatigued today to perform               Manual Intervention (18472)  TIME                                        Therapeutic Activity (01780)  Sets/time                                   Modalities:    No modalities applied this session    Education/Home Exercise Program: Patient HEP program created electronically.  Refer to [employer] access code: [email or web address]    Access Code: [email or web address]  URL: [email or web address]  Date: 05/06/2024  Prepared by: Sahil Sandoval    Exercises  - Long Sitting Calf Stretch with Strap  - 2 x daily - 7 x weekly - 1 sets - 3 reps - 30 hold  - Seated Table Hamstring Stretch  - 2 x daily - 7 x weekly - 1 sets - 3 reps - 30 hold  - Supine Quad Set  - 1 x daily - 7 x weekly - 2-3 sets - 10 reps - 5-10 hold  - Supine Gluteal Sets  - 1 x daily - 7 x weekly - 2-3 sets - 10 reps - 5-10 hold    ASSESSMENT   Today's Assessment:    pt was unable to weight with standing SLR.  May switch to 1 x week or every other week for maintenance.     Medical Necessity Documentation:  I certify that this patient meets the below criteria necessary for medical necessity for care and/or justification of therapy services:  The patient has functional impairments and/or activity limitations and would benefit from continued outpatient therapy services to address the deficits outlined in the patients goals  The patient has a musculoskeletal condition(s) with a corresponding ICD-10 code that is of complexity and

## 2024-08-16 ENCOUNTER — HOSPITAL ENCOUNTER (OUTPATIENT)
Dept: PHYSICAL THERAPY | Age: 74
Setting detail: THERAPIES SERIES
Discharge: HOME | End: 2024-08-16
Payer: MEDICARE

## 2024-08-16 PROCEDURE — 97112 NEUROMUSCULAR REEDUCATION: CPT | Performed by: PHYSICAL THERAPIST

## 2024-08-16 PROCEDURE — 97110 THERAPEUTIC EXERCISES: CPT | Performed by: PHYSICAL THERAPIST

## 2024-08-16 NOTE — PLAN OF CARE
:  1950 MRN: [id number]   Visit #: 24   Insurance Allowable Auth Needed   MC []Yes    [x]No    Insurance: Payor: MEDICARE / Plan: MEDICARE PART A AND B / Product Type: *No Product type* /   Insurance ID: [id number] - (Medicare)  Secondary Insurance (if applicable):    Treatment Diagnosis:     ICD-10-CM    1. Chronic pain of right knee  M25.561     G89.29       2. Difficulty walking  R26.2       3. Weakness of right lower extremity  R29.898          Medical Diagnosis:  Pain in right knee [M25.561]  Presence of right artificial knee joint [Z96.651]   Referring Physician: Kwauk Alamo MD  PCP: Gissell Hinds (Inactive)     Plan of care signed (Y/N): YES    Date of Patient follow up with Physician: PRN     Progress Report/POC: NO   POC update due: (10 visits /OR AUTH LIMITS, whichever is less)  2024                                             Precautions/ Contra-indications:           Latex allergy:  NO  Pacemaker:    NO  Contraindications for Manipulation: NA  Date of Surgery: multiple right knee surgeries over the past 10 years  Other:    Red Flags:  None    C-SSRS Triggered by Intake questionnaire:   [x] No, Questionnaire did not trigger screening.   [] Yes, Patient intake triggered further evaluation      [] C-SSRS Screening completed  [] PCP notified via Plan of Care  [] Emergency services notified     Preferred Language for Healthcare:   [x] English       [] other:    SUBJECTIVE EXAMINATION     Patient stated complaint: Pt reports fatigue and continue inferior knee pain.     Eval hx:  Patient he has a history of right knee pain for many years. Had R TKA about 10 years ago and it did okay. After he had his L knee replaced he started noticing pain in his right knee again. Was told that his kneecap was out of place so he had another surgery on his right knee and afterwards he developed an infection. Had several surgeries to try to clean out the knee. Had several revision surgeries by

## 2024-08-23 ENCOUNTER — HOSPITAL ENCOUNTER (OUTPATIENT)
Dept: PHYSICAL THERAPY | Age: 74
Setting detail: THERAPIES SERIES
Discharge: HOME | End: 2024-08-23
Payer: MEDICARE

## 2024-08-23 PROCEDURE — 97140 MANUAL THERAPY 1/> REGIONS: CPT | Performed by: PHYSICAL THERAPIST

## 2024-08-23 PROCEDURE — 97110 THERAPEUTIC EXERCISES: CPT | Performed by: PHYSICAL THERAPIST

## 2024-08-23 NOTE — FLOWSHEET NOTE
[facility]- Outpatient Rehabilitation and Therapy  [address], [place] [address] office: [phone] fax: [phone]        Physical Therapy: TREATMENT/PROGRESS NOTE   Patient: Fermín Khan (73 y.o. male)   Examination Date: 2024   :  1950 MRN: [id number]   Visit #: 25   Insurance Allowable Auth Needed   MC []Yes    [x]No    Insurance: Payor: MEDICARE / Plan: MEDICARE PART A AND B / Product Type: *No Product type* /   Insurance ID: [id number] - (Medicare)  Secondary Insurance (if applicable):    Treatment Diagnosis:     ICD-10-CM    1. Chronic pain of right knee  M25.561     G89.29       2. Difficulty walking  R26.2       3. Weakness of right lower extremity  R29.898          Medical Diagnosis:  Pain in right knee [M25.561]  Presence of right artificial knee joint [Z96.651]   Referring Physician: Kwaku Alamo MD  PCP: Gissell Hinds (Inactive)     Plan of care signed (Y/N): YES    Date of Patient follow up with Physician: PRN     Progress Report/POC: NO   POC update due: (10 visits /OR AUTH LIMITS, whichever is less)  2024                                             Precautions/ Contra-indications:           Latex allergy:  NO  Pacemaker:    NO  Contraindications for Manipulation: NA  Date of Surgery: multiple right knee surgeries over the past 10 years  Other:    Red Flags:  None    C-SSRS Triggered by Intake questionnaire:   [x] No, Questionnaire did not trigger screening.   [] Yes, Patient intake triggered further evaluation      [] C-SSRS Screening completed  [] PCP notified via Plan of Care  [] Emergency services notified     Preferred Language for Healthcare:   [x] English       [] other:    SUBJECTIVE EXAMINATION     Patient stated complaint: Pt states that he is very tired today.  Frustrated by continued pain.     Eval hx:  Patient he has a history of right knee pain for many years. Had R TKA about 10 years ago and it did okay. After he

## 2024-08-30 ENCOUNTER — HOSPITAL ENCOUNTER (OUTPATIENT)
Dept: PHYSICAL THERAPY | Age: 74
Setting detail: THERAPIES SERIES
Discharge: HOME | End: 2024-08-30
Payer: MEDICARE

## 2024-08-30 PROCEDURE — 97140 MANUAL THERAPY 1/> REGIONS: CPT | Performed by: PHYSICAL THERAPIST

## 2024-08-30 PROCEDURE — 97110 THERAPEUTIC EXERCISES: CPT | Performed by: PHYSICAL THERAPIST

## 2024-08-30 NOTE — FLOWSHEET NOTE
[facility]- Outpatient Rehabilitation and Therapy  [address], [place] [address] office: [phone] fax: [phone]        Physical Therapy: TREATMENT/PROGRESS NOTE   Patient: Fermín Khan (73 y.o. male)   Examination Date: 2024   :  1950 MRN: [id number]   Visit #: 26   Insurance Allowable Auth Needed   MC []Yes    [x]No    Insurance: Payor: MEDICARE / Plan: MEDICARE PART A AND B / Product Type: *No Product type* /   Insurance ID: [id number] - (Medicare)  Secondary Insurance (if applicable):    Treatment Diagnosis:     ICD-10-CM    1. Chronic pain of right knee  M25.561     G89.29       2. Difficulty walking  R26.2       3. Weakness of right lower extremity  R29.898          Medical Diagnosis:  Pain in right knee [M25.561]  Presence of right artificial knee joint [Z96.651]   Referring Physician: Kwaku Alamo MD  PCP: Gissell Hinds (Inactive)     Plan of care signed (Y/N): YES    Date of Patient follow up with Physician: PRN     Progress Report/POC: NO   POC update due: (10 visits /OR AUTH LIMITS, whichever is less)  2024                                             Precautions/ Contra-indications:           Latex allergy:  NO  Pacemaker:    NO  Contraindications for Manipulation: NA  Date of Surgery: multiple right knee surgeries over the past 10 years  Other:    Red Flags:  None    C-SSRS Triggered by Intake questionnaire:   [x] No, Questionnaire did not trigger screening.   [] Yes, Patient intake triggered further evaluation      [] C-SSRS Screening completed  [] PCP notified via Plan of Care  [] Emergency services notified     Preferred Language for Healthcare:   [x] English       [] other:    SUBJECTIVE EXAMINATION     Patient stated complaint: Pt states that he continues to have pain in the tibia.   Giving pt info about NMES unit on [employer].  Pt is concerned that his spacer is 6 years old.  He is not sure how long they last.      Eval hx:   be able to ambulate safely around his house without falling. (patient specific functional goal)    [x] Progressing: [] Met: [] Not Met: [] Adjusted     Overall Progression Towards Functional goals/ Treatment Progress Update:  [] Patient is progressing as expected towards functional goals listed.    [] Progression is slowed due to complexities/Impairments listed.  [x] Progression has been slowed due to co-morbidities.  [] Plan just implemented, too soon (<30days) to assess goals progression   [] Goals require adjustment due to lack of progress  [] Patient is not progressing as expected and requires additional follow up with physician  [] Other:     TREATMENT PLAN     Frequency/Duration: 1-2x/week for 8 weeks for the following treatment interventions:    Interventions:  Therapeutic Exercise (67834) including: strength training, ROM, and functional mobility  Neuromuscular Re-education (36630) activation and proprioception, including postural re-education.    Gait Training (40442) for normalization of ambulation patterns and AD training.   Manual Therapy (19772) as indicated to include: Soft Tissue Mobilization, Dry Needling/IASTM, and Trigger Point Release  Modalities as needed that may include: Cryotherapy  Patient education on joint protection, postural re-education, activity modification, and progression of HEP    Plan: Cont POC- Continue emphasis/focus on improving proper muscle recruitment and activation/motor control patterns and reduce/eliminate soft tissue swelling/inflammation/restriction. Next visit plan to adjust HEP and continue current phase     Electronically Signed by Kusum Alberto PT,    Date: 08/30/2024     Note: Portions of this note have been templated and/or copied from initial evaluation, reassessments and prior notes for documentation efficiency.    Note: If patient does not return for scheduled/recommended follow up visits, this note will serve as a discharge from care along with the most

## 2024-09-06 ENCOUNTER — APPOINTMENT (OUTPATIENT)
Dept: PHYSICAL THERAPY | Age: 74
End: 2024-09-06
Payer: MEDICARE

## 2024-09-06 ENCOUNTER — HOSPITAL ENCOUNTER (OUTPATIENT)
Dept: PHYSICAL THERAPY | Age: 74
Setting detail: THERAPIES SERIES
Discharge: HOME | End: 2024-09-06
Payer: MEDICARE

## 2024-09-06 PROCEDURE — 97110 THERAPEUTIC EXERCISES: CPT | Performed by: PHYSICAL THERAPIST

## 2024-09-06 PROCEDURE — 97112 NEUROMUSCULAR REEDUCATION: CPT | Performed by: PHYSICAL THERAPIST

## 2024-09-06 NOTE — FLOWSHEET NOTE
[facility]- Outpatient Rehabilitation and Therapy  [address], [place] [address] office: [phone] fax: [phone]        Physical Therapy: TREATMENT/PROGRESS NOTE   Patient: Fermín Khan (73 y.o. male)   Examination Date: 2024   :  1950 MRN: [id number]   Visit #: 27   Insurance Allowable Auth Needed   MC []Yes    [x]No    Insurance: Payor: MEDICARE / Plan: MEDICARE PART A AND B / Product Type: *No Product type* /   Insurance ID: [id number] - (Medicare)  Secondary Insurance (if applicable):    Treatment Diagnosis:     ICD-10-CM    1. Chronic pain of right knee  M25.561     G89.29       2. Difficulty walking  R26.2       3. Weakness of right lower extremity  R29.898          Medical Diagnosis:  Pain in right knee [M25.561]  Presence of right artificial knee joint [Z96.651]   Referring Physician: Kwaku Alamo MD  PCP: Gissell Hinds (Inactive)     Plan of care signed (Y/N): YES    Date of Patient follow up with Physician: PRN     Progress Report/POC: NO   POC update due: (10 visits /OR AUTH LIMITS, whichever is less)  2024                                             Precautions/ Contra-indications:           Latex allergy:  NO  Pacemaker:    NO  Contraindications for Manipulation: NA  Date of Surgery: multiple right knee surgeries over the past 10 years  Other:    Red Flags:  None    C-SSRS Triggered by Intake questionnaire:   [x] No, Questionnaire did not trigger screening.   [] Yes, Patient intake triggered further evaluation      [] C-SSRS Screening completed  [] PCP notified via Plan of Care  [] Emergency services notified     Preferred Language for Healthcare:   [x] English       [] other:    SUBJECTIVE EXAMINATION     Patient stated complaint: Knee popping is painful.  Pt has had mm spasms that wake him from sleep.      Eval hx:  Patient he has a history of right knee pain for many years. Had R TKA about 10 years ago and it did okay. After

## 2024-09-10 ENCOUNTER — HOSPITAL ENCOUNTER (OUTPATIENT)
Dept: PHYSICAL THERAPY | Age: 74
Setting detail: THERAPIES SERIES
Discharge: HOME | End: 2024-09-10
Payer: MEDICARE

## 2024-09-10 PROCEDURE — 97110 THERAPEUTIC EXERCISES: CPT | Performed by: PHYSICAL THERAPIST

## 2024-09-10 PROCEDURE — 97112 NEUROMUSCULAR REEDUCATION: CPT | Performed by: PHYSICAL THERAPIST

## 2024-09-17 ENCOUNTER — HOSPITAL ENCOUNTER (OUTPATIENT)
Dept: PHYSICAL THERAPY | Age: 74
Setting detail: THERAPIES SERIES
Discharge: HOME | End: 2024-09-17
Payer: MEDICARE

## 2024-09-17 PROCEDURE — 97140 MANUAL THERAPY 1/> REGIONS: CPT | Performed by: PHYSICAL THERAPIST

## 2024-09-17 PROCEDURE — 97110 THERAPEUTIC EXERCISES: CPT | Performed by: PHYSICAL THERAPIST

## 2024-09-19 ENCOUNTER — TELEPHONE (OUTPATIENT)
Dept: PHYSICAL THERAPY | Age: 74
End: 2024-09-19

## 2024-09-27 ENCOUNTER — APPOINTMENT (OUTPATIENT)
Dept: PHYSICAL THERAPY | Age: 74
End: 2024-09-27
Payer: MEDICARE

## 2024-10-04 ENCOUNTER — HOSPITAL ENCOUNTER (OUTPATIENT)
Dept: PHYSICAL THERAPY | Age: 74
Setting detail: THERAPIES SERIES
Discharge: HOME | End: 2024-10-04
Payer: MEDICARE

## 2024-10-04 PROCEDURE — 97110 THERAPEUTIC EXERCISES: CPT | Performed by: PHYSICAL THERAPIST

## 2024-10-04 PROCEDURE — 97112 NEUROMUSCULAR REEDUCATION: CPT | Performed by: PHYSICAL THERAPIST

## 2024-10-04 NOTE — FLOWSHEET NOTE
[facility]- Outpatient Rehabilitation and Therapy  [address], [place] [address] office: [phone] fax: [phone]        Physical Therapy: TREATMENT/PROGRESS NOTE   Patient: Fermín Khan (73 y.o. male)   Examination Date: 10/04/2024   :  1950 MRN: [id number]   Visit #: 30   Insurance Allowable Auth Needed   MC []Yes    [x]No    Insurance: Payor: MEDICARE / Plan: MEDICARE PART A AND B / Product Type: *No Product type* /   Insurance ID: [id number] - (Medicare)  Secondary Insurance (if applicable):    Treatment Diagnosis:     ICD-10-CM    1. Chronic pain of right knee  M25.561     G89.29       2. Difficulty walking  R26.2       3. Weakness of right lower extremity  R29.898          Medical Diagnosis:  Pain in right knee [M25.561]  Presence of right artificial knee joint [Z96.651]   Referring Physician: Kwaku Alamo MD  PCP: Gissell Hinds (Inactive)     Plan of care signed (Y/N): YES    Date of Patient follow up with Physician: PRN     Progress Report/POC: NO   POC update due: (10 visits /OR AUTH LIMITS, whichever is less)  2024                                             Precautions/ Contra-indications:           Latex allergy:  NO  Pacemaker:    NO  Contraindications for Manipulation: NA  Date of Surgery: multiple right knee surgeries over the past 10 years  Other:    Red Flags:  None    C-SSRS Triggered by Intake questionnaire:   [x] No, Questionnaire did not trigger screening.   [] Yes, Patient intake triggered further evaluation      [] C-SSRS Screening completed  [] PCP notified via Plan of Care  [] Emergency services notified     Preferred Language for Healthcare:   [x] English       [] other:    SUBJECTIVE EXAMINATION     Patient stated complaint: Pt received TENS unit, not NMES.  Pt is going to reorder.   Pt states that pain in knee has been worse.  Pt was able to walk to cardiologist office.     Eval hx:  Patient he has a history of right

## 2024-10-07 ENCOUNTER — TELEPHONE (OUTPATIENT)
Dept: PHYSICAL THERAPY | Age: 74
End: 2024-10-07

## 2024-10-07 NOTE — TELEPHONE ENCOUNTER
Called referring provider's office to request review, sign and return of outpatient rehabilitation plan of care. Spoke with MANJULA.   Comments (if applicable): CALLED THE PROVIDER'S OFFICE BECAUSE FIRST 2 ATTEMPTS TO FAX POC HAS FAILED TO GO THROUGH. VERIFYING PROVIDERS FAX NUMBER TO ATTEMPT A 3RD FAX OF POC. FAX NUMBER: [phone]

## 2024-10-16 ENCOUNTER — APPOINTMENT (OUTPATIENT)
Dept: PHYSICAL THERAPY | Age: 74
End: 2024-10-16
Payer: MEDICARE

## 2024-10-21 ENCOUNTER — HOSPITAL ENCOUNTER (OUTPATIENT)
Dept: PHYSICAL THERAPY | Age: 74
Setting detail: THERAPIES SERIES
Discharge: HOME | End: 2024-10-21
Payer: MEDICARE

## 2024-10-21 PROCEDURE — 97110 THERAPEUTIC EXERCISES: CPT | Performed by: PHYSICAL THERAPIST

## 2024-10-21 PROCEDURE — 97112 NEUROMUSCULAR REEDUCATION: CPT | Performed by: PHYSICAL THERAPIST

## 2024-10-21 NOTE — FLOWSHEET NOTE
[facility]- Outpatient Rehabilitation and Therapy  [address], [place] [address] office: [phone] fax: [phone]        Physical Therapy: TREATMENT/PROGRESS NOTE   Patient: Fermín Khan (73 y.o. male)   Examination Date: 10/21/2024   :  1950 MRN: [id number]   Visit #: 31   Insurance Allowable Auth Needed   MC []Yes    [x]No    Insurance: Payor: MEDICARE / Plan: MEDICARE PART A AND B / Product Type: *No Product type* /   Insurance ID: [id number] - (Medicare)  Secondary Insurance (if applicable):    Treatment Diagnosis:     ICD-10-CM    1. Chronic pain of right knee  M25.561     G89.29       2. Difficulty walking  R26.2       3. Weakness of right lower extremity  R29.898          Medical Diagnosis:  Pain in right knee [M25.561]  Presence of right artificial knee joint [Z96.651]   Referring Physician: Kwaku Alamo MD  PCP: Gissell Hinds (Inactive)     Plan of care signed (Y/N): YES    Date of Patient follow up with Physician: PRN     Progress Report/POC: NO   POC update due: (10 visits /OR AUTH LIMITS, whichever is less)  2024                                             Precautions/ Contra-indications:           Latex allergy:  NO  Pacemaker:    NO  Contraindications for Manipulation: NA  Date of Surgery: multiple right knee surgeries over the past 10 years  Other:    Red Flags:  None    C-SSRS Triggered by Intake questionnaire:   [x] No, Questionnaire did not trigger screening.   [] Yes, Patient intake triggered further evaluation      [] C-SSRS Screening completed  [] PCP notified via Plan of Care  [] Emergency services notified     Preferred Language for Healthcare:   [x] English       [] other:    SUBJECTIVE EXAMINATION     Patient stated complaint: Pt would like to volunteer at [employer], but he is unable to get his own w/c in and out of car.   Pt is unable to amb with walker on steep hill    Eval hx:  Patient he has a history of right knee pain

## (undated) DEVICE — SOLUTION IRRIG 2000ML 0.9% SOD CHL USP UROMATIC PLAS CONT

## (undated) DEVICE — SMARTGOWN SURGICAL GOWN, XL: Brand: CONVERTORS

## (undated) DEVICE — STAPLER SKIN H3.9MM WIRE DIA0.58MM CRWN 6.9MM 35 STPL FIX

## (undated) DEVICE — STAPLER EXT SKIN 35 WIDE S STL STPL SQUEEZE HNDL VISISTAT

## (undated) DEVICE — SUTURE MCRYL SZ 0 L18IN ABSRB VLT L36MM CT-1 1/2 CIR Y740D

## (undated) DEVICE — SUTURE ETHBND EXCEL SZ 0 L18IN NONABSORBABLE GRN L36MM CT-1 CX21D

## (undated) DEVICE — ZIMMER® STERILE DISPOSABLE TOURNIQUET CUFF WITH PLC, DUAL PORT, SINGLE BLADDER, 34 IN. (86 CM)

## (undated) DEVICE — SMARTGOWN BREATHABLE SURGICAL GOWN: Brand: CONVERTORS

## (undated) DEVICE — GLOVE ORANGE PI 7 1/2   MSG9075

## (undated) DEVICE — PENCIL SMK EVAC ALL IN 1 DSGN ENH VISIBILITY IMPROVED AIR

## (undated) DEVICE — HANDPIECE SET WITH HIGH FLOW TIP AND SUCTION TUBE: Brand: INTERPULSE

## (undated) DEVICE — COVER,TABLE,HEAVY DUTY,50"X90",STRL: Brand: MEDLINE

## (undated) DEVICE — SOLUTION IV IRRIG 500ML 0.9% SODIUM CHL 2F7123

## (undated) DEVICE — GLOVE ORANGE PI 8 1/2   MSG9085

## (undated) DEVICE — SUTURE MCRYL SZ 2-0 L18IN ABSRB VLT L36MM CT-1 1/2 CIR Y739D

## (undated) DEVICE — GLOVE ORANGE PI 7   MSG9070

## (undated) DEVICE — PAD,ABDOMINAL,8"X10",ST,LF: Brand: MEDLINE

## (undated) DEVICE — HOOD, PEEL-AWAY: Brand: FLYTE

## (undated) DEVICE — GLOVE ORANGE PI 8   MSG9080

## (undated) DEVICE — SPLINT KNEE UNIV FOR LESS THAN 36IN L20IN FOAM LAM E CNTCT

## (undated) DEVICE — STERILE LATEX POWDER-FREE SURGICAL GLOVESWITH NITRILE COATING: Brand: PROTEXIS

## (undated) DEVICE — SOLUTION IV IRRIG WATER 1000ML POUR BRL 2F7114

## (undated) DEVICE — Device

## (undated) DEVICE — KNEE IMMOBILIZER: Brand: DEROYAL

## (undated) DEVICE — SUTURE ETHLN SZ 3-0 L30IN NONABSORBABLE BLK FSL L30MM 3/8 1671H

## (undated) DEVICE — 3 BONE CEMENT MIXER: Brand: MIXEVAC

## (undated) DEVICE — PACK PROCEDURE SURG EXTREMITY SORGER CDS

## (undated) DEVICE — STANDARD HYPODERMIC NEEDLE,POLYPROPYLENE HUB: Brand: MONOJECT

## (undated) DEVICE — GOWN,REINF,POLY,AURORA,XLNG/XXL,STRL: Brand: MEDLINE

## (undated) DEVICE — SPONGE LAP W18XL18IN WHT COT 4 PLY FLD STRUNG RADPQ DISP ST

## (undated) DEVICE — DRESSING FOAM W4XL12IN AG SIL ADH ANTIMIC POSTOP OPTIFOAM

## (undated) DEVICE — SYRINGE MED 10ML LUERLOCK TIP W/O SFTY DISP

## (undated) DEVICE — COVER XR CASS W20XL41IN UNIV ADH STRP

## (undated) DEVICE — DRESSING FOAM W10XL30CM ANTIMIC SELF ADH SAFETAC TECHNOLOGY

## (undated) DEVICE — SOLUTION IRRIG 2000ML STRL H2O UROMATIC PLAS CONT USP

## (undated) DEVICE — GOWN,REINFORCED,POLY,AURORA,XLARGE,STRL: Brand: MEDLINE

## (undated) DEVICE — SYRINGE, LUER LOCK, 30ML: Brand: MEDLINE

## (undated) DEVICE — GLOVE SURG SZ 9 L11.85IN FNGR THK9.8MIL STRW LTX POLYMER